# Patient Record
Sex: MALE | Race: WHITE | NOT HISPANIC OR LATINO | Employment: OTHER | URBAN - METROPOLITAN AREA
[De-identification: names, ages, dates, MRNs, and addresses within clinical notes are randomized per-mention and may not be internally consistent; named-entity substitution may affect disease eponyms.]

---

## 2022-08-24 ENCOUNTER — HOSPITAL ENCOUNTER (EMERGENCY)
Facility: HOSPITAL | Age: 67
Discharge: HOME | End: 2022-08-24
Attending: EMERGENCY MEDICINE
Payer: COMMERCIAL

## 2022-08-24 VITALS
TEMPERATURE: 98.3 F | RESPIRATION RATE: 20 BRPM | SYSTOLIC BLOOD PRESSURE: 163 MMHG | DIASTOLIC BLOOD PRESSURE: 89 MMHG | HEART RATE: 125 BPM | OXYGEN SATURATION: 98 % | WEIGHT: 273.59 LBS

## 2022-08-24 DIAGNOSIS — R74.01 TRANSAMINITIS: ICD-10-CM

## 2022-08-24 DIAGNOSIS — R11.2 NAUSEA & VOMITING: Primary | ICD-10-CM

## 2022-08-24 DIAGNOSIS — D69.6 THROMBOCYTOPENIA (HCC): ICD-10-CM

## 2022-08-24 LAB
ALBUMIN SERPL BCP-MCNC: 4.2 G/DL (ref 3.5–5)
ALP SERPL-CCNC: 83 U/L (ref 46–116)
ALT SERPL W P-5'-P-CCNC: 111 U/L (ref 12–78)
ANION GAP SERPL CALCULATED.3IONS-SCNC: 10 MMOL/L (ref 4–13)
AST SERPL W P-5'-P-CCNC: 137 U/L (ref 5–45)
BASOPHILS # BLD AUTO: 0.04 THOUSANDS/ΜL (ref 0–0.1)
BASOPHILS NFR BLD AUTO: 1 % (ref 0–1)
BILIRUB SERPL-MCNC: 0.81 MG/DL (ref 0.2–1)
BUN SERPL-MCNC: 12 MG/DL (ref 5–25)
CALCIUM SERPL-MCNC: 9.5 MG/DL (ref 8.3–10.1)
CHLORIDE SERPL-SCNC: 99 MMOL/L (ref 96–108)
CO2 SERPL-SCNC: 26 MMOL/L (ref 21–32)
CREAT SERPL-MCNC: 1.06 MG/DL (ref 0.6–1.3)
EOSINOPHIL # BLD AUTO: 0.05 THOUSAND/ΜL (ref 0–0.61)
EOSINOPHIL NFR BLD AUTO: 1 % (ref 0–6)
ERYTHROCYTE [DISTWIDTH] IN BLOOD BY AUTOMATED COUNT: 12.4 % (ref 11.6–15.1)
GFR SERPL CREATININE-BSD FRML MDRD: 73 ML/MIN/1.73SQ M
GLUCOSE SERPL-MCNC: 170 MG/DL (ref 65–140)
HCT VFR BLD AUTO: 38.2 % (ref 36.5–49.3)
HGB BLD-MCNC: 13.4 G/DL (ref 12–17)
IMM GRANULOCYTES # BLD AUTO: 0.05 THOUSAND/UL (ref 0–0.2)
IMM GRANULOCYTES NFR BLD AUTO: 1 % (ref 0–2)
LIPASE SERPL-CCNC: 136 U/L (ref 73–393)
LYMPHOCYTES # BLD AUTO: 0.36 THOUSANDS/ΜL (ref 0.6–4.47)
LYMPHOCYTES NFR BLD AUTO: 7 % (ref 14–44)
MCH RBC QN AUTO: 32.8 PG (ref 26.8–34.3)
MCHC RBC AUTO-ENTMCNC: 35.1 G/DL (ref 31.4–37.4)
MCV RBC AUTO: 93 FL (ref 82–98)
MONOCYTES # BLD AUTO: 0.41 THOUSAND/ΜL (ref 0.17–1.22)
MONOCYTES NFR BLD AUTO: 9 % (ref 4–12)
NEUTROPHILS # BLD AUTO: 3.94 THOUSANDS/ΜL (ref 1.85–7.62)
NEUTS SEG NFR BLD AUTO: 81 % (ref 43–75)
NRBC BLD AUTO-RTO: 0 /100 WBCS
PLATELET # BLD AUTO: 90 THOUSANDS/UL (ref 149–390)
PMV BLD AUTO: 10 FL (ref 8.9–12.7)
POTASSIUM SERPL-SCNC: 4.9 MMOL/L (ref 3.5–5.3)
PROT SERPL-MCNC: 8.5 G/DL (ref 6.4–8.4)
RBC # BLD AUTO: 4.09 MILLION/UL (ref 3.88–5.62)
SODIUM SERPL-SCNC: 135 MMOL/L (ref 135–147)
WBC # BLD AUTO: 4.85 THOUSAND/UL (ref 4.31–10.16)

## 2022-08-24 PROCEDURE — 80053 COMPREHEN METABOLIC PANEL: CPT | Performed by: EMERGENCY MEDICINE

## 2022-08-24 PROCEDURE — 85025 COMPLETE CBC W/AUTO DIFF WBC: CPT | Performed by: EMERGENCY MEDICINE

## 2022-08-24 PROCEDURE — 83690 ASSAY OF LIPASE: CPT | Performed by: EMERGENCY MEDICINE

## 2022-08-24 PROCEDURE — 96361 HYDRATE IV INFUSION ADD-ON: CPT

## 2022-08-24 PROCEDURE — 99285 EMERGENCY DEPT VISIT HI MDM: CPT

## 2022-08-24 PROCEDURE — 96374 THER/PROPH/DIAG INJ IV PUSH: CPT

## 2022-08-24 PROCEDURE — 99284 EMERGENCY DEPT VISIT MOD MDM: CPT | Performed by: EMERGENCY MEDICINE

## 2022-08-24 PROCEDURE — 36415 COLL VENOUS BLD VENIPUNCTURE: CPT | Performed by: EMERGENCY MEDICINE

## 2022-08-24 RX ORDER — AMLODIPINE BESYLATE 5 MG/1
5 TABLET ORAL DAILY
COMMUNITY

## 2022-08-24 RX ORDER — ONDANSETRON 4 MG/1
4 TABLET, FILM COATED ORAL EVERY 8 HOURS PRN
COMMUNITY

## 2022-08-24 RX ORDER — FAMOTIDINE 10 MG/ML
20 INJECTION, SOLUTION INTRAVENOUS ONCE
Status: COMPLETED | OUTPATIENT
Start: 2022-08-24 | End: 2022-08-24

## 2022-08-24 RX ADMIN — FAMOTIDINE 20 MG: 10 INJECTION, SOLUTION INTRAVENOUS at 16:53

## 2022-08-24 RX ADMIN — SODIUM CHLORIDE 1000 ML: 0.9 INJECTION, SOLUTION INTRAVENOUS at 16:53

## 2022-08-24 NOTE — ED PROVIDER NOTES
History  Chief Complaint   Patient presents with    Tremors     Patient reports tremors for the past week as well as N/V for the past four weeks  Due to patient's inability to keep anything down by mouth, he has not taken any of his prescription medication (water pill, losartan, amlodipine)  73 yo M with HTN, fatty liver, referred to ED by PCP, c/o not feeling since 7/27/22, when he was evaluated at [facility], specfically for rib pain, subsequently diagnosed with colitis, treated with flagyl and cipro  He says leaving the ED that day, he began having periodic vomiting, and it has been an issue for him since then  He was evaluated again at [facility] 7/29/22, c/o feeling shaky, persistent vomiting, with no additional diagnosis  He followed up PCP, subsequently finished the abx, and was referred to GI, with EGD and colonoscopy that did not have any findings, and specifically no colitis  He has been prescribed ondansetron  He has not been eating or drinking much at home, due to the vomiting, which has been occurring daily, some days more than others, averaging maybe 4 times daily  He also c/o "tremor" which he describes a fine shaking of his hands, which he showed me, and admits it is less prominent today than others  His PCP was concerned about him, so referred him to the ED  He denies smoking, drugs  He has occasional alcohol, last use 2 days ago  Prior to Admission Medications   Prescriptions Last Dose Informant Patient Reported? Taking?    FOLIC ACID PO   Yes Yes   Sig: Take by mouth in the morning   LOSARTAN POTASSIUM PO   Yes Yes   Sig: Take by mouth in the morning   TRIAMTERENE PO   Yes Yes   Sig: Take by mouth in the morning   VITAMIN D PO   Yes Yes   Sig: Take by mouth in the morning   amLODIPine (NORVASC) 5 mg tablet   Yes Yes   Sig: Take 5 mg by mouth daily   ondansetron (ZOFRAN) 4 mg tablet   Yes Yes   Sig: Take 4 mg by mouth every 8 (eight) hours as needed for nausea or vomiting Facility-Administered Medications: None       Past Medical History:   Diagnosis Date    Hypertension        Past Surgical History:   Procedure Laterality Date    REPLACEMENT TOTAL KNEE Bilateral        History reviewed  No pertinent family history  I have reviewed and agree with the history as documented  E-Cigarette/Vaping    E-Cigarette Use Never User      E-Cigarette/Vaping Substances    Nicotine No     THC No     CBD No     Flavoring No     Other No     Unknown No      Social History     Tobacco Use    Smoking status: Former Smoker    Smokeless tobacco: Never Used   Vaping Use    Vaping Use: Never used   Substance Use Topics    Alcohol use: Yes     Comment: occassionally    Drug use: Not Currently       Review of Systems   Constitutional: Negative for appetite change, chills and fever  HENT: Negative for sore throat  Respiratory: Negative for cough, shortness of breath and wheezing  Cardiovascular: Negative for chest pain and palpitations  Gastrointestinal: Positive for nausea  Negative for abdominal pain, diarrhea and vomiting  Genitourinary: Negative for dysuria and hematuria  Musculoskeletal: Negative for neck pain  Skin: Negative for rash  Neurological: Negative for dizziness, weakness and headaches  Psychiatric/Behavioral: Negative for suicidal ideas  All other systems reviewed and are negative  Physical Exam  Physical Exam  Vitals and nursing note reviewed  Constitutional:       Appearance: Normal appearance  He is well-developed  He is obese  He is not toxic-appearing or diaphoretic  HENT:      Head: Normocephalic  Right Ear: Tympanic membrane and external ear normal       Left Ear: External ear normal       Nose: Nose normal    Eyes:      General: Lids are normal       Conjunctiva/sclera: Conjunctivae normal       Pupils: Pupils are equal, round, and reactive to light  Neck:      Vascular: No JVD        Meningeal: Brudzinski's sign and Kernig's sign absent  Cardiovascular:      Rate and Rhythm: Normal rate and regular rhythm  Heart sounds: Normal heart sounds  No murmur heard  Pulmonary:      Effort: Pulmonary effort is normal  No tachypnea, accessory muscle usage or respiratory distress  Breath sounds: Normal breath sounds  No wheezing  Abdominal:      General: Bowel sounds are normal  There is no distension  Palpations: Abdomen is soft  Abdomen is not rigid  There is no mass  Tenderness: There is no abdominal tenderness  There is no guarding or rebound  Musculoskeletal:         General: Normal range of motion  Cervical back: Normal range of motion and neck supple  Lymphadenopathy:      Head:      Right side of head: No submental, submandibular, preauricular or posterior auricular adenopathy  Left side of head: No submental, submandibular, preauricular or posterior auricular adenopathy  Cervical: No cervical adenopathy  Skin:     General: Skin is warm and dry  Capillary Refill: Capillary refill takes less than 2 seconds  Findings: No rash  Rash is not purpuric  Neurological:      Mental Status: He is alert and oriented to person, place, and time  GCS: GCS eye subscore is 4  GCS verbal subscore is 5  GCS motor subscore is 6  Cranial Nerves: No cranial nerve deficit  Sensory: No sensory deficit  Motor: Tremor (fine tremor of hands when held out, but not at rest) present  Coordination: Coordination normal       Deep Tendon Reflexes: Reflexes are normal and symmetric  Psychiatric:         Speech: Speech normal          Behavior: Behavior normal          Thought Content:  Thought content normal          Vital Signs  ED Triage Vitals [08/24/22 1529]   Temperature Pulse Respirations Blood Pressure SpO2   98 3 °F (36 8 °C) (!) 125 20 163/89 98 %      Temp Source Heart Rate Source Patient Position - Orthostatic VS BP Location FiO2 (%)   Oral Monitor Sitting Right arm --      Pain Score       No Pain           Vitals:    08/24/22 1529   BP: 163/89   Pulse: (!) 125   Patient Position - Orthostatic VS: Sitting         Visual Acuity      ED Medications  Medications   sodium chloride 0 9 % bolus 1,000 mL (0 mL Intravenous Stopped 8/24/22 1751)   Famotidine (PF) (PEPCID) injection 20 mg (20 mg Intravenous Given 8/24/22 1653)       Diagnostic Studies  Results Reviewed     Procedure Component Value Units Date/Time    Comprehensive metabolic panel [[id number]]  (Abnormal) Collected: 08/24/22 1558    Lab Status: Final result Specimen: Blood from Arm, Left Updated: 08/24/22 1618     Sodium 135 mmol/L      Potassium 4 9 mmol/L      Chloride 99 mmol/L      CO2 26 mmol/L      ANION GAP 10 mmol/L      BUN 12 mg/dL      Creatinine 1 06 mg/dL      Glucose 170 mg/dL      Calcium 9 5 mg/dL       U/L       U/L      Alkaline Phosphatase 83 U/L      Total Protein 8 5 g/dL      Albumin 4 2 g/dL      Total Bilirubin 0 81 mg/dL      eGFR 73 ml/min/1 73sq m     Narrative:      [place] guidelines for Chronic Kidney Disease (CKD):     Stage 1 with normal or high GFR (GFR > 90 mL/min/1 73 square meters)    Stage 2 Mild CKD (GFR = 60-89 mL/min/1 73 square meters)    Stage 3A Moderate CKD (GFR = 45-59 mL/min/1 73 square meters)    Stage 3B Moderate CKD (GFR = 30-44 mL/min/1 73 square meters)    Stage 4 Severe CKD (GFR = 15-29 mL/min/1 73 square meters)    Stage 5 End Stage CKD (GFR <15 mL/min/1 73 square meters)  Note: GFR calculation is accurate only with a steady state creatinine    Lipase [[id number]]  (Normal) Collected: 08/24/22 1558    Lab Status: Final result Specimen: Blood from Arm, Left Updated: 08/24/22 1618     Lipase 136 u/L     CBC and differential [[id number]]  (Abnormal) Collected: 08/24/22 1558    Lab Status: Final result Specimen: Blood from Arm, Left Updated: 08/24/22 1607     WBC 4 85 Thousand/uL      RBC 4 09 Million/uL      Hemoglobin 13 4 g/dL Hematocrit 38 2 %      MCV 93 fL      MCH 32 8 pg      MCHC 35 1 g/dL      RDW 12 4 %      MPV 10 0 fL      Platelets 90 Thousands/uL      nRBC 0 /100 WBCs      Neutrophils Relative 81 %      Immat GRANS % 1 %      Lymphocytes Relative 7 %      Monocytes Relative 9 %      Eosinophils Relative 1 %      Basophils Relative 1 %      Neutrophils Absolute 3 94 Thousands/µL      Immature Grans Absolute 0 05 Thousand/uL      Lymphocytes Absolute 0 36 Thousands/µL      Monocytes Absolute 0 41 Thousand/µL      Eosinophils Absolute 0 05 Thousand/µL      Basophils Absolute 0 04 Thousands/µL                  No orders to display              Procedures  Procedures         ED Course  ED Course as of 08/24/22 1934   Wed Aug 24, 2022   1720 Mild elevation of LFTs over his chronic baseline   1720 Platelet Count(!): 90  Mild increase of thrombocytopenia from his previous, trend has been decreasing platement  He has no purpura, no petechiae  1726 Reviewed results with patient at bedside and updated on the plan  Went over labs that are abnormal, nothing severe requiring emergency intervention, but for which I am advising outpatient followup with GI and Hem/Onc                                             MDM    Disposition  Final diagnoses:   Nausea & vomiting   Thrombocytopenia ([address] )   Transaminitis     Time reflects when diagnosis was documented in both MDM as applicable and the Disposition within this note     Time User Action Codes Description Comment    8/24/2022  5:27 PM Ehrenbergoscar Llanes Add [R11 2] Nausea & vomiting     8/24/2022  5:28 PM Mara Gibes L Add [D69 6] Thrombocytopenia ([address] )     8/24/2022  5:28 PM Ehrenberg Greenjann Add [R74 01] Transaminitis       ED Disposition     ED Disposition   Discharge    Condition   Good    Date/Time   Wed Aug 24, 2022  5:27 PM    Comment   Marita Pulse discharge to home/self care                 Follow-up Information     Follow up With Specialties Details Why Contact Info Additional [facility] Schedule an appointment as soon as possible for a visit  For followup [address]  [address] [facility] [address]  Antoinette Mulugeta Reyes [address] [facility] [place], [address], [address], [facility] [place], [address] [phone]    Frances [facility] On[facility] Call  For followup [place] [phone]  [address] [facility] [place], [address] [facility], [facility], [place], [address] Internal Medicine    [address] [address] [phone]           Discharge Medication List as of 8/24/2022  5:45 PM      CONTINUE these medications which have NOT CHANGED    Details   amLODIPine (NORVASC) 5 mg tablet Take 5 mg by mouth daily, Historical Med      FOLIC ACID PO Take by mouth in the morning, Historical Med      LOSARTAN POTASSIUM PO Take by mouth in the morning, Historical Med      ondansetron (ZOFRAN) 4 mg tablet Take 4 mg by mouth every 8 (eight) hours as needed for nausea or vomiting, Historical Med      TRIAMTERENE PO Take by mouth in the morning, Historical Med      VITAMIN D PO Take by mouth in the morning, Historical Med                 PDMP Review     None          ED Provider  Electronically Signed by           Theresia Dancer, MD  08/24/22 7394

## 2022-08-24 NOTE — DISCHARGE INSTRUCTIONS
Thrombocytopenia   WHAT YOU NEED TO KNOW:   Thrombocytopenia occurs when your body does not have enough platelets  Platelets are cells that help your blood clot  Your body may not be making enough platelets, or it may be destroying too many platelets  When platelets become low, your risk for bleeding increases  Severe bleeding may become life-threatening  Return to the emergency department if:   You have bleeding that does not stop after you elevate and place pressure on the area  You vomit blood or material that looks like coffee grounds  Your arm or leg feels warm, tender, and painful  It may look swollen and red  You suddenly feel lightheaded, dizzy, or weak  Contact your healthcare provider if:   You have bleeding from your gums, mouth, or nose  You have irregular or heavy menstrual bleeding  You have blood in your urine or bowel movement  You have more bruises or small red or purple spots on your skin  You have questions or concerns about your condition or care  Self-care to help prevent bleeding:  Examine your skin for minor bumps, scrapes, and cuts  These injuries can increase your risk for bleeding that can become life-threatening  Use caution with skin and mouth care  Use a soft washcloth and a soft toothbrush  This can keep your skin and gums from bleeding  Keep your nails trimmed  If you shave, use an electric shaver  Do not strain when you have a bowel movement  This can increase pressure in your brain and could cause bleeding  Ask your healthcare provider about a stool softener or laxative if you are constipated  Do not use enemas or suppositories  Use a cool mist humidifier  to increase moisture in your home  This may help prevent coughing or nosebleeds  Coughing can increase pressure in your brain and cause bleeding  Avoid activities that may cause scratches or bruises  Wear shoes or slippers to protect your feet from injury   Ask which activities are safe for you  Do not take aspirin or NSAIDs  These medicines can cause you to bleed and bruise more easily

## 2022-08-26 ENCOUNTER — CONSULT (OUTPATIENT)
Dept: GASTROENTEROLOGY | Facility: CLINIC | Age: 67
End: 2022-08-26
Payer: COMMERCIAL

## 2022-08-26 VITALS
SYSTOLIC BLOOD PRESSURE: 139 MMHG | TEMPERATURE: 98.2 F | HEART RATE: 71 BPM | WEIGHT: 266.6 LBS | DIASTOLIC BLOOD PRESSURE: 78 MMHG

## 2022-08-26 DIAGNOSIS — R11.2 NAUSEA & VOMITING: Primary | ICD-10-CM

## 2022-08-26 DIAGNOSIS — R63.4 WEIGHT LOSS: ICD-10-CM

## 2022-08-26 DIAGNOSIS — R74.01 TRANSAMINITIS: ICD-10-CM

## 2022-08-26 PROCEDURE — 99244 OFF/OP CNSLTJ NEW/EST MOD 40: CPT | Performed by: INTERNAL MEDICINE

## 2022-08-26 RX ORDER — PANTOPRAZOLE SODIUM 40 MG/1
40 TABLET, DELAYED RELEASE ORAL DAILY
Qty: 30 TABLET | Refills: 3 | Status: SHIPPED | OUTPATIENT
Start: 2022-08-26

## 2022-08-26 RX ORDER — METOCLOPRAMIDE 5 MG/1
5 TABLET ORAL 3 TIMES DAILY
Qty: 90 TABLET | Refills: 2 | Status: SHIPPED | OUTPATIENT
Start: 2022-08-26

## 2022-08-26 NOTE — PATIENT INSTRUCTIONS
Short trial of reglan 3 times a day  Zofran as needed  Clear liquid diet for 24-48 hours  Then we will advance to soft foods, low fiber and low fat (and small portions at a time)  Try Ensure clears  Abdominal ultrasound  Send blood tests and workup for elevated liver enzymes  Avoid alcohol  Please try to send us the recent endoscopy and colonoscopy reports  Pending above consider elastography, MR enterography   Can also send CRP, fecal calprotectin in the future  Consider gastric emptying study    Metoclopramide (By mouth)   Metoclopramide (met-oh-KLOE-pra-mide)  Relieves symptoms of gastroesophageal reflux disease (GERD)  Also relieves symptoms of gastroparesis (slow stomach emptying) in patients with diabetes  Brand Name(s): Reglan   There may be other brand names for this medicine  When This Medicine Should Not Be Used: This medicine is not right for everyone  Do not use this medicine if you had an allergic reaction to metoclopramide, or if you have epilepsy (seizures), stomach or bowel bleeding or blockage, pheochromocytoma (adrenal gland tumor), or a history of tardive dyskinesia (movement disorder)  How to Use This Medicine:   Solution, Tablet, Dissolving Tablet  Take your medicine as directed  Your dose may need to be changed several times to find what works best for you  Take this medicine on an empty stomach, 30 minutes before each meal and at bedtime  Make sure your hands are dry before you handle the disintegrating tablet  Peel back the foil from the blister pack, then remove the tablet  Do not push the tablet through the foil  Place the tablet in your mouth  After it has melted, swallow or take a drink of water  Measure the oral liquid medicine with a marked measuring spoon, oral syringe, or medicine cup  This medicine is not for long-term use  Do not use this medicine for longer than 12 weeks  This medicine should come with a Medication Guide   Ask your pharmacist for a copy if you do not have one   Missed dose: Take a dose as soon as you remember  If it is almost time for your next dose, wait until then and take a regular dose  Do not take extra medicine to make up for a missed dose  Store the medicine in a closed container at room temperature, away from heat, moisture, and direct light  Do not freeze the oral liquid  Drugs and Foods to Avoid:   Ask your doctor or pharmacist before using any other medicine, including over-the-counter medicines, vitamins, and herbal products  Some medicines can affect how metoclopramide works  Tell your doctor if you are using any of the following:  Acetaminophen, apomorphine, atovaquone, bromocriptine, cabergoline, cyclosporine, digoxin, fosfomycin, levodopa, posaconazole oral liquid, pramipexole, ropinirole, rotigotine, sirolimus, tacrolimus, tetracycline  Insulin or diabetes medicine  Medicine for depression (including bupropion, fluoxetine, paroxetine, an MAO inhibitor)  Medicine to treat mental illness  Narcotic pain medicine  Tell your doctor if you use anything else that makes you sleepy  Some examples are allergy medicine, narcotic pain medicine, and alcohol  Do not drink alcohol while you are using this medicine  Warnings While Using This Medicine:   Tell your doctor if you are pregnant or breastfeeding, or if you have kidney disease, liver disease, heart disease, congestive heart failure, heart rhythm problems, diabetes, Parkinson's disease, high blood pressure, or a history of depression  Tell your doctor if you had recent surgery in your stomach  This medicine may cause the following problems:  Tardive dyskinesia  Neuroleptic malignant syndrome (a nerve disorder that could be life-threatening)  Changes in mood or behavior  High blood pressure  Increased levels of prolactin hormone  This medicine may make you dizzy, drowsy, or have trouble with thinking or controlling body movements   Do not drive or do anything else that could be dangerous until you know how this medicine affects you  Do not stop using this medicine suddenly  Your doctor will need to slowly decrease your dose before you stop it completely  Your doctor will check your progress and the effects of this medicine at regular visits  Keep all appointments  Keep all medicine out of the reach of children  Never share your medicine with anyone  Possible Side Effects While Using This Medicine:   Call your doctor right away if you notice any of these side effects: Allergic reaction: Itching or hives, swelling in your face or hands, swelling or tingling in your mouth or throat, chest tightness, trouble breathing  Fast, slow, or uneven heartbeat  Jerky muscle movements you cannot control (often in your face, tongue, or jaw)  Lightheadedness, dizziness, or fainting  Problems with balance or walking  Rapid weight gain, swelling in your hands, arms, legs, or feet  Seizures  Severe muscle stiffness, tremors, twitching  Swelling of the breasts, breast soreness, nipple discharge (in both women and men)  Trouble breathing  Unusual changes in mood or behavior, thoughts of hurting yourself or others  Yellowing of your skin or the whites of your eyes  If you notice these less serious side effects, talk with your doctor:   Constipation, diarrhea, nausea, stomach cramps  Headache  Irregular menstrual periods  Problems having sex  Restlessness, confusion, trouble sleeping  Skin rash, itching  If you notice other side effects that you think are caused by this medicine, tell your doctor  Call your doctor for medical advice about side effects  You may report side effects to FDA at 6-005-FDA-1914    © Copyright [employer] 2022 Information is for End User's use only and may not be sold, redistributed or otherwise used for commercial purposes  The above information is an  only  It is not intended as medical advice for individual conditions or treatments   Talk to your doctor, nurse or pharmacist before following any medical regimen to see if it is safe and effective for you  Gastroparesis diet:  Changes to your diet  Maintaining adequate nutrition is the most important goal in the treatment of gastroparesis  Many people can manage gastroparesis with diet changes and dietary changes are the first step in managing this condition  Your doctor may refer you to a dietitian who can work with you to find foods that are easier for you to digest so that you're more likely to get enough calories and nutrients from the food you eat   A dietitian might suggest that you try to:  Eat smaller meals more frequently   Chew food thoroughly   Eat well-cooked fruits and vegetables rather than raw fruits and vegetables   Avoid fibrous fruits and vegetables, such as oranges and broccoli, which may cause bezoars   Choose mostly low-fat foods, but if you can tolerate them, add small servings of fatty foods to your diet   Try soups and pureed foods if liquids are easier for you to swallow   Drink about 34 to 51 ounces (1 to 1 5 liters) of water a day   Exercise gently after you eat, such as going for a walk   Avoid carbonated drinks, alcohol and smoking   Try to avoid lying down for 2 hours after a meal   Take a multivitamin daily    Here's a brief list of foods recommended for people with gastroparesis (your dietitian can give you a more comprehensive list):  Starches  White bread and rolls and "light" whole-wheat bread without nuts or seeds   Plain or egg bagels   English muffins   Flour or corn tortillas   Pancakes   Puffed wheat and rice cereals   Cream of wheat or rice   White crackers   Potatoes, white or sweet (no skin)   Baked french fries   Rice   Pasta  Protein  Lean beef, veal and pork (not fried)   Chicken or turkey (no skin and not fried)   Crab, lobster, shrimp, clams, scallops, oysters   Tuna (packed in water)   [employer]  [employer]   Eggs   Tofu   Strained meat baby food  Fruits and vegetables  Baby food vegetables and fruits   Tomato sauce, paste, puree, juice   Carrots (cooked)   Beets (cooked)   Mushrooms (cooked)   Vegetable juice   Vegetable broth   Fruit juices and drinks   Applesauce   Bananas   Peaches and pears (canned)  Dairy  Milk, if tolerated   Yogurt (without fruit pieces)   Custard and pudding   Frozen yogurt

## 2022-08-26 NOTE — PROGRESS NOTES
[facility] - Outpatient Consultation  Cuong Orellana 74 y o  male MRN: [id number]  Encounter: [id number]          ASSESSMENT AND PLAN:    Cuong Orellana is a 74 y o  male with alcoholic fatty liver, FELIBERTO, hypertension who presents with complaint of nausea and vomiting as well as elevated liver enzymes  He has been having 3 weeks of nausea, vomiting and poor PO intake with weight loss  Also elevated LFTs  Recent imaging with colitis  Suspect infectious gastroenteritis and colitis now with post-infectious symptoms of gastroparesis and ileus  LFTs could also be related to infection but ddx is broad (autoimmune, viral, metabolic causes)  CT scan from July 27th showed homogeneous low attenuation in the liver without focal mass representing fatty infiltration  Also with small bowel loops normal in caliber but there was mucosal wall thickening of the descending colon which may be from underdistention or colitis  Kelsi mesentery with multiple mesenteric lymph nodes with broad differential that includes mesenteric adenitis  In May of 2020 AST and ALT were 65 and 69 respectively  In July the AST and ALT were 94 and 81 respectively  Recent CMP with AST of 137 and ALT of 111, alkaline phosphatase of 83, normal creatinine normal albumin  Total bilirubin normal   CBC with normal white blood cell count and hemoglobin platelets of 90  Hemoglobin A1c 5 7  HFE testing in 2020 reportedly negative for hereditary hemochromatosis  Recent endoscopy and colonoscopy reports not available but reportedly with some mild gastritis as well as a polyp in the colon that was removed  1  Nausea & vomiting    2  Transaminitis    3   Weight loss        Orders Placed This Encounter   Procedures    US abdomen complete    Ferritin    Iron    Transferrin    Comprehensive metabolic panel    Protime-INR    Celiac Disease Antibody Profile    TSH, 3rd generation with Free T4 reflex    KELSEY Screen w/ Reflex to Titer/Pattern    Anti-smooth muscle antibody, IgG    Antimitochondrial antibody    Chronic Hepatitis Panel    Hepatitis panel, acute    EBV acute panel    CMV DNA, quantitative, PCR    CMV IgG/IgM Antibodies    Ceruloplasmin     Short trial of reglan 3 times a day  Recent EKG reviewed  Zofran as needed  Gastroparesis diet  Clear liquid diet for 24-48 hours  Try Ensure clears  Abdominal ultrasound  Send blood tests and workup for elevated liver enzymes  Avoid alcohol  Please try to send us the recent endoscopy and colonoscopy reports  Pending above consider elastography, MR enterography   Can also send CRP, fecal calprotectin in the future  Consider gastric emptying study    ______________________________________________________________________    Referred by Dr Mary Harmon for nausea and vomiting    HPI:    Lola Whitaker is a 74 y o  male who presents with complaint of nausea and vomiting  3 weeks ago he went to the hospital  Thought he broke a rib  Went for imaging and told it was abnormal  He was started on cipro and flagyl  He then started vomiting  This continued and after a few days he saw his PCP who recommended he stop the antibiotics  1 week after he went for an EGD and colonoscopy (they said some mild inflammation and 2 polyps)  Everytime he tries to eat he will vomit  It occurs maybe 10 minutes later  Even liquids are not staying down  HE lost 20 lbs  It never happened before  No blood or black vomit  No abdominal pain, heartburn, dysphagia, odynophagia  No diarrhea but he is not having many BMs  No brbpr or black stools     Father and sister with colon cancer        REVIEW OF SYSTEMS:  10 point ROS reviewed and negative, except as above      Historical Information   Past Medical History:   Diagnosis Date    Hypertension      Past Surgical History:   Procedure Laterality Date    REPLACEMENT TOTAL KNEE Bilateral      Social History   Social History     Substance and Sexual Activity   Alcohol Use Yes    Comment: occassionally     Social History     Substance and Sexual Activity   Drug Use Not Currently     Social History     Tobacco Use   Smoking Status Former Smoker   Smokeless Tobacco Never Used     History reviewed  No pertinent family history  Meds/Allergies       Current Outpatient Medications:     amLODIPine (NORVASC) 5 mg tablet    FOLIC ACID PO    LOSARTAN POTASSIUM PO    metoclopramide (REGLAN) 5 mg tablet    ondansetron (ZOFRAN) 4 mg tablet    pantoprazole (PROTONIX) 40 mg tablet    TRIAMTERENE PO    VITAMIN D PO    No Known Allergies        Objective     Blood pressure 139/78, pulse 71, temperature 98 2 °F (36 8 °C), weight 121 kg (266 lb 9 6 oz)  There is no height or weight on file to calculate BMI  PHYSICAL EXAMINATION:    General Appearance:   Alert, cooperative, no distress   HEENT:  Normocephalic, atraumatic, anicteric  Neck supple, symmetrical, trachea midline  Lungs:   Equal chest rise and unlabored breathing, normal effort, no coughing  Cardiovascular:   No visualized JVD  Abdomen:   No abdominal distension  Skin:   No jaundice, rashes, or lesions  Musculoskeletal:   Normal range of motion visualized  Psych:  Normal affect and normal insight  Neuro:  Alert and appropriate  Lab Results:   No visits with results within 1 Day(s) from this visit     Latest known visit with results is:   Admission on 08/24/2022, Discharged on 08/24/2022   Component Date Value    WBC 08/24/2022 4 85     RBC 08/24/2022 4 09     Hemoglobin 08/24/2022 13 4     Hematocrit 08/24/2022 38 2     MCV 08/24/2022 93     MCH 08/24/2022 32 8     MCHC 08/24/2022 35 1     RDW 08/24/2022 12 4     MPV 08/24/2022 10 0     Platelets 42/77/3123 90 (A)    nRBC 08/24/2022 0     Neutrophils Relative 08/24/2022 81 (A)    Immat GRANS % 08/24/2022 1     Lymphocytes Relative 08/24/2022 7 (A)    Monocytes Relative 08/24/2022 9     Eosinophils Relative 08/24/2022 1     Basophils Relative 08/24/2022 1     Neutrophils Absolute 08/24/2022 3 94     Immature Grans Absolute 08/24/2022 0 05     Lymphocytes Absolute 08/24/2022 0 36 (A)    Monocytes Absolute 08/24/2022 0 41     Eosinophils Absolute 08/24/2022 0 05     Basophils Absolute 08/24/2022 0 04     Sodium 08/24/2022 135     Potassium 08/24/2022 4 9     Chloride 08/24/2022 99     CO2 08/24/2022 26     ANION GAP 08/24/2022 10     BUN 08/24/2022 12     Creatinine 08/24/2022 1 06     Glucose 08/24/2022 170 (A)    Calcium 08/24/2022 9 5     AST 08/24/2022 137 (A)    ALT 08/24/2022 111 (A)    Alkaline Phosphatase 08/24/2022 83     Total Protein 08/24/2022 8 5 (A)    Albumin 08/24/2022 4 2     Total Bilirubin 08/24/2022 0 81     eGFR 08/24/2022 73     Lipase 08/24/2022 136        Lab Results   Component Value Date    WBC 4 85 08/24/2022    HGB 13 4 08/24/2022    HCT 38 2 08/24/2022    MCV 93 08/24/2022    PLT 90 (L) 08/24/2022       Lab Results   Component Value Date    SODIUM 135 08/24/2022    K 4 9 08/24/2022    CL 99 08/24/2022    CO2 26 08/24/2022    AGAP 10 08/24/2022    BUN 12 08/24/2022    CREATININE 1 06 08/24/2022    GLUC 170 (H) 08/24/2022    CALCIUM 9 5 08/24/2022     (H) 08/24/2022     (H) 08/24/2022    ALKPHOS 83 08/24/2022    TP 8 5 (H) 08/24/2022    TBILI 0 81 08/24/2022    EGFR 73 08/24/2022       No results found for: CRP    No results found for: [id number], TSH    No results found for: IRON, TIBC, FERRITIN    Radiology Results:   No results found

## 2022-08-29 ENCOUNTER — APPOINTMENT (OUTPATIENT)
Dept: LAB | Facility: HOSPITAL | Age: 67
End: 2022-08-29
Attending: INTERNAL MEDICINE
Payer: COMMERCIAL

## 2022-08-29 ENCOUNTER — TELEPHONE (OUTPATIENT)
Dept: GASTROENTEROLOGY | Facility: CLINIC | Age: 67
End: 2022-08-29

## 2022-08-29 ENCOUNTER — HOSPITAL ENCOUNTER (OUTPATIENT)
Dept: ULTRASOUND IMAGING | Facility: HOSPITAL | Age: 67
Discharge: HOME | End: 2022-08-29
Attending: INTERNAL MEDICINE
Payer: COMMERCIAL

## 2022-08-29 DIAGNOSIS — R74.01 TRANSAMINITIS: ICD-10-CM

## 2022-08-29 LAB
ALBUMIN SERPL BCP-MCNC: 4.7 G/DL (ref 3.5–5)
ALP SERPL-CCNC: 65 U/L (ref 43–122)
ALT SERPL W P-5'-P-CCNC: 158 U/L
ANION GAP SERPL CALCULATED.3IONS-SCNC: 8 MMOL/L (ref 5–14)
AST SERPL W P-5'-P-CCNC: 186 U/L (ref 17–59)
BILIRUB SERPL-MCNC: 0.94 MG/DL (ref 0.2–1)
BUN SERPL-MCNC: 22 MG/DL (ref 5–25)
CALCIUM SERPL-MCNC: 9.9 MG/DL (ref 8.4–10.2)
CHLORIDE SERPL-SCNC: 99 MMOL/L (ref 96–108)
CO2 SERPL-SCNC: 30 MMOL/L (ref 21–32)
CREAT SERPL-MCNC: 1.36 MG/DL (ref 0.7–1.5)
FERRITIN SERPL-MCNC: 2585 NG/ML (ref 8–388)
GFR SERPL CREATININE-BSD FRML MDRD: 54 ML/MIN/1.73SQ M
GLUCOSE P FAST SERPL-MCNC: 118 MG/DL (ref 70–99)
INR PPP: 1.03 (ref 0.84–1.19)
IRON SERPL-MCNC: 63 UG/DL (ref 65–175)
POTASSIUM SERPL-SCNC: 4.4 MMOL/L (ref 3.5–5.3)
PROT SERPL-MCNC: 7.7 G/DL (ref 6.4–8.4)
PROTHROMBIN TIME: 13.8 SECONDS (ref 11.6–14.5)
SODIUM SERPL-SCNC: 137 MMOL/L (ref 135–147)
TRANSFERRIN SERPL-MCNC: 208 MG/DL (ref 200–400)
TSH SERPL DL<=0.05 MIU/L-ACNC: 2.83 UIU/ML (ref 0.45–4.5)

## 2022-08-29 PROCEDURE — 86644 CMV ANTIBODY: CPT

## 2022-08-29 PROCEDURE — 86231 EMA EACH IG CLASS: CPT

## 2022-08-29 PROCEDURE — 80053 COMPREHEN METABOLIC PANEL: CPT

## 2022-08-29 PROCEDURE — 85610 PROTHROMBIN TIME: CPT

## 2022-08-29 PROCEDURE — 86704 HEP B CORE ANTIBODY TOTAL: CPT

## 2022-08-29 PROCEDURE — 86364 TISS TRNSGLTMNASE EA IG CLAS: CPT

## 2022-08-29 PROCEDURE — 86645 CMV ANTIBODY IGM: CPT

## 2022-08-29 PROCEDURE — 82784 ASSAY IGA/IGD/IGG/IGM EACH: CPT

## 2022-08-29 PROCEDURE — 82728 ASSAY OF FERRITIN: CPT

## 2022-08-29 PROCEDURE — 86038 ANTINUCLEAR ANTIBODIES: CPT

## 2022-08-29 PROCEDURE — 86381 MITOCHONDRIAL ANTIBODY EACH: CPT

## 2022-08-29 PROCEDURE — 83540 ASSAY OF IRON: CPT

## 2022-08-29 PROCEDURE — 86664 EPSTEIN-BARR NUCLEAR ANTIGEN: CPT

## 2022-08-29 PROCEDURE — 84443 ASSAY THYROID STIM HORMONE: CPT

## 2022-08-29 PROCEDURE — 82390 ASSAY OF CERULOPLASMIN: CPT

## 2022-08-29 PROCEDURE — 86665 EPSTEIN-BARR CAPSID VCA: CPT

## 2022-08-29 PROCEDURE — 76700 US EXAM ABDOM COMPLETE: CPT

## 2022-08-29 PROCEDURE — 84466 ASSAY OF TRANSFERRIN: CPT

## 2022-08-29 PROCEDURE — 86663 EPSTEIN-BARR ANTIBODY: CPT

## 2022-08-29 PROCEDURE — 86258 DGP ANTIBODY EACH IG CLASS: CPT

## 2022-08-29 PROCEDURE — 80074 ACUTE HEPATITIS PANEL: CPT

## 2022-08-29 PROCEDURE — 36415 COLL VENOUS BLD VENIPUNCTURE: CPT

## 2022-08-29 PROCEDURE — 86015 ACTIN ANTIBODY EACH: CPT

## 2022-08-29 NOTE — TELEPHONE ENCOUNTER
Patients GI provider:  Dr Shana Olivo    Number to return call: [phone]    Reason for call: Pt's wife, Rubina Webb calling stating on first day of liquid diet, pt vomited 3 times in the beggining and then his stomach started to settle  Today he will start his soft diet  Pt will also be going for his lab and US today  Scheduled procedure/appointment date if applicable: Appt   10/6/22

## 2022-08-30 DIAGNOSIS — R79.89 ELEVATED FERRITIN: Primary | ICD-10-CM

## 2022-08-30 LAB
ACTIN IGG SERPL-ACNC: 17 UNITS (ref 0–19)
CERULOPLASMIN SERPL-MCNC: 19.7 MG/DL (ref 16–31)
CMV IGG SERPL IA-ACNC: >10 U/ML (ref 0–0.59)
CMV IGM SERPL IA-ACNC: <30 AU/ML (ref 0–29.9)
EBV NA IGG SER IA-ACNC: 108 U/ML (ref 0–17.9)
EBV VCA IGG SER IA-ACNC: >600 U/ML (ref 0–17.9)
EBV VCA IGM SER IA-ACNC: <36 U/ML (ref 0–35.9)
ENDOMYSIUM IGA SER QL: NEGATIVE
GLIADIN PEPTIDE IGA SER-ACNC: 7 UNITS (ref 0–19)
GLIADIN PEPTIDE IGG SER-ACNC: 2 UNITS (ref 0–19)
HAV IGM SER QL: NORMAL
HBV CORE AB SER QL: NORMAL
HBV CORE IGM SER QL: NORMAL
HBV CORE IGM SER QL: NORMAL
HBV SURFACE AG SER QL: NORMAL
HBV SURFACE AG SER QL: NORMAL
HCV AB SER QL: NORMAL
HCV AB SER QL: NORMAL
IGA SERPL-MCNC: 400 MG/DL (ref 61–437)
INTERPRETATION: ABNORMAL
MITOCHONDRIA M2 IGG SER-ACNC: <20 UNITS (ref 0–20)
TTG IGA SER-ACNC: <2 U/ML (ref 0–3)
TTG IGG SER-ACNC: <2 U/ML (ref 0–5)

## 2022-08-31 ENCOUNTER — TELEPHONE (OUTPATIENT)
Dept: GASTROENTEROLOGY | Facility: CLINIC | Age: 67
End: 2022-08-31

## 2022-08-31 DIAGNOSIS — R79.89 ELEVATED FERRITIN: Primary | ICD-10-CM

## 2022-08-31 LAB
CMV DNA SERPL NAA+PROBE-ACNC: NEGATIVE IU/ML
CMV DNA SERPL NAA+PROBE-LOG IU: NORMAL LOG10 IU/ML
RYE IGE QN: NEGATIVE

## 2022-08-31 NOTE — TELEPHONE ENCOUNTER
Hi,    Can you schedule him to see Dr Andra Chery or Dr Guille Johnsonor, ideally sometime soon (perhaps within the next 2 weeks)?     Thank you

## 2022-08-31 NOTE — TELEPHONE ENCOUNTER
Patients GI provider:  Dr Lupe Lindsay    Number to return call: [phone], wife Romario Fears)    Reason for call: Wife is asking for a call because  is concerned about a ferritin level Dr Bradley Johnson him about  Please call wife, thank you!     Scheduled procedure/appointment date if applicable: Apt/procedure 10/6/22

## 2022-08-31 NOTE — TELEPHONE ENCOUNTER
Patient is scheduled for an ov with Dr Tevin Cazares on 9/2 at 9:00 AM at [facility]

## 2022-08-31 NOTE — TELEPHONE ENCOUNTER
Called and spoke with the patient  (his wife is not available)    He is feeling better  Eating and no significant nausea and vomiting  We discussed the ferritin level  Prior HFE gene testing negative  Hgb not significantly elevated       Will have him see hepatology and he has an appointment with hematology

## 2022-09-01 NOTE — TELEPHONE ENCOUNTER
Spoke with pt  Cmp is fasting for 8 hrs or longer & confirmed appt with Dr Yuly Sampson for 9/2 @0900  Pt agreeable and verbalized understanding of the same

## 2022-09-01 NOTE — TELEPHONE ENCOUNTER
Patients GI provider:  Dr Mile Arteaga     Number to return call: [phone]    Reason for call: Pt calling requesting to speak with someone regarding lab work   Would like to know if he should fast or not     Scheduled procedure/appointment date if applicable: Apt/procedure 10-6-22

## 2022-09-02 ENCOUNTER — PREP FOR PROCEDURE (OUTPATIENT)
Dept: INTERVENTIONAL RADIOLOGY/VASCULAR | Facility: CLINIC | Age: 67
End: 2022-09-02

## 2022-09-02 ENCOUNTER — TELEPHONE (OUTPATIENT)
Dept: GASTROENTEROLOGY | Facility: CLINIC | Age: 67
End: 2022-09-02

## 2022-09-02 ENCOUNTER — OFFICE VISIT (OUTPATIENT)
Dept: GASTROENTEROLOGY | Facility: CLINIC | Age: 67
End: 2022-09-02
Payer: COMMERCIAL

## 2022-09-02 VITALS
SYSTOLIC BLOOD PRESSURE: 100 MMHG | HEIGHT: 68 IN | WEIGHT: 262.6 LBS | BODY MASS INDEX: 39.8 KG/M2 | TEMPERATURE: 97.5 F | HEART RATE: 65 BPM | DIASTOLIC BLOOD PRESSURE: 66 MMHG | OXYGEN SATURATION: 99 %

## 2022-09-02 DIAGNOSIS — K76.0 FATTY LIVER: ICD-10-CM

## 2022-09-02 DIAGNOSIS — R79.89 ELEVATED FERRITIN: ICD-10-CM

## 2022-09-02 DIAGNOSIS — R79.89 ELEVATED LFTS: Primary | ICD-10-CM

## 2022-09-02 PROCEDURE — 99214 OFFICE O/P EST MOD 30 MIN: CPT | Performed by: INTERNAL MEDICINE

## 2022-09-02 RX ORDER — FOLIC ACID 1 MG/1
1000 TABLET ORAL DAILY
COMMUNITY
Start: 2022-08-13

## 2022-09-02 RX ORDER — ACETAMINOPHEN 160 MG
2000 TABLET,DISINTEGRATING ORAL DAILY
COMMUNITY
Start: 2022-08-16

## 2022-09-02 RX ORDER — LOSARTAN POTASSIUM 50 MG/1
TABLET ORAL
COMMUNITY
Start: 2022-08-02

## 2022-09-02 RX ORDER — LORATADINE 10 MG/1
10 TABLET ORAL
COMMUNITY

## 2022-09-02 RX ORDER — TRIAMTERENE AND HYDROCHLOROTHIAZIDE 37.5; 25 MG/1; MG/1
CAPSULE ORAL
COMMUNITY
Start: 2022-06-20

## 2022-09-02 NOTE — TELEPHONE ENCOUNTER
Patients GI provider:  Dr Angeles Maguire / Nancy Sosa    Number to return call: [phone]    Reason for call: Pt's wife calling, wanted to let Dr Angeles Maguire know that Dr Nancy Sosa stated Ashok Jacobson did not need any more blood work      Scheduled procedure/appointment date if applicable: Apt/procedure 9/2/22

## 2022-09-02 NOTE — PROGRESS NOTES
[facility] - Outpatient Follow-up Note  Herbert Singh 74 y o  male MRN: [id number]  Encounter: [id number]          ASSESSMENT AND PLAN:       1  Fatty liver with slowly but persistently rising transaminases  He has a fatty appearing liver on imaging, has few components of the metabolic syndrome, and states he is drinking 2 alcoholic beverages per day  Per records in epic it is possible he is understanding the amount that he is drinking  At this point in time, with thorough evaluation be negative thus far, I have requested a liver biopsy to elucidate the etiology of his elevated LFTs  His transaminases appear higher than I would expect with normal routine nonalcoholic steatohepatitis  He does not have any evidence of alcoholic hepatitis at this point in time  To be thorough, I have also requested alpha-1 antitrypsin levels as well as a serum ceruloplasmin  I discussed with Mr Pawan Srivastava, that his fatty appearing liver could be related to both nonalcoholic steatohepatitis as well as alcohol-related steatohepatitis  I counseled him on the importance of cutting back alcohol use  I discussed with Mr Stewartnas Atif the natural history of fatty liver disease, including risks for development, and risk for progression to cirrhosis and its complications  We reviewed that there are no current medications that are FDA approved for the specific management of fatty liver disease  I explained that there are many, many compounds (both new and medications currently used for other indications) that are currently being studied to treat several aspects of fatty liver disease, including decreasing hepatic fat, hepatic inflammation, hepatic fibrosis, as well reducing risk factors for the development of fatty liver (primarily through weight loss)      I counseled Mr Pawan Srivastava on the importance of weight loss through lifestyle modification, primary diet and exercise, and the benefits of seeking input of a dietician/nutritionist as well as consultation with a medical weight loss specialist or bariatric surgery team     Mr Estela Gonzalez will have the testing done as outlined above and follow-up in 1 5 months  FOLLOW-UP:  Return in about 6 weeks (around 10/14/2022)  VISIT DIAGNOSES AND ORDERS:      1  Elevated LFTs    2  Elevated ferritin    3  Fatty liver      Orders Placed This Encounter   Procedures    Alpha 1 Antitrypsin Phenotype    Ceruloplasmin    Ambulatory Referral to Interventional Radiology     ______________________________________________________________________    SUBJECTIVE:           Mr Cuong Orellana is a 74 y o  male with medical history as outlined below, including obesity, HTN, daily alcohol use (2 beers/day), and recent evaluation for persistent nasuea/vomiting/poor PO intake and 25 lbs weight loss, who comes in today for initial hepatology consultation after last seeing my colleague Dr Merrell Kayser last week for persistent nausea and vomiting  Symptoms of his nausea and vomiting have resolved since starting Reglan and daily Protonix  He is eating better and feels great  He states he has a follow-up with Dr Merrell Kayser coming up in a few weeks  He has had elevated LFTs over the last 2 years and has had fairly thorough workup ruling out viral autoimmune hepatitis and hemochromatosis  Abdominal imaging showed a fatty appearing liver with hepatosplenomegaly  He denies any known history of chronic liver disease, risk factors for viral hepatitis or heavy alcohol use  He states he drinks 2 drinks per day with dinner after working 12 hours  Mr Estela Gonzalez denies recent or history of yellow eyes/skin, dark urine, GI bleeding, abdominal distention with fluid, lower extremity swelling, easy bruising, excessive bleeding, pruritus or confusion    He denies abdominal pain, nausea, vomiting, heartburn, reflux, difficulty swallowing, early satiety, bloating, diarrhea, constipation or straining with passing stools  He states he had a colonoscopy 3 weeks ago, however I do not see the report in our system  REVIEW OF SYSTEMS     Review of Systems   All other systems reviewed and are negative  Historical Information   There is no problem list on file for this patient  Social History     Substance and Sexual Activity   Alcohol Use Yes    Comment: occassionally     Social History     Substance and Sexual Activity   Drug Use Not Currently     Social History     Tobacco Use   Smoking Status Former Smoker   Smokeless Tobacco Never Used       Meds/Allergies       Current Outpatient Medications:     amLODIPine (NORVASC) 5 mg tablet    Cholecalciferol (Vitamin D3) 50 MCG (2000 UT) capsule    folic acid (FOLVITE) 1 mg tablet    loratadine (CLARITIN) 10 mg tablet    losartan (COZAAR) 50 mg tablet    metoclopramide (REGLAN) 5 mg tablet    ondansetron (ZOFRAN) 4 mg tablet    pantoprazole (PROTONIX) 40 mg tablet    triamterene-hydrochlorothiazide (DYAZIDE) 37 5-25 mg per capsule    VITAMIN D PO    FOLIC ACID PO    LOSARTAN POTASSIUM PO    TRIAMTERENE PO    Allergies   Allergen Reactions    Other Other (See Comments)     Running nose           Objective     Blood pressure 100/66, pulse 65, temperature 97 5 °F (36 4 °C), temperature source Tympanic, height 5' 8" (1 727 m), weight 119 kg (262 lb 9 6 oz), SpO2 99 %  Body mass index is 39 93 kg/m²  PHYSICAL EXAM:      Physical Exam  Vitals reviewed  Constitutional:       General: He is not in acute distress  Appearance: Normal appearance  He is normal weight  He is not ill-appearing  HENT:      Head: Normocephalic  Mouth/Throat:      Mouth: Mucous membranes are moist       Pharynx: Oropharynx is clear  No oropharyngeal exudate  Eyes:      General: No scleral icterus  Extraocular Movements: Extraocular movements intact  Cardiovascular:      Rate and Rhythm: Normal rate and regular rhythm        Heart sounds: No murmur heard   Pulmonary:      Effort: Pulmonary effort is normal  No respiratory distress  Breath sounds: Normal breath sounds  No rales  Abdominal:      General: Abdomen is flat  Bowel sounds are normal  There is distension (obesity)  Palpations: Abdomen is soft  There is hepatomegaly  There is no splenomegaly  Tenderness: There is no abdominal tenderness  Musculoskeletal:         General: No swelling  Cervical back: Normal range of motion and neck supple  Comments: Surgical scar on knee   Skin:     General: Skin is warm  Coloration: Skin is not jaundiced  Findings: No bruising or rash  Neurological:      General: No focal deficit present  Mental Status: He is oriented to person, place, and time  Psychiatric:         Mood and Affect: Mood normal          Lab Results:   Lab Results   Component Value Date    K 4 4 08/29/2022    CO2 30 08/29/2022    CL 99 08/29/2022    BUN 22 08/29/2022    CREATININE 1 36 08/29/2022     Lab Results   Component Value Date    WBC 4 85 08/24/2022    HGB 13 4 08/24/2022    HCT 38 2 08/24/2022    MCV 93 08/24/2022    PLT 90 (L) 08/24/2022     Lab Results   Component Value Date    TP 7 7 08/29/2022     (H) 08/29/2022     (H) 08/29/2022    INR 1 03 08/29/2022      Lab Results   Component Value Date    IRON 63 (L) 08/29/2022    FERRITIN 2,585 (H) 08/29/2022     No results found for: CHOL, HDL, TRIG, LDL      Radiology Results:   US abdomen complete    Result Date: 9/1/2022  Narrative: ABDOMEN ULTRASOUND, COMPLETE INDICATION:   R74 01: Elevation of levels of liver transaminase levels  COMPARISON:  None TECHNIQUE:   Real-time ultrasound of the abdomen was performed with a curvilinear transducer with both volumetric sweeps and still imaging techniques  FINDINGS: PANCREAS:  Visualized portions of the pancreas are within normal limits  AORTA AND IVC:  Visualized portions are normal for patient age  LIVER: Size:  Enlarged    The liver measures 20 5 cm in the midclavicular line  Contour:  Surface contour is smooth  Parenchyma: There is marked diffuse increased echogenicity with smooth echotexture and significant beam attenuation with loss of periportal echogenicity  Most consistent with severe hepatic steatosis  No liver mass identified  Limited imaging of the main portal vein shows it to be patent and hepatopetal  BILIARY: No gallbladder findings  No intrahepatic biliary dilatation  CBD measures 4 0 mm  No choledocholithiasis  KIDNEY: Right kidney measures 10 6 x 4 9 x 6 7  cm  Volume 182 7 mL Kidney within normal limits  Left kidney measures 11 0 x 5 2 x 6 8 cm  Volume 201 8 mL Kidney within normal limits  SPLEEN: Measures 14 4 x 14 3 x 5 6 cm  Volume 608 2 mL Within normal limits  ASCITES:  None  Impression: Hepatosplenomegaly and steatosis   Workstation performed: [id number]         Tisha Vang MD

## 2022-09-13 RX ORDER — SODIUM CHLORIDE 9 MG/ML
75 INJECTION, SOLUTION INTRAVENOUS CONTINUOUS
Status: CANCELLED | OUTPATIENT
Start: 2022-09-13

## 2022-09-15 ENCOUNTER — APPOINTMENT (OUTPATIENT)
Dept: LAB | Facility: CLINIC | Age: 67
End: 2022-09-15
Payer: COMMERCIAL

## 2022-09-15 ENCOUNTER — CONSULT (OUTPATIENT)
Dept: HEMATOLOGY ONCOLOGY | Facility: CLINIC | Age: 67
End: 2022-09-15
Payer: COMMERCIAL

## 2022-09-15 VITALS
TEMPERATURE: 96.6 F | OXYGEN SATURATION: 96 % | SYSTOLIC BLOOD PRESSURE: 120 MMHG | BODY MASS INDEX: 40.77 KG/M2 | HEIGHT: 68 IN | DIASTOLIC BLOOD PRESSURE: 70 MMHG | HEART RATE: 86 BPM | WEIGHT: 269 LBS | RESPIRATION RATE: 16 BRPM

## 2022-09-15 DIAGNOSIS — R79.89 ELEVATED LFTS: ICD-10-CM

## 2022-09-15 DIAGNOSIS — D69.6 THROMBOCYTOPENIA (HCC): ICD-10-CM

## 2022-09-15 DIAGNOSIS — D69.6 THROMBOCYTOPENIA (HCC): Primary | ICD-10-CM

## 2022-09-15 DIAGNOSIS — R79.89 ELEVATED FERRITIN: ICD-10-CM

## 2022-09-15 DIAGNOSIS — K76.0 FATTY LIVER: ICD-10-CM

## 2022-09-15 PROBLEM — Z99.89 OSA ON CPAP: Status: ACTIVE | Noted: 2022-09-15

## 2022-09-15 PROBLEM — Z96.653 STATUS POST TOTAL BILATERAL KNEE REPLACEMENT: Status: ACTIVE | Noted: 2018-12-14

## 2022-09-15 PROBLEM — Z86.010 HISTORY OF COLON POLYPS: Status: ACTIVE | Noted: 2019-04-02

## 2022-09-15 PROBLEM — E78.00 HYPERCHOLESTEROLEMIA: Status: ACTIVE | Noted: 2018-11-29

## 2022-09-15 PROBLEM — M17.0 PRIMARY OSTEOARTHRITIS OF BOTH KNEES: Status: ACTIVE | Noted: 2018-08-02

## 2022-09-15 PROBLEM — G47.33 OSA ON CPAP: Status: ACTIVE | Noted: 2022-09-15

## 2022-09-15 PROBLEM — K70.0 FATTY LIVER, ALCOHOLIC: Status: ACTIVE | Noted: 2020-03-02

## 2022-09-15 PROBLEM — M19.90 OSTEOARTHRITIS: Status: ACTIVE | Noted: 2022-09-15

## 2022-09-15 PROBLEM — I87.2 VENOUS INSUFFICIENCY OF BOTH LOWER EXTREMITIES: Status: ACTIVE | Noted: 2022-09-15

## 2022-09-15 PROBLEM — Z86.0100 HISTORY OF COLON POLYPS: Status: ACTIVE | Noted: 2019-04-02

## 2022-09-15 LAB
AFP-TM SERPL-MCNC: 2.6 NG/ML (ref 0.5–8)
ALBUMIN SERPL BCP-MCNC: 3.9 G/DL (ref 3.5–5)
ALP SERPL-CCNC: 68 U/L (ref 34–104)
ALT SERPL W P-5'-P-CCNC: 63 U/L (ref 7–52)
ANION GAP SERPL CALCULATED.3IONS-SCNC: 7 MMOL/L (ref 4–13)
AST SERPL W P-5'-P-CCNC: 71 U/L (ref 13–39)
BILIRUB SERPL-MCNC: 0.66 MG/DL (ref 0.2–1)
BUN SERPL-MCNC: 14 MG/DL (ref 5–25)
CALCIUM SERPL-MCNC: 9.3 MG/DL (ref 8.4–10.2)
CHLORIDE SERPL-SCNC: 103 MMOL/L (ref 96–108)
CO2 SERPL-SCNC: 28 MMOL/L (ref 21–32)
CREAT SERPL-MCNC: 0.93 MG/DL (ref 0.6–1.3)
ERYTHROCYTE [DISTWIDTH] IN BLOOD BY AUTOMATED COUNT: 12.6 % (ref 11.6–15.1)
FERRITIN SERPL-MCNC: 1372 NG/ML (ref 8–388)
GFR SERPL CREATININE-BSD FRML MDRD: 85 ML/MIN/1.73SQ M
GLUCOSE P FAST SERPL-MCNC: 118 MG/DL (ref 65–99)
HCT VFR BLD AUTO: 35.1 % (ref 36.5–49.3)
HGB BLD-MCNC: 12.4 G/DL (ref 12–17)
IMM EOSINOPHIL NFR BLD MANUAL: 8 % (ref 0–6)
IRON SATN MFR SERPL: 73 % (ref 20–50)
IRON SERPL-MCNC: 174 UG/DL (ref 65–175)
LYMPHOCYTES NFR BLD: 15 % (ref 14–44)
MCH RBC QN AUTO: 33.2 PG (ref 26.8–34.3)
MCHC RBC AUTO-ENTMCNC: 35.3 G/DL (ref 31.4–37.4)
MCV RBC AUTO: 94 FL (ref 82–98)
MONOCYTES NFR BLD AUTO: 4 % (ref 4–12)
NEUTS BAND NFR BLD MANUAL: 4 THOUSAND/UL
NEUTS SEG NFR BLD AUTO: 69 % (ref 45–77)
NRBC BLD AUTO-RTO: 0 /100 WBCS
PLATELET # BLD AUTO: 153 THOUSANDS/UL (ref 149–390)
PLATELET BLD QL SMEAR: ADEQUATE
PMV BLD AUTO: 10.1 FL (ref 8.9–12.7)
POTASSIUM SERPL-SCNC: 4.5 MMOL/L (ref 3.5–5.3)
PROT SERPL-MCNC: 7.1 G/DL (ref 6.4–8.4)
RBC # BLD AUTO: 3.74 MILLION/UL (ref 3.88–5.62)
RBC MORPH BLD: NORMAL
SODIUM SERPL-SCNC: 138 MMOL/L (ref 135–147)
TIBC SERPL-MCNC: 240 UG/DL (ref 250–450)
TOTAL CELLS COUNTED SPEC: 100
WBC # BLD AUTO: 7.97 THOUSAND/UL (ref 4.31–10.16)

## 2022-09-15 PROCEDURE — 80053 COMPREHEN METABOLIC PANEL: CPT

## 2022-09-15 PROCEDURE — 82105 ALPHA-FETOPROTEIN SERUM: CPT

## 2022-09-15 PROCEDURE — 81256 HFE GENE: CPT

## 2022-09-15 PROCEDURE — 85027 COMPLETE CBC AUTOMATED: CPT

## 2022-09-15 PROCEDURE — 36415 COLL VENOUS BLD VENIPUNCTURE: CPT

## 2022-09-15 PROCEDURE — 83540 ASSAY OF IRON: CPT

## 2022-09-15 PROCEDURE — 82728 ASSAY OF FERRITIN: CPT

## 2022-09-15 PROCEDURE — 85007 BL SMEAR W/DIFF WBC COUNT: CPT

## 2022-09-15 PROCEDURE — 99204 OFFICE O/P NEW MOD 45 MIN: CPT

## 2022-09-15 PROCEDURE — 83550 IRON BINDING TEST: CPT

## 2022-09-15 RX ORDER — METOCLOPRAMIDE 5 MG/1
5 TABLET ORAL 4 TIMES DAILY
COMMUNITY

## 2022-09-15 NOTE — PROGRESS NOTES
[facility] [id number]  Hematology Ambulatory Consult  Rosalind Hernadez, 1955, [id number]  9/15/2022    Assessment/Plan:  1  Thrombocytopenia (HCC)  2  Elevated ferritin  3  Elevated LFTs  Mr Megan Ordonez is a 72-year-old male seen in consultation for thrombocytopenia and elevated ferritin  This has been a longstanding problem dating back to 2018  His decreased platelet count is likely directly related to his elevated LFTs  He is currently undergoing workup for this with a liver specialist and Gastroenterology  He is scheduled for liver biopsy on 09/22  I explained that his platelet count is stable and I am not very concerned with this at this time  We will monitor closely with repeat blood draws  I explained that we do not initiate treatment for this which would include a platelet transfusion unless he is under 15-30  His baseline above 100,000 is stable and he has no evidence of excess bleeding or bruising  He knows to call the office with any nose bleeds, bleeding gums, blood in his urine, blood in his stool, petechial like rash  His liver dysfunction is likely secondary to his elevated ferritin levels  He has had previous hemochromatosis cysts workup which was negative  Repeat testing ordered today  I explained that treatment for this is blood donation/therapeutic phlebotomy  I explained the risks of his elevated ferritin levels on his liver  MRI hemochromatosis quantification is ordered and will be scheduled for him at check out today  I will call him when the results of his blood work from today are available to me to discuss things further  I stressed the importance of hydration  He will follow-up with me in the office 1 week after the MRI  - Ambulatory Referral to Hematology / Oncology  - AFP tumor marker; Future  - Hemochromatosis mutation;  Future  - Iron Panel (Includes Ferritin, Iron Sat%, Iron, and TIBC); Future  - MRI hemochromatosis quantification wo and w contrast; Future  - CBC and differential  - Comprehensive metabolic panel  - Peripheral Smear; Future      The patient is scheduled for follow-up in approximately 1 month  Patient voiced agreement and understanding to the above  Patient knows to call the Hematology/Oncology office with any questions and concerns regarding the above  Barrier(s) to care: None  The patient is able to self care     -------------------------------------------------------------------------------------------------------    Chief Complaint   Patient presents with    Consult       Referring provider:  Trena Carolina MD  [address] [employer] [facility],  [address] [facility]    History of present illness:  Jennifer Proctor is a 44-year-old male with past medical history of hypertension, sleep apnea on CPAP, venous insufficiency of bilateral lower extremities, RT arthritis status post knee replacement, and hypercholesterolemia  He is seen in consultation for decreased platelet count  This does not appear to be a new problem as in 2018 he had a low platelet count of 058,048  This was also in the setting of elevated liver and signs with an AST of 74 and ALT 81  Most recently platelet count was a 102,000 with worsening liver function of ,   He denies any family history of any blood disorders or blood conditions  His father and sister both had colon cancer  He has 2 daughters that have anemia likely due to menstrual cycle and a few brothers that he is not in contact with so in not aware of any of their medical conditions  He is up-to-date on colonoscopy  He previously drink 2 beer per day and has recently stopped this  He had a consultation with Gastroenterology on 08/26/2022 after having 3 weeks of nausea and vomiting and poor oral intake  During that timeframe he lost about 25 lb    There is a suspicion for infectious gastroenteritis and colitis and was started on Reglan and Protonix  Abdominal ultrasound was ordered at that time to workup chronically elevated LFTs  On 08/20 9th 2022 he had an abdominal ultrasound which demonstrated an enlarged liver measuring 20 5 cm with severe hepatic steatosis  He then saw a liver specialist on 09/02/2022 who has ordered a liver biopsy to be performed on 09/22/2022  He was also found to have an elevated ferritin of 1567  Previous hemochromatosis workup in February of 2020 was negative  Since starting on Reglan and Protonix he is feeling much better and his appetite is improving  His weight is now stable  He denies any evidence of excess bleeding or bruising  He denies any nosebleeds, bleeding gums, blood in his stool, blood in his urine  He does not have a petechial rash  11/10/2018:  Hemoglobin 14 7, platelets 450   AST 57, ALT 60   Serum iron 101  01/23/2019:  Hemoglobin 13 1, platelets 512   AST 52, ALT 40  12/28/2019:  Ferritin 837, iron saturation 42%, TIBC 293, serum iron 123  02/29/2020:  Hemoglobin 14 5, platelets 940   AST 60, ALT 68   Ferritin 828, iron saturation 33%, TIBC 342, serum iron 113  05/17/2022:  Hemoglobin 14 1, platelets 453   AST 65, ALT 69   Ferritin 762, iron saturation 51%, TIBC 331, serum iron 169  08/24/2022:  Hemoglobin 13 4, platelets 90   ,   08/29/2022:  ,    Ferritin 2585, serum iron 63  08/30/2022:  Hemoglobin 13 4, platelets 885   ,    Ferritin 1567, iron saturation 29%, TIBC 248, serum iron 72      Review of Systems   Constitutional: Negative for activity change, appetite change (improved), diaphoresis, fatigue, fever and unexpected weight change (stable now )  HENT: Negative for trouble swallowing and voice change  Eyes: Negative for photophobia and visual disturbance  Respiratory: Negative for cough, chest tightness and shortness of breath  Cardiovascular: Negative for chest pain, palpitations and leg swelling     Gastrointestinal: Negative for abdominal distention, abdominal pain, blood in stool, constipation, diarrhea and nausea  Endocrine: Negative for cold intolerance and heat intolerance  Genitourinary: Negative for dysuria, hematuria and urgency  Musculoskeletal: Negative for arthralgias, back pain, gait problem and joint swelling  Skin: Negative for pallor and rash  Neurological: Negative for dizziness, seizures, weakness, light-headedness, numbness and headaches  Hematological: Negative for adenopathy  Does not bruise/bleed easily  Psychiatric/Behavioral: Negative for confusion and sleep disturbance  Patient Active Problem List   Diagnosis    BMI 40 0-44 9, adult ([facility] [address] )    Elevated ferritin    Family history of colon cancer    Fatty liver, alcoholic    History of colon polyps    Hypercholesterolemia    Venous insufficiency of both lower extremities    FELIBERTO on CPAP    Osteoarthritis    Primary osteoarthritis of both knees    Status post total bilateral knee replacement    Thrombocytopenia (HCC)    Vitamin D deficiency       Past Medical History:   Diagnosis Date    Hypertension        Past Surgical History:   Procedure Laterality Date    REPLACEMENT TOTAL KNEE Bilateral        No family history on file      Social History     Socioeconomic History    Marital status: /Civil Union     Spouse name: None    Number of children: None    Years of education: None    Highest education level: None   Occupational History    None   Tobacco Use    Smoking status: Former Smoker    Smokeless tobacco: Never Used   Vaping Use    Vaping Use: Never used   Substance and Sexual Activity    Alcohol use: Yes     Comment: occassionally    Drug use: Not Currently    Sexual activity: None   Other Topics Concern    None   Social History Narrative    None     Social Determinants of Health     Financial Resource Strain: Not on file   Food Insecurity: Not on file   Transportation Needs: Not on file   Physical Activity: Not on file   Stress: Not on file   Social Connections: Not on file   Intimate Partner Violence: Not on file   Housing Stability: Not on file         Current Outpatient Medications:     amLODIPine (NORVASC) 5 mg tablet, Take 5 mg by mouth daily, Disp: , Rfl:     Cholecalciferol (Vitamin D3) 50 MCG (2000 UT) capsule, Take 2,000 Units by mouth daily, Disp: , Rfl:     folic acid (FOLVITE) 1 mg tablet, Take 1,000 mcg by mouth daily, Disp: , Rfl:     loratadine (CLARITIN) 10 mg tablet, Take 10 mg by mouth, Disp: , Rfl:     losartan (COZAAR) 50 mg tablet, TAKE' 1 TABLET (50 MG TOTAL) BY MOUTH DAILY  IN THE MORNING, Disp: , Rfl:     LOSARTAN POTASSIUM PO, Take by mouth in the morning, Disp: , Rfl:     metoclopramide (REGLAN) 5 mg tablet, Take 1 tablet (5 mg total) by mouth 3 (three) times a day, Disp: 90 tablet, Rfl: 2    metoclopramide (REGLAN) 5 mg tablet, Take 5 mg by mouth 4 (four) times a day, Disp: , Rfl:     ondansetron (ZOFRAN) 4 mg tablet, Take 4 mg by mouth every 8 (eight) hours as needed for nausea or vomiting, Disp: , Rfl:     pantoprazole (PROTONIX) 40 mg tablet, Take 1 tablet (40 mg total) by mouth daily, Disp: 30 tablet, Rfl: 3    TRIAMTERENE PO, Take by mouth in the morning, Disp: , Rfl:     triamterene-hydrochlorothiazide (DYAZIDE) 37 5-25 mg per capsule, TAKE 1 CAPSULE BY MOUTH DAILY  NOT TAKING REGULARLY , Disp: , Rfl:     VITAMIN D PO, Take by mouth in the morning, Disp: , Rfl:     Allergies   Allergen Reactions    Other Other (See Comments)     Running nose       Objective:  /70 (BP Location: Right arm, Patient Position: Sitting, Cuff Size: Large)   Pulse 86   Temp (!) 96 6 °F (35 9 °C) (Tympanic)   Resp 16   Ht 5' 8" (1 727 m)   Wt 122 kg (269 lb)   SpO2 96%   BMI 40 90 kg/m²   Physical Exam  Constitutional:       General: He is not in acute distress  Appearance: Normal appearance  He is not ill-appearing  HENT:      Head: Normocephalic and atraumatic  Eyes:      Extraocular Movements: Extraocular movements intact  Conjunctiva/sclera: Conjunctivae normal    Cardiovascular:      Rate and Rhythm: Normal rate and regular rhythm  Pulses: Normal pulses  Heart sounds: Normal heart sounds  Pulmonary:      Effort: Pulmonary effort is normal  No respiratory distress  Breath sounds: Normal breath sounds  Abdominal:      General: Bowel sounds are normal  There is no distension  Palpations: Abdomen is soft  Tenderness: There is no abdominal tenderness  Musculoskeletal:      Cervical back: Normal range of motion  No tenderness  Right lower leg: No edema  Left lower leg: No edema  Lymphadenopathy:      Cervical: No cervical adenopathy  Skin:     General: Skin is warm and dry  Capillary Refill: Capillary refill takes less than 2 seconds  Findings: No bruising or lesion  Comments: No petechial rash or purpura   Neurological:      General: No focal deficit present  Mental Status: He is alert and oriented to person, place, and time  Mental status is at baseline  Motor: No weakness  Gait: Gait normal    Psychiatric:         Mood and Affect: Mood normal          Behavior: Behavior normal          Thought Content: Thought content normal          Judgment: Judgment normal          Result Review  Labs:   Appointment on 08/29/2022   Component Date Value Ref Range Status    Ferritin 08/29/2022 2,585 (A) 8 - 388 ng/mL Final    Iron 08/29/2022 63 (A) 65 - 175 ug/dL Final    Patients treated with metal-binding drugs (ie  Deferoxamine) may have depressed iron values      Transferrin 08/29/2022 208  200 - 400 mg/dL Final    Sodium 08/29/2022 137  135 - 147 mmol/L Final    Potassium 08/29/2022 4 4  3 5 - 5 3 mmol/L Final    Chloride 08/29/2022 99  96 - 108 mmol/L Final    CO2 08/29/2022 30  21 - 32 mmol/L Final    ANION GAP 08/29/2022 8  5 - 14 mmol/L Final    BUN 08/29/2022 22  5 - 25 mg/dL Final    Creatinine 08/29/2022 1 36  0 70 - 1 50 mg/dL Final    Standardized to IDMS reference method    Glucose, Fasting 08/29/2022 118 (A) 70 - 99 mg/dL Final    Specimen collection should occur prior to Sulfasalazine administration due to the potential for falsely depressed results  Specimen collection should occur prior to Sulfapyridine administration due to the potential for falsely elevated results   Calcium 08/29/2022 9 9  8 4 - 10 2 mg/dL Final    AST 08/29/2022 186 (A) 17 - 59 U/L Final    Specimen collection should occur prior to Sulfasalazine administration due to the potential for falsely depressed results   ALT 08/29/2022 158 (A) <50 U/L Final    Specimen collection should occur prior to Sulfasalazine administration due to the potential for falsely depressed results       Alkaline Phosphatase 08/29/2022 65  43 - 122 U/L Final    Total Protein 08/29/2022 7 7  6 4 - 8 4 g/dL Final    Albumin 08/29/2022 4 7  3 5 - 5 0 g/dL Final    Total Bilirubin 08/29/2022 0 94  0 20 - 1 00 mg/dL Final    eGFR 08/29/2022 54  ml/min/1 73sq m Final    Protime 08/29/2022 13 8  11 6 - 14 5 seconds Final    INR 08/29/2022 1 03  0 84 - 1 19 Final    IgA 08/29/2022 400  61 - 437 mg/dL Final    Gliadin IgA 08/29/2022 7  0 - 19 units Final                       Negative                   0 - 19                     Weak Positive             20 - 30                     Moderate to Strong Positive   >30    Gliadin IgG 08/29/2022 2  0 - 19 units Final                       Negative                   0 - 19                     Weak Positive             20 - 30                     Moderate to Strong Positive   >30    Tissue Transglut Ab IGG 08/29/2022 <2  0 - 5 U/mL Final                                  Negative        0 - 5                                Weak Positive   6 - 9                                Positive           >9    TISSUE TRANSGLUTAMINASE IGA 08/29/2022 <2  0 - 3 U/mL Final Negative        0 -  3                                Weak Positive   4 - 10                                Positive           >10   Tissue Transglutaminase (tTG) has been identified   as the endomysial antigen  Studies have demonstr-   ated that endomysial IgA antibodies have over 99%   specificity for gluten sensitive enteropathy   Endomysial IgA 08/29/2022 Negative  Negative Final    TSH 3RD GENERATON 08/29/2022 2 830  0 450 - 4 500 uIU/mL Final    Using supplements with high doses of biotin 20 to more than 300 times greater than the adequate daily intake for adults of 30 mcg/day as established by the [place] [employer], can cause falsely depress results  Adult TSH (3rd generation) reference range follows the recommended guidelines of the American Thyroid Association, January, 2020   KELSEY 08/29/2022 Negative  Negative Final    Smooth Muscle Ab 08/29/2022 17  0 - 19 Units Final                     Negative                     0 - 19                   Weak positive               20 - 30                   Moderate to strong positive     >30   Actin Antibodies are found in 52-85% of patients with   autoimmune hepatitis or chronic active hepatitis and   in 22% of patients with primary biliary cirrhosis   Mitochondrial Ab 08/29/2022 <20 0  0 0 - 20 0 Units Final                                    Negative    0 0 - 20 0                                  Equivocal  20 1 - 24 9                                  Positive         >24 9  Mitochondrial (M2) Antibodies are found in 90-96% of  patients with primary biliary cirrhosis      Hepatitis B Surface Ag 08/29/2022 Non-reactive  Non-reactive, NonReactive - Confirmed Final    Hepatitis C Ab 08/29/2022 Non-reactive  Non-reactive Final    Hep B C IgM 08/29/2022 Non-reactive  Non-reactive Final    Hep B Core Total Ab 08/29/2022 Non-reactive  Non-reactive Final    Hepatitis B Surface Ag 08/29/2022 Non-reactive  Non-reactive, NonReactive - Confirmed Final    Hep A IgM 08/29/2022 Non-reactive  Non-reactive, Equivocal-Suggest Recollect Final    Hepatitis C Ab 08/29/2022 Non-reactive  Non-reactive Final    Hep B C IgM 08/29/2022 Non-reactive  Non-reactive Final    EBV VCA IgG 08/29/2022 >600 0 (A) 0 0 - 17 9 U/mL Final                                     Negative        <18 0                                   Equivocal 18 0 - 21 9                                   Positive        >21 9    EBV VCA IgM 08/29/2022 <36 0  0 0 - 35 9 U/mL Final                                     Negative        <36 0                                   Equivocal 36 0 - 43 9                                   Positive        >43 9    EBV Nuclear Ag Ab 08/29/2022 108 0 (A) 0 0 - 17 9 U/mL Final                                     Negative        <18 0                                   Equivocal 18 0 - 21 9                                   Positive        >21 9    INTERPRETATION 08/29/2022 Comment   Final                   EBV Interpretation Chart  Key: Antibody Present +    Antibody Absent -  Interpretation             VCA-IgM   VCA-IgG  EBNA-IgG  No previous infection/        -         -         -  Susceptible  Primary infection (new        +         +         -  or recent)  Past Infection               +or-       +         +  See comment below*            +         -         -  *Results indicate infection with EBV at some time   however cannot predict the timing of the infection   since antibodies to EBNA usually develop after   primary infection or, alternatively, approximately   5-10% of patients with EBV never develop antibodies   to EBNA   Cytomegalovirus Quant  PCR 08/29/2022 Negative  Negative IU/mL Final    No CMV DNA detected  The quantitative range of this assay is 200 to 1 million IU/mL      log10 CMV Qn DNA Pl 08/29/2022 COMMENT  log10 IU/mL Final    Unable to calculate result since non-numeric result obtained for  component test     CMV IGG 08/29/2022 >10 00 (A) 0 00 - 0 59 U/mL Final                                   Negative          <0 60                                 Equivocal   0 60 - 0 69                                 Positive          >0 69    CMV IgM 08/29/2022 <30 0  0 0 - 29 9 AU/mL Final                                    Negative         <30 0                                  Equivocal  30 0 - 34 9                                  Positive         >34 9  A positive result is generally indicative of acute  infection, reactivation or persistent IgM production      Ceruloplasmin 08/29/2022 19 7  16 0 - 31 0 mg/dL Final   Admission on 08/24/2022, Discharged on 08/24/2022   Component Date Value Ref Range Status    WBC 08/24/2022 4 85  4 31 - 10 16 Thousand/uL Final    RBC 08/24/2022 4 09  3 88 - 5 62 Million/uL Final    Hemoglobin 08/24/2022 13 4  12 0 - 17 0 g/dL Final    Hematocrit 08/24/2022 38 2  36 5 - 49 3 % Final    MCV 08/24/2022 93  82 - 98 fL Final    MCH 08/24/2022 32 8  26 8 - 34 3 pg Final    MCHC 08/24/2022 35 1  31 4 - 37 4 g/dL Final    RDW 08/24/2022 12 4  11 6 - 15 1 % Final    MPV 08/24/2022 10 0  8 9 - 12 7 fL Final    Platelets 31/21/0993 90 (A) 149 - 390 Thousands/uL Final    nRBC 08/24/2022 0  /100 WBCs Final    Neutrophils Relative 08/24/2022 81 (A) 43 - 75 % Final    Immat GRANS % 08/24/2022 1  0 - 2 % Final    Lymphocytes Relative 08/24/2022 7 (A) 14 - 44 % Final    Monocytes Relative 08/24/2022 9  4 - 12 % Final    Eosinophils Relative 08/24/2022 1  0 - 6 % Final    Basophils Relative 08/24/2022 1  0 - 1 % Final    Neutrophils Absolute 08/24/2022 3 94  1 85 - 7 62 Thousands/µL Final    Immature Grans Absolute 08/24/2022 0 05  0 00 - 0 20 Thousand/uL Final    Lymphocytes Absolute 08/24/2022 0 36 (A) 0 60 - 4 47 Thousands/µL Final    Monocytes Absolute 08/24/2022 0 41  0 17 - 1 22 Thousand/µL Final    Eosinophils Absolute 08/24/2022 0 05  0 00 - 0 61 Thousand/µL Final    Basophils Absolute 08/24/2022 0 04  0 00 - 0 10 Thousands/µL Final    Sodium 2022 135  135 - 147 mmol/L Final    Potassium 2022 4 9  3 5 - 5 3 mmol/L Final    Chloride 2022 99  96 - 108 mmol/L Final    CO2 2022 26  21 - 32 mmol/L Final    ANION GAP 2022 10  4 - 13 mmol/L Final    BUN 2022 12  5 - 25 mg/dL Final    Creatinine 2022 1 06  0 60 - 1 30 mg/dL Final    Standardized to IDMS reference method    Glucose 2022 170 (A) 65 - 140 mg/dL Final    If the patient is fasting, the ADA then defines impaired fasting glucose as > 100 mg/dL and diabetes as > or equal to 123 mg/dL  Specimen collection should occur prior to Sulfasalazine administration due to the potential for falsely depressed results  Specimen collection should occur prior to Sulfapyridine administration due to the potential for falsely elevated results   Calcium 2022 9 5  8 3 - 10 1 mg/dL Final    AST 2022 137 (A) 5 - 45 U/L Final    Specimen collection should occur prior to Sulfasalazine administration due to the potential for falsely depressed results   ALT 2022 111 (A) 12 - 78 U/L Final    Specimen collection should occur prior to Sulfasalazine administration due to the potential for falsely depressed results   Alkaline Phosphatase 2022 83  46 - 116 U/L Final    Total Protein 2022 8 5 (A) 6 4 - 8 4 g/dL Final    Albumin 2022 4 2  3 5 - 5 0 g/dL Final    Total Bilirubin 2022 0 81  0 20 - 1 00 mg/dL Final    Use of this assay is not recommended for patients undergoing treatment with eltrombopag due to the potential for falsely elevated results   eGFR 2022 73  ml/min/1 73sq m Final    Lipase 2022 136  73 - 393 u/L Final       Imagin22: abdominal US  LIVER:  Size:  Enlarged  The liver measures 20 5 cm in the midclavicular line  Contour:  Surface contour is smooth  Parenchyma:   There is marked diffuse increased echogenicity with smooth echotexture and significant beam attenuation with loss of periportal echogenicity  Most consistent with severe hepatic steatosis  No liver mass identified  Limited imaging of the main portal vein shows it to be patent and hepatopetal     Please note: This report has been generated by a voice recognition software system  Therefore there may be syntax, spelling, and/or grammatical errors  Please call if you have any questions

## 2022-09-22 ENCOUNTER — HOSPITAL ENCOUNTER (OUTPATIENT)
Dept: RADIOLOGY | Facility: HOSPITAL | Age: 67
Discharge: HOME | End: 2022-09-22
Attending: RADIOLOGY
Payer: COMMERCIAL

## 2022-09-22 VITALS
BODY MASS INDEX: 40.96 KG/M2 | SYSTOLIC BLOOD PRESSURE: 112 MMHG | HEIGHT: 68 IN | RESPIRATION RATE: 18 BRPM | TEMPERATURE: 98.4 F | OXYGEN SATURATION: 95 % | HEART RATE: 80 BPM | WEIGHT: 270.28 LBS | DIASTOLIC BLOOD PRESSURE: 72 MMHG

## 2022-09-22 DIAGNOSIS — R79.89 ELEVATED LFTS: ICD-10-CM

## 2022-09-22 LAB
ERYTHROCYTE [DISTWIDTH] IN BLOOD BY AUTOMATED COUNT: 13.2 % (ref 11.6–15.1)
HCT VFR BLD AUTO: 32.2 % (ref 36.5–49.3)
HGB BLD-MCNC: 11.2 G/DL (ref 12–17)
INR PPP: 1.07 (ref 0.84–1.19)
MCH RBC QN AUTO: 33.2 PG (ref 26.8–34.3)
MCHC RBC AUTO-ENTMCNC: 34.8 G/DL (ref 31.4–37.4)
MCV RBC AUTO: 96 FL (ref 82–98)
PLATELET # BLD AUTO: 93 THOUSANDS/UL (ref 149–390)
PMV BLD AUTO: 9.5 FL (ref 8.9–12.7)
PROTHROMBIN TIME: 13.9 SECONDS (ref 11.6–14.5)
RBC # BLD AUTO: 3.37 MILLION/UL (ref 3.88–5.62)
WBC # BLD AUTO: 6.08 THOUSAND/UL (ref 4.31–10.16)

## 2022-09-22 PROCEDURE — 99152 MOD SED SAME PHYS/QHP 5/>YRS: CPT | Performed by: INTERNAL MEDICINE

## 2022-09-22 PROCEDURE — 99152 MOD SED SAME PHYS/QHP 5/>YRS: CPT

## 2022-09-22 PROCEDURE — 88313 SPECIAL STAINS GROUP 2: CPT | Performed by: PATHOLOGY

## 2022-09-22 PROCEDURE — 99153 MOD SED SAME PHYS/QHP EA: CPT

## 2022-09-22 PROCEDURE — 47000 NEEDLE BIOPSY OF LIVER PERQ: CPT | Performed by: INTERNAL MEDICINE

## 2022-09-22 PROCEDURE — 85610 PROTHROMBIN TIME: CPT | Performed by: RADIOLOGY

## 2022-09-22 PROCEDURE — 76942 ECHO GUIDE FOR BIOPSY: CPT | Performed by: INTERNAL MEDICINE

## 2022-09-22 PROCEDURE — 85027 COMPLETE CBC AUTOMATED: CPT | Performed by: RADIOLOGY

## 2022-09-22 PROCEDURE — 47000 NEEDLE BIOPSY OF LIVER PERQ: CPT

## 2022-09-22 PROCEDURE — 88307 TISSUE EXAM BY PATHOLOGIST: CPT | Performed by: PATHOLOGY

## 2022-09-22 RX ORDER — OXYCODONE HYDROCHLORIDE 5 MG/1
5 TABLET ORAL EVERY 4 HOURS PRN
Status: DISCONTINUED | OUTPATIENT
Start: 2022-09-22 | End: 2022-09-23 | Stop reason: HOSPADM

## 2022-09-22 RX ORDER — SODIUM CHLORIDE 9 MG/ML
75 INJECTION, SOLUTION INTRAVENOUS CONTINUOUS
Status: DISCONTINUED | OUTPATIENT
Start: 2022-09-22 | End: 2022-09-23 | Stop reason: HOSPADM

## 2022-09-22 RX ORDER — MIDAZOLAM HYDROCHLORIDE 2 MG/2ML
INJECTION, SOLUTION INTRAMUSCULAR; INTRAVENOUS CODE/TRAUMA/SEDATION MEDICATION
Status: COMPLETED | OUTPATIENT
Start: 2022-09-22 | End: 2022-09-22

## 2022-09-22 RX ORDER — FENTANYL CITRATE 50 UG/ML
INJECTION, SOLUTION INTRAMUSCULAR; INTRAVENOUS CODE/TRAUMA/SEDATION MEDICATION
Status: COMPLETED | OUTPATIENT
Start: 2022-09-22 | End: 2022-09-22

## 2022-09-22 RX ADMIN — FENTANYL CITRATE 50 MCG: 50 INJECTION INTRAMUSCULAR; INTRAVENOUS at 08:45

## 2022-09-22 RX ADMIN — MIDAZOLAM 1 MG: 1 INJECTION INTRAMUSCULAR; INTRAVENOUS at 08:46

## 2022-09-22 RX ADMIN — MIDAZOLAM 0.5 MG: 1 INJECTION INTRAMUSCULAR; INTRAVENOUS at 08:55

## 2022-09-22 RX ADMIN — MIDAZOLAM 0.5 MG: 1 INJECTION INTRAMUSCULAR; INTRAVENOUS at 08:50

## 2022-09-22 RX ADMIN — FENTANYL CITRATE 50 MCG: 50 INJECTION INTRAMUSCULAR; INTRAVENOUS at 08:46

## 2022-09-22 RX ADMIN — SODIUM CHLORIDE 75 ML/HR: 0.9 INJECTION, SOLUTION INTRAVENOUS at 07:34

## 2022-09-22 RX ADMIN — FENTANYL CITRATE 25 MCG: 50 INJECTION INTRAMUSCULAR; INTRAVENOUS at 08:53

## 2022-09-22 RX ADMIN — MIDAZOLAM 1 MG: 1 INJECTION INTRAMUSCULAR; INTRAVENOUS at 08:45

## 2022-09-22 NOTE — BRIEF OP NOTE (RAD/CATH)
INTERVENTIONAL RADIOLOGY PROCEDURE NOTE    Date: 9/22/2022    Procedure: IR BIOPSY LIVER RANDOM    Preoperative diagnosis:   1  Elevated LFTs         Postoperative diagnosis: Same  Surgeon: Livia Meza MD     Assistant: None  No qualified resident was available  Blood loss: Minimal    Specimens: Three 1 3cm 18g intact cores      Findings:     US guided left liver lobe non targeted biopsy  Complications: None immediate      Anesthesia: conscious sedation

## 2022-09-22 NOTE — DISCHARGE INSTR - LAB
Percutaneous Liver Biopsy   WHAT YOU NEED TO KNOW:   A PLB is a procedure to remove a sample of tissue from your liver  The sample can be sent to a lab and tested for liver disease, cancer, or infection  After the procedure you may have pain and bruising at the biopsy site  You may also have pain in your right shoulder  These symptoms should get better in 48 to 72 hours  DISCHARGE INSTRUCTIONS:     [facility] patients,  Contact Interventional Radiology at [phone] PATIENTS: Contact Interventional Radiology at [phone]   [facility] PATIENTS: Contact Interventional Radiology at [phone] if:    Fever greater than 101 or chills  You have severe pain in your abdomen  Your abdomen is larger than usual and feels hard  Your neck is more swollen and you have trouble swallowing  You feel weak or dizzy  Your heart is beating faster than usual    Your pain does not get better after you take pain medicine  Your wound is red, swollen, or draining pus  You have nausea or are vomiting  Your skin is itchy, swollen, or you have a rash  You have questions or concerns about your condition or care  Medicines:   Acetaminophen decreases pain and fever  It is available without a doctor's order  Acetaminophen can cause liver damage if not taken correctly  Take your home medicine as directed  Resume your normal diet  Small sips of flat soda will help with mild nausea  Self-care:   Rest as directed  Do not play sports, exercise, or lift anything heavier than 5 pounds for up to 1 week  Apply firm, steady pressure if bleeding occurs  A small amount of bleeding from your wound is possible  Apply pressure with a clean gauze or towel for 5 to 10 minutes  Call 911 if bleeding becomes heavy or does not stop  Ask your healthcare provider when to take your blood thinner or antiplatelet medicine  You may need to wait 24 to 72 hours to take your medicine   This will prevent bleeding  Follow up with your healthcare provider as directed: Write down your questions so you remember to ask them during your visits  Procedural Sedation   WHAT YOU NEED TO KNOW:   Procedural sedation is medicine used during procedures to help you feel relaxed and calm  You will remember little to none of the procedure  After sedation you may feel tired, weak, or unsteady on your feet  You may also have trouble concentrating or short-term memory loss  These symptoms should go away in 24 hours or less  DISCHARGE INSTRUCTIONS:     Call 911 or have someone else call for any of the following: You have sudden trouble breathing  You cannot be woken  Contact Interventional Radiology at [phone]   [place] PATIENTS: Contact Interventional Radiology at [phone]) Loly Ferrari PATIENTS: Contact Interventional Radiology at [phone]) if any of the following occur: You have a severe headache or dizziness  Your heart is beating faster than usual     You have a fever or chills  Your skin is itchy, swollen, or you have a rash  You have nausea or are vomiting for more than 8 hours after the procedure  You have questions or concerns about your condition or care  Self-care:   Have someone stay with you for 24 hours  This person can drive you to errands and help you do things around the house  This person can also watch for problems  Rest and do quiet activities for 24 hours  Do not exercise, ride a bike, or play sports  Stand up slowly to prevent dizziness and falls  Take short walks around the house with another person  Slowly return to your usual activities the next day  Do not drive or use dangerous machines or tools for 24 hours  You may injure yourself or others  Examples include a lawnmower, saw, or drill  Do not return to work for 24 hours if you use dangerous machines or tools for work  Do not make important decisions for 24 hours   For example, do not sign important papers or invest money  Drink liquids as directed  Liquids help flush the sedation medicine out of your body  Ask how much liquid to drink each day and which liquids are best for you  Eat small, frequent meals to prevent nausea and vomiting  Start with clear liquids such as juice or broth  If you do not vomit after clear liquids, you can eat your usual foods  Do not drink alcohol or take medicines that make you drowsy  This includes medicines that help you sleep and anxiety medicines  Ask your healthcare provider if it is safe for you to take pain medicine  Follow up with your healthcare provider as directed: Write down your questions so you remember to ask them during your visits  Procedural Sedation   WHAT YOU NEED TO KNOW:   Procedural sedation is medicine used during procedures to help you feel relaxed and calm  You will remember little to none of the procedure  After sedation you may feel tired, weak, or unsteady on your feet  You may also have trouble concentrating or short-term memory loss  These symptoms should go away in 24 hours or less  DISCHARGE INSTRUCTIONS:     Call 911 or have someone else call for any of the following: You have sudden trouble breathing  You cannot be woken  Contact Interventional Radiology at [phone]   [place] PATIENTS: Contact Interventional Radiology at [phone]) Kathrin Sutton PATIENTS: Contact Interventional Radiology at [phone]) if any of the following occur: You have a severe headache or dizziness  Your heart is beating faster than usual     You have a fever or chills  Your skin is itchy, swollen, or you have a rash  You have nausea or are vomiting for more than 8 hours after the procedure  You have questions or concerns about your condition or care  Self-care:   Have someone stay with you for 24 hours  This person can drive you to errands and help you do things around the house   This person can also watch for problems  Rest and do quiet activities for 24 hours  Do not exercise, ride a bike, or play sports  Stand up slowly to prevent dizziness and falls  Take short walks around the house with another person  Slowly return to your usual activities the next day  Do not drive or use dangerous machines or tools for 24 hours  You may injure yourself or others  Examples include a lawnmower, saw, or drill  Do not return to work for 24 hours if you use dangerous machines or tools for work  Do not make important decisions for 24 hours  For example, do not sign important papers or invest money  Drink liquids as directed  Liquids help flush the sedation medicine out of your body  Ask how much liquid to drink each day and which liquids are best for you  Eat small, frequent meals to prevent nausea and vomiting  Start with clear liquids such as juice or broth  If you do not vomit after clear liquids, you can eat your usual foods  Do not drink alcohol or take medicines that make you drowsy  This includes medicines that help you sleep and anxiety medicines  Ask your healthcare provider if it is safe for you to take pain medicine  Follow up with your healthcare provider as directed: Write down your questions so you remember to ask them during your visits

## 2022-09-22 NOTE — H&P
Interventional Radiology  History and Physical 9/22/2022     Marita Pulse   1955   [id number]    Assessment/Plan:    74year old male with fatty liver and elevated LFTs of uncertain etiology  Plan for US guided non targeted liver biopsy  Procedure, risks, benefits and alternatives were discussed with the patient  Consent obtained at bedside  /77   Pulse 86   Temp 98 °F (36 7 °C) (Temporal)   Resp 16   Ht 5' 8" (1 727 m)   Wt 123 kg (270 lb 4 5 oz)   SpO2 97%   BMI 41 10 kg/m²       Problem List Items Addressed This Visit    None     Visit Diagnoses     Elevated LFTs        Relevant Orders    IR biopsy liver random             Subjective: Normal state of health, no acute complaints  Patient ID: Marita Saldaña is a 74 y o  male  History of Present Illness  See A/P above  Review of Systems   Respiratory: Negative  Cardiovascular: Negative  Past Medical History:   Diagnosis Date    Hypertension         Past Surgical History:   Procedure Laterality Date    REPLACEMENT TOTAL KNEE Bilateral         Social History     Tobacco Use   Smoking Status Former Smoker   Smokeless Tobacco Never Used        Social History     Substance and Sexual Activity   Alcohol Use Not Currently    Comment: occassionally        Social History     Substance and Sexual Activity   Drug Use Not Currently        Allergies   Allergen Reactions    Other Other (See Comments)     Running nose       Current Outpatient Medications   Medication Sig Dispense Refill    amLODIPine (NORVASC) 5 mg tablet Take 5 mg by mouth daily      loratadine (CLARITIN) 10 mg tablet Take 10 mg by mouth      losartan (COZAAR) 50 mg tablet TAKE' 1 TABLET (50 MG TOTAL) BY MOUTH DAILY   IN THE MORNING      LOSARTAN POTASSIUM PO Take by mouth in the morning      metoclopramide (REGLAN) 5 mg tablet Take 1 tablet (5 mg total) by mouth 3 (three) times a day 90 tablet 2    ondansetron (ZOFRAN) 4 mg tablet Take 4 mg by mouth every 8 (eight) hours as needed for nausea or vomiting      pantoprazole (PROTONIX) 40 mg tablet Take 1 tablet (40 mg total) by mouth daily 30 tablet 3    triamterene-hydrochlorothiazide (DYAZIDE) 37 5-25 mg per capsule TAKE 1 CAPSULE BY MOUTH DAILY  NOT TAKING REGULARLY   Cholecalciferol (Vitamin D3) 50 MCG (2000 UT) capsule Take 2,000 Units by mouth daily      folic acid (FOLVITE) 1 mg tablet Take 1,000 mcg by mouth daily      metoclopramide (REGLAN) 5 mg tablet Take 5 mg by mouth 4 (four) times a day      TRIAMTERENE PO Take by mouth in the morning      VITAMIN D PO Take by mouth in the morning       Current Facility-Administered Medications   Medication Dose Route Frequency Provider Last Rate Last Admin    sodium chloride 0 9 % infusion  75 mL/hr Intravenous Continuous Mike Saunders MD 75 mL/hr at 09/22/22 0734 75 mL/hr at 09/22/22 0734          Objective:    Vitals:    09/22/22 0657   BP: 144/77   Pulse: 86   Resp: 16   Temp: 98 °F (36 7 °C)   TempSrc: Temporal   SpO2: 97%   Weight: 123 kg (270 lb 4 5 oz)   Height: 5' 8" (1 727 m)        Physical Exam  Cardiovascular:      Rate and Rhythm: Normal rate and regular rhythm  Pulmonary:      Effort: Pulmonary effort is normal            No results found for: BNP   Lab Results   Component Value Date    WBC 7 97 09/15/2022    HGB 12 4 09/15/2022    HCT 35 1 (L) 09/15/2022    MCV 94 09/15/2022     09/15/2022     Lab Results   Component Value Date    INR 1 07 09/22/2022    INR 1 03 08/29/2022    PROTIME 13 9 09/22/2022    PROTIME 13 8 08/29/2022     No results found for: PTT      I have personally reviewed pertinent imaging and laboratory results  Code Status: No Order  Advance Directive and Living Will:      Power of :    POLST:      This text is generated with voice recognition software  There may be translation, syntax,  or grammatical errors  If you have any questions, please contact the dictating provider

## 2022-09-23 LAB — HFE GENE MUT ANL BLD/T: NORMAL

## 2022-09-23 PROCEDURE — 88313 SPECIAL STAINS GROUP 2: CPT | Performed by: PATHOLOGY

## 2022-09-23 PROCEDURE — 88307 TISSUE EXAM BY PATHOLOGIST: CPT | Performed by: PATHOLOGY

## 2022-10-06 ENCOUNTER — OFFICE VISIT (OUTPATIENT)
Dept: GASTROENTEROLOGY | Facility: CLINIC | Age: 67
End: 2022-10-06
Payer: COMMERCIAL

## 2022-10-06 VITALS
HEIGHT: 68 IN | SYSTOLIC BLOOD PRESSURE: 142 MMHG | TEMPERATURE: 97.5 F | WEIGHT: 273.6 LBS | HEART RATE: 99 BPM | OXYGEN SATURATION: 98 % | DIASTOLIC BLOOD PRESSURE: 88 MMHG | BODY MASS INDEX: 41.46 KG/M2

## 2022-10-06 DIAGNOSIS — R79.89 ELEVATED FERRITIN: ICD-10-CM

## 2022-10-06 DIAGNOSIS — R79.89 ELEVATED LFTS: ICD-10-CM

## 2022-10-06 DIAGNOSIS — R11.2 NAUSEA AND VOMITING, UNSPECIFIED VOMITING TYPE: Primary | ICD-10-CM

## 2022-10-06 PROCEDURE — 99213 OFFICE O/P EST LOW 20 MIN: CPT | Performed by: INTERNAL MEDICINE

## 2022-10-06 NOTE — PROGRESS NOTES
[facility] - Outpatient Follow-up Note  Jw Maxwell 74 y o  male MRN: [id number]  Encounter: [id number]          ASSESSMENT AND PLAN:    Jw Maxwell is a 74 y o  male with fatty liver thought to be related to alcohol use versus nonalcoholic fatty liver, FELIBERTO, hypertension who presents for follow-up  He was previously seen for elevated LFTs as well as several weeks of nausea, vomiting, poor p o  Intake, weight loss  Recent imaging had suggested colitis and it was thought that he may have had a suspected infectious gastroenteritis/colitis subsequently with a post infectious functional disorder including possible gastroparesis versus leus  He is now much better in terms of GI symptoms and no more nausea, vomiting or weight loss  CT from July 27th revealed homogeneous low attenuation in the liver suspicious for fatty infiltration, as well as small bowel loops normal in caliber but with mucosal wall thickening of the descending colon  He also had Kelsi mesentery with multiple mesenteric lymph nodes suspicious for mesenteric adenitis  Liver biopsy results revealed steatohepatitis with moderate centrilobular pericellular and periportal fibrosis  Iron studies show mild accumulation of finely granular iron with in Kupffer cells and hepatocytes  90% revealed steatosis with a steatosis score of 3  Fibrosis score of 2, perisinusoidal and periportal/portal     Ceruloplasmin normal at 25 8  Most recent CMP with AST of 71 and ALT of 63 which is an improvement compared to August   Normal albumin and total bilirubin  Celiac serologies negative  Ferritin elevated at 1372 with normal iron, and iron saturation of 73  AFP 2 6  Most recent CBC with normal white blood cell count but hemoglobin of 11 2 and platelets of 93  INR 1 07  Prior EBV and CMV serologies shown prior infection but not current  Standard viral hepatitis panel normal   ASMA in normal   AMA normal   KELSEY negative  Hemochromatosis results did not show any genetic findings of hereditary hemochromatosis  Ultrasound revealed hepatomegaly/fatty liver  1  Nausea and vomiting, unspecified vomiting type    2  Elevated LFTs    3  Elevated ferritin        No orders of the defined types were placed in this encounter  Continue Protonix daily  Stop Reglan use  Can use Zofran as needed  Monitor diet and try for healthy weight loss  We discussed calorie counting with a goal to start of 1800 to 2000 calories per day  Avoid alcohol  Continue to follow-up with Dr Ritchie Swanson as needed for liver abnormalities  Follow-up MRI results    ______________________________________________________________________    SUBJECTIVE:    Mehran Kidd is a 74 y o  male who presents with complaint of nausea and vomiting  Since last visit he was seen by Dr Ritchie Swanson who noted fatty liver was slowly but persistently rising transaminases  No nausea and vomiting now  He is still on the reglan  He is not eating well  He is not hungry but he does have an Ensure in the morning  He feels well  He gained weight  No heartburn, dysphagia, odynophagia, diarrhea, constipation, BRBPR, melena, abdominal pain  He drinks some alcohol  He is drinking less alcohol, maybe 8 drinks per week (of vodka)  REVIEW OF SYSTEMS IS OTHERWISE NEGATIVE  10 point ROS reviewed and negative, except as above      Historical Information   Past Medical History:   Diagnosis Date    Hypertension      Past Surgical History:   Procedure Laterality Date    IR BIOPSY LIVER RANDOM  9/22/2022    REPLACEMENT TOTAL KNEE Bilateral      Social History   Social History     Substance and Sexual Activity   Alcohol Use Not Currently    Comment: occassionally     Social History     Substance and Sexual Activity   Drug Use Not Currently     Social History     Tobacco Use   Smoking Status Former Smoker   Smokeless Tobacco Never Used     History reviewed  No pertinent family history      Meds/Allergies Current Outpatient Medications:     amLODIPine (NORVASC) 5 mg tablet    Cholecalciferol (Vitamin D3) 50 MCG (2000 UT) capsule    folic acid (FOLVITE) 1 mg tablet    loratadine (CLARITIN) 10 mg tablet    losartan (COZAAR) 50 mg tablet    LOSARTAN POTASSIUM PO    metoclopramide (REGLAN) 5 mg tablet    metoclopramide (REGLAN) 5 mg tablet    ondansetron (ZOFRAN) 4 mg tablet    pantoprazole (PROTONIX) 40 mg tablet    TRIAMTERENE PO    triamterene-hydrochlorothiazide (DYAZIDE) 37 5-25 mg per capsule    VITAMIN D PO    Allergies   Allergen Reactions    Other Other (See Comments)     Running nose           Objective     Blood pressure 142/88, pulse 99, temperature 97 5 °F (36 4 °C), temperature source Tympanic, height 5' 8" (1 727 m), weight 124 kg (273 lb 9 6 oz), SpO2 98 %  Body mass index is 41 6 kg/m²  PHYSICAL EXAMINATION:    General Appearance:   Alert, cooperative, no distress   HEENT:  Normocephalic, atraumatic, anicteric  Neck supple, symmetrical, trachea midline  Lungs:   Equal chest rise and unlabored breathing, normal effort, no coughing  Cardiovascular:   No visualized JVD  Abdomen:   No abdominal distension  Skin:   No jaundice, rashes, or lesions  Musculoskeletal:   Normal range of motion visualized  Psych:  Normal affect and normal insight  Neuro:  Alert and appropriate  Lab Results:   No visits with results within 1 Day(s) from this visit     Latest known visit with results is:   Hospital Outpatient Visit on 09/22/2022   Component Date Value    Protime 09/22/2022 13 9     INR 09/22/2022 1 07     WBC 09/22/2022 6 08     RBC 09/22/2022 3 37 (A)    Hemoglobin 09/22/2022 11 2 (A)    Hematocrit 09/22/2022 32 2 (A)    MCV 09/22/2022 96     MCH 09/22/2022 33 2     MCHC 09/22/2022 34 8     RDW 09/22/2022 13 2     Platelets 74/15/1815 93 (A)    MPV 09/22/2022 9 5     Case Report 09/22/2022                      Value:Surgical Pathology Report Case: [id number]                                   Authorizing Provider:  Marcel Pardo MD              Collected:           09/22/2022 2650              Ordering Location:     [facility]        Received:            09/22/2022 [address] [facility] Interventional                                                                            Radiology                                                                    Pathologist:           Bryson Harrell MD                                                                    Specimen:    Liver                                                                                      Final Diagnosis 09/22/2022                      Value: This result contains rich text formatting which cannot be displayed here   Additional Information 09/22/2022                      Value: This result contains rich text formatting which cannot be displayed here  Art Bhavik Description 09/22/2022                      Value: This result contains rich text formatting which cannot be displayed here   Clinical Information 09/22/2022                      Value:Random liver biopsy  CC:  Lakia Tariq  CC:  Zara Fritz    Per EMR,   Fatty liver with slowly but persistently rising transaminases      He has a fatty appearing liver on imaging, has few components of the metabolic syndrome, and states he is drinking 2 alcoholic beverages per day      08/29/22 10:37  AST: 186 (H)  ALT: 158 (H)  Alkaline Phosphatase: 65    09/15/22 09:45  AST: 71 (H)  ALT: 63 (H)  Alkaline Phosphatase: 68    08/29/22 10:37  HEPATITIS A IGM ANTIBODY: Non-reactive  HEPATITIS B SURFACE ANTIGEN: Non-reactive    Non-reactive  HEPATITIS B CORE TOTAL ANTIBODY: Non-reactive  HEPATITIS B CORE IGM ANTIBODY: Non-reactive    Non-reactive  HEPATITIS C ANTIBODY: Non-reactive    Non-reactive    08/29/22 10:37  ANTI-NUCLEAR ANTIBODY (KELSEY): Negative  ENDOMYSIAL IGA ANTIBODY: Negative  MITOCHONDRIAL ANTIBODY: <20 0  SMOOTH MUSCLE ANTIBODY: 17  Tissue Transglut Ab IGG: <2  TTG IGA: <2       Lab Results   Component Value Date    WBC 6 08 09/22/2022    HGB 11 2 (L) 09/22/2022    HCT 32 2 (L) 09/22/2022    MCV 96 09/22/2022    PLT 93 (L) 09/22/2022       Lab Results   Component Value Date    SODIUM 138 09/15/2022    K 4 5 09/15/2022     09/15/2022    CO2 28 09/15/2022    AGAP 7 09/15/2022    BUN 14 09/15/2022    CREATININE 0 93 09/15/2022    GLUC 170 (H) 08/24/2022    GLUF 118 (H) 09/15/2022    CALCIUM 9 3 09/15/2022    AST 71 (H) 09/15/2022    ALT 63 (H) 09/15/2022    ALKPHOS 68 09/15/2022    TP 7 1 09/15/2022    TBILI 0 66 09/15/2022    EGFR 85 09/15/2022       No results found for: CRP    Lab Results   Component Value Date    [id number] 2 830 08/29/2022       Lab Results   Component Value Date    IRON 174 09/15/2022    TIBC 240 (L) 09/15/2022    FERRITIN 1,372 (H) 09/15/2022       Radiology Results:   IR biopsy liver random    Result Date: 9/22/2022  Narrative: PROCEDURE: Ultrasound guided nontargeted left liver lobe biopsy  STAFF: KALYANI Jeffries  NUMBER OF IMAGES: Multiple  COMPLICATIONS: None  MEDICATIONS: Local lidocaine  Moderate sedation with fentanyl and Versed was monitored by radiology nursing staff  Monitoring of conscious sedation totaled 30 minutes  INDICATION: Fatty liver  Elevated LFTs of uncertain etiology  PROCEDURE: Under real-time ultrasound guidance, a 17-gauge coaxial needle was advanced into the peripheral left liver lobe via a substernal approach  Under ultrasound guidance, a total of three 1 3 cm 18g core biopsies were obtained  The tract with embolized with Gelfoam, and the coaxial needle was removed  FINDINGS: 1  Pre-procedural ultrasound showed the left liver lobe, unremarkable in appearance    2   Intra-procedural ultrasound confirmed good positioning of the biopsy needle within the peripheral left liver lobe    3   Post-procedural ultrasound showed no immediate complication  Impression: Ultrasound-guided nontargeted left liver lobe biopsy   Workstation performed: [id number]

## 2022-10-06 NOTE — PATIENT INSTRUCTIONS
Continue Protonix daily  Stop Reglan use  Can use Zofran as needed  Monitor diet and try for healthy weight loss   We discussed calorie counting with a goal to start of 1800 to 2000 calories per day  Try to avoid/limit alcohol  Continue to follow-up with Dr Frances Pratt as needed for liver abnormalities  Awaiting MRI

## 2022-10-12 ENCOUNTER — OFFICE VISIT (OUTPATIENT)
Dept: GASTROENTEROLOGY | Facility: CLINIC | Age: 67
End: 2022-10-12
Payer: COMMERCIAL

## 2022-10-12 VITALS
WEIGHT: 269.8 LBS | SYSTOLIC BLOOD PRESSURE: 106 MMHG | DIASTOLIC BLOOD PRESSURE: 86 MMHG | TEMPERATURE: 97.9 F | BODY MASS INDEX: 40.89 KG/M2 | OXYGEN SATURATION: 96 % | HEIGHT: 68 IN | HEART RATE: 104 BPM

## 2022-10-12 DIAGNOSIS — K75.81 NASH (NONALCOHOLIC STEATOHEPATITIS): ICD-10-CM

## 2022-10-12 DIAGNOSIS — E66.01 CLASS 3 SEVERE OBESITY DUE TO EXCESS CALORIES WITH SERIOUS COMORBIDITY AND BODY MASS INDEX (BMI) OF 40.0 TO 44.9 IN ADULT (HCC): Primary | ICD-10-CM

## 2022-10-12 PROCEDURE — 99214 OFFICE O/P EST MOD 30 MIN: CPT | Performed by: INTERNAL MEDICINE

## 2022-10-12 RX ORDER — HYDROCORTISONE 10 MG/ML
1 LOTION TOPICAL 2 TIMES DAILY
COMMUNITY
Start: 2022-09-13

## 2022-10-12 NOTE — PROGRESS NOTES
[facility] - Outpatient Follow-up Note  Dami Cantu 74 y o  male MRN: [id number]  Encounter: [id number]          ASSESSMENT AND PLAN:       1  Elevated transaminases:  Likely etiology is MAURICE/REEVES +/- hemochromatosis  MRI requested by by JODIE Huerta for iron quant  I requested HFE testing  Liver biopsy showed mild iron accumulation in liver  Serum iron at top end of normal   Transaminases improving  No physical, radiology, laboratory or histologic evidence of cirrhosis/portal HTN  Continue weight loss efforts:  Referred to weight management team   Offered goal of 15 lbs weight loss by follow-up visit in mid February  No FDA approved medications at this time specifically for REEVES, however if necessary, weight loss medication can indirectly help if resulting in sustained weight loss  FOLLOW-UP:  Return in about 4 months (around 2/12/2023)  VISIT DIAGNOSES AND ORDERS:      1  Class 3 severe obesity due to excess calories with serious comorbidity and body mass index (BMI) of 40 0 to 44 9 in adult ([facility] [address] )    2  REEVES (nonalcoholic steatohepatitis)      Orders Placed This Encounter   Procedures   • Hemochromatosis mutation   • Hepatic function panel   • Ambulatory Referral to Weight Management     ______________________________________________________________________    SUBJECTIVE:           Mr Dami Cantu is a 74 y o  male with medical history as outlined below, including obesity, HTN, daily alcohol use (2 beers/day), and recent evaluation for persistent nasuea/vomiting/poor PO intake and 25 lbs weight loss, who comes in today for follow-up after having blood work, imaging and liver biopsy done to evaluate for etiology of elevated LFTs beyond alcoholic and non-alcoholic (MAURICE and REEVES respectively) liver disease  Biopsy showed mild iron accumulation in addition to the expected steatohepatitis  In the office today, he states he actually feels very well     Symptoms of his nausea and vomiting have resolved and he is no longer on Reglan  He is trying to eat healthier meals with a focus on the Mediterranean diet    Mr Huan Gonzalez denies yellow eyes/skin, dark urine, GI bleeding, abdominal distention with fluid, lower extremity swelling, easy bruising, excessive bleeding, pruritus or confusion  He denies abdominal pain, nausea, vomiting, heartburn, reflux, difficulty swallowing, early satiety, bloating, diarrhea, constipation or straining with passing stools  He denies a return to drinking alcohol  REVIEW OF SYSTEMS     Review of Systems   All other systems reviewed and are negative        Historical Information   Patient Active Problem List   Diagnosis   • BMI 40 0-44 9, adult (HCC)   • Elevated ferritin   • Family history of colon cancer   • Fatty liver, alcoholic   • History of colon polyps   • Hypercholesterolemia   • Venous insufficiency of both lower extremities   • FELIBERTO on CPAP   • Osteoarthritis   • Primary osteoarthritis of both knees   • Status post total bilateral knee replacement   • Thrombocytopenia (HCC)   • Vitamin D deficiency     Social History     Substance and Sexual Activity   Alcohol Use Not Currently    Comment: occassionally     Social History     Substance and Sexual Activity   Drug Use Not Currently     Social History     Tobacco Use   Smoking Status Former Smoker   Smokeless Tobacco Never Used       Meds/Allergies       Current Outpatient Medications:   •  amLODIPine (NORVASC) 5 mg tablet  •  Cholecalciferol (Vitamin D3) 50 MCG (2000 UT) capsule  •  Dermarest Eczema 1 % lotion  •  folic acid (FOLVITE) 1 mg tablet  •  loratadine (CLARITIN) 10 mg tablet  •  losartan (COZAAR) 50 mg tablet  •  LOSARTAN POTASSIUM PO  •  metoclopramide (REGLAN) 5 mg tablet  •  ondansetron (ZOFRAN) 4 mg tablet  •  pantoprazole (PROTONIX) 40 mg tablet  •  TRIAMTERENE PO  •  triamterene-hydrochlorothiazide (DYAZIDE) 37 5-25 mg per capsule  •  VITAMIN D PO  •  metoclopramide (REGLAN) 5 mg tablet    Allergies   Allergen Reactions   • Other Other (See Comments)     Running nose           Objective     Blood pressure 106/86, pulse 104, temperature 97 9 °F (36 6 °C), temperature source Tympanic, height 5' 8" (1 727 m), weight 122 kg (269 lb 12 8 oz), SpO2 96 %  Body mass index is 41 02 kg/m²  PHYSICAL EXAM:      Physical Exam  Vitals reviewed  Constitutional:       General: He is not in acute distress  Appearance: Normal appearance  He is normal weight  He is not ill-appearing  HENT:      Head: Normocephalic  Mouth/Throat:      Mouth: Mucous membranes are moist       Pharynx: Oropharynx is clear  No oropharyngeal exudate  Eyes:      General: No scleral icterus  Extraocular Movements: Extraocular movements intact  Cardiovascular:      Rate and Rhythm: Normal rate and regular rhythm  Heart sounds: No murmur heard  Pulmonary:      Effort: Pulmonary effort is normal  No respiratory distress  Breath sounds: Normal breath sounds  No rales  Abdominal:      General: Abdomen is flat  Bowel sounds are normal  There is distension (obesity)  Palpations: Abdomen is soft  There is hepatomegaly  There is no splenomegaly  Tenderness: There is no abdominal tenderness  Musculoskeletal:         General: No swelling  Cervical back: Normal range of motion and neck supple  Comments: Surgical scar on knee   Skin:     General: Skin is warm  Coloration: Skin is not jaundiced  Findings: No bruising or rash  Neurological:      General: No focal deficit present  Mental Status: He is oriented to person, place, and time     Psychiatric:         Mood and Affect: Mood normal          Lab Results:   Lab Results   Component Value Date    K 4 5 09/15/2022    CO2 28 09/15/2022     09/15/2022    BUN 14 09/15/2022    CREATININE 0 93 09/15/2022     Lab Results   Component Value Date    WBC 6 08 09/22/2022    HGB 11 2 (L) 09/22/2022    HCT 32 2 (L) 09/22/2022 MCV 96 09/22/2022    PLT 93 (L) 09/22/2022     Lab Results   Component Value Date    TP 7 1 09/15/2022    AST 71 (H) 09/15/2022    ALT 63 (H) 09/15/2022    INR 1 07 09/22/2022      Lab Results   Component Value Date    IRON 174 09/15/2022    FERRITIN 1,372 (H) 09/15/2022     No results found for: CHOL, HDL, TRIG, LDL      Radiology Results:   US abdomen complete    Result Date: 9/1/2022  Narrative: ABDOMEN ULTRASOUND, COMPLETE INDICATION:   R74 01: Elevation of levels of liver transaminase levels  COMPARISON:  None TECHNIQUE:   Real-time ultrasound of the abdomen was performed with a curvilinear transducer with both volumetric sweeps and still imaging techniques  FINDINGS: PANCREAS:  Visualized portions of the pancreas are within normal limits  AORTA AND IVC:  Visualized portions are normal for patient age  LIVER: Size:  Enlarged  The liver measures 20 5 cm in the midclavicular line  Contour:  Surface contour is smooth  Parenchyma: There is marked diffuse increased echogenicity with smooth echotexture and significant beam attenuation with loss of periportal echogenicity  Most consistent with severe hepatic steatosis  No liver mass identified  Limited imaging of the main portal vein shows it to be patent and hepatopetal  BILIARY: No gallbladder findings  No intrahepatic biliary dilatation  CBD measures 4 0 mm  No choledocholithiasis  KIDNEY: Right kidney measures 10 6 x 4 9 x 6 7  cm  Volume 182 7 mL Kidney within normal limits  Left kidney measures 11 0 x 5 2 x 6 8 cm  Volume 201 8 mL Kidney within normal limits  SPLEEN: Measures 14 4 x 14 3 x 5 6 cm  Volume 608 2 mL Within normal limits  ASCITES:  None  Impression: Hepatosplenomegaly and steatosis   Workstation performed: [id number]         Ingrid Soria MD

## 2022-10-12 NOTE — PATIENT INSTRUCTIONS
Blood work in December  See our weight management team  Continue with your healthier eating and walking  Goal of 15 lbs weight before next visit

## 2022-10-19 ENCOUNTER — HOSPITAL ENCOUNTER (OUTPATIENT)
Dept: RADIOLOGY | Facility: HOSPITAL | Age: 67
Discharge: HOME | End: 2022-10-19
Payer: COMMERCIAL

## 2022-10-19 DIAGNOSIS — R79.89 ELEVATED LFTS: ICD-10-CM

## 2022-10-19 DIAGNOSIS — R79.89 ELEVATED FERRITIN: ICD-10-CM

## 2022-10-19 DIAGNOSIS — D69.6 THROMBOCYTOPENIA (HCC): ICD-10-CM

## 2022-10-19 PROCEDURE — A9585 GADOBUTROL INJECTION: HCPCS

## 2022-10-19 PROCEDURE — 74183 MRI ABD W/O CNTR FLWD CNTR: CPT

## 2022-10-19 RX ADMIN — GADOBUTROL 12 ML: 604.72 INJECTION INTRAVENOUS at 20:03

## 2022-10-25 ENCOUNTER — TELEPHONE (OUTPATIENT)
Dept: HEMATOLOGY ONCOLOGY | Facility: CLINIC | Age: 67
End: 2022-10-25

## 2022-10-25 NOTE — TELEPHONE ENCOUNTER
CALL RETURN FORM   Reason for patient call? Patient returning call, needs regular phone call for appmt with Ms espino  [facility]  Patient's primary oncologist?  [address]   Name of person the patient was calling for? Omar   Any additional information to add, if applicable? [phone]   Informed patient that the message will be forwarded to the team and someone will get back to them as soon as possible    Did you relay this information to the patient?  yes

## 2022-10-25 NOTE — TELEPHONE ENCOUNTER
Keena Shields will be out of the office on 10/26 and working remotely on 10/27  Patient has been rescheduled for a Virtual Visit on Thursday 10/27/2022 @ 10:30 AM     Hopeline number provided for confirmation

## 2022-10-27 ENCOUNTER — APPOINTMENT (OUTPATIENT)
Dept: LAB | Facility: HOSPITAL | Age: 67
End: 2022-10-27
Payer: COMMERCIAL

## 2022-10-27 ENCOUNTER — TELEMEDICINE (OUTPATIENT)
Dept: HEMATOLOGY ONCOLOGY | Facility: CLINIC | Age: 67
End: 2022-10-27

## 2022-10-27 ENCOUNTER — TELEPHONE (OUTPATIENT)
Dept: HEMATOLOGY ONCOLOGY | Facility: CLINIC | Age: 67
End: 2022-10-27

## 2022-10-27 DIAGNOSIS — E83.19 IRON OVERLOAD: ICD-10-CM

## 2022-10-27 DIAGNOSIS — R79.89 ELEVATED FERRITIN: Primary | ICD-10-CM

## 2022-10-27 DIAGNOSIS — D69.6 THROMBOCYTOPENIA (HCC): Primary | ICD-10-CM

## 2022-10-27 DIAGNOSIS — R79.89 ELEVATED FERRITIN: ICD-10-CM

## 2022-10-27 LAB
BASOPHILS # BLD AUTO: 0.06 THOUSANDS/ÂΜL (ref 0–0.1)
BASOPHILS NFR BLD AUTO: 1 % (ref 0–1)
EOSINOPHIL # BLD AUTO: 0.75 THOUSAND/ÂΜL (ref 0–0.61)
EOSINOPHIL NFR BLD AUTO: 10 % (ref 0–6)
ERYTHROCYTE [DISTWIDTH] IN BLOOD BY AUTOMATED COUNT: 13.8 % (ref 11.6–15.1)
FERRITIN SERPL-MCNC: 1670 NG/ML (ref 8–388)
HCT VFR BLD AUTO: 37.8 % (ref 36.5–49.3)
HGB BLD-MCNC: 12.7 G/DL (ref 12–17)
IMM GRANULOCYTES # BLD AUTO: 0.05 THOUSAND/UL (ref 0–0.2)
IMM GRANULOCYTES NFR BLD AUTO: 1 % (ref 0–2)
IRON SATN MFR SERPL: 42 % (ref 20–50)
IRON SERPL-MCNC: 109 UG/DL (ref 65–175)
LYMPHOCYTES # BLD AUTO: 1.48 THOUSANDS/ÂΜL (ref 0.6–4.47)
LYMPHOCYTES NFR BLD AUTO: 20 % (ref 14–44)
MCH RBC QN AUTO: 33.6 PG (ref 26.8–34.3)
MCHC RBC AUTO-ENTMCNC: 33.6 G/DL (ref 31.4–37.4)
MCV RBC AUTO: 100 FL (ref 82–98)
MONOCYTES # BLD AUTO: 0.67 THOUSAND/ÂΜL (ref 0.17–1.22)
MONOCYTES NFR BLD AUTO: 9 % (ref 4–12)
NEUTROPHILS # BLD AUTO: 4.35 THOUSANDS/ÂΜL (ref 1.85–7.62)
NEUTS SEG NFR BLD AUTO: 59 % (ref 43–75)
NRBC BLD AUTO-RTO: 0 /100 WBCS
PLATELET # BLD AUTO: 142 THOUSANDS/UL (ref 149–390)
PMV BLD AUTO: 9.6 FL (ref 8.9–12.7)
RBC # BLD AUTO: 3.78 MILLION/UL (ref 3.88–5.62)
TIBC SERPL-MCNC: 258 UG/DL (ref 250–450)
WBC # BLD AUTO: 7.36 THOUSAND/UL (ref 4.31–10.16)

## 2022-10-27 PROCEDURE — 83540 ASSAY OF IRON: CPT

## 2022-10-27 PROCEDURE — 36415 COLL VENOUS BLD VENIPUNCTURE: CPT

## 2022-10-27 PROCEDURE — 83550 IRON BINDING TEST: CPT

## 2022-10-27 PROCEDURE — 82728 ASSAY OF FERRITIN: CPT

## 2022-10-27 PROCEDURE — 85025 COMPLETE CBC W/AUTO DIFF WBC: CPT

## 2022-10-27 NOTE — TELEPHONE ENCOUNTER
Called and spoke with patient  He is aware he is scheduled for therapeutic phlebotomy tomorrow at 1:00 pm at the [facility]  Patient verbalized understanding

## 2022-10-27 NOTE — PROGRESS NOTES
Virtual Regular Visit    Verification of patient location:    Patient is located in the following state in which I hold an active license [place]    Assessment/Plan:  1  Thrombocytopenia ([address] )  2  Iron overload  3  Elevated ferritin  Mr Jose Uribe is a 29-year-old male seen in follow-up for thrombocytopenia and iron overload  His most recent ferritin was noted to be 1372  He was evaluated by Gastroenterology, Dr Zoraida Echeverria, for his liver and underwent liver biopsy  He has no evidence of hemochromatosis HFE mutation  His liver did demonstrate some mild iron overload  Iron quantification MRI demonstrated slight iron overload with strong steatosis  We discussed the risk of his elevated ferritin levels and he would like to proceed with therapeutic phlebotomy  We will arrange for this to happen asap as he is planning to return to work next week  We discussed that chronic iron overload can damage the liver further than currently  He is to continue with Gastroenterology, liver specialist in February  We has brief discussion regarding alternatives the therapeutic phlebotomy such as chelation therapy and he is okay proceeding with therapeutic phlebotomy 1st   He will go for repeat/updated labs today and continue with this monthly  Plan is to initiate therapeutic phlebotomy monthly for a ferritin >800 or TSAT >45% and hemoglobin > 10  He knows to call the office with any new symptoms  Otherwise he will follow-up with me in February     - CBC and differential; Standing  - Iron Panel (Includes Ferritin, Iron Sat%, Iron, and TIBC);  Standing  - CBC and differential  - Iron Panel (Includes Ferritin, Iron Sat%, Iron, and TIBC)       Reason for visit is   Chief Complaint   Patient presents with   • Virtual Regular Visit        Encounter provider PRAFUL Bynum    Provider located at [address] [id number]      Recent Visits  Date Type Provider Dept   10/25/22 Telephone [facility] [place] [place]   Showing recent visits within past 7 days and meeting all other requirements  Future Appointments  No visits were found meeting these conditions  Showing future appointments within next 150 days and meeting all other requirements       The patient was identified by name and date of birth  Nelson Tucker was informed that this is a telemedicine visit and that the visit is being conducted through Telephone  He acknowledged consent and understanding of privacy and security of the video platform  The patient has agreed to participate and understands they can discontinue the visit at any time  Patient is aware this is a billable service  Subjective  Nelson Tucker is a 74 y o  male  seen in follow-up for thrombocytopenia and elevated ferritin  He has undergone workup including complete gastrointestinal workup with liver biopsy  Hemochromatosis workup has been negative  MRI of the abdomen demonstrated mild iron overload  Most recent ferritin was reviewed at her last visit and was down trending  Platelet count has been stable in the  range  He was evaluated by Dr Amrik Dwyer with Gastroenterology for his liver and was found to have likely [address]  He is a previous 2 beer per day drinker  He previously had significant symptoms with decreased appetite and weight loss  His weight has been stable and his appetite has returned to baseline  He was on Reglan and Protonix  He denies any excess bleeding or bruising  He denies any nose bleeds, bleeding gums, blood in his stool, blood in his urine  He does not have a petechial rash  He is planning on returning to work next week      11/10/2018:  Hemoglobin 14 7, platelets 557              AST 57, ALT 60              Serum iron 101  01/23/2019:  Hemoglobin 13 1, platelets 543              AST 52, ALT 40  12/28/2019:  Ferritin 837, iron saturation 42%, TIBC 293, serum iron 123  02/29/2020:  Hemoglobin 14 5, platelets 701              AST 60, ALT 68              Ferritin 828, iron saturation 33%, TIBC 342, serum iron 113  05/17/2022:  Hemoglobin 14 1, platelets 590              AST 65, ALT 69              Ferritin 762, iron saturation 51%, TIBC 331, serum iron 169  08/24/2022:  Hemoglobin 13 4, platelets 90              ,   08/29/2022:  ,               Ferritin 2585, serum iron 63  08/30/2022:  Hemoglobin 13 4, platelets 425              ,               Ferritin 1567, iron saturation 29%, TIBC 248, serum iron 72  09/15/2022:  Hemoglobin 12 4, MCV 94, platelets 081, WBC 0 65   Ferritin 1372, iron saturation 73%, TIBC 240, serum iron 174  09/22/2022:  Hemoglobin 11 2, MCV 96, platelets 93, WBC 9 29    Past Medical History:   Diagnosis Date   • Hypertension        Past Surgical History:   Procedure Laterality Date   • IR BIOPSY LIVER RANDOM  9/22/2022   • REPLACEMENT TOTAL KNEE Bilateral        Current Outpatient Medications   Medication Sig Dispense Refill   • amLODIPine (NORVASC) 5 mg tablet Take 5 mg by mouth daily     • Cholecalciferol (Vitamin D3) 50 MCG (2000 UT) capsule Take 2,000 Units by mouth daily     • Dermarest Eczema 1 % lotion Apply 1 application topically 2 (two) times a day To affected area     • folic acid (FOLVITE) 1 mg tablet Take 1,000 mcg by mouth daily     • loratadine (CLARITIN) 10 mg tablet Take 10 mg by mouth     • losartan (COZAAR) 50 mg tablet TAKE' 1 TABLET (50 MG TOTAL) BY MOUTH DAILY   IN THE MORNING     • LOSARTAN POTASSIUM PO Take by mouth in the morning     • metoclopramide (REGLAN) 5 mg tablet Take 1 tablet (5 mg total) by mouth 3 (three) times a day (Patient not taking: Reported on 10/12/2022) 90 tablet 2   • metoclopramide (REGLAN) 5 mg tablet Take 5 mg by mouth 4 (four) times a day     • ondansetron (ZOFRAN) 4 mg tablet Take 4 mg by mouth every 8 (eight) hours as needed for nausea or vomiting     • pantoprazole (PROTONIX) 40 mg tablet Take 1 tablet (40 mg total) by mouth daily 30 tablet 3   • TRIAMTERENE PO Take by mouth in the morning     • triamterene-hydrochlorothiazide (DYAZIDE) 37 5-25 mg per capsule TAKE 1 CAPSULE BY MOUTH DAILY  NOT TAKING REGULARLY  • VITAMIN D PO Take by mouth in the morning       No current facility-administered medications for this visit  Allergies   Allergen Reactions   • Other Other (See Comments)     Running nose       Review of Systems   Constitutional: Negative for activity change, appetite change (improved), diaphoresis, fatigue, fever and unexpected weight change (stable now )  HENT: Negative for trouble swallowing and voice change  Eyes: Negative for photophobia and visual disturbance  Respiratory: Negative for cough, chest tightness and shortness of breath  Cardiovascular: Negative for chest pain, palpitations and leg swelling  Gastrointestinal: Negative for abdominal distention, abdominal pain, blood in stool, constipation, diarrhea and nausea  Endocrine: Negative for cold intolerance and heat intolerance  Genitourinary: Negative for dysuria, hematuria and urgency  Musculoskeletal: Negative for arthralgias, back pain, gait problem and joint swelling  Skin: Negative for pallor and rash  Neurological: Negative for dizziness, seizures, weakness, light-headedness, numbness and headaches  Hematological: Negative for adenopathy  Does not bruise/bleed easily  Psychiatric/Behavioral: Negative for confusion and sleep disturbance  Video Exam    There were no vitals filed for this visit  Physical Exam  Pulmonary:      Effort: No respiratory distress  Neurological:      Mental Status: He is oriented to person, place, and time  Mental status is at baseline  Psychiatric:         Mood and Affect: Mood normal          Behavior: Behavior normal          Thought Content:  Thought content normal          Judgment: Judgment normal           I spent 25 minutes with patient today in which greater than 50% of the time was spent in counseling/coordination of care regarding Review of office notes, labs, pathology, imaging

## 2022-10-28 ENCOUNTER — HOSPITAL ENCOUNTER (OUTPATIENT)
Dept: INFUSION CENTER | Facility: CLINIC | Age: 67
Discharge: HOME | End: 2022-10-28

## 2022-10-28 VITALS
TEMPERATURE: 97.7 F | SYSTOLIC BLOOD PRESSURE: 130 MMHG | DIASTOLIC BLOOD PRESSURE: 91 MMHG | RESPIRATION RATE: 18 BRPM | HEART RATE: 111 BPM

## 2022-10-28 DIAGNOSIS — R79.89 ELEVATED FERRITIN: Primary | ICD-10-CM

## 2022-10-28 NOTE — PROGRESS NOTES
Patient arrived to unit for first time therapeutic phlebotomy  HGB 12 7, verified with Alejandro Erickson RN  Tolerated treatment without any adverse reactions  10-15 min wait period completed  AVS printed and given to patient, aware of upcoming appointments   Pt left unit in stable condition

## 2022-11-01 ENCOUNTER — APPOINTMENT (OUTPATIENT)
Dept: URGENT CARE | Age: 67
End: 2022-11-01

## 2022-11-21 ENCOUNTER — APPOINTMENT (OUTPATIENT)
Dept: LAB | Facility: CLINIC | Age: 67
End: 2022-11-21

## 2022-11-25 ENCOUNTER — HOSPITAL ENCOUNTER (OUTPATIENT)
Dept: INFUSION CENTER | Facility: CLINIC | Age: 67
Discharge: HOME | End: 2022-11-25

## 2022-11-25 VITALS
HEART RATE: 71 BPM | SYSTOLIC BLOOD PRESSURE: 127 MMHG | RESPIRATION RATE: 18 BRPM | TEMPERATURE: 96 F | DIASTOLIC BLOOD PRESSURE: 80 MMHG

## 2022-11-25 DIAGNOSIS — R79.89 ELEVATED FERRITIN: Primary | ICD-10-CM

## 2022-11-25 NOTE — PLAN OF CARE
Problem: Knowledge Deficit  Goal: Patient/family/caregiver demonstrates understanding of disease process, treatment plan, medications, and discharge instructions  Description: Complete learning assessment and assess knowledge base    Interventions:  - Provide teaching at level of understanding  - Provide teaching via preferred learning methods  11/25/2022 1205 by Guera Larson RN  Outcome: Progressing

## 2022-11-25 NOTE — PROGRESS NOTES
Patient arrived to unit without complaint  Patient's lab parameters reviewed and ok to perform therapeutic phlebotomy today  Lab parameters and total blood removal volume of 450 mL verified independently by Kerry Cerda RN  450 mL of blood removed from right AC vein without incident  After 15 minute observation, patient's vital signs stable  AVS provided and patient aware of next appointment  Patient left in stable condition

## 2022-12-10 DIAGNOSIS — R63.4 WEIGHT LOSS: ICD-10-CM

## 2022-12-10 DIAGNOSIS — R11.2 NAUSEA & VOMITING: ICD-10-CM

## 2022-12-12 RX ORDER — OMEPRAZOLE 40 MG/1
40 CAPSULE, DELAYED RELEASE ORAL DAILY
Qty: 30 CAPSULE | Refills: 2 | Status: SHIPPED | OUTPATIENT
Start: 2022-12-12

## 2022-12-13 ENCOUNTER — TELEPHONE (OUTPATIENT)
Dept: GASTROENTEROLOGY | Facility: CLINIC | Age: 67
End: 2022-12-13

## 2022-12-13 NOTE — TELEPHONE ENCOUNTER
PA is needed for omeprazole 40 mg daily           ----- Message -----  From: Roger Laguna RN  Sent: 12/12/2022   2:37 PM EST  To: , *  Subject: FW: Rx Auth Request                              Needs a PA  Please advise   Thanks

## 2022-12-15 NOTE — TELEPHONE ENCOUNTER
Initiated PA for Omeprazole 40mg capsule through covermymeds   (Zhao: [facility])

## 2022-12-22 ENCOUNTER — APPOINTMENT (OUTPATIENT)
Dept: LAB | Facility: CLINIC | Age: 67
End: 2022-12-22

## 2022-12-22 DIAGNOSIS — E66.01 CLASS 3 SEVERE OBESITY DUE TO EXCESS CALORIES WITH SERIOUS COMORBIDITY AND BODY MASS INDEX (BMI) OF 40.0 TO 44.9 IN ADULT (HCC): ICD-10-CM

## 2022-12-22 DIAGNOSIS — K75.81 NASH (NONALCOHOLIC STEATOHEPATITIS): ICD-10-CM

## 2022-12-22 LAB
ALBUMIN SERPL BCP-MCNC: 4.1 G/DL (ref 3.5–5)
ALP SERPL-CCNC: 64 U/L (ref 34–104)
ALT SERPL W P-5'-P-CCNC: 59 U/L (ref 7–52)
AST SERPL W P-5'-P-CCNC: 89 U/L (ref 13–39)
BILIRUB DIRECT SERPL-MCNC: 0.24 MG/DL (ref 0–0.2)
BILIRUB SERPL-MCNC: 0.82 MG/DL (ref 0.2–1)
PROT SERPL-MCNC: 7.2 G/DL (ref 6.4–8.4)

## 2022-12-23 ENCOUNTER — HOSPITAL ENCOUNTER (OUTPATIENT)
Dept: INFUSION CENTER | Facility: CLINIC | Age: 67
Discharge: HOME | End: 2022-12-23

## 2022-12-23 VITALS
RESPIRATION RATE: 18 BRPM | TEMPERATURE: 98.1 F | DIASTOLIC BLOOD PRESSURE: 84 MMHG | HEART RATE: 90 BPM | SYSTOLIC BLOOD PRESSURE: 122 MMHG

## 2022-12-23 DIAGNOSIS — R79.89 ELEVATED FERRITIN: Primary | ICD-10-CM

## 2022-12-23 LAB — CERULOPLASMIN SERPL-MCNC: 21.9 MG/DL (ref 16–31)

## 2022-12-23 NOTE — PROGRESS NOTES
Pt  Denied new symptoms or concerns today  Phlebotomy ordered today  Parameters met  Carmen Vazquez RN verified parameters   450 ml removed using LAC  Pt  Tolerated well having fluids as encouraged

## 2022-12-28 LAB
A1AT PHENOTYP SERPL IFE: NORMAL
A1AT SERPL-MCNC: 186 MG/DL (ref 101–187)

## 2022-12-29 LAB — HFE GENE MUT ANL BLD/T: NORMAL

## 2023-01-16 ENCOUNTER — TELEPHONE (OUTPATIENT)
Dept: GASTROENTEROLOGY | Facility: CLINIC | Age: 68
End: 2023-01-16

## 2023-01-18 ENCOUNTER — APPOINTMENT (OUTPATIENT)
Dept: LAB | Facility: CLINIC | Age: 68
End: 2023-01-18

## 2023-01-18 DIAGNOSIS — R79.89 ELEVATED FERRITIN: ICD-10-CM

## 2023-01-18 LAB
BASOPHILS # BLD AUTO: 0.06 THOUSANDS/ÂΜL (ref 0–0.1)
BASOPHILS NFR BLD AUTO: 1 % (ref 0–1)
EOSINOPHIL # BLD AUTO: 0.37 THOUSAND/ÂΜL (ref 0–0.61)
EOSINOPHIL NFR BLD AUTO: 6 % (ref 0–6)
ERYTHROCYTE [DISTWIDTH] IN BLOOD BY AUTOMATED COUNT: 12.4 % (ref 11.6–15.1)
HCT VFR BLD AUTO: 35.3 % (ref 36.5–49.3)
HGB BLD-MCNC: 11.9 G/DL (ref 12–17)
IMM GRANULOCYTES # BLD AUTO: 0.03 THOUSAND/UL (ref 0–0.2)
IMM GRANULOCYTES NFR BLD AUTO: 1 % (ref 0–2)
LYMPHOCYTES # BLD AUTO: 1.45 THOUSANDS/ÂΜL (ref 0.6–4.47)
LYMPHOCYTES NFR BLD AUTO: 23 % (ref 14–44)
MCH RBC QN AUTO: 32.7 PG (ref 26.8–34.3)
MCHC RBC AUTO-ENTMCNC: 33.7 G/DL (ref 31.4–37.4)
MCV RBC AUTO: 97 FL (ref 82–98)
MONOCYTES # BLD AUTO: 0.75 THOUSAND/ÂΜL (ref 0.17–1.22)
MONOCYTES NFR BLD AUTO: 12 % (ref 4–12)
NEUTROPHILS # BLD AUTO: 3.7 THOUSANDS/ÂΜL (ref 1.85–7.62)
NEUTS SEG NFR BLD AUTO: 57 % (ref 43–75)
NRBC BLD AUTO-RTO: 0 /100 WBCS
PLATELET # BLD AUTO: 146 THOUSANDS/UL (ref 149–390)
PMV BLD AUTO: 9.7 FL (ref 8.9–12.7)
RBC # BLD AUTO: 3.64 MILLION/UL (ref 3.88–5.62)
WBC # BLD AUTO: 6.36 THOUSAND/UL (ref 4.31–10.16)

## 2023-01-20 ENCOUNTER — TELEPHONE (OUTPATIENT)
Dept: HEMATOLOGY ONCOLOGY | Facility: CLINIC | Age: 68
End: 2023-01-20

## 2023-01-20 ENCOUNTER — HOSPITAL ENCOUNTER (OUTPATIENT)
Dept: INFUSION CENTER | Facility: CLINIC | Age: 68
Discharge: HOME | End: 2023-01-20

## 2023-01-20 VITALS
TEMPERATURE: 98 F | RESPIRATION RATE: 18 BRPM | SYSTOLIC BLOOD PRESSURE: 111 MMHG | DIASTOLIC BLOOD PRESSURE: 76 MMHG | HEART RATE: 84 BPM

## 2023-01-20 DIAGNOSIS — R79.89 ELEVATED FERRITIN: Primary | ICD-10-CM

## 2023-01-20 DIAGNOSIS — R79.89 ELEVATED LFTS: ICD-10-CM

## 2023-01-20 DIAGNOSIS — R63.4 WEIGHT LOSS: ICD-10-CM

## 2023-01-20 DIAGNOSIS — R11.2 NAUSEA & VOMITING: ICD-10-CM

## 2023-01-20 DIAGNOSIS — D69.6 THROMBOCYTOPENIA (HCC): ICD-10-CM

## 2023-01-20 DIAGNOSIS — E83.19 IRON OVERLOAD: ICD-10-CM

## 2023-01-20 RX ORDER — OMEPRAZOLE 40 MG/1
40 CAPSULE, DELAYED RELEASE ORAL DAILY
Qty: 90 CAPSULE | Refills: 1 | Status: SHIPPED | OUTPATIENT
Start: 2023-01-20

## 2023-01-20 NOTE — TELEPHONE ENCOUNTER
[facility]  Follow up appointment must be moved once again  Patient has been rescheduled to Monday 2/20/2023 @3:00 PM instead with labs 1 week prior  LVM for patient explaining this change in detail  Advised patient to call [phone] with any questions or concerns

## 2023-01-20 NOTE — PROGRESS NOTES
Pt presents for therapeutic phlebotomy  No new meds or concerns  Pt tolerated phlebotomy without adverse reaction  Future visits discussed  AVS declined

## 2023-01-20 NOTE — TELEPHONE ENCOUNTER
Xiomara Green will be on vacation on Friday 2/17/2023  Patient has been rescheduled to 2/16/2023 @ 4:00 PM     Patient is scheduled for infusion on the same day @ 3:00 PM and his follow up will be completed there, [place] per Maia DAVIS with new appointment details and instructions to complete labs 1 week prior  Hopeline number provided for possible call back

## 2023-01-30 NOTE — PROGRESS NOTES
Addendum: On 1/20/23 450mL removed vis therapeutic phlebotomy per orders and per protocol  Tolerated well

## 2023-01-30 NOTE — ADDENDUM NOTE
Encounter addended by: Oriana Rodríguez RN on: 1/30/2023 3:40 PM   Actions taken: Clinical Note Signed

## 2023-02-15 ENCOUNTER — APPOINTMENT (OUTPATIENT)
Dept: LAB | Facility: CLINIC | Age: 68
End: 2023-02-15

## 2023-02-15 DIAGNOSIS — D69.6 THROMBOCYTOPENIA (HCC): ICD-10-CM

## 2023-02-15 DIAGNOSIS — R79.89 ELEVATED LFTS: ICD-10-CM

## 2023-02-15 DIAGNOSIS — R79.89 ELEVATED FERRITIN: ICD-10-CM

## 2023-02-15 DIAGNOSIS — E83.19 IRON OVERLOAD: ICD-10-CM

## 2023-02-15 LAB
ALBUMIN SERPL BCP-MCNC: 3.9 G/DL (ref 3.5–5)
ALP SERPL-CCNC: 66 U/L (ref 34–104)
ALT SERPL W P-5'-P-CCNC: 43 U/L (ref 7–52)
ANION GAP SERPL CALCULATED.3IONS-SCNC: 6 MMOL/L (ref 4–13)
AST SERPL W P-5'-P-CCNC: 67 U/L (ref 13–39)
BASOPHILS NFR BLD MANUAL: 1 % (ref 0–1)
BILIRUB SERPL-MCNC: 0.54 MG/DL (ref 0.2–1)
BUN SERPL-MCNC: 19 MG/DL (ref 5–25)
CALCIUM SERPL-MCNC: 9.4 MG/DL (ref 8.4–10.2)
CHLORIDE SERPL-SCNC: 104 MMOL/L (ref 96–108)
CO2 SERPL-SCNC: 28 MMOL/L (ref 21–32)
CREAT SERPL-MCNC: 0.95 MG/DL (ref 0.6–1.3)
ERYTHROCYTE [DISTWIDTH] IN BLOOD BY AUTOMATED COUNT: 12.7 % (ref 11.6–15.1)
FERRITIN SERPL-MCNC: 793 NG/ML (ref 8–388)
GFR SERPL CREATININE-BSD FRML MDRD: 83 ML/MIN/1.73SQ M
GLUCOSE SERPL-MCNC: 113 MG/DL (ref 65–140)
HCT VFR BLD AUTO: 33.7 % (ref 36.5–49.3)
HGB BLD-MCNC: 11.5 G/DL (ref 12–17)
IMM EOSINOPHIL NFR BLD MANUAL: 4 % (ref 0–6)
IRON SATN MFR SERPL: 26 % (ref 20–50)
IRON SERPL-MCNC: 79 UG/DL (ref 65–175)
LYMPHOCYTES NFR BLD: 20 % (ref 14–44)
MCH RBC QN AUTO: 32.9 PG (ref 26.8–34.3)
MCHC RBC AUTO-ENTMCNC: 34.1 G/DL (ref 31.4–37.4)
MCV RBC AUTO: 96 FL (ref 82–98)
MONOCYTES NFR BLD AUTO: 11 % (ref 4–12)
NEUTS SEG NFR BLD AUTO: 64 % (ref 45–77)
NRBC BLD AUTO-RTO: 0 /100 WBCS
PLATELET # BLD AUTO: 141 THOUSANDS/UL (ref 149–390)
PLATELET BLD QL SMEAR: ABNORMAL
PMV BLD AUTO: 9.6 FL (ref 8.9–12.7)
POTASSIUM SERPL-SCNC: 3.8 MMOL/L (ref 3.5–5.3)
PROT SERPL-MCNC: 7.1 G/DL (ref 6.4–8.4)
RBC # BLD AUTO: 3.5 MILLION/UL (ref 3.88–5.62)
RBC MORPH BLD: NORMAL
SODIUM SERPL-SCNC: 138 MMOL/L (ref 135–147)
TIBC SERPL-MCNC: 301 UG/DL (ref 250–450)
TOTAL CELLS COUNTED SPEC: 100
WBC # BLD AUTO: 5.94 THOUSAND/UL (ref 4.31–10.16)

## 2023-02-16 ENCOUNTER — HOSPITAL ENCOUNTER (OUTPATIENT)
Dept: INFUSION CENTER | Facility: CLINIC | Age: 68
Discharge: HOME | End: 2023-02-16

## 2023-02-16 VITALS
SYSTOLIC BLOOD PRESSURE: 145 MMHG | HEART RATE: 86 BPM | DIASTOLIC BLOOD PRESSURE: 90 MMHG | RESPIRATION RATE: 18 BRPM | TEMPERATURE: 98.1 F

## 2023-02-16 DIAGNOSIS — R79.89 ELEVATED FERRITIN: Primary | ICD-10-CM

## 2023-02-16 NOTE — PROGRESS NOTES
Patient arrived to unit without complaint  Patient's lab parameters reviewed and ok to perform therapeutic phlebotomy today  Lab parameters and total blood removal volume of 450 mL verified independently by Elizabeth Henao RN  450 mL of blood removed from left AC vein without incident  After 15 minute observation, patient's vital signs stable  AVS provided and patient aware of next appointment  Patient left in stable condition

## 2023-02-20 ENCOUNTER — OFFICE VISIT (OUTPATIENT)
Dept: HEMATOLOGY ONCOLOGY | Facility: CLINIC | Age: 68
End: 2023-02-20

## 2023-02-20 VITALS
TEMPERATURE: 96.3 F | OXYGEN SATURATION: 98 % | RESPIRATION RATE: 16 BRPM | WEIGHT: 256 LBS | HEIGHT: 68 IN | DIASTOLIC BLOOD PRESSURE: 80 MMHG | SYSTOLIC BLOOD PRESSURE: 150 MMHG | BODY MASS INDEX: 38.8 KG/M2 | HEART RATE: 113 BPM

## 2023-02-20 DIAGNOSIS — E83.19 IRON OVERLOAD: ICD-10-CM

## 2023-02-20 DIAGNOSIS — R79.89 ELEVATED FERRITIN: Primary | ICD-10-CM

## 2023-02-20 DIAGNOSIS — D69.6 THROMBOCYTOPENIA (HCC): ICD-10-CM

## 2023-02-20 DIAGNOSIS — R79.89 ELEVATED LFTS: ICD-10-CM

## 2023-02-20 NOTE — PROGRESS NOTES
[facility] [id number]  Hematology Ambulatory Follow-Up  Odell Dial, 1955, [id number]  2/20/2023    Assessment/Plan:  1  Elevated ferritin  2  Thrombocytopenia ([address] )  3  Iron overload  4  Elevated LFTs  Mr Jaime Pulido is a 47-year-old male seen in follow-up for thrombocytopenia and iron overload  His most recent ferritin has improved to 793, hemoglobin remained stable in the 11-12 range  He continues to follow with gastroenterology for his elevated LFTs which are improving and ALT is within normal limits and AST is slightly elevated at 67  He will continue with therapeutic phlebotomies as tolerated with labs prior  He knows to call the office with any new or worsening symptoms  He continues working 13-hour shifts 5 days a week and is fatigued from that schedule but otherwise he states that he is basically back to his baseline from prior symptoms  Stressed the importance of good nutrition and he will add an Ensure/boost supplements in the morning since he only drinks coffee, for lunch she has tuna, yogurt, and Jell-O, and dinner he eats a regular meal   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  - Comprehensive metabolic panel; Future  - Iron Panel (Includes Ferritin, Iron Sat%, Iron, and TIBC); Future  - CBC and differential; Future      The patient is scheduled for follow-up in approximately 6 months  Patient voiced agreement and understanding to the above  Patient knows to call the Hematology/Oncology office with any questions and concerns regarding the above        Barrier(s) to care: None  The patient is able to self care   ------------------------------------------------------------------------------------------------------    Chief Complaint   Patient presents with   • Follow-up       History of present illness:   Odell Dial is a 74 y o  male  seen in follow-up for thrombocytopenia and elevated ferritin  He has undergone workup including complete gastrointestinal workup with liver biopsy  Hemochromatosis workup has been negative  MRI of the abdomen demonstrated mild iron overload  Most recent ferritin was reviewed at her last visit and was down trending  Platelet count has been stable in the  range  He was evaluated by Dr Yeyo Young with Gastroenterology for his liver and was found to have likely [address]  He is a previous 2 beer per day drinker  He previously had significant symptoms with decreased appetite and weight loss  His weight has been stable and his appetite has returned to baseline  He was on Reglan and Protonix  He denies any excess bleeding or bruising  He denies any nose bleeds, bleeding gums, blood in his stool, blood in his urine  He does not have a petechial rash  He is doing well and feels good  He returned back to work and is working 13-hour shifts  His appetite is not much better but he is eating as much as he can  He has had some weight loss though his albumin and other electrolytes are within normal limits and CMP looks great  His liver enzymes are returning to normal   Ferritin is slowly decreasing most recently in the 700s  He tolerates phlebotomies well and returns to work the next day      11/10/2018:  Hemoglobin 14 7, platelets 430              AST 57, ALT 60              Serum iron 101  01/23/2019:  Hemoglobin 13 1, platelets 697              AST 52, ALT 40  12/28/2019:  Ferritin 837, iron saturation 42%, TIBC 293, serum iron 123  02/29/2020:  Hemoglobin 14 5, platelets 493              AST 60, ALT 68              Ferritin 828, iron saturation 33%, TIBC 342, serum iron 113  05/17/2022:  Hemoglobin 14 1, platelets 253              AST 65, ALT 69              Ferritin 762, iron saturation 51%, TIBC 331, serum iron 169  08/24/2022:  Hemoglobin 13 4, platelets 90              ,   08/29/2022:  , ALT 158              [facility]  08/30/2022:  Hemoglobin 13 4, platelets 453              ,               Ferritin 1567, iron saturation 29%, TIBC 248, serum iron 72  09/15/2022:  Hemoglobin 12 4, MCV 94, platelets 726, WBC 1 82              Ferritin 1372, iron saturation 73%, TIBC 240, serum iron 174  2/15/2023: Hemoglobin 11 5, MCV 96, platelets 679, WBC 4 19   Ferritin 793, iron saturation 26%, TIBC 301, serum iron 79   AST 67, ALT 43    Review of Systems   Constitutional: Negative for activity change, appetite change (improved), diaphoresis, fatigue, fever and unexpected weight change (stable now )  HENT: Negative for trouble swallowing and voice change  Eyes: Negative for photophobia and visual disturbance  Respiratory: Negative for cough, chest tightness and shortness of breath  Cardiovascular: Negative for chest pain, palpitations and leg swelling  Gastrointestinal: Negative for abdominal distention, abdominal pain, blood in stool, constipation, diarrhea and nausea  Endocrine: Negative for cold intolerance and heat intolerance  Genitourinary: Negative for dysuria, hematuria and urgency  Musculoskeletal: Negative for arthralgias, back pain, gait problem and joint swelling  Skin: Negative for pallor and rash  Neurological: Negative for dizziness, seizures, weakness, light-headedness, numbness and headaches  Hematological: Negative for adenopathy  Does not bruise/bleed easily  Psychiatric/Behavioral: Negative for confusion and sleep disturbance         Patient Active Problem List   Diagnosis   • BMI 40 0-44 9, adult (HCC)   • Elevated ferritin   • Family history of colon cancer   • Fatty liver, alcoholic   • History of colon polyps   • Hypercholesterolemia   • Venous insufficiency of both lower extremities   • FELIBERTO on CPAP   • Osteoarthritis   • Primary osteoarthritis of both knees   • Status post total bilateral knee replacement   • Thrombocytopenia ([facility] [address] )   • Vitamin D deficiency       Past Medical History:   Diagnosis Date   • Hypertension        Past Surgical History:   Procedure Laterality Date   • IR BIOPSY LIVER RANDOM  9/22/2022   • REPLACEMENT TOTAL KNEE Bilateral        No family history on file  Social History     Socioeconomic History   • Marital status: /Civil Union     Spouse name: None   • Number of children: None   • Years of education: None   • Highest education level: None   Occupational History   • None   Tobacco Use   • Smoking status: Former   • Smokeless tobacco: Never   Vaping Use   • Vaping Use: Never used   Substance and Sexual Activity   • Alcohol use: Not Currently     Comment: occassionally   • Drug use: Not Currently   • Sexual activity: None   Other Topics Concern   • None   Social History Narrative   • None     Social Determinants of Health     Financial Resource Strain: Not on file   Food Insecurity: Not on file   Transportation Needs: Not on file   Physical Activity: Not on file   Stress: Not on file   Social Connections: Not on file   Intimate Partner Violence: Not on file   Housing Stability: Not on file         Current Outpatient Medications:   •  amLODIPine (NORVASC) 5 mg tablet, Take 5 mg by mouth daily, Disp: , Rfl:   •  Cholecalciferol (Vitamin D3) 50 MCG (2000 UT) capsule, Take 2,000 Units by mouth daily, Disp: , Rfl:   •  Dermarest Eczema 1 % lotion, Apply 1 application topically 2 (two) times a day To affected area, Disp: , Rfl:   •  folic acid (FOLVITE) 1 mg tablet, Take 1,000 mcg by mouth daily, Disp: , Rfl:   •  loratadine (CLARITIN) 10 mg tablet, Take 10 mg by mouth, Disp: , Rfl:   •  losartan (COZAAR) 50 mg tablet, TAKE' 1 TABLET (50 MG TOTAL) BY MOUTH DAILY  IN THE MORNING, Disp: , Rfl:   •  LOSARTAN POTASSIUM PO, Take by mouth in the morning, Disp: , Rfl:   •  omeprazole (PriLOSEC) 40 MG capsule, TAKE 1 CAPSULE (40 MG TOTAL) BY MOUTH DAILY  , Disp: 90 capsule, Rfl: 1  •  ondansetron (ZOFRAN) 4 mg tablet, Take 4 mg by mouth every 8 (eight) hours as needed for nausea or vomiting, Disp: , Rfl:   •  TRIAMTERENE PO, Take by mouth in the morning, Disp: , Rfl:   •  triamterene-hydrochlorothiazide (DYAZIDE) 37 5-25 mg per capsule, TAKE 1 CAPSULE BY MOUTH DAILY  NOT TAKING REGULARLY , Disp: , Rfl:   •  VITAMIN D PO, Take by mouth in the morning, Disp: , Rfl:   •  metoclopramide (REGLAN) 5 mg tablet, Take 1 tablet (5 mg total) by mouth 3 (three) times a day (Patient not taking: Reported on 10/12/2022), Disp: 90 tablet, Rfl: 2  •  metoclopramide (REGLAN) 5 mg tablet, Take 5 mg by mouth 4 (four) times a day, Disp: , Rfl:     Allergies   Allergen Reactions   • Other Other (See Comments)     Running nose       Objective:  /80 (BP Location: Left arm, Patient Position: Sitting, Cuff Size: Adult)   Pulse (!) 113   Temp (!) 96 3 °F (35 7 °C) (Tympanic)   Resp 16   Ht 5' 8" (1 727 m)   Wt 116 kg (256 lb)   SpO2 98%   BMI 38 92 kg/m²    Physical Exam  Constitutional:       General: He is not in acute distress  Appearance: Normal appearance  He is not ill-appearing  HENT:      Head: Normocephalic and atraumatic  Eyes:      Extraocular Movements: Extraocular movements intact  Conjunctiva/sclera: Conjunctivae normal    Cardiovascular:      Rate and Rhythm: Normal rate and regular rhythm  Pulses: Normal pulses  Heart sounds: Normal heart sounds  Pulmonary:      Effort: Pulmonary effort is normal  No respiratory distress  Abdominal:      General: Bowel sounds are normal  There is no distension  Tenderness: There is no abdominal tenderness  Musculoskeletal:         General: Normal range of motion  Cervical back: Normal range of motion  Right lower leg: No edema  Left lower leg: No edema  Skin:     General: Skin is warm and dry  Capillary Refill: Capillary refill takes less than 2 seconds  Coloration: Skin is not jaundiced or pale     Neurological:      Mental Status: He is alert and oriented to person, place, and time  Mental status is at baseline  Psychiatric:         Mood and Affect: Mood normal          Behavior: Behavior normal          Thought Content: Thought content normal          Judgment: Judgment normal          Result Review  Labs:  Appointment on 02/15/2023   Component Date Value Ref Range Status   • WBC 02/15/2023 5 94  4 31 - 10 16 Thousand/uL Final   • RBC 02/15/2023 3 50 (L)  3 88 - 5 62 Million/uL Final   • Hemoglobin 02/15/2023 11 5 (L)  12 0 - 17 0 g/dL Final   • Hematocrit 02/15/2023 33 7 (L)  36 5 - 49 3 % Final   • MCV 02/15/2023 96  82 - 98 fL Final   • MCH 02/15/2023 32 9  26 8 - 34 3 pg Final   • MCHC 02/15/2023 34 1  31 4 - 37 4 g/dL Final   • RDW 02/15/2023 12 7  11 6 - 15 1 % Final   • MPV 02/15/2023 9 6  8 9 - 12 7 fL Final   • Platelets 94/49/8266 141 (L)  149 - 390 Thousands/uL Final   • nRBC 02/15/2023 0  /100 WBCs Final   • Sodium 02/15/2023 138  135 - 147 mmol/L Final   • Potassium 02/15/2023 3 8  3 5 - 5 3 mmol/L Final   • Chloride 02/15/2023 104  96 - 108 mmol/L Final   • CO2 02/15/2023 28  21 - 32 mmol/L Final   • ANION GAP 02/15/2023 6  4 - 13 mmol/L Final   • BUN 02/15/2023 19  5 - 25 mg/dL Final   • Creatinine 02/15/2023 0 95  0 60 - 1 30 mg/dL Final    Standardized to IDMS reference method   • Glucose 02/15/2023 113  65 - 140 mg/dL Final    If the patient is fasting, the ADA then defines impaired fasting glucose as > 100 mg/dL and diabetes as > or equal to 123 mg/dL  Specimen collection should occur prior to Sulfasalazine administration due to the potential for falsely depressed results  Specimen collection should occur prior to Sulfapyridine administration due to the potential for falsely elevated results  • Calcium 02/15/2023 9 4  8 4 - 10 2 mg/dL Final   • AST 02/15/2023 67 (H)  13 - 39 U/L Final    Specimen collection should occur prior to Sulfasalazine administration due to the potential for falsely depressed results      • ALT 02/15/2023 43  7 - 52 U/L Final    Specimen collection should occur prior to Sulfasalazine administration due to the potential for falsely depressed results  • Alkaline Phosphatase 02/15/2023 66  34 - 104 U/L Final   • Total Protein 02/15/2023 7 1  6 4 - 8 4 g/dL Final   • Albumin 02/15/2023 3 9  3 5 - 5 0 g/dL Final   • Total Bilirubin 02/15/2023 0 54  0 20 - 1 00 mg/dL Final   • eGFR 02/15/2023 83  ml/min/1 73sq m Final   • Segmented Neutrophils Manual 02/15/2023 64  45 - 77 % Final   • Lymphocytes Manual 02/15/2023 20  14 - 44 % Final   • Monocytes Manual 02/15/2023 11  4 - 12 % Final   • Eosinophils Manual 02/15/2023 4  0 - 6 % Final   • Basos 02/15/2023 1  0 - 1 % Final   • Total Counted 02/15/2023 100   Final   • RBC Morphology 02/15/2023 Normal   Final   • Platelet Estimate 11/93/2622 Borderline (A)  Adequate Final   • Iron Saturation 02/15/2023 26  20 - 50 % Final   • TIBC 02/15/2023 301  250 - 450 ug/dL Final   • Iron 02/15/2023 79  65 - 175 ug/dL Final    Patients treated with metal-binding drugs (ie  Deferoxamine) may have depressed iron values  • Ferritin 02/15/2023 793 (H)  8 - 388 ng/mL Final       Imaging:   No relevant imaging to review     Please note: This report has been generated by a voice recognition software system  Therefore there may be syntax, spelling, and/or grammatical errors  Please call if you have any questions

## 2023-03-16 ENCOUNTER — APPOINTMENT (OUTPATIENT)
Dept: LAB | Facility: CLINIC | Age: 68
End: 2023-03-16

## 2023-03-16 DIAGNOSIS — R79.89 ELEVATED LFTS: ICD-10-CM

## 2023-03-16 DIAGNOSIS — D69.6 THROMBOCYTOPENIA (HCC): ICD-10-CM

## 2023-03-16 DIAGNOSIS — R79.89 ELEVATED FERRITIN: ICD-10-CM

## 2023-03-16 DIAGNOSIS — E83.19 IRON OVERLOAD: ICD-10-CM

## 2023-03-16 LAB
BASOPHILS # BLD AUTO: 0.04 THOUSANDS/ÂΜL (ref 0–0.1)
BASOPHILS NFR BLD AUTO: 1 % (ref 0–1)
EOSINOPHIL # BLD AUTO: 0.38 THOUSAND/ÂΜL (ref 0–0.61)
EOSINOPHIL NFR BLD AUTO: 6 % (ref 0–6)
ERYTHROCYTE [DISTWIDTH] IN BLOOD BY AUTOMATED COUNT: 12.4 % (ref 11.6–15.1)
HCT VFR BLD AUTO: 35.7 % (ref 36.5–49.3)
HGB BLD-MCNC: 12.1 G/DL (ref 12–17)
IMM GRANULOCYTES # BLD AUTO: 0.02 THOUSAND/UL (ref 0–0.2)
IMM GRANULOCYTES NFR BLD AUTO: 0 % (ref 0–2)
LYMPHOCYTES # BLD AUTO: 1.09 THOUSANDS/ÂΜL (ref 0.6–4.47)
LYMPHOCYTES NFR BLD AUTO: 17 % (ref 14–44)
MCH RBC QN AUTO: 33 PG (ref 26.8–34.3)
MCHC RBC AUTO-ENTMCNC: 33.9 G/DL (ref 31.4–37.4)
MCV RBC AUTO: 97 FL (ref 82–98)
MONOCYTES # BLD AUTO: 0.66 THOUSAND/ÂΜL (ref 0.17–1.22)
MONOCYTES NFR BLD AUTO: 11 % (ref 4–12)
NEUTROPHILS # BLD AUTO: 4.09 THOUSANDS/ÂΜL (ref 1.85–7.62)
NEUTS SEG NFR BLD AUTO: 65 % (ref 43–75)
NRBC BLD AUTO-RTO: 0 /100 WBCS
PLATELET # BLD AUTO: 144 THOUSANDS/UL (ref 149–390)
PMV BLD AUTO: 9.9 FL (ref 8.9–12.7)
RBC # BLD AUTO: 3.67 MILLION/UL (ref 3.88–5.62)
WBC # BLD AUTO: 6.28 THOUSAND/UL (ref 4.31–10.16)

## 2023-03-17 ENCOUNTER — HOSPITAL ENCOUNTER (OUTPATIENT)
Dept: INFUSION CENTER | Facility: CLINIC | Age: 68
Discharge: HOME | End: 2023-03-17

## 2023-03-17 VITALS
TEMPERATURE: 97.9 F | HEART RATE: 84 BPM | SYSTOLIC BLOOD PRESSURE: 131 MMHG | RESPIRATION RATE: 16 BRPM | DIASTOLIC BLOOD PRESSURE: 83 MMHG

## 2023-03-17 DIAGNOSIS — R79.89 ELEVATED FERRITIN: Primary | ICD-10-CM

## 2023-03-17 NOTE — PROGRESS NOTES
patient arrived for therapeutic phlebotomy without concerns  HGB 12 1  Parameters checked with Laina Campbell  450 mL removed from L AC without incidents  Patient stable after 15 min observation time  Denies need for AVS, aware of upcoming appointments

## 2023-04-14 DIAGNOSIS — R79.89 ELEVATED FERRITIN: Primary | ICD-10-CM

## 2023-05-11 ENCOUNTER — APPOINTMENT (OUTPATIENT)
Dept: LAB | Facility: CLINIC | Age: 68
End: 2023-05-11

## 2023-05-11 DIAGNOSIS — R79.89 ELEVATED FERRITIN: ICD-10-CM

## 2023-05-11 LAB
BASOPHILS # BLD AUTO: 0.06 THOUSANDS/ÂΜL (ref 0–0.1)
BASOPHILS NFR BLD AUTO: 1 % (ref 0–1)
EOSINOPHIL # BLD AUTO: 0.47 THOUSAND/ÂΜL (ref 0–0.61)
EOSINOPHIL NFR BLD AUTO: 7 % (ref 0–6)
ERYTHROCYTE [DISTWIDTH] IN BLOOD BY AUTOMATED COUNT: 12.4 % (ref 11.6–15.1)
FERRITIN SERPL-MCNC: 425 NG/ML (ref 8–388)
HCT VFR BLD AUTO: 36.5 % (ref 36.5–49.3)
HGB BLD-MCNC: 12.5 G/DL (ref 12–17)
IMM GRANULOCYTES # BLD AUTO: 0.03 THOUSAND/UL (ref 0–0.2)
IMM GRANULOCYTES NFR BLD AUTO: 0 % (ref 0–2)
IRON SATN MFR SERPL: 28 % (ref 20–50)
IRON SERPL-MCNC: 108 UG/DL (ref 65–175)
LYMPHOCYTES # BLD AUTO: 1.35 THOUSANDS/ÂΜL (ref 0.6–4.47)
LYMPHOCYTES NFR BLD AUTO: 19 % (ref 14–44)
MCH RBC QN AUTO: 32.6 PG (ref 26.8–34.3)
MCHC RBC AUTO-ENTMCNC: 34.2 G/DL (ref 31.4–37.4)
MCV RBC AUTO: 95 FL (ref 82–98)
MONOCYTES # BLD AUTO: 0.66 THOUSAND/ÂΜL (ref 0.17–1.22)
MONOCYTES NFR BLD AUTO: 9 % (ref 4–12)
NEUTROPHILS # BLD AUTO: 4.56 THOUSANDS/ÂΜL (ref 1.85–7.62)
NEUTS SEG NFR BLD AUTO: 64 % (ref 43–75)
NRBC BLD AUTO-RTO: 0 /100 WBCS
PLATELET # BLD AUTO: 144 THOUSANDS/UL (ref 149–390)
PMV BLD AUTO: 10 FL (ref 8.9–12.7)
RBC # BLD AUTO: 3.84 MILLION/UL (ref 3.88–5.62)
TIBC SERPL-MCNC: 382 UG/DL (ref 250–450)
WBC # BLD AUTO: 7.13 THOUSAND/UL (ref 4.31–10.16)

## 2023-05-12 ENCOUNTER — HOSPITAL ENCOUNTER (OUTPATIENT)
Dept: INFUSION CENTER | Facility: CLINIC | Age: 68
Discharge: HOME | End: 2023-05-12

## 2023-05-12 VITALS
SYSTOLIC BLOOD PRESSURE: 124 MMHG | DIASTOLIC BLOOD PRESSURE: 80 MMHG | HEART RATE: 103 BPM | RESPIRATION RATE: 18 BRPM | TEMPERATURE: 97.3 F

## 2023-05-12 DIAGNOSIS — R79.89 ELEVATED FERRITIN: Primary | ICD-10-CM

## 2023-05-12 NOTE — PROGRESS NOTES
Patient presents for therapeutic phlebotomy  Vital signs stable  Hemoglobin is 12 5, Hematocrit 63 5 and Ferritin 425 from labs drawn on 5/11/23  Verified by second RN  OK to treat per Ankur De La Cruz  Started at [phone] and ended at 1528   450 ml blood removed  Gauze dressing applied and pressure held  Patient tolerated well  Vital signs repeated and are stable and patient observed for any reactions  No new meds or concerns  Pt tolerated infusion without adverse reaction  Future appointments discussed  AVS given

## 2023-05-14 ENCOUNTER — HOSPITAL ENCOUNTER (EMERGENCY)
Facility: HOSPITAL | Age: 68
Discharge: HOME | End: 2023-05-14
Attending: EMERGENCY MEDICINE | Admitting: EMERGENCY MEDICINE

## 2023-05-14 ENCOUNTER — APPOINTMENT (EMERGENCY)
Dept: CT IMAGING | Facility: HOSPITAL | Age: 68
End: 2023-05-14

## 2023-05-14 VITALS
HEART RATE: 94 BPM | SYSTOLIC BLOOD PRESSURE: 162 MMHG | HEIGHT: 68 IN | DIASTOLIC BLOOD PRESSURE: 96 MMHG | RESPIRATION RATE: 18 BRPM | TEMPERATURE: 97.8 F | BODY MASS INDEX: 37.25 KG/M2 | WEIGHT: 245.81 LBS | OXYGEN SATURATION: 97 %

## 2023-05-14 DIAGNOSIS — S22.41XA CLOSED FRACTURE OF MULTIPLE RIBS OF RIGHT SIDE, INITIAL ENCOUNTER: ICD-10-CM
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
## 2023-05-14 LAB
ALBUMIN SERPL BCP-MCNC: 4.1 G/DL (ref 3.5–5)
ALP SERPL-CCNC: 66 U/L (ref 34–104)
ALT SERPL W P-5'-P-CCNC: 56 U/L (ref 7–52)
ANION GAP SERPL CALCULATED.3IONS-SCNC: 8 MMOL/L (ref 4–13)
AST SERPL W P-5'-P-CCNC: 93 U/L (ref 13–39)
BASOPHILS # BLD AUTO: 0.05 THOUSANDS/ÂΜL (ref 0–0.1)
BASOPHILS NFR BLD AUTO: 1 % (ref 0–1)
BILIRUB DIRECT SERPL-MCNC: 0.14 MG/DL (ref 0–0.2)
BILIRUB SERPL-MCNC: 0.48 MG/DL (ref 0.2–1)
BUN SERPL-MCNC: 21 MG/DL (ref 5–25)
CALCIUM SERPL-MCNC: 9.5 MG/DL (ref 8.4–10.2)
CHLORIDE SERPL-SCNC: 100 MMOL/L (ref 96–108)
CO2 SERPL-SCNC: 23 MMOL/L (ref 21–32)
CREAT SERPL-MCNC: 1.15 MG/DL (ref 0.6–1.3)
EOSINOPHIL # BLD AUTO: 0.22 THOUSAND/ÂΜL (ref 0–0.61)
EOSINOPHIL NFR BLD AUTO: 3 % (ref 0–6)
ERYTHROCYTE [DISTWIDTH] IN BLOOD BY AUTOMATED COUNT: 12.3 % (ref 11.6–15.1)
GFR SERPL CREATININE-BSD FRML MDRD: 65 ML/MIN/1.73SQ M
GLUCOSE SERPL-MCNC: 97 MG/DL (ref 65–140)
HCT VFR BLD AUTO: 33.9 % (ref 36.5–49.3)
HGB BLD-MCNC: 11.7 G/DL (ref 12–17)
IMM GRANULOCYTES # BLD AUTO: 0.04 THOUSAND/UL (ref 0–0.2)
IMM GRANULOCYTES NFR BLD AUTO: 1 % (ref 0–2)
LYMPHOCYTES # BLD AUTO: 0.99 THOUSANDS/ÂΜL (ref 0.6–4.47)
LYMPHOCYTES NFR BLD AUTO: 15 % (ref 14–44)
MCH RBC QN AUTO: 32.2 PG (ref 26.8–34.3)
MCHC RBC AUTO-ENTMCNC: 34.5 G/DL (ref 31.4–37.4)
MCV RBC AUTO: 93 FL (ref 82–98)
MONOCYTES # BLD AUTO: 0.75 THOUSAND/ÂΜL (ref 0.17–1.22)
MONOCYTES NFR BLD AUTO: 12 % (ref 4–12)
NEUTROPHILS # BLD AUTO: 4.38 THOUSANDS/ÂΜL (ref 1.85–7.62)
NEUTS SEG NFR BLD AUTO: 68 % (ref 43–75)
NRBC BLD AUTO-RTO: 0 /100 WBCS
PLATELET # BLD AUTO: 135 THOUSANDS/UL (ref 149–390)
PMV BLD AUTO: 10.1 FL (ref 8.9–12.7)
POTASSIUM SERPL-SCNC: 4.2 MMOL/L (ref 3.5–5.3)
PROT SERPL-MCNC: 7.1 G/DL (ref 6.4–8.4)
RBC # BLD AUTO: 3.63 MILLION/UL (ref 3.88–5.62)
SODIUM SERPL-SCNC: 131 MMOL/L (ref 135–147)
TSH SERPL DL<=0.05 MIU/L-ACNC: 2.48 UIU/ML (ref 0.45–4.5)
WBC # BLD AUTO: 6.43 THOUSAND/UL (ref 4.31–10.16)

## 2023-05-14 RX ORDER — LIDOCAINE 40 MG/G
CREAM TOPICAL AS NEEDED
Qty: 30 G | Refills: 0 | Status: SHIPPED | OUTPATIENT
Start: 2023-05-14

## 2023-05-14 RX ORDER — NAPROXEN 500 MG/1
500 TABLET ORAL 2 TIMES DAILY WITH MEALS
Qty: 20 TABLET | Refills: 0 | Status: SHIPPED | OUTPATIENT
Start: 2023-05-14 | End: 2023-05-24

## 2023-05-14 RX ORDER — KETOROLAC TROMETHAMINE 30 MG/ML
15 INJECTION, SOLUTION INTRAMUSCULAR; INTRAVENOUS ONCE
Status: COMPLETED | OUTPATIENT
Start: 2023-05-14 | End: 2023-05-14

## 2023-05-14 RX ORDER — OXYCODONE HYDROCHLORIDE 5 MG/1
5 TABLET ORAL EVERY 4 HOURS PRN
Qty: 10 TABLET | Refills: 0 | Status: SHIPPED | OUTPATIENT
Start: 2023-05-14

## 2023-05-14 RX ORDER — LIDOCAINE 50 MG/G
1 PATCH TOPICAL ONCE
Status: DISCONTINUED | OUTPATIENT
Start: 2023-05-14 | End: 2023-05-14 | Stop reason: HOSPADM

## 2023-05-14 RX ADMIN — KETOROLAC TROMETHAMINE 15 MG: 30 INJECTION, SOLUTION INTRAMUSCULAR; INTRAVENOUS at 11:35

## 2023-05-14 RX ADMIN — LIDOCAINE 1 PATCH: 50 PATCH CUTANEOUS at 11:35

## 2023-05-14 RX ADMIN — IOHEXOL 100 ML: 350 INJECTION, SOLUTION INTRAVENOUS at 12:52

## 2023-05-14 RX ADMIN — SODIUM CHLORIDE 1000 ML: 0.9 INJECTION, SOLUTION INTRAVENOUS at 11:38

## 2023-05-14 NOTE — ED PROVIDER NOTES
History  Chief Complaint   Patient presents with   • Fall     Patient reports fall last night after standing too quickly, struck body on carpeted floor  Denies head strike, LOC, thinners  R sided rib and abdominal pain since fall  78 YO male presents after a fall  Patient states fall occurred last night  States he was sitting and stood up quickly, became lightheaded and fell to the floor, landing on the carpet on the Right side  Patient denies striking his head  States he has been able to get up and ambulate since the event  He states he typically takes time prior to standing up due to lightheadedness but did not last night  Patient has pain in the Right lateral lower chest was and Right upper abdomen  This pain has been constant, worse with movement and with deep breathing  Pt denies SOB/F/C/N/V/D/C, no dysuria, burning on urination or blood in urine  History provided by:  Patient and spouse   used: No        Prior to Admission Medications   Prescriptions Last Dose Informant Patient Reported? Taking? Cholecalciferol (Vitamin D3) 50 MCG (2000 UT) capsule   Yes No   Sig: Take 2,000 Units by mouth daily   Dermarest Eczema 1 % lotion   Yes No   Sig: Apply 1 application topically 2 (two) times a day To affected area   LOSARTAN POTASSIUM PO   Yes No   Sig: Take by mouth in the morning   TRIAMTERENE PO   Yes No   Sig: Take by mouth in the morning   VITAMIN D PO   Yes No   Sig: Take by mouth in the morning   amLODIPine (NORVASC) 5 mg tablet   Yes No   Sig: Take 5 mg by mouth daily   folic acid (FOLVITE) 1 mg tablet   Yes No   Sig: Take 1,000 mcg by mouth daily   loratadine (CLARITIN) 10 mg tablet   Yes No   Sig: Take 10 mg by mouth   losartan (COZAAR) 50 mg tablet   Yes No   Sig: TAKE' 1 TABLET (50 MG TOTAL) BY MOUTH DAILY   IN THE MORNING   metoclopramide (REGLAN) 5 mg tablet   No No   Sig: Take 1 tablet (5 mg total) by mouth 3 (three) times a day   Patient not taking: Reported on 10/12/2022   metoclopramide (REGLAN) 5 mg tablet   Yes No   Sig: Take 5 mg by mouth 4 (four) times a day   omeprazole (PriLOSEC) 40 MG capsule   No No   Sig: TAKE 1 CAPSULE (40 MG TOTAL) BY MOUTH DAILY  ondansetron (ZOFRAN) 4 mg tablet   Yes No   Sig: Take 4 mg by mouth every 8 (eight) hours as needed for nausea or vomiting   triamterene-hydrochlorothiazide (DYAZIDE) 37 5-25 mg per capsule   Yes No   Sig: TAKE 1 CAPSULE BY MOUTH DAILY  NOT TAKING REGULARLY  Facility-Administered Medications: None       Past Medical History:   Diagnosis Date   • Hypertension        Past Surgical History:   Procedure Laterality Date   • IR BIOPSY LIVER RANDOM  9/22/2022   • REPLACEMENT TOTAL KNEE Bilateral        History reviewed  No pertinent family history  I have reviewed and agree with the history as documented  E-Cigarette/Vaping   • E-Cigarette Use Never User      E-Cigarette/Vaping Substances   • Nicotine No    • THC No    • CBD No    • Flavoring No    • Other No    • Unknown No      Social History     Tobacco Use   • Smoking status: Former   • Smokeless tobacco: Never   Vaping Use   • Vaping Use: Never used   Substance Use Topics   • Alcohol use: Yes     Comment: occassionally   • Drug use: Not Currently       Review of Systems   Constitutional: Negative for fever  HENT: Negative for dental problem  Eyes: Negative for visual disturbance  Respiratory: Negative for shortness of breath  Cardiovascular: Positive for chest pain  Gastrointestinal: Negative for abdominal pain, nausea and vomiting  Genitourinary: Negative for dysuria and frequency  Musculoskeletal: Negative for neck pain and neck stiffness  Skin: Negative for rash  Neurological: Negative for dizziness, weakness and light-headedness  Psychiatric/Behavioral: Negative for agitation, behavioral problems and confusion  All other systems reviewed and are negative  Physical Exam  Physical Exam  Vitals and nursing note reviewed  Constitutional:       Appearance: He is well-developed  Comments: Uncomfortable  HENT:      Head: Normocephalic and atraumatic  Eyes:      Extraocular Movements: Extraocular movements intact  Cardiovascular:      Rate and Rhythm: Normal rate  Pulmonary:      Effort: Pulmonary effort is normal       Breath sounds: Normal breath sounds  Comments: Palpation over the Right lower lateral chest wall reproduces discomfort  Abdominal:      General: There is no distension  Musculoskeletal:         General: Normal range of motion  Cervical back: Normal range of motion  Skin:     Findings: No rash  Neurological:      Mental Status: He is alert and oriented to person, place, and time     Psychiatric:         Behavior: Behavior normal          Vital Signs  ED Triage Vitals [05/14/23 1102]   Temperature Pulse Respirations Blood Pressure SpO2   97 8 °F (36 6 °C) 95 16 (!) 171/80 98 %      Temp Source Heart Rate Source Patient Position - Orthostatic VS BP Location FiO2 (%)   Oral Monitor Sitting Right arm --      Pain Score       8           Vitals:    05/14/23 1102 05/14/23 1230 05/14/23 1345   BP: (!) 171/80 133/75 162/96   Pulse: 95 92 94   Patient Position - Orthostatic VS: Sitting Lying Sitting         Visual Acuity  Visual Acuity    Flowsheet Row Most Recent Value   L Pupil Size (mm) 2   R Pupil Size (mm) 2          ED Medications  Medications   sodium chloride 0 9 % bolus 1,000 mL (0 mL Intravenous Stopped 5/14/23 1238)   ketorolac (TORADOL) injection 15 mg (15 mg Intravenous Given 5/14/23 1135)   iohexol (OMNIPAQUE) 350 MG/ML injection (SINGLE-DOSE) 100 mL (100 mL Intravenous Given 5/14/23 1252)       Diagnostic Studies  Results Reviewed     Procedure Component Value Units Date/Time    Basic metabolic panel [[id number]]  (Abnormal) Collected: 05/14/23 1135    Lab Status: Final result Specimen: Blood from Arm, Left Updated: 05/14/23 1225     Sodium 131 mmol/L      Potassium 4 2 mmol/L Chloride 100 mmol/L      CO2 23 mmol/L      ANION GAP 8 mmol/L      BUN 21 mg/dL      Creatinine 1 15 mg/dL      Glucose 97 mg/dL      Calcium 9 5 mg/dL      eGFR 65 ml/min/1 73sq m     Narrative:      [place] guidelines for Chronic Kidney Disease (CKD):   •  Stage 1 with normal or high GFR (GFR > 90 mL/min/1 73 square meters)  •  Stage 2 Mild CKD (GFR = 60-89 mL/min/1 73 square meters)  •  Stage 3A Moderate CKD (GFR = 45-59 mL/min/1 73 square meters)  •  Stage 3B Moderate CKD (GFR = 30-44 mL/min/1 73 square meters)  •  Stage 4 Severe CKD (GFR = 15-29 mL/min/1 73 square meters)  •  Stage 5 End Stage CKD (GFR <15 mL/min/1 73 square meters)  Note: GFR calculation is accurate only with a steady state creatinine    Hepatic function panel [[id number]]  (Abnormal) Collected: 05/14/23 1135    Lab Status: Final result Specimen: Blood from Arm, Left Updated: 05/14/23 1225     Total Bilirubin 0 48 mg/dL      Bilirubin, Direct 0 14 mg/dL      Alkaline Phosphatase 66 U/L      AST 93 U/L      ALT 56 U/L      Total Protein 7 1 g/dL      Albumin 4 1 g/dL     TSH [[id number]]  (Normal) Collected: 05/14/23 1135    Lab Status: Final result Specimen: Blood from Arm, Left Updated: 05/14/23 1216     TSH 3RD GENERATON 2 483 uIU/mL     CBC and differential [[id number]]  (Abnormal) Collected: 05/14/23 1135    Lab Status: Final result Specimen: Blood from Arm, Left Updated: 05/14/23 1207     WBC 6 43 Thousand/uL      RBC 3 63 Million/uL      Hemoglobin 11 7 g/dL      Hematocrit 33 9 %      MCV 93 fL      MCH 32 2 pg      MCHC 34 5 g/dL      RDW 12 3 %      MPV 10 1 fL      Platelets 477 Thousands/uL      nRBC 0 /100 WBCs      Neutrophils Relative 68 %      Immat GRANS % 1 %      Lymphocytes Relative 15 %      Monocytes Relative 12 %      Eosinophils Relative 3 %      Basophils Relative 1 %      Neutrophils Absolute 4 38 Thousands/µL      Immature Grans Absolute 0 04 Thousand/uL      Lymphocytes Absolute 0 99 Thousands/µL      Monocytes Absolute 0 75 Thousand/µL      Eosinophils Absolute 0 22 Thousand/µL      Basophils Absolute 0 05 Thousands/µL                  CT chest abdomen pelvis w contrast   Final Result by Genna Adair MD (05/14 1322)      1  Nondisplaced fractures of the right anterior seventh and eighth ribs  Otherwise no traumatic injury to the underlying chest, abdomen, or pelvis      2  Diffuse hepatic steatosis with slight splenomegaly though decreased from the prior MRI  This may be related to underlying portal hypertension given fibrosis on liver biopsy  The study was marked in [facility] for immediate notification  Workstation performed: [id number]                    Procedures  ECG 12 Lead Documentation Only    Date/Time: 5/14/2023 12:45 PM  Performed by: Benigno Hernandez MD  Authorized by: Benigno Hernandez MD     ECG reviewed by me, the ED Provider: yes    Patient location:  ED  Previous ECG:     Previous ECG:  Unavailable  Interpretation:     Interpretation: normal    Rate:     ECG rate:  84    ECG rate assessment: normal    Rhythm:     Rhythm: sinus rhythm    QRS:     QRS axis:  Normal    QRS intervals:  Normal  Conduction:     Conduction: normal    ST segments:     ST segments:  Normal  T waves:     T waves: normal    Comments:      Low Voltage QRS             ED Course  ED Course as of 05/14/23 1722   Sun May 14, 2023   1231 Creatinine: 1 15  0 95 two months prior  1309 I reviewed CT imaging prior to radiology reading: Appears to have non-displaced fractures of ribs 7 and 8  Will wait for radiology to read  Medical Decision Making  1  Fall - Patient states fall last night, pain in the lower chest wall and RUQ since  Will CT chest abdomen and pelvis to assess potential rib fractures, PTX, hemothorax, liver injury  Will give NSAIDs for discomfort and had lidocaine patch placed       2  Lightheadedness - Patient had no preceding chest pain or shortness of breath, states similar in the past will check ECG, CBC for anemia, metabolic panel for electrolyte abnormalities and dehydration  Will give fluids  Closed fracture of multiple ribs of right side, initial encounter: acute illness or injury  Fall, initial encounter: acute illness or injury  Amount and/or Complexity of Data Reviewed  Independent Historian: spouse  Labs: ordered  Decision-making details documented in ED Course  Radiology: ordered and independent interpretation performed  Decision-making details documented in ED Course  ECG/medicine tests: ordered and independent interpretation performed  Decision-making details documented in ED Course  Risk  OTC drugs  Prescription drug management  Disposition  Final diagnoses:   Fall, initial encounter   Closed fracture of multiple ribs of right side, initial encounter     Time reflects when diagnosis was documented in both MDM as applicable and the Disposition within this note     Time User Action Codes Description Comment    5/14/2023  1:38 PM Bharti POP Add [K36  XXXA] Fall, initial encounter     5/14/2023  1:39 PM Matthew Vitale Add [S22 [id number]] Closed fracture of multiple ribs of right side, initial encounter       ED Disposition     ED Disposition   Discharge    Condition   Stable    Date/Time   Sun May 14, 2023  1:38 PM    Comment   Ajit Stevens discharge to home/self care                 Follow-up Information     Follow up With Specialties Details Why Contact Info Additional Information    Melva Alfredo MD Internal Medicine   [address] [address]  [phone]       [facility]   [address] [id number]  [address] [place] [address], [address] [place] [address], [address] [phone]          Discharge Medication List as of 5/14/2023  1:43 PM      START taking these medications    Details lidocaine (LMX) 4 % cream Apply topically as needed for mild pain, Starting Sun 5/14/2023, Normal      naproxen (NAPROSYN) 500 mg tablet Take 1 tablet (500 mg total) by mouth 2 (two) times a day with meals for 10 days, Starting Sun 5/14/2023, Until Wed 5/24/2023, Normal      oxyCODONE (ROXICODONE) 5 immediate release tablet Take 1 tablet (5 mg total) by mouth every 4 (four) hours as needed for moderate pain for up to 10 doses Max Daily Amount: 30 mg, Starting Sun 5/14/2023, Normal         CONTINUE these medications which have NOT CHANGED    Details   amLODIPine (NORVASC) 5 mg tablet Take 5 mg by mouth daily, Historical Med      !! Cholecalciferol (Vitamin D3) 50 MCG (2000 UT) capsule Take 2,000 Units by mouth daily, Starting Tue 8/16/2022, Historical Med      Dermarest Eczema 1 % lotion Apply 1 application topically 2 (two) times a day To affected area, Starting Tue 9/13/2022, Historical Med      folic acid (FOLVITE) 1 mg tablet Take 1,000 mcg by mouth daily, Starting Sat 8/13/2022, Historical Med      loratadine (CLARITIN) 10 mg tablet Take 10 mg by mouth, Historical Med      !! losartan (COZAAR) 50 mg tablet TAKE' 1 TABLET (50 MG TOTAL) BY MOUTH DAILY  IN THE MORNING, Historical Med      !! LOSARTAN POTASSIUM PO Take by mouth in the morning, Historical Med      !! metoclopramide (REGLAN) 5 mg tablet Take 1 tablet (5 mg total) by mouth 3 (three) times a day, Starting Fri 8/26/2022, Normal      !! metoclopramide (REGLAN) 5 mg tablet Take 5 mg by mouth 4 (four) times a day, Historical Med      omeprazole (PriLOSEC) 40 MG capsule TAKE 1 CAPSULE (40 MG TOTAL) BY MOUTH DAILY  , Starting Fri 1/20/2023, Normal      ondansetron (ZOFRAN) 4 mg tablet Take 4 mg by mouth every 8 (eight) hours as needed for nausea or vomiting, Historical Med      TRIAMTERENE PO Take by mouth in the morning, Historical Med      triamterene-hydrochlorothiazide (DYAZIDE) 37 5-25 mg per capsule TAKE 1 CAPSULE BY MOUTH DAILY  NOT TAKING REGULARLY  , Historical Med      !! VITAMIN D PO Take by mouth in the morning, Historical Med       !! - Potential duplicate medications found  Please discuss with provider  No discharge procedures on file      PDMP Review     None          ED Provider  Electronically Signed by           Benigno Hernandez MD  05/14/23 4127

## 2023-05-14 NOTE — DISCHARGE INSTRUCTIONS
Use the incentive spirometer a few times an hour while awake to prevent small areas of lung collapse (atelectasis)  Take the Naprosyn twice daily for the next 5-10 days  You can take the oxycodone if the naprosyn is not helping  Do not drive or operate machinery while taking this and do not mix it with alcohol  You can use the lidocaine over the area and ice  Return to the ER if you develop shortness of breath or if the pain is not being well controlled with the prescribed medication  The number for trauma is included in these discharge instructions, call to be seen in the office for further evaluation and management

## 2023-05-15 LAB
ATRIAL RATE: 84 BPM
P AXIS: 65 DEGREES
PR INTERVAL: 174 MS
QRS AXIS: 18 DEGREES
QRSD INTERVAL: 92 MS
QT INTERVAL: 350 MS
QTC INTERVAL: 413 MS
T WAVE AXIS: 47 DEGREES
VENTRICULAR RATE: 84 BPM

## 2023-06-01 ENCOUNTER — OFFICE VISIT (OUTPATIENT)
Dept: SURGERY | Facility: CLINIC | Age: 68
End: 2023-06-01

## 2023-06-01 VITALS — WEIGHT: 244.9 LBS | TEMPERATURE: 97.8 F | BODY MASS INDEX: 37.11 KG/M2 | HEIGHT: 68 IN

## 2023-06-01 DIAGNOSIS — S22.49XA RIB FRACTURES: Primary | ICD-10-CM
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
## 2023-06-01 RX ORDER — METHOCARBAMOL 500 MG/1
500 TABLET, FILM COATED ORAL 4 TIMES DAILY
Qty: 56 TABLET | Refills: 0 | Status: SHIPPED | OUTPATIENT
Start: 2023-06-01 | End: 2023-06-15

## 2023-06-01 NOTE — PROGRESS NOTES
Office Visit - General Surgery  Joseph Mckeon MRN: [id number]  Encounter: [id number]    Assessment and Plan  Problem List Items Addressed This Visit        Musculoskeletal and Integument    Rib fractures - Primary     Patient sustained the following injuries following a mechanical fall: Right anterior rib fractures 7/8  - Seen in ED at [facility] on 5/14 sent home with Oxycodone/Tylenol  - Here for follow up  - Not taking oxycodone did not like how it made him feel  - Is complaining of constipation  - 99%oxygen saturation on RA, denies SOB difficulty breathing except when coughing due to rib pain  - Lungs sounds clear, found to have some tachycardia 110-120  - Wrote for RX Robaxin for pain and senna/colace for constipation suggested MOM as well  -Discussed with patient typical time for rib healing could take up to 8 to 12 weeks  -Discussed with patient to limit heavy lifting nothing over 10 pounds, gradually increase activity as tolerated  -Follow-up as needed with trauma  -Recommend to follow-up with his primary care physician due to some tachycardia in the office of rates in the low 100-120 patient asymptomatic denies any previous tachycardia         Relevant Medications    methocarbamol (ROBAXIN) 500 mg tablet       Other    Fall     Patient is status post mechanical fall on May 14  Sustained rib fractures  Presented to the hospital found to have right anterior seventh and eighth rib fractures sent home on naproxen and oxycodone  -Patient stopped taking oxycodone and naproxen due to stomach pains  -Still having pain although has said that it is improving  -Sleeping in bed, but having difficulty getting a full night sleep              Chief Complaint:  Joseph Mckeon is a 79 y o  male who presents for Follow-up (F/u fall  Rib fx  Getting better but still hurting )    Subjective  Patient states that his pain is improving slowly however he stopped taking the oxycodone naproxen because it upset his stomach    He states that his pain instead of 10 is now more 6-8  Certain types of twisting motions coughing sneezing aggravate his pain  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  Past Medical History:   Diagnosis Date   • Hypertension        Past Surgical History:   Procedure Laterality Date   • IR BIOPSY LIVER RANDOM  9/22/2022   • REPLACEMENT TOTAL KNEE Bilateral        History reviewed  No pertinent family history  Social History     Tobacco Use   • Smoking status: Former   • Smokeless tobacco: Never   Vaping Use   • Vaping Use: Never used   Substance Use Topics   • Alcohol use: Yes     Comment: occassionally   • Drug use: Not Currently        Medications  Current Outpatient Medications on File Prior to Visit   Medication Sig Dispense Refill   • amLODIPine (NORVASC) 5 mg tablet Take 5 mg by mouth daily     • Cholecalciferol (Vitamin D3) 50 MCG (2000 UT) capsule Take 2,000 Units by mouth daily     • Dermarest Eczema 1 % lotion Apply 1 application topically 2 (two) times a day To affected area     • folic acid (FOLVITE) 1 mg tablet Take 1,000 mcg by mouth daily     • lidocaine (LMX) 4 % cream Apply topically as needed for mild pain 30 g 0   • loratadine (CLARITIN) 10 mg tablet Take 10 mg by mouth     • losartan (COZAAR) 50 mg tablet TAKE' 1 TABLET (50 MG TOTAL) BY MOUTH DAILY  IN THE MORNING     • LOSARTAN POTASSIUM PO Take by mouth in the morning     • omeprazole (PriLOSEC) 40 MG capsule TAKE 1 CAPSULE (40 MG TOTAL) BY MOUTH DAILY   90 capsule 1   • ondansetron (ZOFRAN) 4 mg tablet Take 4 mg by mouth every 8 (eight) hours as needed for nausea or vomiting     • oxyCODONE (ROXICODONE) 5 immediate release tablet Take 1 tablet (5 mg total) by mouth every 4 (four) hours as needed for moderate pain for up to 10 doses Max Daily Amount: 30 mg 10 tablet 0   • TRIAMTERENE PO Take by mouth in the morning     • triamterene-hydrochlorothiazide (DYAZIDE) 37 5-25 mg per capsule TAKE 1 CAPSULE BY MOUTH DAILY  NOT TAKING REGULARLY  • VITAMIN D PO Take by mouth in the morning     • metoclopramide (REGLAN) 5 mg tablet Take 1 tablet (5 mg total) by mouth 3 (three) times a day (Patient not taking: Reported on 10/12/2022) 90 tablet 2   • metoclopramide (REGLAN) 5 mg tablet Take 5 mg by mouth 4 (four) times a day     • naproxen (NAPROSYN) 500 mg tablet Take 1 tablet (500 mg total) by mouth 2 (two) times a day with meals for 10 days 20 tablet 0     No current facility-administered medications on file prior to visit  Allergies  Allergies   Allergen Reactions   • Other Other (See Comments)     Running nose       Review of Systems   Constitutional: Negative  Respiratory: Negative  Cardiovascular: Negative  Gastrointestinal: Positive for constipation  Genitourinary: Negative  Musculoskeletal:        + For rib pain   Neurological: Negative  Objective  Vitals:    06/01/23 1247   Temp: 97 8 °F (36 6 °C)       Physical Exam  Constitutional:       General: He is not in acute distress  Appearance: Normal appearance  He is not toxic-appearing  HENT:      Head: Atraumatic  Cardiovascular:      Rate and Rhythm: Regular rhythm  Tachycardia present  Pulses: Normal pulses  Heart sounds: Normal heart sounds  No murmur heard  No gallop  Pulmonary:      Effort: Pulmonary effort is normal  No respiratory distress  Breath sounds: Normal breath sounds  No wheezing or rales  Abdominal:      General: Bowel sounds are normal       Palpations: Abdomen is soft  Skin:     General: Skin is warm  Capillary Refill: Capillary refill takes less than 2 seconds  Neurological:      General: No focal deficit present  Mental Status: He is alert and oriented to person, place, and time     Psychiatric:         Behavior: Behavior normal

## 2023-06-01 NOTE — ASSESSMENT & PLAN NOTE
Patient is status post mechanical fall on May 14  Sustained rib fractures  Presented to the hospital found to have right anterior seventh and eighth rib fractures sent home on naproxen and oxycodone  -Patient stopped taking oxycodone and naproxen due to stomach pains  -Still having pain although has said that it is improving  -Sleeping in bed, but having difficulty getting a full night sleep

## 2023-06-01 NOTE — ASSESSMENT & PLAN NOTE
Patient sustained the following injuries following a mechanical fall: Right anterior rib fractures 7/8    - Seen in ED at [facility] on 5/14 sent home with Oxycodone/Tylenol  - Here for follow up  - Not taking oxycodone did not like how it made him feel  - Is complaining of constipation  - 99%oxygen saturation on RA, denies SOB difficulty breathing except when coughing due to rib pain  - Lungs sounds clear, found to have some tachycardia 110-120  - Wrote for RX Robaxin for pain and senna/colace for constipation suggested MOM as well  -Discussed with patient typical time for rib healing could take up to 8 to 12 weeks  -Discussed with patient to limit heavy lifting nothing over 10 pounds, gradually increase activity as tolerated  -Follow-up as needed with trauma  -Recommend to follow-up with his primary care physician due to some tachycardia in the office of rates in the low 100-120 patient asymptomatic denies any previous tachycardia

## 2023-06-02 ENCOUNTER — OFFICE VISIT (OUTPATIENT)
Dept: FAMILY MEDICINE CLINIC | Facility: CLINIC | Age: 68
End: 2023-06-02
Payer: COMMERCIAL

## 2023-06-02 VITALS
BODY MASS INDEX: 37.62 KG/M2 | HEIGHT: 68 IN | HEART RATE: 106 BPM | WEIGHT: 248.2 LBS | SYSTOLIC BLOOD PRESSURE: 128 MMHG | DIASTOLIC BLOOD PRESSURE: 80 MMHG | TEMPERATURE: 99 F | OXYGEN SATURATION: 97 %

## 2023-06-02 DIAGNOSIS — D69.6 THROMBOCYTOPENIA (HCC): ICD-10-CM

## 2023-06-02 DIAGNOSIS — Z12.5 PROSTATE CANCER SCREENING: Primary | ICD-10-CM

## 2023-06-02 DIAGNOSIS — S22.49XD CLOSED FRACTURE OF MULTIPLE RIBS WITH ROUTINE HEALING, UNSPECIFIED LATERALITY, SUBSEQUENT ENCOUNTER: ICD-10-CM

## 2023-06-02 DIAGNOSIS — E66.01 SEVERE OBESITY (BMI 35.0-39.9) WITH COMORBIDITY (HCC): ICD-10-CM

## 2023-06-02 PROCEDURE — 99204 OFFICE O/P NEW MOD 45 MIN: CPT | Performed by: FAMILY MEDICINE

## 2023-06-05 PROBLEM — E66.01 SEVERE OBESITY (BMI 35.0-39.9) WITH COMORBIDITY (HCC): Status: ACTIVE | Noted: 2023-06-05

## 2023-06-05 NOTE — PROGRESS NOTES
"Assessment/Plan:    78 y/o male with: rib fx, thrombocytopenia and severe obesity with comorbidity  Continue meds Discussed supportive care and return parameters  No problem-specific Assessment & Plan notes found for this encounter  Diagnoses and all orders for this visit:    Prostate cancer screening  -     Lipid Panel with Direct LDL reflex; Future  -     PSA, Total Screen; Future    Severe obesity (BMI 35 0-39  9) with comorbidity (HCC)    Thrombocytopenia ([address] )    Closed fracture of multiple ribs with routine healing, unspecified laterality, subsequent encounter          Subjective:     Chief Complaint   Patient presents with   • New pt     Hx of fractured ribs  Allergies  Pt does not really have any problems at this time  [facility]        Patient ID: Jenny Townsend is a 79 y o  male  Patient is a 78 y/o male who presents to establish care he has rib fx, thrombocytopenia and severe obesity with comorbidity no fevers chills nausea or vomiting      The following portions of the patient's history were reviewed and updated as appropriate: allergies, current medications, past family history, past medical history, past social history, past surgical history and problem list     Review of Systems   Constitutional: Negative  HENT: Negative  Eyes: Negative  Respiratory: Negative  Cardiovascular: Negative  Gastrointestinal: Negative  Endocrine: Negative  Genitourinary: Negative  Musculoskeletal: Negative  Allergic/Immunologic: Negative  Neurological: Negative  Hematological: Negative  Psychiatric/Behavioral: Negative  All other systems reviewed and are negative  Objective:      /80 (BP Location: Right arm, Patient Position: Sitting, Cuff Size: Large)   Pulse (!) 106   Temp 99 °F (37 2 °C) (Temporal)   Ht 5' 8\" (1 727 m)   Wt 113 kg (248 lb 3 2 oz)   SpO2 97%   BMI 37 74 kg/m²          Physical Exam  Constitutional:       Appearance: He is well-developed     HENT: " Head: Atraumatic  Right Ear: External ear normal       Left Ear: External ear normal    Eyes:      Conjunctiva/sclera: Conjunctivae normal       Pupils: Pupils are equal, round, and reactive to light  Cardiovascular:      Rate and Rhythm: Normal rate and regular rhythm  Heart sounds: Normal heart sounds  Pulmonary:      Effort: Pulmonary effort is normal  No respiratory distress  Breath sounds: Normal breath sounds  Abdominal:      General: Bowel sounds are normal  There is no distension  Palpations: Abdomen is soft  Tenderness: There is no abdominal tenderness  There is no guarding or rebound  Musculoskeletal:         General: Normal range of motion  Cervical back: Normal range of motion  Skin:     General: Skin is warm and dry  Neurological:      Mental Status: He is alert and oriented to person, place, and time  Cranial Nerves: No cranial nerve deficit  Psychiatric:         Behavior: Behavior normal          Thought Content: Thought content normal          Judgment: Judgment normal        BMI Counseling: Body mass index is 37 74 kg/m²  The BMI is above normal  Nutrition recommendations include encouraging healthy choices of fruits and vegetables  Exercise recommendations include moderate physical activity 150 minutes/week  Rationale for BMI follow-up plan is due to patient being overweight or obese  Depression Screening and Follow-up Plan: Patient was screened for depression during today's encounter  They screened negative with a PHQ-2 score of 0

## 2023-06-15 ENCOUNTER — APPOINTMENT (OUTPATIENT)
Dept: LAB | Facility: MEDICAL CENTER | Age: 68
End: 2023-06-15
Payer: COMMERCIAL

## 2023-06-15 DIAGNOSIS — Z12.5 PROSTATE CANCER SCREENING: ICD-10-CM

## 2023-06-15 DIAGNOSIS — R79.89 ELEVATED FERRITIN: ICD-10-CM

## 2023-06-15 LAB
BASOPHILS # BLD AUTO: 0.05 THOUSANDS/ÂΜL (ref 0–0.1)
BASOPHILS NFR BLD AUTO: 1 % (ref 0–1)
CHOLEST SERPL-MCNC: 197 MG/DL
EOSINOPHIL # BLD AUTO: 0.13 THOUSAND/ÂΜL (ref 0–0.61)
EOSINOPHIL NFR BLD AUTO: 3 % (ref 0–6)
ERYTHROCYTE [DISTWIDTH] IN BLOOD BY AUTOMATED COUNT: 12.4 % (ref 11.6–15.1)
FERRITIN SERPL-MCNC: 403 NG/ML (ref 24–336)
HCT VFR BLD AUTO: 36.6 % (ref 36.5–49.3)
HDLC SERPL-MCNC: 105 MG/DL
HGB BLD-MCNC: 12.7 G/DL (ref 12–17)
IMM GRANULOCYTES # BLD AUTO: 0.03 THOUSAND/UL (ref 0–0.2)
IMM GRANULOCYTES NFR BLD AUTO: 1 % (ref 0–2)
IRON SATN MFR SERPL: 56 % (ref 20–50)
IRON SERPL-MCNC: 180 UG/DL (ref 65–175)
LDLC SERPL CALC-MCNC: 81 MG/DL (ref 0–100)
LYMPHOCYTES # BLD AUTO: 0.84 THOUSANDS/ÂΜL (ref 0.6–4.47)
LYMPHOCYTES NFR BLD AUTO: 21 % (ref 14–44)
MCH RBC QN AUTO: 32 PG (ref 26.8–34.3)
MCHC RBC AUTO-ENTMCNC: 34.7 G/DL (ref 31.4–37.4)
MCV RBC AUTO: 92 FL (ref 82–98)
MONOCYTES # BLD AUTO: 0.71 THOUSAND/ÂΜL (ref 0.17–1.22)
MONOCYTES NFR BLD AUTO: 18 % (ref 4–12)
NEUTROPHILS # BLD AUTO: 2.21 THOUSANDS/ÂΜL (ref 1.85–7.62)
NEUTS SEG NFR BLD AUTO: 56 % (ref 43–75)
NRBC BLD AUTO-RTO: 0 /100 WBCS
PLATELET # BLD AUTO: 111 THOUSANDS/UL (ref 149–390)
PMV BLD AUTO: 9.7 FL (ref 8.9–12.7)
PSA SERPL-MCNC: 0.9 NG/ML (ref 0–4)
RBC # BLD AUTO: 3.97 MILLION/UL (ref 3.88–5.62)
TIBC SERPL-MCNC: 319 UG/DL (ref 250–450)
TRIGL SERPL-MCNC: 57 MG/DL
WBC # BLD AUTO: 3.97 THOUSAND/UL (ref 4.31–10.16)

## 2023-06-15 PROCEDURE — 82728 ASSAY OF FERRITIN: CPT

## 2023-06-15 PROCEDURE — 83550 IRON BINDING TEST: CPT

## 2023-06-15 PROCEDURE — 83540 ASSAY OF IRON: CPT

## 2023-06-15 PROCEDURE — 85025 COMPLETE CBC W/AUTO DIFF WBC: CPT

## 2023-06-15 PROCEDURE — 80061 LIPID PANEL: CPT

## 2023-06-15 PROCEDURE — 36415 COLL VENOUS BLD VENIPUNCTURE: CPT

## 2023-06-15 PROCEDURE — G0103 PSA SCREENING: HCPCS

## 2023-06-16 ENCOUNTER — HOSPITAL ENCOUNTER (OUTPATIENT)
Dept: INFUSION CENTER | Facility: CLINIC | Age: 68
Discharge: HOME | End: 2023-06-16
Payer: COMMERCIAL

## 2023-06-16 VITALS
RESPIRATION RATE: 18 BRPM | DIASTOLIC BLOOD PRESSURE: 88 MMHG | SYSTOLIC BLOOD PRESSURE: 138 MMHG | TEMPERATURE: 97.5 F | HEART RATE: 88 BPM

## 2023-06-16 DIAGNOSIS — R79.89 ELEVATED FERRITIN: Primary | ICD-10-CM

## 2023-06-16 PROCEDURE — 99195 PHLEBOTOMY: CPT

## 2023-06-16 NOTE — PROGRESS NOTES
Pt presents for therapeutic phlebotomy  Pt has parameters for ferritin, TSAT, and hgb  Pt meets some parameters but not all  Notified Jeff Ta RN who clarified with Jaswinder Dorado that pt is OK to receive phlebotomy today  Parameters and volume verified with Bebeto Mary RN  450mL removed from [address] [place] [address] without incident  Pt observed for 15 minutes post phlebotomy, VSS  Pt provided with AVS and left unit in stable condition  Encouraged pt to hydrate well today and tomorrow and not to get up too quickly  Per Ev Paz will review plan to see if parameters can be made more clear

## 2023-06-22 ENCOUNTER — APPOINTMENT (EMERGENCY)
Dept: RADIOLOGY | Facility: HOSPITAL | Age: 68
DRG: 176 | End: 2023-06-22
Payer: COMMERCIAL

## 2023-06-22 ENCOUNTER — HOSPITAL ENCOUNTER (INPATIENT)
Facility: HOSPITAL | Age: 68
LOS: 8 days | Discharge: HOME | DRG: 176 | End: 2023-06-30
Attending: EMERGENCY MEDICINE | Admitting: INTERNAL MEDICINE
Payer: COMMERCIAL

## 2023-06-22 ENCOUNTER — OFFICE VISIT (OUTPATIENT)
Dept: SURGERY | Facility: CLINIC | Age: 68
End: 2023-06-22
Payer: COMMERCIAL

## 2023-06-22 ENCOUNTER — APPOINTMENT (INPATIENT)
Dept: NON INVASIVE DIAGNOSTICS | Facility: HOSPITAL | Age: 68
DRG: 176 | End: 2023-06-22
Payer: COMMERCIAL

## 2023-06-22 VITALS
BODY MASS INDEX: 37.48 KG/M2 | SYSTOLIC BLOOD PRESSURE: 149 MMHG | OXYGEN SATURATION: 98 % | HEIGHT: 68 IN | TEMPERATURE: 97.8 F | RESPIRATION RATE: 14 BRPM | HEART RATE: 116 BPM | DIASTOLIC BLOOD PRESSURE: 98 MMHG | WEIGHT: 247.3 LBS

## 2023-06-22 DIAGNOSIS — M54.9 BACK PAIN: ICD-10-CM

## 2023-06-22 DIAGNOSIS — T14.8XXA HEMATOMA: ICD-10-CM

## 2023-06-22 DIAGNOSIS — R79.89 ELEVATED FERRITIN: Primary | ICD-10-CM
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
## 2023-06-22 DIAGNOSIS — R00.0 TACHYCARDIA: ICD-10-CM

## 2023-06-22 DIAGNOSIS — I26.99 PULMONARY EMBOLISM (HCC): ICD-10-CM

## 2023-06-22 DIAGNOSIS — R74.8 ELEVATED LIVER ENZYMES: ICD-10-CM

## 2023-06-22 DIAGNOSIS — R55 SYNCOPE: ICD-10-CM

## 2023-06-22 DIAGNOSIS — R07.81 RIB PAIN: Primary | ICD-10-CM

## 2023-06-22 LAB
2HR DELTA HS TROPONIN: 1 NG/L
4HR DELTA HS TROPONIN: 2 NG/L
ALBUMIN SERPL BCP-MCNC: 4 G/DL (ref 3.5–5)
ALP SERPL-CCNC: 92 U/L (ref 46–116)
ALT SERPL W P-5'-P-CCNC: 84 U/L (ref 12–78)
ANION GAP SERPL CALCULATED.3IONS-SCNC: 5 MMOL/L
APTT PPP: 28 SECONDS (ref 23–37)
AST SERPL W P-5'-P-CCNC: 143 U/L (ref 5–45)
BASOPHILS # BLD AUTO: 0.05 THOUSANDS/ÂΜL (ref 0–0.1)
BASOPHILS NFR BLD AUTO: 1 % (ref 0–1)
BILIRUB SERPL-MCNC: 0.44 MG/DL (ref 0.2–1)
BUN SERPL-MCNC: 10 MG/DL (ref 5–25)
CALCIUM SERPL-MCNC: 9.7 MG/DL (ref 8.3–10.1)
CARDIAC TROPONIN I PNL SERPL HS: 7 NG/L
CARDIAC TROPONIN I PNL SERPL HS: 8 NG/L
CARDIAC TROPONIN I PNL SERPL HS: 9 NG/L
CHLORIDE SERPL-SCNC: 102 MMOL/L (ref 96–108)
CO2 SERPL-SCNC: 26 MMOL/L (ref 21–32)
CREAT SERPL-MCNC: 0.98 MG/DL (ref 0.6–1.3)
D DIMER PPP FEU-MCNC: 1.39 UG/ML FEU
EOSINOPHIL # BLD AUTO: 0.14 THOUSAND/ÂΜL (ref 0–0.61)
EOSINOPHIL NFR BLD AUTO: 3 % (ref 0–6)
ERYTHROCYTE [DISTWIDTH] IN BLOOD BY AUTOMATED COUNT: 12.6 % (ref 11.6–15.1)
ETHANOL SERPL-MCNC: <3 MG/DL (ref 0–3)
GFR SERPL CREATININE-BSD FRML MDRD: 80 ML/MIN/1.73SQ M
GGT SERPL-CCNC: 404 U/L (ref 5–85)
GLUCOSE SERPL-MCNC: 94 MG/DL (ref 65–140)
HCT VFR BLD AUTO: 35.6 % (ref 36.5–49.3)
HGB BLD-MCNC: 12.4 G/DL (ref 12–17)
IMM GRANULOCYTES # BLD AUTO: 0.03 THOUSAND/UL (ref 0–0.2)
IMM GRANULOCYTES NFR BLD AUTO: 1 % (ref 0–2)
INR PPP: 1.02 (ref 0.84–1.19)
LYMPHOCYTES # BLD AUTO: 0.99 THOUSANDS/ÂΜL (ref 0.6–4.47)
LYMPHOCYTES NFR BLD AUTO: 20 % (ref 14–44)
MCH RBC QN AUTO: 31.9 PG (ref 26.8–34.3)
MCHC RBC AUTO-ENTMCNC: 34.8 G/DL (ref 31.4–37.4)
MCV RBC AUTO: 92 FL (ref 82–98)
MONOCYTES # BLD AUTO: 0.52 THOUSAND/ÂΜL (ref 0.17–1.22)
MONOCYTES NFR BLD AUTO: 10 % (ref 4–12)
NEUTROPHILS # BLD AUTO: 3.3 THOUSANDS/ÂΜL (ref 1.85–7.62)
NEUTS SEG NFR BLD AUTO: 65 % (ref 43–75)
NRBC BLD AUTO-RTO: 0 /100 WBCS
PLATELET # BLD AUTO: 133 THOUSANDS/UL (ref 149–390)
PMV BLD AUTO: 9.2 FL (ref 8.9–12.7)
POTASSIUM SERPL-SCNC: 4.5 MMOL/L (ref 3.5–5.3)
PROT SERPL-MCNC: 8.1 G/DL (ref 6.4–8.4)
PROTHROMBIN TIME: 13.6 SECONDS (ref 11.6–14.5)
RBC # BLD AUTO: 3.89 MILLION/UL (ref 3.88–5.62)
SODIUM SERPL-SCNC: 133 MMOL/L (ref 135–147)
TSH SERPL DL<=0.05 MIU/L-ACNC: 3.46 UIU/ML (ref 0.45–4.5)
VIT B12 SERPL-MCNC: 541 PG/ML (ref 180–914)
WBC # BLD AUTO: 5.03 THOUSAND/UL (ref 4.31–10.16)

## 2023-06-22 PROCEDURE — 96365 THER/PROPH/DIAG IV INF INIT: CPT

## 2023-06-22 PROCEDURE — 96366 THER/PROPH/DIAG IV INF ADDON: CPT

## 2023-06-22 PROCEDURE — 99284 EMERGENCY DEPT VISIT MOD MDM: CPT

## 2023-06-22 PROCEDURE — 71045 X-RAY EXAM CHEST 1 VIEW: CPT

## 2023-06-22 PROCEDURE — 85025 COMPLETE CBC W/AUTO DIFF WBC: CPT | Performed by: STUDENT IN AN ORGANIZED HEALTH CARE EDUCATION/TRAINING PROGRAM

## 2023-06-22 PROCEDURE — 82977 ASSAY OF GGT: CPT | Performed by: INTERNAL MEDICINE

## 2023-06-22 PROCEDURE — 70450 CT HEAD/BRAIN W/O DYE: CPT

## 2023-06-22 PROCEDURE — 72125 CT NECK SPINE W/O DYE: CPT

## 2023-06-22 PROCEDURE — 36415 COLL VENOUS BLD VENIPUNCTURE: CPT | Performed by: STUDENT IN AN ORGANIZED HEALTH CARE EDUCATION/TRAINING PROGRAM

## 2023-06-22 PROCEDURE — 71275 CT ANGIOGRAPHY CHEST: CPT

## 2023-06-22 PROCEDURE — 82077 ASSAY SPEC XCP UR&BREATH IA: CPT | Performed by: INTERNAL MEDICINE

## 2023-06-22 PROCEDURE — 99223 1ST HOSP IP/OBS HIGH 75: CPT | Performed by: INTERNAL MEDICINE

## 2023-06-22 PROCEDURE — 93005 ELECTROCARDIOGRAM TRACING: CPT

## 2023-06-22 PROCEDURE — 93970 EXTREMITY STUDY: CPT

## 2023-06-22 PROCEDURE — 85379 FIBRIN DEGRADATION QUANT: CPT | Performed by: STUDENT IN AN ORGANIZED HEALTH CARE EDUCATION/TRAINING PROGRAM

## 2023-06-22 PROCEDURE — 84443 ASSAY THYROID STIM HORMONE: CPT | Performed by: STUDENT IN AN ORGANIZED HEALTH CARE EDUCATION/TRAINING PROGRAM

## 2023-06-22 PROCEDURE — 99213 OFFICE O/P EST LOW 20 MIN: CPT | Performed by: EMERGENCY MEDICINE

## 2023-06-22 PROCEDURE — 85610 PROTHROMBIN TIME: CPT | Performed by: INTERNAL MEDICINE

## 2023-06-22 PROCEDURE — 82607 VITAMIN B-12: CPT | Performed by: INTERNAL MEDICINE

## 2023-06-22 PROCEDURE — 85730 THROMBOPLASTIN TIME PARTIAL: CPT | Performed by: INTERNAL MEDICINE

## 2023-06-22 PROCEDURE — G1004 CDSM NDSC: HCPCS

## 2023-06-22 PROCEDURE — 80053 COMPREHEN METABOLIC PANEL: CPT | Performed by: STUDENT IN AN ORGANIZED HEALTH CARE EDUCATION/TRAINING PROGRAM

## 2023-06-22 PROCEDURE — 84484 ASSAY OF TROPONIN QUANT: CPT | Performed by: STUDENT IN AN ORGANIZED HEALTH CARE EDUCATION/TRAINING PROGRAM

## 2023-06-22 RX ORDER — FOLIC ACID 1 MG/1
1 TABLET ORAL DAILY
Status: DISCONTINUED | OUTPATIENT
Start: 2023-06-22 | End: 2023-06-30 | Stop reason: HOSPADM

## 2023-06-22 RX ORDER — HEPARIN SODIUM 1000 [USP'U]/ML
4400 INJECTION, SOLUTION INTRAVENOUS; SUBCUTANEOUS EVERY 6 HOURS PRN
Status: DISCONTINUED | OUTPATIENT
Start: 2023-06-22 | End: 2023-06-24

## 2023-06-22 RX ORDER — SODIUM CHLORIDE, SODIUM GLUCONATE, SODIUM ACETATE, POTASSIUM CHLORIDE, MAGNESIUM CHLORIDE, SODIUM PHOSPHATE, DIBASIC, AND POTASSIUM PHOSPHATE .53; .5; .37; .037; .03; .012; .00082 G/100ML; G/100ML; G/100ML; G/100ML; G/100ML; G/100ML; G/100ML
1000 INJECTION, SOLUTION INTRAVENOUS ONCE
Status: COMPLETED | OUTPATIENT
Start: 2023-06-22 | End: 2023-06-22

## 2023-06-22 RX ORDER — LOSARTAN POTASSIUM 50 MG/1
50 TABLET ORAL DAILY
Status: DISCONTINUED | OUTPATIENT
Start: 2023-06-23 | End: 2023-06-30 | Stop reason: HOSPADM

## 2023-06-22 RX ORDER — LANOLIN ALCOHOL/MO/W.PET/CERES
100 CREAM (GRAM) TOPICAL DAILY
Status: DISCONTINUED | OUTPATIENT
Start: 2023-06-22 | End: 2023-06-30 | Stop reason: HOSPADM

## 2023-06-22 RX ORDER — HEPARIN SODIUM 1000 [USP'U]/ML
8800 INJECTION, SOLUTION INTRAVENOUS; SUBCUTANEOUS ONCE
Status: COMPLETED | OUTPATIENT
Start: 2023-06-22 | End: 2023-06-22

## 2023-06-22 RX ORDER — HEPARIN SODIUM 10000 [USP'U]/100ML
3-30 INJECTION, SOLUTION INTRAVENOUS
Status: DISCONTINUED | OUTPATIENT
Start: 2023-06-22 | End: 2023-06-24

## 2023-06-22 RX ORDER — HEPARIN SODIUM 1000 [USP'U]/ML
8800 INJECTION, SOLUTION INTRAVENOUS; SUBCUTANEOUS EVERY 6 HOURS PRN
Status: DISCONTINUED | OUTPATIENT
Start: 2023-06-22 | End: 2023-06-24

## 2023-06-22 RX ORDER — PANTOPRAZOLE SODIUM 40 MG/1
40 TABLET, DELAYED RELEASE ORAL
Status: DISCONTINUED | OUTPATIENT
Start: 2023-06-23 | End: 2023-06-23

## 2023-06-22 RX ADMIN — IOHEXOL 155 ML: 350 INJECTION, SOLUTION INTRAVENOUS at 17:51

## 2023-06-22 RX ADMIN — SODIUM CHLORIDE, SODIUM GLUCONATE, SODIUM ACETATE, POTASSIUM CHLORIDE, MAGNESIUM CHLORIDE, SODIUM PHOSPHATE, DIBASIC, AND POTASSIUM PHOSPHATE 1000 ML: .53; .5; .37; .037; .03; .012; .00082 INJECTION, SOLUTION INTRAVENOUS at 14:43

## 2023-06-22 RX ADMIN — HEPARIN SODIUM 18 UNITS/KG/HR: 10000 INJECTION, SOLUTION INTRAVENOUS at 21:23

## 2023-06-22 RX ADMIN — HEPARIN SODIUM 8800 UNITS: 1000 INJECTION INTRAVENOUS; SUBCUTANEOUS at 21:22

## 2023-06-22 NOTE — ED PROVIDER NOTES
History  Chief Complaint   Patient presents with   • Rib Injury     Pt reports falling and breaking 2 ribs on the right side may 13th and has been following up with outpatient trauma and had a fall last night when going to the bathroom  They want to make sure everything is okay  -headstrike, -LOC     Pt is a 78 YO M, PMHx including HTN, s/p fall resulting in R rib fx 1 month ago, who presents to the ED after a fall  Pt got up slowly from a seated position to use the bathroom  While walking to the bathroom he collapsed  He is unsure what caused him to fall but states he did not trip  This was unwitnessed  He is unsure how he fell or if he hit his head  Per his wife, who is at bedside, when she went to him he initially seemed out of it but quickly came to  After the fall he had worsening of his right lateral chest wall pain but no new symptoms  He subsequently had a trauma office appointment today (was already scheduled)  While there he was found to be tachycardic, prompting referral to ED  Currently pt continues to endorse R lateral rib cage pain  No modifying factors  Associated symptoms include feeling shaky  He states this fall is similar to his prior fall which he states was not due to tripping  Denies any chest pain or SOB  No back pain, neck pain, or abdominal pain  No AC/AP  No other complaints or concerns  Chart reviewed  Patient was seen in the emergency department on 5/14/2023 after a fall  Per this ED note patient stated he stood up too quickly, became lightheaded, and then fell  He underwent a CT of his chest abdomen pelvis which showed nondisplaced fractures of the right anterior seventh and eighth ribs  He was discharged with trauma follow-up  Prior to Admission Medications   Prescriptions Last Dose Informant Patient Reported? Taking?    Cholecalciferol (Vitamin D3) 50 MCG (2000 UT) capsule 6/22/2023 Self Yes Yes   Sig: Take 2,000 Units by mouth daily   Dermarest Eczema 1 % lotion More than a month Self Yes No   Sig: Apply 1 application topically 2 (two) times a day To affected area   LOSARTAN POTASSIUM PO 6/22/2023 Self Yes Yes   Sig: Take by mouth in the morning   TRIAMTERENE PO Unknown Self Yes No   Sig: Take by mouth in the morning   VITAMIN D PO 6/22/2023 Self Yes Yes   Sig: Take by mouth in the morning   amLODIPine (NORVASC) 5 mg tablet 6/21/2023 Self Yes Yes   Sig: Take 5 mg by mouth daily   folic acid (FOLVITE) 1 mg tablet 6/22/2023 Self Yes Yes   Sig: Take 1,000 mcg by mouth daily   lidocaine (LMX) 4 % cream More than a month  No No   Sig: Apply topically as needed for mild pain   loratadine (CLARITIN) 10 mg tablet 6/22/2023 Self Yes Yes   Sig: Take 10 mg by mouth   losartan (COZAAR) 50 mg tablet Unknown Self Yes No   Sig: TAKE' 1 TABLET (50 MG TOTAL) BY MOUTH DAILY  IN THE MORNING   methocarbamol (ROBAXIN) 500 mg tablet   No No   Sig: Take 1 tablet (500 mg total) by mouth 4 (four) times a day for 14 days   metoclopramide (REGLAN) 5 mg tablet  Self No No   Sig: Take 1 tablet (5 mg total) by mouth 3 (three) times a day   Patient not taking: Reported on 10/12/2022   metoclopramide (REGLAN) 5 mg tablet  Self Yes No   Sig: Take 5 mg by mouth 4 (four) times a day   naproxen (NAPROSYN) 500 mg tablet   No No   Sig: Take 1 tablet (500 mg total) by mouth 2 (two) times a day with meals for 10 days   omeprazole (PriLOSEC) 40 MG capsule More than a month  No No   Sig: TAKE 1 CAPSULE (40 MG TOTAL) BY MOUTH DAILY     ondansetron (ZOFRAN) 4 mg tablet More than a month Self Yes No   Sig: Take 4 mg by mouth every 8 (eight) hours as needed for nausea or vomiting   oxyCODONE (ROXICODONE) 5 immediate release tablet Not Taking  No No   Sig: Take 1 tablet (5 mg total) by mouth every 4 (four) hours as needed for moderate pain for up to 10 doses Max Daily Amount: 30 mg   Patient not taking: Reported on 6/2/2023   triamterene-hydrochlorothiazide (DYAZIDE) 37 5-25 mg per capsule 6/22/2023 Self Yes Yes   Sig: TAKE 1 CAPSULE BY MOUTH DAILY  NOT TAKING REGULARLY  Facility-Administered Medications: None       Past Medical History:   Diagnosis Date   • Broken ribs    • Hypertension        Past Surgical History:   Procedure Laterality Date   • IR BIOPSY LIVER RANDOM  9/22/2022   • REPLACEMENT TOTAL KNEE Bilateral        Family History   Problem Relation Age of Onset   • Cancer Father      I have reviewed and agree with the history as documented  E-Cigarette/Vaping   • E-Cigarette Use Never User      E-Cigarette/Vaping Substances   • Nicotine No    • THC No    • CBD No    • Flavoring No    • Other No    • Unknown No      Social History     Tobacco Use   • Smoking status: Former   • Smokeless tobacco: Never   Vaping Use   • Vaping Use: Never used   Substance Use Topics   • Alcohol use: Yes     Comment: occassionally   • Drug use: Not Currently        Review of Systems   Constitutional: Negative for chills and fever  Respiratory: Negative for shortness of breath  Gastrointestinal: Negative for nausea and vomiting  Musculoskeletal: Negative for back pain and neck pain  Right lateral chest wall pain     All other systems reviewed and are negative  Physical Exam  ED Triage Vitals [06/22/23 1354]   Temperature Pulse Respirations Blood Pressure SpO2   98 4 °F (36 9 °C) (!) 121 16 163/89 98 %      Temp Source Heart Rate Source Patient Position - Orthostatic VS BP Location FiO2 (%)   Temporal Monitor Sitting Right arm --      Pain Score       7             Orthostatic Vital Signs  Vitals:    06/22/23 1430 06/22/23 1500 06/22/23 1530 06/22/23 1600   BP: 164/93 139/63 130/63 121/65   Pulse: (!) 124 (!) 120 (!) 120 (!) 118   Patient Position - Orthostatic VS:  Sitting Sitting Sitting       Physical Exam  Vitals and nursing note reviewed  Constitutional:       General: He is not in acute distress  Appearance: He is well-developed  He is not ill-appearing, toxic-appearing or diaphoretic     HENT:      Head: Normocephalic and atraumatic  Right Ear: External ear normal       Left Ear: External ear normal       Nose: Nose normal    Eyes:      General: Lids are normal  No scleral icterus  Cardiovascular:      Rate and Rhythm: Regular rhythm  Tachycardia present  Heart sounds: Normal heart sounds  No murmur heard  No friction rub  No gallop  Pulmonary:      Effort: Pulmonary effort is normal  No respiratory distress  Breath sounds: Normal breath sounds  No wheezing or rales  Abdominal:      Palpations: Abdomen is soft  Tenderness: There is no abdominal tenderness  There is no guarding or rebound  Musculoskeletal:         General: No deformity  Normal range of motion  Arms:       Cervical back: Normal range of motion and neck supple  Skin:     General: Skin is warm and dry  Neurological:      General: No focal deficit present  Mental Status: He is alert     Psychiatric:         Mood and Affect: Mood normal          Behavior: Behavior normal          ED Medications  Medications   multi-electrolyte (ISOLYTE-S PH 7 4) bolus 1,000 mL (1,000 mL Intravenous New Bag 6/22/23 1443)       Diagnostic Studies  Results Reviewed     Procedure Component Value Units Date/Time    HS Troponin I 4hr [[id number]]     Lab Status: No result Specimen: Blood     CBC and differential [[id number]]  (Abnormal) Collected: 06/22/23 1438    Lab Status: Final result Specimen: Blood from Arm, Left Updated: 06/22/23 1525     WBC 5 03 Thousand/uL      RBC 3 89 Million/uL      Hemoglobin 12 4 g/dL      Hematocrit 35 6 %      MCV 92 fL      MCH 31 9 pg      MCHC 34 8 g/dL      RDW 12 6 %      MPV 9 2 fL      Platelets 117 Thousands/uL      nRBC 0 /100 WBCs      Neutrophils Relative 65 %      Immat GRANS % 1 %      Lymphocytes Relative 20 %      Monocytes Relative 10 %      Eosinophils Relative 3 %      Basophils Relative 1 %      Neutrophils Absolute 3 30 Thousands/µL      Immature Grans Absolute 0 03 Thousand/uL Lymphocytes Absolute 0 99 Thousands/µL      Monocytes Absolute 0 52 Thousand/µL      Eosinophils Absolute 0 14 Thousand/µL      Basophils Absolute 0 05 Thousands/µL     TSH, 3rd generation with Free T4 reflex [[id number]]  (Normal) Collected: 06/22/23 1438    Lab Status: Final result Specimen: Blood from Arm, Left Updated: 06/22/23 1520     TSH 3RD GENERATON 3 464 uIU/mL     HS Troponin 0hr (reflex protocol) [[id number]]  (Normal) Collected: 06/22/23 1438    Lab Status: Final result Specimen: Blood from Arm, Left Updated: 06/22/23 1512     hs TnI 0hr 7 ng/L     HS Troponin I 2hr [[id number]]     Lab Status: No result Specimen: Blood     D-dimer, quantitative [[id number]]  (Abnormal) Collected: 06/22/23 1438    Lab Status: Final result Specimen: Blood from Arm, Left Updated: 06/22/23 1511     D-Dimer, Quant 1 39 ug/ml FEU     Narrative: In the evaluation for possible pulmonary embolism, in the appropriate (Well's Score of 4 or less) patient, the age adjusted d-dimer cutoff for this patient can be calculated as:    Age x 0 01 (in ug/mL) for Age-adjusted D-dimer exclusion threshold for a patient over 50 years      Comprehensive metabolic panel [[id number]]  (Abnormal) Collected: 06/22/23 1438    Lab Status: Final result Specimen: Blood from Arm, Left Updated: 06/22/23 1508     Sodium 133 mmol/L      Potassium 4 5 mmol/L      Chloride 102 mmol/L      CO2 26 mmol/L      ANION GAP 5 mmol/L      BUN 10 mg/dL      Creatinine 0 98 mg/dL      Glucose 94 mg/dL      Calcium 9 7 mg/dL       U/L      ALT 84 U/L      Alkaline Phosphatase 92 U/L      Total Protein 8 1 g/dL      Albumin 4 0 g/dL      Total Bilirubin 0 44 mg/dL      eGFR 80 ml/min/1 73sq m     Narrative:      [facility] guidelines for Chronic Kidney Disease (CKD):   •  Stage 1 with normal or high GFR (GFR > 90 mL/min/1 73 square meters)  •  Stage 2 Mild CKD (GFR = 60-89 mL/min/1 73 square meters)  •  Stage 3A Moderate CKD (GFR = 45-59 "mL/min/1 73 square meters)  •  Stage 3B Moderate CKD (GFR = 30-44 mL/min/1 73 square meters)  •  Stage 4 Severe CKD (GFR = 15-29 mL/min/1 73 square meters)  •  Stage 5 End Stage CKD (GFR <15 mL/min/1 73 square meters)  Note: GFR calculation is accurate only with a steady state creatinine                 XR chest 1 view portable    (Results Pending)   CT head without contrast    (Results Pending)   CT spine cervical without contrast    (Results Pending)   CTA ED chest PE Study    (Results Pending)         Procedures  Procedures      ED Course  ED Course as of 06/22/23 1627   Thu Jun 22, 2023   1508 AST(!): 143  Elevated from prior   1509 ALT(!): 84  Elevated from prior   1512 D-Dimer, Quant(!): 1 39  Will PE study   1513 hs TnI 0hr: 7   1525 Hemoglobin: 12 4   1526 Platelet Count(!): 166   1611 Pt signed out to Dr Claudell Bills  Pending CT studies                                       Medical Decision Making  Patient is a 79 y o  male who presents to the ED after a likely pre/syncopal episode  Pt is non-toxic, well appearing  He is tachycardic but otherwise vitals are stable  Episodes sound syncopal in nature  Given tachycardia, differential includes PE, dysrhythmia (aflutter?)  Low suspicion for orthostatic syncope given lack of dehydration and no evidence of acute life threatening hemorrhage  Presentation not consistent with seizures given short time course, no postictal state, and no seizure activity  Given new trauma and worsening right rib cage pain, there is concern for new rib fracture/intrathoracic injury  Low suspicion for acute neurologic catastrophes including ICH  Doubt intraabdominal trauma given no abdominal tenderness  Plan: labs, troponin, CTH, CT C spine, d-dimer, IVF reassessment                 Portions of the record may have been created with voice recognition software   Occasional wrong word or \"sound a like\" substitutions may have occurred due to the inherent limitations of voice " recognition software  Read the chart carefully and recognize, using context, where substitutions have occurred  Rib pain: acute illness or injury  Amount and/or Complexity of Data Reviewed  External Data Reviewed: notes  Labs: ordered  Decision-making details documented in ED Course  Radiology: ordered  Risk  Prescription drug management  Disposition  Final diagnoses:   Rib pain   Elevated liver enzymes     Time reflects when diagnosis was documented in both MDM as applicable and the Disposition within this note     Time User Action Codes Description Comment    6/22/2023  4:25 PM Ana Kaitlyn Add [R07 81] Rib pain     6/22/2023  4:27 PM Ana Kaitlyn Add [R74 8] Elevated liver enzymes       ED Disposition     None      Follow-up Information    None         Patient's Medications   Discharge Prescriptions    No medications on file     No discharge procedures on file  PDMP Review     None           ED Provider  Attending physically available and evaluated Zoë Reene I managed the patient along with the ED Attending      Electronically Signed by         Rangel Guardado DO  06/22/23 3327

## 2023-06-22 NOTE — PROGRESS NOTES
"Office Visit - General Surgery  Huan Jiménez MRN: [id number]  Encounter: [id number]    Assessment and Plan  Problem List Items Addressed This Visit        Other    Elevated ferritin - Primary     - patient has been undergoing therapeutic phlebotomies, last on 6/16  - f/u with PCP         Fall     - has been having syncopal falls in the days following his therapeutic phlebotomies  - last fall was yesterday, stating \"I just went down\"  No recollection of details  - worsening pain at site of prior rib fracture and small contusion on right hip  - no significant pain or new areas of pain  He does not believe he struck his head  His wife found him on the ground, reports he was a little \"out of it\"  - recommend evaluation in the ED for work up of syncope and fall with imaging         Tachycardia     - tachycardic to 126 bpm in office  - denies chest pain and is not hypoxic  - underwent phlebotomy on 6/16 and had a fall yesterday with worsening pain in his existing rib fractures as well, though is in no acute distress and appears non-toxic at this time  - recommend patient be evaluated in the ED at this time  He and wife are in agreement  Office staff will transport him to ED via [facility] now  Chief Complaint:  Huan Jiménez is a 79 y o  male who presents for Follow-up (3rd f/u rib fx )    Subjective  78 y/o male s/p fall on 5/14 when he sustained R 7th and 8th rib fractures  He was doing well and then he had a fall yesterday  He reports falling in the days following therapeutic phlebotomies which he as been receiving by  Hematology over the last several months for elevated ferritin levels  He reports that he \"just went down\"  He denies presyncopal symptoms but does not recall the details of the event  He does not believe he struck his head  His wife heard a thud and found him immediately and reports that he was \"sort of out of it\"  He does not take AP/AC medications   He does not feel well in the days following his " "phlebotomies as he feels generally weak and \"out of it\"  Since his fall he notes worse pain in the area of his known rib fractures  He also notes a small bruise on his right hip  Past Medical History:   Diagnosis Date   • Broken ribs    • Hypertension        Past Surgical History:   Procedure Laterality Date   • IR BIOPSY LIVER RANDOM  9/22/2022   • REPLACEMENT TOTAL KNEE Bilateral        Family History   Problem Relation Age of Onset   • Cancer Father        Social History     Tobacco Use   • Smoking status: Former   • Smokeless tobacco: Never   Vaping Use   • Vaping Use: Never used   Substance Use Topics   • Alcohol use: Yes     Comment: occassionally   • Drug use: Not Currently        Medications  No current facility-administered medications on file prior to visit  Current Outpatient Medications on File Prior to Visit   Medication Sig Dispense Refill   • amLODIPine (NORVASC) 5 mg tablet Take 5 mg by mouth daily     • Cholecalciferol (Vitamin D3) 50 MCG (2000 UT) capsule Take 2,000 Units by mouth daily     • Dermarest Eczema 1 % lotion Apply 1 application topically 2 (two) times a day To affected area     • folic acid (FOLVITE) 1 mg tablet Take 1,000 mcg by mouth daily     • lidocaine (LMX) 4 % cream Apply topically as needed for mild pain 30 g 0   • loratadine (CLARITIN) 10 mg tablet Take 10 mg by mouth     • losartan (COZAAR) 50 mg tablet TAKE' 1 TABLET (50 MG TOTAL) BY MOUTH DAILY  IN THE MORNING     • LOSARTAN POTASSIUM PO Take by mouth in the morning     • omeprazole (PriLOSEC) 40 MG capsule TAKE 1 CAPSULE (40 MG TOTAL) BY MOUTH DAILY  90 capsule 1   • ondansetron (ZOFRAN) 4 mg tablet Take 4 mg by mouth every 8 (eight) hours as needed for nausea or vomiting     • TRIAMTERENE PO Take by mouth in the morning     • triamterene-hydrochlorothiazide (DYAZIDE) 37 5-25 mg per capsule TAKE 1 CAPSULE BY MOUTH DAILY  NOT TAKING REGULARLY       • VITAMIN D PO Take by mouth in the morning     • methocarbamol " (ROBAXIN) 500 mg tablet Take 1 tablet (500 mg total) by mouth 4 (four) times a day for 14 days 56 tablet 0   • metoclopramide (REGLAN) 5 mg tablet Take 1 tablet (5 mg total) by mouth 3 (three) times a day (Patient not taking: Reported on 10/12/2022) 90 tablet 2   • metoclopramide (REGLAN) 5 mg tablet Take 5 mg by mouth 4 (four) times a day     • naproxen (NAPROSYN) 500 mg tablet Take 1 tablet (500 mg total) by mouth 2 (two) times a day with meals for 10 days 20 tablet 0   • oxyCODONE (ROXICODONE) 5 immediate release tablet Take 1 tablet (5 mg total) by mouth every 4 (four) hours as needed for moderate pain for up to 10 doses Max Daily Amount: 30 mg (Patient not taking: Reported on 6/2/2023) 10 tablet 0       Allergies  Allergies   Allergen Reactions   • Ciprofloxacin Vomiting   • Flagyl [Metronidazole] Vomiting   • Other Other (See Comments)     Running nose       Review of Systems   Constitutional: Negative  HENT: Negative  Respiratory: Negative  Negative for chest tightness and shortness of breath  Cardiovascular: Negative  Negative for chest pain  Gastrointestinal: Negative  Negative for abdominal pain, nausea and vomiting  Genitourinary: Negative  Musculoskeletal:        + R anterolateral rib pain   Skin: Negative  Neurological: Negative  Negative for dizziness, light-headedness and headaches  Hematological: Negative  Psychiatric/Behavioral: Negative  Objective  Vitals:    06/22/23 1301   BP: 149/98   Pulse: (!) 116   Resp: 14   Temp: 97 8 °F (36 6 °C)   SpO2: 98%       Physical Exam  Constitutional:       Appearance: Normal appearance  HENT:      Head: Normocephalic and atraumatic  Nose: Nose normal       Mouth/Throat:      Mouth: Mucous membranes are dry  Cardiovascular:      Rate and Rhythm: Normal rate and regular rhythm  Pulses: Normal pulses  Heart sounds: Normal heart sounds        Comments: +  bpm  Pulmonary:      Effort: Pulmonary effort is normal       Comments: + Oxygen saturation 95%  Abdominal:      General: Abdomen is flat  Palpations: Abdomen is soft  Tenderness: There is no abdominal tenderness  There is no guarding  Musculoskeletal:         General: Tenderness present  No swelling or deformity  Normal range of motion  Cervical back: Normal range of motion and neck supple  No tenderness  Comments: + R anterolateral chest wall tenderness  + R lateral gluteal contusion, small  No hematoma appreciated    + Cervical through lumbar spine non-tender  Skin:     General: Skin is warm and dry  Capillary Refill: Capillary refill takes less than 2 seconds  Neurological:      General: No focal deficit present  Mental Status: He is alert and oriented to person, place, and time  Sensory: No sensory deficit  Motor: No weakness     Psychiatric:         Behavior: Behavior normal

## 2023-06-22 NOTE — ASSESSMENT & PLAN NOTE
- R 7th/8th rib fractures  - recommend evaluation in ED due to tachycardia and what sound like syncopal falls  Will f/u chest imaging to r/o PTX/SAMMI or new chest wall injury since his fall yesterday     - rib pain is worse since fall, but tolerable

## 2023-06-22 NOTE — ASSESSMENT & PLAN NOTE
- tachycardic to 126 bpm in office  - denies chest pain and is not hypoxic  - underwent phlebotomy on 6/16 and had a fall yesterday with worsening pain in his existing rib fractures as well, though is in no acute distress and appears non-toxic at this time  - recommend patient be evaluated in the ED at this time  He and wife are in agreement  Office staff will transport him to ED via [facility] now

## 2023-06-22 NOTE — ASSESSMENT & PLAN NOTE
"- has been having syncopal falls in the days following his therapeutic phlebotomies  - last fall was yesterday, stating \"I just went down\"  No recollection of details  - worsening pain at site of prior rib fracture and small contusion on right hip  - no significant pain or new areas of pain  He does not believe he struck his head  His wife found him on the ground, reports he was a little \"out of it\"     - recommend evaluation in the ED for work up of syncope and fall with imaging  "

## 2023-06-23 ENCOUNTER — APPOINTMENT (INPATIENT)
Dept: NON INVASIVE DIAGNOSTICS | Facility: HOSPITAL | Age: 68
DRG: 176 | End: 2023-06-23
Payer: COMMERCIAL

## 2023-06-23 PROBLEM — R33.9 RETENTION OF URINE, UNSPECIFIED: Chronic | Status: ACTIVE | Noted: 2023-06-23

## 2023-06-23 PROBLEM — R55 SYNCOPE: Status: ACTIVE | Noted: 2023-06-23

## 2023-06-23 PROBLEM — E83.42 HYPOMAGNESEMIA: Status: ACTIVE | Noted: 2023-06-23

## 2023-06-23 PROBLEM — R29.6 RECURRENT FALLS: Status: ACTIVE | Noted: 2023-06-23

## 2023-06-23 PROBLEM — I10 PRIMARY HYPERTENSION: Chronic | Status: ACTIVE | Noted: 2023-06-23

## 2023-06-23 LAB
ALBUMIN SERPL BCP-MCNC: 3.3 G/DL (ref 3.5–5)
ALP SERPL-CCNC: 76 U/L (ref 46–116)
ALT SERPL W P-5'-P-CCNC: 61 U/L (ref 12–78)
ANION GAP SERPL CALCULATED.3IONS-SCNC: 2 MMOL/L
AORTIC ROOT: 4.3 CM
APICAL FOUR CHAMBER EJECTION FRACTION: 67 %
APTT PPP: 201 SECONDS (ref 23–37)
APTT PPP: 67 SECONDS (ref 23–37)
APTT PPP: 98 SECONDS (ref 23–37)
ASCENDING AORTA: 4.1 CM
AST SERPL W P-5'-P-CCNC: 105 U/L (ref 5–45)
ATRIAL RATE: 118 BPM
BILIRUB SERPL-MCNC: 0.97 MG/DL (ref 0.2–1)
BUN SERPL-MCNC: 11 MG/DL (ref 5–25)
CALCIUM ALBUM COR SERPL-MCNC: 9.6 MG/DL (ref 8.3–10.1)
CALCIUM SERPL-MCNC: 9 MG/DL (ref 8.3–10.1)
CHLORIDE SERPL-SCNC: 103 MMOL/L (ref 96–108)
CO2 SERPL-SCNC: 30 MMOL/L (ref 21–32)
CREAT SERPL-MCNC: 0.98 MG/DL (ref 0.6–1.3)
E WAVE DECELERATION TIME: 196 MS
FRACTIONAL SHORTENING: 37 % (ref 28–44)
GFR SERPL CREATININE-BSD FRML MDRD: 80 ML/MIN/1.73SQ M
GLUCOSE SERPL-MCNC: 106 MG/DL (ref 65–140)
INTERVENTRICULAR SEPTUM IN DIASTOLE (PARASTERNAL SHORT AXIS VIEW): 0.9 CM
INTERVENTRICULAR SEPTUM: 0.9 CM (ref 0.6–1.1)
IVC: 14 MM
LAAS-AP2: 21.2 CM2
LAAS-AP4: 19.9 CM2
LEFT ATRIUM SIZE: 3.2 CM
LEFT ATRIUM VOLUME (MOD BIPLANE): 64 ML
LEFT INTERNAL DIMENSION IN SYSTOLE: 3.2 CM (ref 2.1–4)
LEFT VENTRICULAR INTERNAL DIMENSION IN DIASTOLE: 5.1 CM (ref 3.5–6)
LEFT VENTRICULAR POSTERIOR WALL IN END DIASTOLE: 1 CM
LEFT VENTRICULAR STROKE VOLUME: 84 ML
LVSV (TEICH): 84 ML
MAGNESIUM SERPL-MCNC: 1.3 MG/DL (ref 1.6–2.6)
MV E'TISSUE VEL-SEP: 10 CM/S
MV PEAK A VEL: 0.96 M/S
MV PEAK E VEL: 87 CM/S
MV STENOSIS PRESSURE HALF TIME: 57 MS
MV VALVE AREA P 1/2 METHOD: 3.86 CM2
P AXIS: 24 DEGREES
PHOSPHATE SERPL-MCNC: 3.2 MG/DL (ref 2.3–4.1)
POTASSIUM SERPL-SCNC: 4.3 MMOL/L (ref 3.5–5.3)
PR INTERVAL: 168 MS
PROT SERPL-MCNC: 6.8 G/DL (ref 6.4–8.4)
QRS AXIS: 0 DEGREES
QRSD INTERVAL: 84 MS
QT INTERVAL: 308 MS
QTC INTERVAL: 431 MS
RA PRESSURE ESTIMATED: 3 MMHG
RIGHT ATRIUM AREA SYSTOLE A4C: 20.6 CM2
RIGHT VENTRICLE ID DIMENSION: 3.4 CM
SL CV LEFT ATRIUM LENGTH A2C: 5.4 CM
SL CV LV EF: 60
SL CV PED ECHO LEFT VENTRICLE DIASTOLIC VOLUME (MOD BIPLANE) 2D: 125 ML
SL CV PED ECHO LEFT VENTRICLE SYSTOLIC VOLUME (MOD BIPLANE) 2D: 41 ML
SODIUM SERPL-SCNC: 135 MMOL/L (ref 135–147)
T WAVE AXIS: 6 DEGREES
TRICUSPID ANNULAR PLANE SYSTOLIC EXCURSION: 2.4 CM
VENTRICULAR RATE: 118 BPM

## 2023-06-23 PROCEDURE — 99223 1ST HOSP IP/OBS HIGH 75: CPT | Performed by: INTERNAL MEDICINE

## 2023-06-23 PROCEDURE — 80053 COMPREHEN METABOLIC PANEL: CPT | Performed by: INTERNAL MEDICINE

## 2023-06-23 PROCEDURE — 99232 SBSQ HOSP IP/OBS MODERATE 35: CPT | Performed by: INTERNAL MEDICINE

## 2023-06-23 PROCEDURE — 85730 THROMBOPLASTIN TIME PARTIAL: CPT | Performed by: INTERNAL MEDICINE

## 2023-06-23 PROCEDURE — 97163 PT EVAL HIGH COMPLEX 45 MIN: CPT

## 2023-06-23 PROCEDURE — 83735 ASSAY OF MAGNESIUM: CPT | Performed by: INTERNAL MEDICINE

## 2023-06-23 PROCEDURE — 84100 ASSAY OF PHOSPHORUS: CPT | Performed by: INTERNAL MEDICINE

## 2023-06-23 PROCEDURE — 93306 TTE W/DOPPLER COMPLETE: CPT

## 2023-06-23 PROCEDURE — 97166 OT EVAL MOD COMPLEX 45 MIN: CPT

## 2023-06-23 PROCEDURE — 93970 EXTREMITY STUDY: CPT | Performed by: SURGERY

## 2023-06-23 PROCEDURE — 93306 TTE W/DOPPLER COMPLETE: CPT | Performed by: INTERNAL MEDICINE

## 2023-06-23 PROCEDURE — 93010 ELECTROCARDIOGRAM REPORT: CPT | Performed by: INTERNAL MEDICINE

## 2023-06-23 RX ORDER — METHOCARBAMOL 500 MG/1
500 TABLET, FILM COATED ORAL EVERY 6 HOURS PRN
Status: DISCONTINUED | OUTPATIENT
Start: 2023-06-23 | End: 2023-06-23

## 2023-06-23 RX ORDER — MAGNESIUM SULFATE HEPTAHYDRATE 40 MG/ML
2 INJECTION, SOLUTION INTRAVENOUS ONCE
Status: COMPLETED | OUTPATIENT
Start: 2023-06-23 | End: 2023-06-23

## 2023-06-23 RX ORDER — LANOLIN ALCOHOL/MO/W.PET/CERES
3 CREAM (GRAM) TOPICAL
Status: DISCONTINUED | OUTPATIENT
Start: 2023-06-23 | End: 2023-06-30 | Stop reason: HOSPADM

## 2023-06-23 RX ORDER — SODIUM CHLORIDE, SODIUM GLUCONATE, SODIUM ACETATE, POTASSIUM CHLORIDE, MAGNESIUM CHLORIDE, SODIUM PHOSPHATE, DIBASIC, AND POTASSIUM PHOSPHATE .53; .5; .37; .037; .03; .012; .00082 G/100ML; G/100ML; G/100ML; G/100ML; G/100ML; G/100ML; G/100ML
75 INJECTION, SOLUTION INTRAVENOUS CONTINUOUS
Status: DISCONTINUED | OUTPATIENT
Start: 2023-06-23 | End: 2023-06-25

## 2023-06-23 RX ORDER — METHOCARBAMOL 750 MG/1
750 TABLET, FILM COATED ORAL EVERY 6 HOURS PRN
Status: DISCONTINUED | OUTPATIENT
Start: 2023-06-23 | End: 2023-06-30 | Stop reason: HOSPADM

## 2023-06-23 RX ORDER — ACETAMINOPHEN 325 MG/1
650 TABLET ORAL EVERY 6 HOURS PRN
Status: DISCONTINUED | OUTPATIENT
Start: 2023-06-23 | End: 2023-06-30 | Stop reason: HOSPADM

## 2023-06-23 RX ADMIN — MAGNESIUM SULFATE HEPTAHYDRATE 2 G: 40 INJECTION, SOLUTION INTRAVENOUS at 09:55

## 2023-06-23 RX ADMIN — METHOCARBAMOL 750 MG: 750 TABLET ORAL at 16:31

## 2023-06-23 RX ADMIN — LOSARTAN POTASSIUM 50 MG: 50 TABLET, FILM COATED ORAL at 08:53

## 2023-06-23 RX ADMIN — DICLOFENAC SODIUM 2 G: 10 GEL TOPICAL at 20:04

## 2023-06-23 RX ADMIN — ACETAMINOPHEN 650 MG: 325 TABLET ORAL at 23:34

## 2023-06-23 RX ADMIN — FOLIC ACID 1 MG: 1 TABLET ORAL at 08:53

## 2023-06-23 RX ADMIN — HEPARIN SODIUM 15 UNITS/KG/HR: 10000 INJECTION, SOLUTION INTRAVENOUS at 11:36

## 2023-06-23 RX ADMIN — SODIUM CHLORIDE, SODIUM GLUCONATE, SODIUM ACETATE, POTASSIUM CHLORIDE, MAGNESIUM CHLORIDE, SODIUM PHOSPHATE, DIBASIC, AND POTASSIUM PHOSPHATE 75 ML/HR: .53; .5; .37; .037; .03; .012; .00082 INJECTION, SOLUTION INTRAVENOUS at 13:48

## 2023-06-23 RX ADMIN — PANTOPRAZOLE SODIUM 40 MG: 40 TABLET, DELAYED RELEASE ORAL at 05:13

## 2023-06-23 RX ADMIN — Medication 1 TABLET: at 08:53

## 2023-06-23 RX ADMIN — THIAMINE HCL TAB 100 MG 100 MG: 100 TAB at 08:53

## 2023-06-23 RX ADMIN — MELATONIN TAB 3 MG 3 MG: 3 TAB at 23:34

## 2023-06-23 NOTE — PLAN OF CARE
Problem: PAIN - ADULT  Goal: Verbalizes/displays adequate comfort level or baseline comfort level  Description: Interventions:  - Encourage patient to monitor pain and request assistance  - Assess pain using appropriate pain scale  - Administer analgesics based on type and severity of pain and evaluate response  - Implement non-pharmacological measures as appropriate and evaluate response  - Consider cultural and social influences on pain and pain management  - Notify physician/advanced practitioner if interventions unsuccessful or patient reports new pain  Outcome: Progressing     Problem: INFECTION - ADULT  Goal: Absence or prevention of progression during hospitalization  Description: INTERVENTIONS:  - Assess and monitor for signs and symptoms of infection  - Monitor lab/diagnostic results  - Monitor all insertion sites, i e  indwelling lines, tubes, and drains  - Monitor endotracheal if appropriate and nasal secretions for changes in amount and color  - [place] appropriate cooling/warming therapies per order  - Administer medications as ordered  - Instruct and encourage patient and family to use good hand hygiene technique  - Identify and instruct in appropriate isolation precautions for identified infection/condition  Outcome: Progressing  Goal: Absence of fever/infection during neutropenic period  Description: INTERVENTIONS:  - Monitor WBC    Outcome: Progressing     Problem: DISCHARGE PLANNING  Goal: Discharge to home or other facility with appropriate resources  Description: INTERVENTIONS:  - Identify barriers to discharge w/patient and caregiver  - Arrange for needed discharge resources and transportation as appropriate  - Identify discharge learning needs (meds, wound care, etc )  - Arrange for interpretive services to assist at discharge as needed  - Refer to Case Management Department for coordinating discharge planning if the patient needs post-hospital services based on physician/advanced practitioner order or complex needs related to functional status, cognitive ability, or social support system  Outcome: Progressing     Problem: Knowledge Deficit  Goal: Patient/family/caregiver demonstrates understanding of disease process, treatment plan, medications, and discharge instructions  Description: Complete learning assessment and assess knowledge base    Interventions:  - Provide teaching at level of understanding  - Provide teaching via preferred learning methods  Outcome: Progressing

## 2023-06-23 NOTE — PLAN OF CARE
Problem: PHYSICAL THERAPY ADULT  Goal: Performs mobility at highest level of function for planned discharge setting  See evaluation for individualized goals  Description: Treatment/Interventions: Functional transfer training, Elevations, Therapeutic exercise, Endurance training, Patient/family training, Equipment eval/education, Bed mobility, Gait training, Spoke to case management, Spoke to nursing, OT  Equipment Recommended: Nik Thomas       See flowsheet documentation for full assessment, interventions and recommendations  Note: Prognosis: Fair  Problem List: Decreased endurance, Impaired balance, Decreased mobility, Pain, Obesity  Assessment: Pt is a 79 y o  male who presents after a fall 6/21/23  CT imaging demonstrated a small PE 6/22/23  Additionally, patient had a fall in May and suffered R 7th and 8th rib fractures  Pt's current hospital problem list includes PE, rib fractures, thrombocytopenia, fattyliver, alcoholic, and elevated ferritin  Pt  has a past medical history of Broken ribs and Hypertension  Pt has a high complexity clinical presentation due to Ongoing medical management for primary dx, Increased reliance on more restrictive AD compared to baseline, Decreased activity tolerance compared to baseline, Fall risk, Increased assistance needed from caregiver at current time, Ongoing telemetry monitoring, Continuous pulse oximetry monitoring    Pt demonstrates deficits in functional mobility and ambulation, balance, and endurance  Skilled PT is required to address these deficits  Pt required S for bed mobility, S for transfers, CGA for ambulation, and CGA for stair negotiation  The patient's AM-PAC Basic Mobility Inpatient Short Form Raw Score is 18  A Raw score of greater than 16 suggests the patient may benefit from discharge to home  Please also refer to the recommendation of the Physical Therapist for safe discharge planning  Pt's current d/c recommendation is for home with OPPT    Barriers to Discharge: Decreased caregiver support     PT Discharge Recommendation: Home with outpatient rehabilitation    See flowsheet documentation for full assessment

## 2023-06-23 NOTE — CONSULTS
Consultation - Cardiology Team 1  Melo Devine 79 y o  male MRN: [id number]  Unit/Bed#: [facility] 821-01 Encounter: [id number]    Assessment/Plan     Active Problems:    Elevated ferritin    Fatty liver, alcoholic    Thrombocytopenia (HCC)    Rib fractures    Tachycardia    Pulmonary embolism ([address] )      Assessment    1  Syncope- recurrent  At night after getting OOB to use bathroom  Does not recall preceding symptoms  Each event several days after phlebotomy  Telemetry- sinus rhythm  ECG-sinus tachycardia heart rate 118 bpm   Cannot rule out inferior infarct  2  Elevated ferritin/iron overload  Recent phlebotomies, last 6/16    3  Rib fractures- as result of syncopal event    4  Tachycardia-sinus and has improved with IV hydration    5  Possible PE-as noted on CTA chest   LV /RV ratio normal  TTE pending  D dimer 1 3  IV heparin started  ? Contributing to syncoe  No hypoxia    6  Essential HTN  PTA-amlodipine 5 mg, losartan 50 mg, Dyazide 37 5/25 mg daily  Losartan 50mg continued  7   Hypomagnesemia  Mag 1 3  Being repleted  Plan  1  F/u with echocardiogram to assure LV /RV function normal  2  Reasonable to apply 2 week zio patch  3  Check orthostatic VS  4  Hydrate well especially pre and post phlebotomy  5  Slow position changes particularly at night getting OOB  6  AC per medicine      Physician Requesting Consult: Dustin Camargo MD  Reason for Consult / Principal Problem: syncope      HPI: Melo Devine is a 79y o  year old male with HTN, FELIBERTO-uses CPAP, syncope, history of chronic thrombocytopenia, elevated ferritin and iron deposition liver - receiving phlebotomies , history of heavy alcohol use who was sent in from trauma office with tachycardia and recent syncopal event  He presented mildly hypertensive and tachycardic  He was given 1 liter isolyte  ECG sinus tachycardia  HR improved significantly with IV hydration  Patient without any palpitations chest pain or shortness of breath    Patient reports 3 episodes of syncope  Each occurred getting out of bed in the middle of the night going to the bathroom  First was the ED visit 5/14  He does not recall getting lightheaded or dizzy  At that time he suffered right-sided rib fractures  He was seen in the emergency room  He reportedly had 2 more episodes  All were in the middle of the night getting up out of bed to use the bathroom  He has been out of work for the past month due to his right-sided rib fractures  He is a   He reports feeling exhausted  Rib pain limiting his activity      CTA chest revealed no central PE, peripheral branch arteries are not well opacified  Small filling defects in the far peripheral fourth and fifth order branch arteries supplying the right lower lobe  LV/RV ratio normal  Healing fractures right 6-9 ribs  No new fractures       Started on IV heparin    D dimer 1 3  Mag 1 3  0 hr trop 7  2 hr trop 8  4 hr trop 9  BUN/creat- 10/0 9  H/H 12 4/35  6     CT head- no acute pathology  C spine no fracture     ED visit 5/14- fall on floor in the middle of night after getting OOB  No head strike per ED records  + rib fractures       Trauma f/u 6/22 reported syncope  He was tachycardic-   TTE 12/2018- normal LV/RV function, mild TR  Normal PA pressure  Remote tobacco abuse  Father with CAD in his 46s  Rare alcohol use         Inpatient consult to Cardiology  Consult performed by: PRAFUL Kaur  Consult ordered by: Holley Siegel MD          Review of Systems   Constitutional: Positive for activity change and fatigue  HENT: Negative  Eyes: Negative  Respiratory: Negative for cough and shortness of breath  Cardiovascular: Negative for chest pain, palpitations and leg swelling  Gastrointestinal: Negative  Endocrine: Negative  Genitourinary: Negative  Musculoskeletal: Negative  Skin: Negative  Allergic/Immunologic: Negative  Neurological: Positive for syncope  Hematological: Bruises/bleeds easily  Psychiatric/Behavioral: Negative  All other systems reviewed and are negative        Historical Information   Past Medical History:   Diagnosis Date   • Broken ribs    • Hypertension      Past Surgical History:   Procedure Laterality Date   • IR BIOPSY LIVER RANDOM  9/22/2022   • REPLACEMENT TOTAL KNEE Bilateral      Social History     Substance and Sexual Activity   Alcohol Use Yes    Comment: occassionally     Social History     Substance and Sexual Activity   Drug Use Not Currently     Social History     Tobacco Use   Smoking Status Former   Smokeless Tobacco Never     Family History:   Family History   Problem Relation Age of Onset   • Cancer Father        Meds/Allergies   current meds:   Current Facility-Administered Medications   Medication Dose Route Frequency   • folic acid (FOLVITE) tablet 1 mg  1 mg Oral Daily   • heparin (porcine) 25,000 units in 0 45% NaCl 250 mL infusion (premix)  3-30 Units/kg/hr (Order-Specific) Intravenous Titrated   • heparin (porcine) injection 4,400 Units  4,400 Units Intravenous Q6H PRN   • heparin (porcine) injection 8,800 Units  8,800 Units Intravenous Q6H PRN   • losartan (COZAAR) tablet 50 mg  50 mg Oral Daily   • magnesium sulfate 2 g/50 mL IVPB (premix) 2 g  2 g Intravenous Once   • multivitamin-minerals (CENTRUM) tablet 1 tablet  1 tablet Oral Daily   • pantoprazole (PROTONIX) EC tablet 40 mg  40 mg Oral Early Morning   • thiamine tablet 100 mg  100 mg Oral Daily    and PTA meds:    Medications Prior to Admission   Medication   • amLODIPine (NORVASC) 5 mg tablet   • Cholecalciferol (Vitamin D3) 50 MCG (2000 UT) capsule   • folic acid (FOLVITE) 1 mg tablet   • loratadine (CLARITIN) 10 mg tablet   • LOSARTAN POTASSIUM PO   • triamterene-hydrochlorothiazide (DYAZIDE) 37 5-25 mg per capsule   • VITAMIN D PO   • Dermarest Eczema 1 % lotion   • lidocaine (LMX) 4 % cream   • losartan (COZAAR) 50 mg tablet   • methocarbamol (ROBAXIN) 500 "mg tablet   • metoclopramide (REGLAN) 5 mg tablet   • metoclopramide (REGLAN) 5 mg tablet   • naproxen (NAPROSYN) 500 mg tablet   • omeprazole (PriLOSEC) 40 MG capsule   • ondansetron (ZOFRAN) 4 mg tablet   • oxyCODONE (ROXICODONE) 5 immediate release tablet   • TRIAMTERENE PO     Allergies   Allergen Reactions   • Ciprofloxacin Vomiting   • Flagyl [Metronidazole] Vomiting   • Other Other (See Comments)     Running nose       Objective   Vitals: Blood pressure 106/75, pulse (!) 108, temperature 98 7 °F (37 1 °C), resp  rate 14, height 5' 8\" (1 727 m), weight 111 kg (244 lb), SpO2 95 %    Orthostatic Blood Pressures    Flowsheet Row Most Recent Value   Blood Pressure 106/75 filed at 06/23/2023 1121   Patient Position - Orthostatic VS Sitting filed at 06/22/2023 1830            Intake/Output Summary (Last 24 hours) at 6/23/2023 1127  Last data filed at 6/23/2023 0801  Gross per 24 hour   Intake 120 ml   Output 1000 ml   Net -880 ml       Invasive Devices     Peripheral Intravenous Line  Duration           Peripheral IV 06/22/23 Left Antecubital 1 day    Peripheral IV 06/22/23 Right;Ventral (anterior) Forearm 1 day                Physical Exam: /75   Pulse (!) 108   Temp 98 7 °F (37 1 °C)   Resp 14   Ht 5' 8\" (1 727 m)   Wt 111 kg (244 lb)   SpO2 95%   BMI 37 10 kg/m²   General Appearance:    Alert, cooperative, no distress, appears stated age   Head:    Normocephalic, no scleral icterus   Eyes:    PERRL   Nose:   Nares normal, septum midline, mucosa normal, no drainage    Throat:   Lips, mucosa, and tongue normal   Neck:   Supple, symmetrical, trachea midline     no carotid bruit or JVD   Back:     Symmetric   Lungs:     Clear to auscultation bilaterally, respirations unlabored   Chest Wall:    No tenderness or deformity    Heart:    Regular rate and rhythm, S1 and S2 normal, no murmur, rub   or gallop   Abdomen:     Soft, non-tender, bowel sounds active all four quadrants,     no masses, no organomegaly " Extremities:   Extremities normal, atraumatic, no cyanosis or edema   Pulses:   2+ and symmetric all extremities   Skin:   Skin color, texture, turgor normal, no rashes or lesions   Neurologic:   Alert and oriented to person place and time   No focal deficits       Lab Results:   Recent Results (from the past 72 hour(s))   CBC and differential    Collection Time: 06/22/23  2:38 PM   Result Value Ref Range    WBC 5 03 4 31 - 10 16 Thousand/uL    RBC 3 89 3 88 - 5 62 Million/uL    Hemoglobin 12 4 12 0 - 17 0 g/dL    Hematocrit 35 6 (L) 36 5 - 49 3 %    MCV 92 82 - 98 fL    MCH 31 9 26 8 - 34 3 pg    MCHC 34 8 31 4 - 37 4 g/dL    RDW 12 6 11 6 - 15 1 %    MPV 9 2 8 9 - 12 7 fL    Platelets 623 (L) 765 - 390 Thousands/uL    nRBC 0 /100 WBCs    Neutrophils Relative 65 43 - 75 %    Immat GRANS % 1 0 - 2 %    Lymphocytes Relative 20 14 - 44 %    Monocytes Relative 10 4 - 12 %    Eosinophils Relative 3 0 - 6 %    Basophils Relative 1 0 - 1 %    Neutrophils Absolute 3 30 1 85 - 7 62 Thousands/µL    Immature Grans Absolute 0 03 0 00 - 0 20 Thousand/uL    Lymphocytes Absolute 0 99 0 60 - 4 47 Thousands/µL    Monocytes Absolute 0 52 0 17 - 1 22 Thousand/µL    Eosinophils Absolute 0 14 0 00 - 0 61 Thousand/µL    Basophils Absolute 0 05 0 00 - 0 10 Thousands/µL   Comprehensive metabolic panel    Collection Time: 06/22/23  2:38 PM   Result Value Ref Range    Sodium 133 (L) 135 - 147 mmol/L    Potassium 4 5 3 5 - 5 3 mmol/L    Chloride 102 96 - 108 mmol/L    CO2 26 21 - 32 mmol/L    ANION GAP 5 mmol/L    BUN 10 5 - 25 mg/dL    Creatinine 0 98 0 60 - 1 30 mg/dL    Glucose 94 65 - 140 mg/dL    Calcium 9 7 8 3 - 10 1 mg/dL     (H) 5 - 45 U/L    ALT 84 (H) 12 - 78 U/L    Alkaline Phosphatase 92 46 - 116 U/L    Total Protein 8 1 6 4 - 8 4 g/dL    Albumin 4 0 3 5 - 5 0 g/dL    Total Bilirubin 0 44 0 20 - 1 00 mg/dL    eGFR 80 ml/min/1 73sq m   HS Troponin 0hr (reflex protocol)    Collection Time: 06/22/23  2:38 PM   Result Value "Ref Range    hs TnI 0hr 7 \"Refer to ACS Flowchart\"- see link ng/L   D-dimer, quantitative    Collection Time: 06/22/23  2:38 PM   Result Value Ref Range    D-Dimer, Quant 1 39 (H) <0 50 ug/ml FEU   TSH, 3rd generation with Free T4 reflex    Collection Time: 06/22/23  2:38 PM   Result Value Ref Range    TSH 3RD GENERATON 3 464 0 450 - 4 500 uIU/mL   Gamma GT    Collection Time: 06/22/23  2:38 PM   Result Value Ref Range     (H) 5 - 85 U/L   Vitamin B12    Collection Time: 06/22/23  2:38 PM   Result Value Ref Range    Vitamin B-12 541 180 - 914 pg/mL   HS Troponin I 2hr    Collection Time: 06/22/23  5:05 PM   Result Value Ref Range    hs TnI 2hr 8 \"Refer to ACS Flowchart\"- see link ng/L    Delta 2hr hsTnI 1 <20 ng/L   HS Troponin I 4hr    Collection Time: 06/22/23  7:11 PM   Result Value Ref Range    hs TnI 4hr 9 \"Refer to ACS Flowchart\"- see link ng/L    Delta 4hr hsTnI 2 <20 ng/L   Ethanol    Collection Time: 06/22/23  9:21 PM   Result Value Ref Range    Ethanol Lvl <3 0 - 3 mg/dL   APTT    Collection Time: 06/22/23  9:21 PM   Result Value Ref Range    PTT 28 23 - 37 seconds   Protime-INR    Collection Time: 06/22/23  9:21 PM   Result Value Ref Range    Protime 13 6 11 6 - 14 5 seconds    INR 1 02 0 84 - 1 19   APTT    Collection Time: 06/23/23  4:00 AM   Result Value Ref Range     (HH) 23 - 37 seconds   Magnesium    Collection Time: 06/23/23  4:36 AM   Result Value Ref Range    Magnesium 1 3 (L) 1 6 - 2 6 mg/dL   Phosphorus    Collection Time: 06/23/23  4:36 AM   Result Value Ref Range    Phosphorus 3 2 2 3 - 4 1 mg/dL   Comprehensive metabolic panel    Collection Time: 06/23/23  4:38 AM   Result Value Ref Range    Sodium 135 135 - 147 mmol/L    Potassium 4 3 3 5 - 5 3 mmol/L    Chloride 103 96 - 108 mmol/L    CO2 30 21 - 32 mmol/L    ANION GAP 2 mmol/L    BUN 11 5 - 25 mg/dL    Creatinine 0 98 0 60 - 1 30 mg/dL    Glucose 106 65 - 140 mg/dL    Calcium 9 0 8 3 - 10 1 mg/dL    Corrected Calcium 9 6 8 3 " - 10 1 mg/dL     (H) 5 - 45 U/L    ALT 61 12 - 78 U/L    Alkaline Phosphatase 76 46 - 116 U/L    Total Protein 6 8 6 4 - 8 4 g/dL    Albumin 3 3 (L) 3 5 - 5 0 g/dL    Total Bilirubin 0 97 0 20 - 1 00 mg/dL    eGFR 80 ml/min/1 73sq m   Echo complete w/ contrast if indicated    Collection Time: 06/23/23 10:12 AM   Result Value Ref Range    LA size 3 2 cm    LVPWd 1 00 cm    Left Atrium Area-systolic Apical Two Chamber 21 2 cm2    Left Atrium Area-systolic Four Chamber 27 4 cm2    MV E' Tissue Velocity Septal 10 cm/s    Tricuspid annular plane systolic excursion 9 13 cm    IVSd 2 08 cm    LV DIASTOLIC VOLUME (MOD BIPLANE) 2D 125 mL    LEFT VENTRICLE SYSTOLIC VOLUME (MOD BIPLANE) 2D 41 mL    Left ventricular stroke volume (2D) 84 00 mL    A4C EF 67 %    LA length (A2C) 5 40 cm    LVIDd 5 10 cm    IVS 0 9 cm    LVIDS 3 20 cm    FS 37 28 - 44 %    LA volume (BP) 64 mL    Asc Ao 4 1 cm    Ao root 4 30 cm    RVID d 3 4 cm    MV valve area p 1/2 method 3 86 cm2    E wave deceleration time 196 ms    MV Peak E Vin 87 cm/s    MV Peak A Vin 0 96 m/s    RAA A4C 20 6 cm2    MV stenosis pressure 1/2 time 57 ms    LVSV, 2D 84 mL     Imaging: I have personally reviewed pertinent reports  EKG: sinus tachycardia unable to r/o inferior infarct  VTE Prophylaxis: Heparin    Code Status: Level 1 - Full Code  Advance Directive and Living Will:      Power of :    POLST:      Counseling / Coordination of Care  Total floor / unit time spent today 50 minutes  Greater than 50% of total time was spent with the patient and / or family counseling and / or coordination of care

## 2023-06-23 NOTE — RESTORATIVE TECHNICIAN NOTE
Restorative Technician Note      Patient Name: Zahra Fair     Restorative Tech Visit Date: 06/23/23  Note Type: Mobility  Patient Position Upon Consult: Other (Comment) (responded to JOMAR singh)  Activity Performed: Ambulated (in hallways)  Assistive Device: Other (Comment) (used IV pole for stability)  Education Provided: Yes  Patient Position at End of Consult: Bedside chair;  All needs within reach    Shani Al  DPT, Restorative Technician

## 2023-06-23 NOTE — ED ATTENDING ATTESTATION
6/22/2023  Mer BURCH Comment, DO, saw and evaluated the patient  I have discussed the patient with the resident/non-physician practitioner and agree with the resident's/non-physician practitioner's findings, Plan of Care, and MDM as documented in the resident's/non-physician practitioner's note, except where noted  All available labs and Radiology studies were reviewed  I was present for key portions of any procedure(s) performed by the resident/non-physician practitioner and I was immediately available to provide assistance  At this point I agree with the current assessment done in the Emergency Department  I have conducted an independent evaluation of this patient a history and physical is as follows:    74-year-old male presents status post fall  Patient got up from a seated position to go to the bathroom and while walking to the bathroom he collapsed  Patient unsure what caused him to fall but states he did not trip  It was an unwitnessed fall  Patient not sure if he hit his head  After the fall he had worsening of his right lateral chest wall pain  Patient broke 2 ribs on the right side approximately 1 month ago after a fall  Patient states he feels shaky, no chest pain or shortness of breath  On exam-no acute distress, heart tachycardic, no respiratory distress, moving all extremities  Plan- concern given falls that they may be syncopal   Patient also tachycardic to emergency department so differential includes PE, dysrhythmia  We will do CT head, C-spine, check labs including a D-dimer and cardiac labs    Patient will require admission    ED Course         Critical Care Time  Procedures

## 2023-06-23 NOTE — PHYSICAL THERAPY NOTE
Physical Therapy Evaluation    Patient Name: Sri Win    QBIDS'Q Date: 6/23/2023     Problem List  Principal Problem:    Syncope  Active Problems:    Elevated ferritin    Fatty liver, alcoholic    Thrombocytopenia (HCC)    Rib fractures    Tachycardia    Pulmonary embolism [facility])       Past Medical History  Past Medical History:   Diagnosis Date    Broken ribs     Hypertension         Past Surgical History  Past Surgical History:   Procedure Laterality Date    IR BIOPSY LIVER RANDOM  9/22/2022    REPLACEMENT TOTAL KNEE Bilateral            06/23/23 0939   PT Last Visit   PT Visit Date 06/23/23   Note Type   Note type Evaluation   Pain Assessment   Pain Assessment Tool 0-10   Pain Score 7   Pain Location/Orientation Orientation: Right;Location: Rib Cage   Hospital Pain Intervention(s) Repositioned; Ambulation/increased activity; Rest   Restrictions/Precautions   Weight Bearing Precautions Per Order No   Other Precautions Multiple lines;Telemetry; Fall Risk;Pain; Chair Alarm; Bed Alarm   Home Living   Type of [address] [place] [address] Two level;1/2 bath on main level;Bed/bath upstairs;Stairs to enter with rails  (3 LEONOR with railing; pt can live on 1st floor)   Bathroom Shower/Tub Walk-in shower   Bathroom Toilet Raised   Bathroom Equipment Commode; Shower chair;Grab bars in [facility] Other (Comment)  (none)   Prior Function   Level of [place] Independent with ADLs; Independent with functional mobility; Independent with IADLS   Lives With Spouse   Receives Help From Family   IADLs Independent with driving; Independent with meal prep; Independent with medication management   Falls in the last 6 months 1 to 4   Vocational Full time employment  (, planning to retire on August)   Cognition   Overall Cognitive Status WFL   Arousal/Participation Cooperative   Attention Within functional limits   Orientation Level Oriented X4   Memory Within functional limits   Following Commands Follows one step commands without difficulty   Comments cooperative and followed 1 step commands   RLE Assessment   RLE Assessment WFL   LLE Assessment   LLE Assessment WFL   Bed Mobility   Supine to Sit 5  Supervision   Additional items HOB elevated   Transfers   Sit to Stand 5  Supervision   Stand to Sit 5  Supervision   Additional Comments RW   Ambulation/Elevation   Gait pattern Excessively slow; Short stride   Gait Assistance 5  Supervision  (CGA)   Assistive Device Rolling walker   Distance 120'   Stair Management Assistance 5  Supervision  (CGA)   Stair Management Technique One rail R;Step to pattern   Number of Stairs 2  (due to IV)   Balance   Static Sitting Fair +   Dynamic Sitting Fair   Static Standing Fair -   Dynamic Standing Fair -   Ambulatory Poor +   Endurance Deficit   Endurance Deficit Yes   Endurance Deficit Description pain; fatigue   Activity Tolerance   Activity Tolerance Patient limited by fatigue;Patient limited by pain   Medical Staff Made Aware ROSE Sanchez   Nurse Made Aware RN provided clearance   Assessment   Prognosis Fair   Problem List Decreased endurance; Impaired balance;Decreased mobility;Pain;Obesity   Assessment Pt is a 79 y o  male who presents after a fall 6/21/23  CT imaging demonstrated a small PE 6/22/23  Additionally, patient had a fall in May and suffered R 7th and 8th rib fractures  Pt's current hospital problem list includes PE, rib fractures, thrombocytopenia, fattyliver, alcoholic, and elevated ferritin  Pt  has a past medical history of Broken ribs and Hypertension   Pt has a high complexity clinical presentation due to Ongoing medical management for primary dx, Increased reliance on more restrictive AD compared to baseline, Decreased activity tolerance compared to baseline, Fall risk, Increased assistance needed from caregiver at current time, Ongoing telemetry monitoring, Continuous pulse oximetry monitoring    Pt demonstrates deficits in functional mobility and ambulation, balance, and endurance  Skilled PT is required to address these deficits  Pt required S for bed mobility, S for transfers, CGA for ambulation, and CGA for stair negotiation  The patient's AM-PAC Basic Mobility Inpatient Short Form Raw Score is 18  A Raw score of greater than 16 suggests the patient may benefit from discharge to home  Please also refer to the recommendation of the Physical Therapist for safe discharge planning  Pt's current d/c recommendation is for home with OPPT  (Simultaneous filing  User may not have seen previous data )   Barriers to Discharge Decreased caregiver support   Goals   Patient Goals return home   STG Expiration Date 07/07/23   Short Term Goal #1 In 14 days 1) pt will perform bed mobility at [facility] demonstrating improved functional mobility 2) pt will perform transfers at [facility] demonstrating improved functional mobility and ability to carry out ADLs 3) pt will perform ambulation at [facility] with LRAD for a distance of 150' to improve safe functional mobility within their home environment 4) pt will negotiate 3 stairs at [facility] demonstrating the ability to safely navigate their home environment   Plan   Treatment/Interventions Functional transfer training;Elevations; Therapeutic exercise; Endurance training;Patient/family training;Equipment eval/education; Bed mobility;Gait training;Spoke to case management;Spoke to nursing;OT   PT Frequency 1-2x/wk   Recommendation   PT Discharge Recommendation Home with outpatient rehabilitation   Equipment Recommended [address] Recommended Wheeled walker   Additional Comments Anticipate pt will progress to home with no needs   AM-PAC Basic Mobility Inpatient   Turning in Flat Bed Without Bedrails 3   Lying on Back to Sitting on Edge of Flat Bed Without Bedrails 3   Moving Bed to Chair 3   Standing Up From Chair Using Arms 3   Walk in Room 3   Climb 3-5 Stairs With Railing 3   Basic Mobility Inpatient Raw Score 18   Basic Mobility Standardized Score 41 05   Highest Level Of Mobility   -[facility] Goal 6: Walk 10 steps or more   -HL Achieved 7: Walk 25 feet or more   Modified [place] Scale   Modified [place] Scale 3   Barthel Index   Feeding 10   Bathing 5   Grooming Score 5   Dressing Score 5   Bladder Score 10   Bowels Score 10   Toilet Use Score 10   Transfers (Bed/Chair) Score 10   Mobility (Level Surface) Score 0   Stairs Score 5   Barthel Index Score 70       Beto Sullivan, SPT

## 2023-06-23 NOTE — UTILIZATION REVIEW
NOTIFICATION OF INPATIENT ADMISSION   AUTHORIZATION REQUEST   SERVICING FACILITY:   [facility]  Address: [address] [place] [address], [address] [place] [address]  Tax ID: [id number]  NPI: [id number] ATTENDING PROVIDER:  Attending Name and NPI#: Mable Tellez Md [[id number]]  Address: [address] [place] [address]  Phone: [phone]   ADMISSION INFORMATION:  [facility]  Place of Service Code: 21  Inpatient Admission Date/Time: 6/22/23  7:23 PM  Discharge Date/Time: No discharge date for patient encounter  Admitting Diagnosis Code/Description:  Syncope [R55]  Pulmonary embolism ([address] ) [I26 99]  Tachycardia [R00 0]  Elevated liver enzymes [R74 8]  Rib pain [R07 81]     UTILIZATION REVIEW CONTACT:  [facility] Utilization   Network Utilization Review Department  Phone: [phone]  Fax: [phone]  Email: [email or web address]  Contact for approvals/pending authorizations, clinical reviews, and discharge  PHYSICIAN ADVISORY SERVICES:  Medical Necessity Denial & Efxp-ky-Rwpi Review  Phone: [phone]  Fax: [phone]  Email: [email or web address]

## 2023-06-23 NOTE — H&P
[facility]  H&P  Name: Ayaz Appiah 79 y o  male I MRN: [id number]  Unit/Bed#: QCB I Date of Admission: 6/22/2023   Date of Service: 6/22/2023 I Hospital Day: 0      Assessment/Plan   Pulmonary embolism [facility])  Assessment & Plan  Presented to the ER in May after experiencing a fall found to have rib fractures, he has been following with trauma as an outpatient  Patient reports experiencing a fall yesterday denies syncope/loss of consciousness, cannot recall much preceding his fall or afterwards limited historian in regards to his fall  Patient found to have small pulmonary embolism on CT imaging, unsure if this is the etiology behind his fall however will continue work-up and treatment  Check echocardiogram, monitor on telemetry-patient's repeated falls would recommend cardiac monitoring on discharge  Start heparin drip and transition to Eliquis or Xarelto once price checked  Lower extremity Dopplers ordered  In regards to work-up of falls we will check orthostatics hold antihypertensives for now    Rib fractures  Assessment & Plan  Rib fracture status post fall    Thrombocytopenia (HCC)  Assessment & Plan  Chronic thrombocytopenia secondary to alcoholic fatty liver disease    Fatty liver, alcoholic  Assessment & Plan  Follows with GI as an outpatient    Elevated ferritin  Assessment & Plan  She follows with hematology as an outpatient for phlebotomies             VTE Pharmacologic Prophylaxis:   High Risk (Score >/= 5) - Pharmacological DVT Prophylaxis Ordered: heparin drip  Sequential Compression Devices Ordered  Code Status: Level 1 - Full Code   Discussion with family: Patient declined call to   Anticipated Length of Stay: Patient will be admitted on an inpatient basis with an anticipated length of stay of greater than 2 midnights secondary to Fall pulm embolism      Total Time Spent on Date of Encounter in care of patient: 40 minutes This time was spent on one or more of the following: performing physical exam; counseling and coordination of care; obtaining or reviewing history; documenting in the medical record; reviewing/ordering tests, medications or procedures; communicating with other healthcare professionals and discussing with patient's family/caregivers  Chief Complaint: Fall    History of Present Illness:  Rama Velazquez is a 79 y o  male with a PMH of hypertension who presents evaluation of a fall  Of note patient was recently evaluated in the ER in May after experiencing a fall and found to have right seventh and eighth rib fractures  Patient reports a fall yesterday  He denies loss of consciousness or head strike but cannot tell me much about the events preceding his fall or afterwards  During my evaluation he currently denies any acute complaints including headache nausea vomiting chest pain shortness of breath numbness or tingling in his extremities  He was found to have a small blood clot on imaging, lab work-up otherwise largely unremarkable apart from elevation in AST and ALT patient reports occasional alcohol use denies any drug use  Review of Systems:  Review of Systems   Constitutional: Positive for fatigue  Negative for chills and fever  HENT: Negative for ear pain and sore throat  Eyes: Negative for pain and visual disturbance  Respiratory: Negative for cough and shortness of breath  Cardiovascular: Negative for chest pain, palpitations and leg swelling  Gastrointestinal: Negative for abdominal distention, abdominal pain, diarrhea, nausea and vomiting  Genitourinary: Negative for dysuria and hematuria  Musculoskeletal: Negative for arthralgias and back pain  Skin: Negative for color change, pallor, rash and wound  Neurological: Negative for dizziness, seizures, syncope, weakness and headaches  All other systems reviewed and are negative        Past Medical and Surgical History:   Past Medical History:   Diagnosis Date   • Broken ribs    • Hypertension        Past Surgical History:   Procedure Laterality Date   • IR BIOPSY LIVER RANDOM  9/22/2022   • REPLACEMENT TOTAL KNEE Bilateral        Meds/Allergies:  Prior to Admission medications    Medication Sig Start Date End Date Taking? Authorizing Provider   amLODIPine (NORVASC) 5 mg tablet Take 5 mg by mouth daily   Yes Historical Provider, MD   Cholecalciferol (Vitamin D3) 50 MCG (2000 UT) capsule Take 2,000 Units by mouth daily 8/16/22  Yes Historical Provider, MD   folic acid (FOLVITE) 1 mg tablet Take 1,000 mcg by mouth daily 8/13/22  Yes Historical Provider, MD   loratadine (CLARITIN) 10 mg tablet Take 10 mg by mouth   Yes Historical Provider, MD   LOSARTAN POTASSIUM PO Take by mouth in the morning   Yes Historical Provider, MD   triamterene-hydrochlorothiazide (DYAZIDE) 37 5-25 mg per capsule TAKE 1 CAPSULE BY MOUTH DAILY  NOT TAKING REGULARLY  6/20/22  Yes Historical Provider, MD   VITAMIN D PO Take by mouth in the morning   Yes Historical Provider, MD   Dermarest Eczema 1 % lotion Apply 1 application topically 2 (two) times a day To affected area 9/13/22   Historical Provider, MD   lidocaine (LMX) 4 % cream Apply topically as needed for mild pain 5/14/23   Liban Remedies, MD   losartan (COZAAR) 50 mg tablet TAKE' 1 TABLET (50 MG TOTAL) BY MOUTH DAILY   IN THE MORNING 8/2/22   Historical Provider, MD   methocarbamol (ROBAXIN) 500 mg tablet Take 1 tablet (500 mg total) by mouth 4 (four) times a day for 14 days 6/1/23 6/15/23  PRAFUL Murdock   metoclopramide (REGLAN) 5 mg tablet Take 1 tablet (5 mg total) by mouth 3 (three) times a day  Patient not taking: Reported on 10/12/2022 8/26/22   Ale Gonsalez MD   metoclopramide (REGLAN) 5 mg tablet Take 5 mg by mouth 4 (four) times a day    Historical Provider, MD   naproxen (NAPROSYN) 500 mg tablet Take 1 tablet (500 mg total) by mouth 2 (two) times a day with meals for 10 days 5/14/23 5/24/23  Brice Gomez Fam Diaz MD   omeprazole (PriLOSEC) 40 MG capsule TAKE 1 CAPSULE (40 MG TOTAL) BY MOUTH DAILY  1/20/23   Beatrice Spann MD   ondansetron (ZOFRAN) 4 mg tablet Take 4 mg by mouth every 8 (eight) hours as needed for nausea or vomiting    Historical Provider, MD   oxyCODONE (ROXICODONE) 5 immediate release tablet Take 1 tablet (5 mg total) by mouth every 4 (four) hours as needed for moderate pain for up to 10 doses Max Daily Amount: 30 mg  Patient not taking: Reported on 6/2/2023 5/14/23   Mayur Coleman MD   TRIAMTERENE PO Take by mouth in the morning    Historical Provider, MD     I have reviewed home medications with patient personally  Allergies: Allergies   Allergen Reactions   • Ciprofloxacin Vomiting   • Flagyl [Metronidazole] Vomiting   • Other Other (See Comments)     Running nose       Social History:  Marital Status: /Civil Union   Occupation:   Patient Pre-hospital Living Situation: Home  Patient Pre-hospital Level of Mobility: walks  Patient Pre-hospital Diet Restrictions:   Substance Use History:   Social History     Substance and Sexual Activity   Alcohol Use Yes    Comment: occassionally     Social History     Tobacco Use   Smoking Status Former   Smokeless Tobacco Never     Social History     Substance and Sexual Activity   Drug Use Not Currently       Family History:  Family History   Problem Relation Age of Onset   • Cancer Father        Physical Exam:     Vitals:   Blood Pressure: 164/87 (06/22/23 1900)  Pulse: (!) 122 (06/22/23 1900)  Temperature: 98 4 °F (36 9 °C) (06/22/23 1354)  Temp Source: Oral (06/22/23 1500)  Respirations: 20 (06/22/23 1900)  SpO2: 96 % (06/22/23 1900)    Physical Exam  Vitals and nursing note reviewed  Constitutional:       General: He is not in acute distress  Appearance: He is well-developed  He is not toxic-appearing or diaphoretic  HENT:      Head: Normocephalic and atraumatic  Eyes:      General: No scleral icterus       Conjunctiva/sclera: Conjunctivae normal    Cardiovascular:      Rate and Rhythm: Regular rhythm  Tachycardia present  Heart sounds: No murmur heard  No friction rub  No gallop  Pulmonary:      Effort: Pulmonary effort is normal  No respiratory distress  Breath sounds: Normal breath sounds  No stridor  No wheezing, rhonchi or rales  Chest:      Chest wall: No tenderness  Abdominal:      General: There is no distension  Palpations: Abdomen is soft  There is no mass  Tenderness: There is no abdominal tenderness  There is no guarding or rebound  Hernia: No hernia is present  Musculoskeletal:         General: No swelling or tenderness  Cervical back: Neck supple  Skin:     General: Skin is warm and dry  Capillary Refill: Capillary refill takes less than 2 seconds  Neurological:      Mental Status: He is alert and oriented to person, place, and time     Psychiatric:         Mood and Affect: Mood normal           Additional Data:     Lab Results:  Results from last 7 days   Lab Units 06/22/23  1438   WBC Thousand/uL 5 03   HEMOGLOBIN g/dL 12 4   HEMATOCRIT % 35 6*   PLATELETS Thousands/uL 133*   NEUTROS PCT % 65   LYMPHS PCT % 20   MONOS PCT % 10   EOS PCT % 3     Results from last 7 days   Lab Units 06/22/23  1438   SODIUM mmol/L 133*   POTASSIUM mmol/L 4 5   CHLORIDE mmol/L 102   CO2 mmol/L 26   BUN mg/dL 10   CREATININE mg/dL 0 98   ANION GAP mmol/L 5   CALCIUM mg/dL 9 7   ALBUMIN g/dL 4 0   TOTAL BILIRUBIN mg/dL 0 44   ALK PHOS U/L 92   ALT U/L 84*   AST U/L 143*   GLUCOSE RANDOM mg/dL 94                       Lines/Drains:  Invasive Devices     Peripheral Intravenous Line  Duration           Peripheral IV 06/22/23 Left Antecubital <1 day    Peripheral IV 06/22/23 Right;Ventral (anterior) Forearm <1 day                    Imaging: Reviewed radiology reports from this admission including: chest CT scan  CT head without contrast   Final Result by Nubia Silver DO (06/22 0019)   No acute intracranial abnormality  Workstation performed: [id number]         CT spine cervical without contrast   Final Result by Nubia Silver DO (06/22 1809)   No cervical spine fracture or traumatic malalignment  Workstation performed: [id number]         CTA ED chest PE Study   Final Result by Nubia Silver DO (06/22 1846)   No central pulmonary emboli  The peripheral branch arteries are not well opacified  There are small filling defects in the far peripheral fourth and fifth order branch arteries supplying the right lower lobe for example image 14/148 and image 14/162  Measured RV/LV ratio is within normal limits at less than 0 9  Healing fractures of right sixth, seventh, eighth and ninth ribs  All showing some minimal callus formation -and therefore related to the initial injury in may  No new fractures were found  I personally discussed this study with Dr Salvador Lau on 6/22/2023 6:45 PM             Workstation performed: [id number]         XR chest 1 view portable   Final Result by Salma Salgado MD (06/22 1757)      No acute cardiopulmonary disease  Workstation performed: [id number]         VAS lower limb venous duplex study, complete bilateral    (Results Pending)       EKG and Other Studies Reviewed on Admission:   · EKG: sinus tach  ** Please Note: This note has been constructed using a voice recognition system   **

## 2023-06-23 NOTE — ASSESSMENT & PLAN NOTE
Presented to the ER in May after experiencing a fall found to have rib fractures, he has been following with trauma as an outpatient  Patient reports experiencing a fall yesterday denies syncope/loss of consciousness, cannot recall much preceding his fall or afterwards limited historian in regards to his fall     Patient found to have small pulmonary embolism on CT imaging, unsure if this is the etiology behind his fall however will continue work-up and treatment  Check echocardiogram, monitor on telemetry-patient's repeated falls would recommend cardiac monitoring on discharge  Start heparin drip and transition to Eliquis or Xarelto once price checked  Lower extremity Dopplers ordered  In regards to work-up of falls we will check orthostatics hold antihypertensives for now

## 2023-06-23 NOTE — PLAN OF CARE
Problem: MOBILITY - ADULT  Goal: Maintain or return to baseline ADL function  Description: INTERVENTIONS:  -  Assess patient's ability to carry out ADLs; assess patient's baseline for ADL function and identify physical deficits which impact ability to perform ADLs (bathing, care of mouth/teeth, toileting, grooming, dressing, etc )  - Assess/evaluate cause of self-care deficits   - Assess range of motion  - Assess patient's mobility; develop plan if impaired  - Assess patient's need for assistive devices and provide as appropriate  - Encourage maximum independence but intervene and supervise when necessary  - Involve family in performance of ADLs  - Assess for home care needs following discharge   - Consider OT consult to assist with ADL evaluation and planning for discharge  - Provide patient education as appropriate  6/23/2023 0734 by Jesus Meyers RN  Outcome: Progressing  6/23/2023 0734 by Jesus Meyers RN  Outcome: Progressing  Goal: Maintains/Returns to pre admission functional level  Description: INTERVENTIONS:  - Perform BMAT or MOVE assessment daily    - Set and communicate daily mobility goal to care team and patient/family/caregiver  - Collaborate with rehabilitation services on mobility goals if consulted  - Perform Range of Motion 3 times a day  - Reposition patient every 2 hours    - Dangle patient 3 times a day  - Stand patient 3 times a day  - Ambulate patient 3 times a day  - Out of bed to chair 3 times a day   - Out of bed for meals 3 times a day  - Out of bed for toileting  - Record patient progress and toleration of activity level   6/23/2023 0734 by Jesus Meyers RN  Outcome: Progressing  6/23/2023 0734 by Jesus Meyers RN  Outcome: Progressing     Problem: PAIN - ADULT  Goal: Verbalizes/displays adequate comfort level or baseline comfort level  Description: Interventions:  - Encourage patient to monitor pain and request assistance  - Assess pain using appropriate pain scale  - Administer analgesics based on type and severity of pain and evaluate response  - Implement non-pharmacological measures as appropriate and evaluate response  - Consider cultural and social influences on pain and pain management  - Notify physician/advanced practitioner if interventions unsuccessful or patient reports new pain  6/23/2023 0734 by Elsi Hernandez RN  Outcome: Progressing  6/23/2023 0734 by Elsi Hernandez RN  Outcome: Progressing

## 2023-06-23 NOTE — CASE MANAGEMENT
Case Management Discharge Planning Note    Patient name Chester Harrison  Location [address] 788[address]65 MRN [id number]  : 1955 Date 2023       Current Admission Date: 2023  Current Admission Diagnosis:Rib fractures   Patient Active Problem List    Diagnosis Date Noted   • Tachycardia 2023   • Pulmonary embolism ([facility] ) 2023   • Severe obesity (BMI 35 0-39  9) with comorbidity ([facility] ) 2023   • Prostate cancer screening 2023   • Fall 2023   • Rib fractures 2023   • Venous insufficiency of both lower extremities 09/15/2022   • FELIBERTO on CPAP 09/15/2022   • Osteoarthritis 09/15/2022   • Fatty liver, alcoholic    • Elevated ferritin 2020   • History of colon polyps 2019   • Status post total bilateral knee replacement 2018   • BMI 40 0-44 9, adult ([facility] ) 2018   • Hypercholesterolemia 2018   • Primary osteoarthritis of both knees 2018   • Thrombocytopenia ([facility] ) 2018   • Vitamin D deficiency 2015   • Family history of colon cancer 2015      LOS (days): 1  Geometric Mean LOS (GMLOS) (days):   Days to GMLOS:     OBJECTIVE:  Risk of Unplanned Readmission Score: 8 59         Current admission status: Inpatient   Preferred Pharmacy:   [id number], [address]  [address]  Phone: [phone] Fax: [phone]    Primary Care Provider: Sita Jarquin MD    Primary Insurance: BLUE CROSS  Secondary Insurance:     DISCHARGE DETAILS:      Met at bedside with patient  Lives at home with wife, independent with all ADL's and drives a truck for a living  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

## 2023-06-23 NOTE — UTILIZATION REVIEW
Initial Clinical Review    Admission: Date/Time/Statement:   Admission Orders (From admission, onward)     Ordered        06/22/23 1923  INPATIENT ADMISSION  Once                      Orders Placed This Encounter   Procedures   • INPATIENT ADMISSION     Standing Status:   Standing     Number of Occurrences:   1     Order Specific Question:   Level of Care     Answer:   Med Surg [16]     Order Specific Question:   Estimated length of stay     Answer:   More than 2 Midnights     Order Specific Question:   Certification     Answer:   I certify that inpatient services are medically necessary for this patient for a duration of greater than two midnights  See H&P and MD Progress Notes for additional information about the patient's course of treatment  ED Arrival Information     Expected   -    Arrival   6/22/2023 13:48    Acuity   Urgent            Means of arrival   Walk-In    Escorted by   Family Member    Service   Hospitalist    Admission type   Emergency            Arrival complaint   rib pain           Chief Complaint   Patient presents with   • Rib Injury     Pt reports falling and breaking 2 ribs on the right side may 13th and has been following up with outpatient trauma and had a fall last night when going to the bathroom  They want to make sure everything is okay  -headstrike, -LOC       Initial Presentation: 79 y o  male who presented to [facility] ED via EMS presents evaluation of a fall one day prior  Patient was recently evaluated in the ER in May after experiencing a fall and found to have right seventh and eighth rib fractures  He denies loss of consciousness or head strike but cannot recall the events preceding his fall or afterwards  Pt offers no complaints in the ED  In the ED, tachycardic, on imaging, pt found to have a small blood clot, labs unremarkable apart from elevation in AST and ALT  Given IVF and started on heparin drip  PMHx:  HTN  Admitted Inpatient status dt PE    Plan:  med surg, telemetry, continue heparin drip, check echo and orthostatics, hold antihypertensives, CIWA, PT OT eval, CM consult for dispo planning  Date: 6/23   Day 2:   Pt c/o feeling exhausted, rib pain limiting activity  Continue above tx plan including heparin drip and fu on orthostatics  PT recommending home with outpt rehab      6/23 Per Cardiology: F/u with echocardiogram to assure LV /RV function normal  Reasonable to apply 2 week zio patch  Check orthostatic VS  Hydrate well especially pre and post phlebotomy  Slow position changes particularly at night getting OOB  AC per medicine      ED Triage Vitals [06/22/23 1354]   Temperature Pulse Respirations Blood Pressure SpO2   98 4 °F (36 9 °C) (!) 121 16 163/89 98 %      Temp Source Heart Rate Source Patient Position - Orthostatic VS BP Location FiO2 (%)   Temporal Monitor Sitting Right arm --      Pain Score       7          Wt Readings from Last 1 Encounters:   06/23/23 111 kg (244 lb 0 8 oz)     Additional Vital Signs:   Date/Time Temp Pulse Resp BP MAP (mmHg) SpO2 O2 Device Patient Position - Orthostatic VS   06/23/23 15:53:11 97 9 °F (36 6 °C) -- 16 137/86 103 -- -- --   06/23/23 11:21:19 -- 108 Abnormal  -- 106/75 85 95 % -- Standing - Orthostatic VS   06/23/23 11:19:53 -- 94 -- 113/80 91 95 % -- Sitting   06/23/23 11:16:45 -- -- -- 126/81 96 -- -- Lying   06/23/23 1012 -- 85 -- 146/87 -- -- -- --   06/23/23 07:29:07 98 7 °F (37 1 °C) -- 14 146/87 107 -- -- --   06/23/23 05:16:14 -- 85 18 144/95 111 95 % -- --   06/23/23 0403 -- -- -- -- -- 96 % -- --   06/23/23 0402 -- -- -- -- -- 96 % -- --   06/22/23 23:46:43 98 5 °F (36 9 °C) 94 22 142/90 107 95 % -- --   06/22/23 2245 -- 95 22 -- -- 96 % None (Room air) --   06/22/23 2145 -- 105 21 -- -- 94 % -- --   06/22/23 2100 -- 104 -- 160/84 -- -- -- --   06/22/23 1900 -- 122 Abnormal  20 164/87 113 96 % None (Room air) --   06/22/23 1830 -- 114 Abnormal  20 129/78 96 95 % None (Room air) Sitting   06/22/23 1800 -- 116 Abnormal  21 142/82 105 94 % None (Room air) Sitting   06/22/23 1600 -- 118 Abnormal  18 121/65 87 94 % None (Room air) Sitting   06/22/23 1530 -- 120 Abnormal  16 130/63 90 97 % None (Room air) Sitting   06/22/23 1500 -- 120 Abnormal  16 139/63 91 96 % None (Room air) Sitting   06/22/23 1430 -- 124 Abnormal  24 Abnormal  164/93 122 97 % -- --   06/22/23 1415 -- 122 Abnormal  20 147/87 112 99 % None (Room air) Sitting   06/22/23 1354 98 4 °F (36 9 °C) 121 Abnormal  16 163/89 113 98 % None (Room air) Sitting     Pertinent Labs/Diagnostic Test Results:   6/23 ECHO:  •  Left Ventricle: Left ventricular cavity size is normal  Wall thickness is normal  The left ventricular ejection fraction is 60%  Systolic function is normal  Wall motion is normal  Diastolic function is normal for age  •  Right Ventricle: Right ventricular cavity size is normal  Systolic function is normal   •  Right Atrium: The atrium is mildly dilated  •  Atrial Septum: No patent foramen ovale detected using color flow Doppler at rest   •  Mitral Valve: There is mild regurgitation  •  Aorta: The aortic root is mildly dilated  The ascending aorta is mildly dilated  The aortic root is 4 30 cm  The ascending aorta is 4 1 cm  CT head without contrast   Final Result by Abby Cueto DO (06/22 1757)   No acute intracranial abnormality  Workstation performed: [id number]         CT spine cervical without contrast   Final Result by Abby Cueto DO (06/22 1809)   No cervical spine fracture or traumatic malalignment  Workstation performed: [id number]         CTA ED chest PE Study   Final Result by Abby Cueto DO (06/22 1846)   No central pulmonary emboli  The peripheral branch arteries are not well opacified  There are small filling defects in the far peripheral fourth and fifth order branch arteries supplying the right lower lobe for example image 14/148 and image 14/162        Measured RV/LV ratio is within normal limits at less than 0 9  Healing fractures of right sixth, seventh, eighth and ninth ribs  All showing some minimal callus formation -and therefore related to the initial injury in may  No new fractures were found  I personally discussed this study with Dr Juwan Vitale on 6/22/2023 6:45 PM             Workstation performed: [id number]         XR chest 1 view portable   Final Result by Angelita Barnett MD (06/22 1757)      No acute cardiopulmonary disease                    Workstation performed: [id number]         VAS lower limb venous duplex study, complete bilateral    (Results Pending)         Results from last 7 days   Lab Units 06/22/23  1438   WBC Thousand/uL 5 03   HEMOGLOBIN g/dL 12 4   HEMATOCRIT % 35 6*   PLATELETS Thousands/uL 133*   NEUTROS ABS Thousands/µL 3 30         Results from last 7 days   Lab Units 06/23/23  0438 06/23/23  0436 06/22/23  1438   SODIUM mmol/L 135  --  133*   POTASSIUM mmol/L 4 3  --  4 5   CHLORIDE mmol/L 103  --  102   CO2 mmol/L 30  --  26   ANION GAP mmol/L 2  --  5   BUN mg/dL 11  --  10   CREATININE mg/dL 0 98  --  0 98   EGFR ml/min/1 73sq m 80  --  80   CALCIUM mg/dL 9 0  --  9 7   MAGNESIUM mg/dL  --  1 3*  --    PHOSPHORUS mg/dL  --  3 2  --      Results from last 7 days   Lab Units 06/23/23  0438 06/22/23  1438   AST U/L 105* 143*   ALT U/L 61 84*   ALK PHOS U/L 76 92   TOTAL PROTEIN g/dL 6 8 8 1   ALBUMIN g/dL 3 3* 4 0   TOTAL BILIRUBIN mg/dL 0 97 0 44         Results from last 7 days   Lab Units 06/23/23  0438 06/22/23  1438   GLUCOSE RANDOM mg/dL 106 94     Results from last 7 days   Lab Units 06/22/23  1911 06/22/23  1705 06/22/23  1438   HS TNI 0HR ng/L  --   --  7   HS TNI 2HR ng/L  --  8  --    HSTNI D2 ng/L  --  1  --    HS TNI 4HR ng/L 9  --   --    HSTNI D4 ng/L 2  --   --      Results from last 7 days   Lab Units 06/22/23  1438   D-DIMER QUANTITATIVE ug/ml FEU 1 39*     Results from last 7 days   Lab Units 06/23/23  0408 06/22/23 2121   PROTIME seconds  --  13 6   INR   --  1 02   PTT seconds 201* 28     Results from last 7 days   Lab Units 06/22/23  1438   TSH 3RD GENERATON uIU/mL 3 464       Results from last 7 days   Lab Units 06/22/23  2121   ETHANOL LVL mg/dL <3     ED Treatment:   Medication Administration from 06/22/2023 1348 to 06/22/2023 2321       Date/Time Order Dose Route Action     06/22/2023 1443 EDT multi-electrolyte (ISOLYTE-S PH 7 4) bolus 1,000 mL 1,000 mL Intravenous New Bag     06/22/2023 1751 EDT iohexol (OMNIPAQUE) 350 MG/ML injection (SINGLE-DOSE) 150 mL 155 mL Intravenous Given     06/22/2023 2122 EDT heparin (porcine) injection 8,800 Units 8,800 Units Intravenous Given     06/22/2023 2123 EDT heparin (porcine) 25,000 units in 0 45% NaCl 250 mL infusion (premix) 18 Units/kg/hr Intravenous New Bag        Past Medical History:   Diagnosis Date   • Broken ribs    • Hypertension      Present on Admission:  • Rib fractures  • Tachycardia  • Thrombocytopenia (HCC)  • Fatty liver, alcoholic  • Elevated ferritin      Admitting Diagnosis: Syncope [R55]  Pulmonary embolism (HCC) [I26 99]  Tachycardia [R00 0]  Elevated liver enzymes [R74 8]  Rib pain [R07 81]  Age/Sex: 79 y o  male  Admission Orders:  Scheduled Medications:  folic acid, 1 mg, Oral, Daily  losartan, 50 mg, Oral, Daily  multivitamin-minerals, 1 tablet, Oral, Daily  pantoprazole, 40 mg, Oral, Early Morning  thiamine, 100 mg, Oral, Daily      Continuous IV Infusions:  heparin (porcine), 3-30 Units/kg/hr (Order-Specific), Intravenous, Titrated      PRN Meds:  heparin (porcine), 4,400 Units, Intravenous, Q6H PRN  heparin (porcine), 8,800 Units, Intravenous, Q6H PRN      scd  IO, daily wts    IP CONSULT TO CASE MANAGEMENT    Network Utilization Review Department  ATTENTION: Please call with any questions or concerns to [phone] and carefully listen to the prompts so that you are directed to the right person   All voicemails are confidential   Galilea Shape all requests for admission clinical reviews, approved or denied determinations and any other requests to dedicated fax number below belonging to the campus where the patient is receiving treatment   List of dedicated fax numbers for the Facilities:  [address] DENIALS (Administrative/Medical Necessity) [phone]   [address] (Maternity/NICU/Pediatrics) [phone]   [address] [phone]   [place] [address] [phone]   [address] [place] [address] [phone]   1558 [facility] 134 815 [facility] [phone]

## 2023-06-23 NOTE — OCCUPATIONAL THERAPY NOTE
Occupational Therapy Evaluation     Patient Name: Iline Severance  HLCZT'Q Date: 6/23/2023  Problem List  Principal Problem:    Syncope  Active Problems:    Elevated ferritin    Fatty liver, alcoholic    Thrombocytopenia (HCC)    Rib fractures    Tachycardia    Pulmonary embolism [facility])    Past Medical History  Past Medical History:   Diagnosis Date    Broken ribs     Hypertension      Past Surgical History  Past Surgical History:   Procedure Laterality Date    IR BIOPSY LIVER RANDOM  9/22/2022    REPLACEMENT TOTAL KNEE Bilateral          06/23/23 0938   OT Last Visit   OT Visit Date 06/23/23   Note Type   Note type Evaluation   Pain Assessment   Pain Assessment Tool 0-10   Pain Score 6   Pain Location/Orientation Orientation: Right;[place] [facility]   Hospital Pain Intervention(s) Repositioned; Ambulation/increased activity   Restrictions/Precautions   Weight Bearing Precautions Per Order No   Other Precautions Multiple lines;Telemetry; Fall Risk;Pain  (IV)   Home Living   Type of [facility] [address] Two level;1/2 bath on main level;Bed/bath upstairs;Stairs to enter with rails  (3 [address]ON[address] w/ railing; pt reports able to have [address]  Pt reports he sleeps on couch and wife sleeps on recliner on main level)   Bathroom Shower/Tub Walk-in shower   Bathroom Toilet Raised   Bathroom Equipment Grab bars in shower; [address]   (pt denies DME)   Additional Comments Pt resides with spouse in a [facility] w/ 3 [address] w/ railing  Pt reports ability to have [address]   Prior Function   Level of [place] Independent with ADLs; Independent with functional mobility; Independent with IADLS   Lives With Spouse   Receives Help From Family   IADLs Independent with driving; Independent with meal prep; Independent with medication management   Falls in the last 6 months 1 to 4   Vocational Full time employment  (Pt reports he is retiring in August  He is a )   Comments PTA, pt reports independence with ADLs,IADLs, and functional mobility w/o device  Pt (+) drives   Lifestyle   Autonomy PTA, pt reports independence with ADLs,IADLs, and functional mobility w/o device  Pt (+) drives   Reciprocal Relationships Supportive spouse   Service to Others Employed   Intrinsic Gratification Pt reports unsure at the time  He is looking forward to finding a hobby when he gets better   General   Family/Caregiver Present No   ADL   Where Assessed Chair   Eating Assistance 7  [facility] [address] 5  [address] [place] [address]  5  [address]  5  [address]  5  [address]  [address]  [address] 5  Supervision/Setup   Additional Comments Pt limited in ADL status due to decreased endurance  Bed Mobility   Supine to Sit 5  Supervision   Additional items HOB elevated; Bedrails; Increased time required   Sit to Supine Unable to assess   Additional Comments Pt greeted supine in bed upon arrival   Transfers   Sit to Stand 5  Supervision   Additional items Increased time required   Stand to Sit 5  Supervision   Additional items Increased time required   Additional Comments Pt completed transfers at S level w/ RW   Functional Mobility   Functional Mobility 5  Supervision   Additional Comments Pt completed functional mobility at S level w/ RW at a household distance   Additional items Rolling walker   Balance   Static Sitting Fair +   Dynamic Sitting Fair   Static Standing Fair -   Dynamic Standing Fair -   Ambulatory Poor +   Activity Tolerance   Activity Tolerance Patient limited by fatigue   Medical Staff Made Aware Co-eval with RAISA Pérez 2* to pt's medical complexities and decreased endurance   Nurse Made Aware RN cleared and updated   RUE Assessment   RUE Assessment WFL   LUE Assessment   LUE Assessment WFL   Hand Function   Gross Motor Coordination Functional   Fine Motor Coordination Functional   Sensation   Light Touch No apparent deficits   Psychosocial   Psychosocial (WDL) WDL   Cognition   Overall Cognitive Status WFL   Arousal/Participation Alert; Responsive; Cooperative   Attention Within functional limits   Orientation Level Oriented X4   Memory Within functional limits   Following Commands Follows one step commands without difficulty   Comments Pt was pleasant and cooperative t/o session  Pt benefits from one step directions  Assessment   Limitation Decreased endurance;Decreased high-level ADLs; Decreased ADL status   Prognosis Fair   Assessment Pt is 80 y/o male who presented to [facility] on 6/22/23 s/p mechanical fall on 6/21/23  (-) HS (-) LOC  CT head (-)  Pt has a diagnosis of pulmonary embolism, syncope, and fall  Pt with history of recent fall and R 7th and 8th rib fx  Pt has PMH of Hypertension  Pt greeted for OT evaluation on 6/23/23  Pt resides in a [facility] with his wife w/ [address] w/ railing  PTA, pt reports independence with ADLs, IADLs, and functional mobility w/o device  Pt plans to retire in August, he is a   Pt (+) drives  Pt is demonstrating the following occupational performance levels: S for bed mobility, S for sit to stand transfers w/ RW, S for functional mobility w/ RW at household distance, S for UB bathing/dressing, S for LB bathing/dressing, S for toileting, I for grooming, and I for eating  Pt with no further acute care OT needs  Pt to return home upon d/c with increased social support to maximize independence with ADLs  D/c skilled OT services  Goals   Patient Goals to go home   Plan   OT Frequency Eval only   Recommendation   OT Discharge Recommendation No rehabilitation needs   Additional Comments  The patient's raw score on the AM-PAC Daily Activity Inpatient Short Form is 20  A raw score of greater than or equal to 19 suggests the patient may benefit from discharge to home   Please refer to the recommendation of the Occupational Therapist for safe discharge planning  AM-PAC Daily Activity Inpatient   Lower Body Dressing 3   Bathing 3   Toileting 3   Upper Body Dressing 3   Grooming 4   Eating 4   Daily Activity Raw Score 20   Daily Activity Standardized Score (Calc for Raw Score >=11) 42 03   AM-PAC Applied Cognition Inpatient   Following a Speech/Presentation 4   Understanding Ordinary Conversation 4   Taking Medications 4   Remembering Where Things Are Placed or Put Away 4   Remembering List of 4-5 Errands 3   Taking Care of Complicated Tasks 3   Applied Cognition Raw Score 22   Applied Cognition Standardized Score 47 83   End of Consult   Education Provided Yes   Patient Position at End of Consult Bedside chair;Bed/Chair alarm activated; All needs within reach   Nurse Communication Nurse aware of consult       DIANA Garcia

## 2023-06-24 ENCOUNTER — APPOINTMENT (INPATIENT)
Dept: RADIOLOGY | Facility: HOSPITAL | Age: 68
DRG: 176 | End: 2023-06-24
Payer: COMMERCIAL

## 2023-06-24 PROBLEM — S27.802A: Status: ACTIVE | Noted: 2023-06-24

## 2023-06-24 LAB
ABO GROUP BLD: NORMAL
ABO GROUP BLD: NORMAL
ALBUMIN SERPL BCP-MCNC: 3.3 G/DL (ref 3.5–5)
ALBUMIN SERPL BCP-MCNC: 3.4 G/DL (ref 3.5–5)
ALP SERPL-CCNC: 77 U/L (ref 46–116)
ALP SERPL-CCNC: 78 U/L (ref 46–116)
ALT SERPL W P-5'-P-CCNC: 54 U/L (ref 12–78)
ALT SERPL W P-5'-P-CCNC: 56 U/L (ref 12–78)
ANION GAP SERPL CALCULATED.3IONS-SCNC: 5 MMOL/L
ANION GAP SERPL CALCULATED.3IONS-SCNC: 5 MMOL/L
APTT PPP: 64 SECONDS (ref 23–37)
AST SERPL W P-5'-P-CCNC: 77 U/L (ref 5–45)
AST SERPL W P-5'-P-CCNC: 88 U/L (ref 5–45)
BASOPHILS # BLD AUTO: 0.02 THOUSANDS/ÂΜL (ref 0–0.1)
BASOPHILS NFR BLD AUTO: 0 % (ref 0–1)
BILIRUB SERPL-MCNC: 0.77 MG/DL (ref 0.2–1)
BILIRUB SERPL-MCNC: 0.8 MG/DL (ref 0.2–1)
BLD GP AB SCN SERPL QL: NEGATIVE
BUN SERPL-MCNC: 10 MG/DL (ref 5–25)
BUN SERPL-MCNC: 10 MG/DL (ref 5–25)
CALCIUM ALBUM COR SERPL-MCNC: 9.3 MG/DL (ref 8.3–10.1)
CALCIUM ALBUM COR SERPL-MCNC: 9.6 MG/DL (ref 8.3–10.1)
CALCIUM SERPL-MCNC: 8.8 MG/DL (ref 8.3–10.1)
CALCIUM SERPL-MCNC: 9 MG/DL (ref 8.3–10.1)
CHLORIDE SERPL-SCNC: 100 MMOL/L (ref 96–108)
CHLORIDE SERPL-SCNC: 101 MMOL/L (ref 96–108)
CO2 SERPL-SCNC: 26 MMOL/L (ref 21–32)
CO2 SERPL-SCNC: 27 MMOL/L (ref 21–32)
CREAT SERPL-MCNC: 0.89 MG/DL (ref 0.6–1.3)
CREAT SERPL-MCNC: 0.92 MG/DL (ref 0.6–1.3)
EOSINOPHIL # BLD AUTO: 0.04 THOUSAND/ÂΜL (ref 0–0.61)
EOSINOPHIL NFR BLD AUTO: 1 % (ref 0–6)
ERYTHROCYTE [DISTWIDTH] IN BLOOD BY AUTOMATED COUNT: 12.4 % (ref 11.6–15.1)
ERYTHROCYTE [DISTWIDTH] IN BLOOD BY AUTOMATED COUNT: 12.4 % (ref 11.6–15.1)
GFR SERPL CREATININE-BSD FRML MDRD: 86 ML/MIN/1.73SQ M
GFR SERPL CREATININE-BSD FRML MDRD: 89 ML/MIN/1.73SQ M
GLUCOSE SERPL-MCNC: 119 MG/DL (ref 65–140)
GLUCOSE SERPL-MCNC: 207 MG/DL (ref 65–140)
HCT VFR BLD AUTO: 28 % (ref 36.5–49.3)
HCT VFR BLD AUTO: 29.3 % (ref 36.5–49.3)
HCT VFR BLD AUTO: 29.9 % (ref 36.5–49.3)
HGB BLD-MCNC: 10.2 G/DL (ref 12–17)
HGB BLD-MCNC: 10.5 G/DL (ref 12–17)
HGB BLD-MCNC: 9.9 G/DL (ref 12–17)
IMM GRANULOCYTES # BLD AUTO: 0.04 THOUSAND/UL (ref 0–0.2)
IMM GRANULOCYTES NFR BLD AUTO: 1 % (ref 0–2)
LACTATE SERPL-SCNC: 2 MMOL/L (ref 0.5–2)
LYMPHOCYTES # BLD AUTO: 0.51 THOUSANDS/ÂΜL (ref 0.6–4.47)
LYMPHOCYTES NFR BLD AUTO: 10 % (ref 14–44)
MAGNESIUM SERPL-MCNC: 1.5 MG/DL (ref 1.6–2.6)
MAGNESIUM SERPL-MCNC: 1.8 MG/DL (ref 1.6–2.6)
MCH RBC QN AUTO: 32.6 PG (ref 26.8–34.3)
MCH RBC QN AUTO: 32.7 PG (ref 26.8–34.3)
MCHC RBC AUTO-ENTMCNC: 34.8 G/DL (ref 31.4–37.4)
MCHC RBC AUTO-ENTMCNC: 35.1 G/DL (ref 31.4–37.4)
MCV RBC AUTO: 93 FL (ref 82–98)
MCV RBC AUTO: 94 FL (ref 82–98)
MONOCYTES # BLD AUTO: 0.34 THOUSAND/ÂΜL (ref 0.17–1.22)
MONOCYTES NFR BLD AUTO: 7 % (ref 4–12)
NEUTROPHILS # BLD AUTO: 4.3 THOUSANDS/ÂΜL (ref 1.85–7.62)
NEUTS SEG NFR BLD AUTO: 81 % (ref 43–75)
NRBC BLD AUTO-RTO: 0 /100 WBCS
PLATELET # BLD AUTO: 82 THOUSANDS/UL (ref 149–390)
PLATELET # BLD AUTO: 92 THOUSANDS/UL (ref 149–390)
PMV BLD AUTO: 10.1 FL (ref 8.9–12.7)
PMV BLD AUTO: 9.3 FL (ref 8.9–12.7)
POTASSIUM SERPL-SCNC: 3.5 MMOL/L (ref 3.5–5.3)
POTASSIUM SERPL-SCNC: 3.7 MMOL/L (ref 3.5–5.3)
PROT SERPL-MCNC: 7.1 G/DL (ref 6.4–8.4)
PROT SERPL-MCNC: 7.1 G/DL (ref 6.4–8.4)
RBC # BLD AUTO: 3.13 MILLION/UL (ref 3.88–5.62)
RBC # BLD AUTO: 3.21 MILLION/UL (ref 3.88–5.62)
RH BLD: POSITIVE
RH BLD: POSITIVE
SODIUM SERPL-SCNC: 131 MMOL/L (ref 135–147)
SODIUM SERPL-SCNC: 133 MMOL/L (ref 135–147)
SPECIMEN EXPIRATION DATE: NORMAL
WBC # BLD AUTO: 4.24 THOUSAND/UL (ref 4.31–10.16)
WBC # BLD AUTO: 5.25 THOUSAND/UL (ref 4.31–10.16)

## 2023-06-24 PROCEDURE — 74177 CT ABD & PELVIS W/CONTRAST: CPT

## 2023-06-24 PROCEDURE — 74176 CT ABD & PELVIS W/O CONTRAST: CPT

## 2023-06-24 PROCEDURE — 85730 THROMBOPLASTIN TIME PARTIAL: CPT | Performed by: INTERNAL MEDICINE

## 2023-06-24 PROCEDURE — 86850 RBC ANTIBODY SCREEN: CPT | Performed by: INTERNAL MEDICINE

## 2023-06-24 PROCEDURE — 85018 HEMOGLOBIN: CPT | Performed by: INTERNAL MEDICINE

## 2023-06-24 PROCEDURE — 83605 ASSAY OF LACTIC ACID: CPT | Performed by: INTERNAL MEDICINE

## 2023-06-24 PROCEDURE — 71260 CT THORAX DX C+: CPT

## 2023-06-24 PROCEDURE — 85025 COMPLETE CBC W/AUTO DIFF WBC: CPT | Performed by: INTERNAL MEDICINE

## 2023-06-24 PROCEDURE — G1004 CDSM NDSC: HCPCS

## 2023-06-24 PROCEDURE — 85027 COMPLETE CBC AUTOMATED: CPT | Performed by: INTERNAL MEDICINE

## 2023-06-24 PROCEDURE — 85014 HEMATOCRIT: CPT | Performed by: INTERNAL MEDICINE

## 2023-06-24 PROCEDURE — 83735 ASSAY OF MAGNESIUM: CPT | Performed by: INTERNAL MEDICINE

## 2023-06-24 PROCEDURE — 80053 COMPREHEN METABOLIC PANEL: CPT | Performed by: INTERNAL MEDICINE

## 2023-06-24 PROCEDURE — 99232 SBSQ HOSP IP/OBS MODERATE 35: CPT | Performed by: INTERNAL MEDICINE

## 2023-06-24 PROCEDURE — 86901 BLOOD TYPING SEROLOGIC RH(D): CPT | Performed by: INTERNAL MEDICINE

## 2023-06-24 PROCEDURE — 86900 BLOOD TYPING SEROLOGIC ABO: CPT | Performed by: INTERNAL MEDICINE

## 2023-06-24 PROCEDURE — 99255 IP/OBS CONSLTJ NEW/EST HI 80: CPT | Performed by: SURGERY

## 2023-06-24 RX ORDER — OXYCODONE HYDROCHLORIDE 5 MG/1
5 TABLET ORAL EVERY 4 HOURS PRN
Status: DISCONTINUED | OUTPATIENT
Start: 2023-06-24 | End: 2023-06-30 | Stop reason: HOSPADM

## 2023-06-24 RX ORDER — MAGNESIUM SULFATE HEPTAHYDRATE 40 MG/ML
2 INJECTION, SOLUTION INTRAVENOUS ONCE
Status: COMPLETED | OUTPATIENT
Start: 2023-06-24 | End: 2023-06-24

## 2023-06-24 RX ORDER — AMLODIPINE BESYLATE 5 MG/1
5 TABLET ORAL DAILY
Status: DISCONTINUED | OUTPATIENT
Start: 2023-06-24 | End: 2023-06-24

## 2023-06-24 RX ORDER — HYDROMORPHONE HCL IN WATER/PF 6 MG/30 ML
0.2 PATIENT CONTROLLED ANALGESIA SYRINGE INTRAVENOUS EVERY 4 HOURS PRN
Status: DISCONTINUED | OUTPATIENT
Start: 2023-06-24 | End: 2023-06-30 | Stop reason: HOSPADM

## 2023-06-24 RX ADMIN — Medication 1 TABLET: at 08:24

## 2023-06-24 RX ADMIN — IOHEXOL 100 ML: 350 INJECTION, SOLUTION INTRAVENOUS at 15:56

## 2023-06-24 RX ADMIN — FOLIC ACID 1 MG: 1 TABLET ORAL at 08:24

## 2023-06-24 RX ADMIN — HYDROMORPHONE HYDROCHLORIDE 0.2 MG: 0.2 INJECTION, SOLUTION INTRAMUSCULAR; INTRAVENOUS; SUBCUTANEOUS at 16:29

## 2023-06-24 RX ADMIN — HEPARIN SODIUM 13 UNITS/KG/HR: 10000 INJECTION, SOLUTION INTRAVENOUS at 07:19

## 2023-06-24 RX ADMIN — SODIUM CHLORIDE, SODIUM GLUCONATE, SODIUM ACETATE, POTASSIUM CHLORIDE, MAGNESIUM CHLORIDE, SODIUM PHOSPHATE, DIBASIC, AND POTASSIUM PHOSPHATE 75 ML/HR: .53; .5; .37; .037; .03; .012; .00082 INJECTION, SOLUTION INTRAVENOUS at 16:29

## 2023-06-24 RX ADMIN — MAGNESIUM SULFATE IN WATER 2 G: 40 INJECTION, SOLUTION INTRAVENOUS at 16:29

## 2023-06-24 RX ADMIN — LOSARTAN POTASSIUM 50 MG: 50 TABLET, FILM COATED ORAL at 08:24

## 2023-06-24 RX ADMIN — OXYCODONE HYDROCHLORIDE 5 MG: 5 TABLET ORAL at 20:26

## 2023-06-24 RX ADMIN — METHOCARBAMOL 750 MG: 750 TABLET ORAL at 12:35

## 2023-06-24 RX ADMIN — MELATONIN TAB 3 MG 3 MG: 3 TAB at 20:26

## 2023-06-24 RX ADMIN — METHOCARBAMOL 750 MG: 750 TABLET ORAL at 20:26

## 2023-06-24 RX ADMIN — METHOCARBAMOL 750 MG: 750 TABLET ORAL at 04:52

## 2023-06-24 RX ADMIN — OXYCODONE HYDROCHLORIDE 5 MG: 5 TABLET ORAL at 12:36

## 2023-06-24 RX ADMIN — THIAMINE HCL TAB 100 MG 100 MG: 100 TAB at 08:24

## 2023-06-24 NOTE — ASSESSMENT & PLAN NOTE
Follows with GI as an outpatient Pt presents ambulatory to ED with complaints of a migraine which started around 1230 this afternoon. Pt reports history of migraines which occur at least once per week and states this one is worse than usual. Pt reports taking OTC medicaions without relief and states she is having trouble keeping PO fluids and solids down. On arrival pt is alert and oriented x4 and resting in bed in no apparent distress with call light in reach, family at bedside.

## 2023-06-24 NOTE — ASSESSMENT & PLAN NOTE
"· Fall on 5/14/23 when he stood up quickly from the seated position, felt lightheaded and fell   · CT chest (5/14/23) - \"Nondisplaced fractures of the right anterior seventh and eighth ribs  \"  · Braden Gillespie again on 6/21/23 and was seen in the trauma office as follow-up from eugene injury on 6/22/23 - was told to go to the ED  · Presyncope noted in the days following his therapeutic phlebotomy   · Noted to be tachycardic in the trauma office which resolved with IVF's  · Likely hypovolemia with orthostatic hypotension from therapeutic phlebotomy and decreased oral hydration in the setting of diuretic use for hypertension  · Home Dyazide held  · IVF's  · Echo - EF 60%  · Telemetry - no arrhythmia  · Outpatient Zio patch will be arranged by cardiology  "

## 2023-06-24 NOTE — PLAN OF CARE
Problem: MOBILITY - ADULT  Goal: Maintain or return to baseline ADL function  Description: INTERVENTIONS:  -  Assess patient's ability to carry out ADLs; assess patient's baseline for ADL function and identify physical deficits which impact ability to perform ADLs (bathing, care of mouth/teeth, toileting, grooming, dressing, etc )  - Assess/evaluate cause of self-care deficits   - Assess range of motion  - Assess patient's mobility; develop plan if impaired  - Assess patient's need for assistive devices and provide as appropriate  - Encourage maximum independence but intervene and supervise when necessary  - Involve family in performance of ADLs  - Assess for home care needs following discharge   - Consider OT consult to assist with ADL evaluation and planning for discharge  - Provide patient education as appropriate  Outcome: Progressing  Goal: Maintains/Returns to pre admission functional level  Description: INTERVENTIONS:  - Perform BMAT or MOVE assessment daily    - Set and communicate daily mobility goal to care team and patient/family/caregiver  - Collaborate with rehabilitation services on mobility goals if consulted  - Perform Range of Motion 3 times a day  - Reposition patient every 2 hours    - Dangle patient 3 times a day  - Stand patient 3 times a day  - Ambulate patient 3 times a day  - Out of bed to chair 3 times a day   - Out of bed for meals 3 times a day  - Out of bed for toileting  - Record patient progress and toleration of activity level   Outcome: Progressing     Problem: PAIN - ADULT  Goal: Verbalizes/displays adequate comfort level or baseline comfort level  Description: Interventions:  - Encourage patient to monitor pain and request assistance  - Assess pain using appropriate pain scale  - Administer analgesics based on type and severity of pain and evaluate response  - Implement non-pharmacological measures as appropriate and evaluate response  - Consider cultural and social influences on pain and pain management  - Notify physician/advanced practitioner if interventions unsuccessful or patient reports new pain  Outcome: Progressing     Problem: INFECTION - ADULT  Goal: Absence or prevention of progression during hospitalization  Description: INTERVENTIONS:  - Assess and monitor for signs and symptoms of infection  - Monitor lab/diagnostic results  - Monitor all insertion sites, i e  indwelling lines, tubes, and drains  - Monitor endotracheal if appropriate and nasal secretions for changes in amount and color  - [place] appropriate cooling/warming therapies per order  - Administer medications as ordered  - Instruct and encourage patient and family to use good hand hygiene technique  - Identify and instruct in appropriate isolation precautions for identified infection/condition  Outcome: Progressing  Goal: Absence of fever/infection during neutropenic period  Description: INTERVENTIONS:  - Monitor WBC    Outcome: Progressing

## 2023-06-24 NOTE — QUICK NOTE
2-week cardiac monitor has been ordered    Patient can arrange pickup at [facility] in [facility]  Will sign off

## 2023-06-24 NOTE — PROGRESS NOTES
"[facility]  Progress Note  Name: Carrie Drew  MRN: [id number]  Unit/Bed#: [facility] 821-01 I Date of Admission: 6/22/2023   Date of Service: 6/23/2023 I Hospital Day: 1    Assessment/Plan   * Recurrent falls - possible presyncope with orthostatic hypotension  Assessment & Plan  · Fall on 5/14/23 when he stood up quickly from the seated position, felt lightheaded and fell   · CT chest (5/14/23) - \"Nondisplaced fractures of the right anterior seventh and eighth ribs  \"  · Gary Aguirre again on 6/21/23 and was seen in the trauma office as follow-up from eugene injury on 6/22/23 - was told to go to the ED  · Presyncope noted in the days following his therapeutic phlebotomy   · Noted to be tachycardic in the trauma office which resolved with IVF's  · Likely hypovolemia with orthostatic hypotension from therapeutic phlebotomy and decreased oral hydration in the setting of diuretic use for hypertension  · Home Dyazide held  · IVF's  · Echo - EF 60%  · Telemetry - no arrhythmia  · Outpatient Zio patch will be arranged by cardiology    Pulmonary embolism [facility])  Assessment & Plan  · Presentation as above  · CTA chest PE study - \"No central pulmonary emboli  The peripheral branch arteries are not well opacified  There are small filling defects in the far peripheral fourth and fifth order branch arteries supplying the right lower lobe  \"  · On heparin drip  · Eliquis being price checked    Rib fractures  Assessment & Plan  · Following fall on 5/14/2023  · Pain control    Primary hypertension  Assessment & Plan  · Home regimen: Amlodipine 5 mg daily, losartan 50 mg daily, triamterene-HCTZ 37 5/25 mg daily  · Amlodipine and triamterene HCTZ held with concern for orthostatic hypotension  · Continue Losartan 50 mg daily  · BP acceptable  · Monitor BP along with orthostatic vitals    Elevated ferritin/iron overload  Assessment & Plan  · Follows with hematology as outpatient for regular " phlebotomies    Thrombocytopenia (HCC)  Assessment & Plan  · Chronic  · Monitor    Fatty liver, alcoholic  Assessment & Plan  Follows with GI as an outpatient    Hypomagnesemia  Assessment & Plan  · Replete    Tachycardia  Assessment & Plan  · Likely due to hypovolemia resolved with IV fluids  · Plan for ZIO monitor as outpatient      VTE Pharmacologic Prophylaxis: VTE Score: 3 Moderate Risk (Score 3-4) - Pharmacological DVT Prophylaxis Ordered: heparin drip  Patient Centered Rounds: I performed bedside rounds with nursing staff today  Education and Discussions with Family / Patient: Patient declined call to   Total Time Spent on Date of Encounter in care of patient: 35 minutes This time was spent on one or more of the following: performing physical exam; counseling and coordination of care; obtaining or reviewing history; documenting in the medical record; reviewing/ordering tests, medications or procedures; communicating with other healthcare professionals and discussing with patient's family/caregivers  Current Length of Stay: 1 day(s)  Current Patient Status: Inpatient   Certification Statement: The patient will continue to require additional inpatient hospital stay due to Hypovolemia    Code Status: Level 1 - Full Code    Subjective:   Mild lightheadedness on standing  Objective:     Vitals:   Temp (24hrs), Av 4 °F (36 9 °C), Min:97 9 °F (36 6 °C), Max:98 7 °F (37 1 °C)    Temp:  [97 9 °F (36 6 °C)-98 7 °F (37 1 °C)] 97 9 °F (36 6 °C)  HR:  [] 108  Resp:  [14-22] 16  BP: (106-160)/(75-95) 137/86  SpO2:  [94 %-96 %] 95 %  Body mass index is 37 1 kg/m²  Physical Exam:   Physical Exam  Vitals reviewed  HENT:      Head: Normocephalic  Nose: Nose normal       Mouth/Throat:      Mouth: Mucous membranes are moist    Eyes:      Extraocular Movements: Extraocular movements intact  Cardiovascular:      Rate and Rhythm: Normal rate and regular rhythm     Pulmonary: Effort: Pulmonary effort is normal  No respiratory distress  Breath sounds: Normal breath sounds  No wheezing  Abdominal:      General: Bowel sounds are normal  There is no distension  Palpations: Abdomen is soft  Tenderness: There is no abdominal tenderness  Musculoskeletal:         General: No swelling  Cervical back: Neck supple  Skin:     General: Skin is warm  Neurological:      General: No focal deficit present  Mental Status: He is alert and oriented to person, place, and time     Psychiatric:         Mood and Affect: Mood normal          Behavior: Behavior normal           Additional Data:     Labs:  Results from last 7 days   Lab Units 06/22/23  1438   WBC Thousand/uL 5 03   HEMOGLOBIN g/dL 12 4   HEMATOCRIT % 35 6*   PLATELETS Thousands/uL 133*   NEUTROS PCT % 65   LYMPHS PCT % 20   MONOS PCT % 10   EOS PCT % 3     Results from last 7 days   Lab Units 06/23/23  0438   SODIUM mmol/L 135   POTASSIUM mmol/L 4 3   CHLORIDE mmol/L 103   CO2 mmol/L 30   BUN mg/dL 11   CREATININE mg/dL 0 98   ANION GAP mmol/L 2   CALCIUM mg/dL 9 0   ALBUMIN g/dL 3 3*   TOTAL BILIRUBIN mg/dL 0 97   ALK PHOS U/L 76   ALT U/L 61   AST U/L 105*   GLUCOSE RANDOM mg/dL 106     Results from last 7 days   Lab Units 06/22/23  2121   INR  1 02                   Lines/Drains:  Invasive Devices     Peripheral Intravenous Line  Duration           Peripheral IV 06/22/23 Left Antecubital 1 day    Peripheral IV 06/22/23 Right;Ventral (anterior) Forearm 1 day                Last 24 Hours Medication List:   Current Facility-Administered Medications   Medication Dose Route Frequency Provider Last Rate   • Diclofenac Sodium  2 g Topical 4x Daily Олег Madden MD     • folic acid  1 mg Oral Daily Ananth Queen DO     • heparin (porcine)  3-30 Units/kg/hr (Order-Specific) Intravenous Titrated Ananth Banai, DO 13 Units/kg/hr (06/23/23 1522)   • heparin (porcine)  4,400 Units Intravenous Q6H PRN Ananth Bret, DO     • heparin (porcine)  8,800 Units Intravenous Q6H PRN Ananth Banai, DO     • losartan  50 mg Oral Daily Ananth Lylaai, DO     • methocarbamol  750 mg Oral Q6H PRN Cornell Paredes MD     • multi-electrolyte  75 mL/hr Intravenous Continuous Cornell Paredes MD 75 mL/hr (06/23/23 1348)   • multivitamin-minerals  1 tablet Oral Daily Ananth Banai, DO     • thiamine  100 mg Oral Daily Ananthtravon Aritaai, DO          Today, Patient Was Seen By: Cornell Paredes MD    **Please Note: This note may have been constructed using a voice recognition system  **

## 2023-06-24 NOTE — ASSESSMENT & PLAN NOTE
- Recent history of multiple falls, admitted for rib fractures in mid May, recent fall on 6/21 and presented to the trauma clinic for evaluation, was tachycardic and sent to the ED for syncope/orthostatic hypotension work-up, initial CT scans negative for acute traumatic injury but positive for possible small Pes  - 6/24 CT A/P: Finding suspicious for pleural hematoma more prominent on the right, concern for underlying diaphragmatic injury, new soft tissue fullness and partially visualized posterior mediastinum extending to the left  - 6/24 CT C/A/P PO/IV: Mildly asymmetric b/l  pleural thickening R 3cm and L 2cm, associated stranding s/f crural hematomas, extension of small volume hematoma superiorly into posterior mediastinum, no leodan diaphragmatic discontinuity or evidence of rupture, hepatomegaly/hepatic steatosis, splenomegaly  - Hb dropped to 10s from 12s'  - continue to trend Hb

## 2023-06-24 NOTE — ASSESSMENT & PLAN NOTE
· Home regimen: Amlodipine 5 mg daily, losartan 50 mg daily, triamterene-HCTZ 37 5/25 mg daily  · Amlodipine and triamterene HCTZ held with concern for orthostatic hypotension  · Continue Losartan 50 mg daily  · BP acceptable  · Monitor BP along with orthostatic vitals

## 2023-06-24 NOTE — ASSESSMENT & PLAN NOTE
"· Presentation as above  · CTA chest PE study - \"No central pulmonary emboli  The peripheral branch arteries are not well opacified  There are small filling defects in the far peripheral fourth and fifth order branch arteries supplying the right lower lobe  \"  · On heparin drip  · Eliquis being price checked  "

## 2023-06-24 NOTE — DISCHARGE INSTR - AVS FIRST PAGE
Mr Melissa Ceja,    We have ordered a 2-week heart monitor for you  You will need to call [facility] to arrange being set up for the monitor  The contact information is in this discharge packet      [facility]    You need to get CBC done within a week-arrange through your primary care physician

## 2023-06-24 NOTE — CONSULTS
Consultation - Ofelia Fowler 79 y o  male MRN: [id number]  [facility] Encounter: [id number]    Trauma Alert: Evaluation; trauma team notified at 12pm via text   Model of Arrival: Ambulance    Trauma Team: Attending [facility] and Residents Douglas [facility]  Consultants:     None     Assessment/Plan   Active Problems / Assessment:   -Recent history of multiple falls, admitted for rib fractures in mid May, recent fall on 6/21 and presented to the trauma clinic for evaluation, was tachycardic and sent to the ED for syncope/orthostatic hypotension work-up, initial CT scans negative for acute traumatic injury but positive for possible small PEs  -6/24 CT A/P Noncon positive for possible crural hematoma more prominent on the right  -6/24 CT C/A/P w p o  and IV contrast also showing mild asymmetric bilateral crural thickening right greater than left with associated stranding suspicious for crural hematomas as well as extension of small volume hematoma superiorly into the posterior mediastinum     Plan:   -Heparin drip currently held in setting of possible hematoma as well as hemoglobin drop 10 from 12  -Can continue to trend hemoglobin, if next couple hemoglobins are stable can restart heparin drip at that time  -Diet as tolerated  -Pain control    History of Present Illness   Physician Requesting Evaluation: Mamadou Silverman MD  Reason for Evaluation / Principal Problem: possible crural hematoma    Chief Complaint: Bilateral lower abdominal and back pain    HPI:    Bari Marley is a 79 y o  male w PMH of HTN, iron overload follows with hematology outpatient for phlebotomies, recent admission to the trauma service admitted May after a fall with rib fractures, again with a fall on 6/21 and presented to the trauma office for follow-up where he was tachycardic and in the setting of additional recent fall was sent to the ED for further evaluation for possible syncopal episodes and orthostatic hypotension    Initial scans on 6/22 negative for any acute traumatic injury, patient was admitted to the [facility] service for syncope and orthostatic hypotension work-up, was also found to have possible small PEs on imaging, initiated on heparin drip  Patient now with bilateral lower abdominal pain radiating to his lower back/flanks bilaterally, CT A/P showing possible crural hematoma on the right side  Patient reports increased bilateral lower abdominal and lower back pain since yesterday when he was trying to strain on his recliner chair in his hospital room, states this had a fall on 6/21 he has not had any recent falls following discharge in mid May, has been ambulating without issues including since admission to the hospital   Patient reports he thinks he might get lightheaded/dizzy intermittently after his phlebotomies in the outpatient setting  Review of Systems   Constitutional: Negative for appetite change, chills and fever  Respiratory: Negative for shortness of breath  Cardiovascular: Negative for chest pain  Gastrointestinal: Positive for abdominal pain  Negative for abdominal distention, constipation, diarrhea, nausea and vomiting  Genitourinary: Negative for difficulty urinating  Musculoskeletal: Positive for back pain  Negative for neck pain  Skin: Positive for color change  Neurological: Positive for dizziness and light-headedness  Negative for numbness and headaches  Psychiatric/Behavioral: Negative for agitation and confusion  The patient is not nervous/anxious  All other systems reviewed and are negative  12-point, complete review of systems was reviewed and negative except as stated above       Historical Information     Past Medical History:   Diagnosis Date   • Broken ribs    • Hypertension      Past Surgical History:   Procedure Laterality Date   • IR BIOPSY LIVER RANDOM  9/22/2022   • REPLACEMENT TOTAL KNEE Bilateral         Social History     Tobacco Use   • Smoking status: Former   • Smokeless tobacco: Never   Vaping Use   • Vaping Use: Never used   Substance Use Topics   • Alcohol use: Yes     Comment: occassionally   • Drug use: Not Currently     Immunization History   Administered Date(s) Administered   • COVID-19 PFIZER VACCINE 0 3 ML IM 04/21/2021, 05/12/2021, 05/19/2022   • COVID-19 Pfizer Vac BIVALENT Sheldon-sucrose 12 Yr+ IM (BOOSTER ONLY) 09/19/2022     Last Tetanus: unknown  Family History: Non-contributory    1  Before the illness or injury that brought you to the Emergency, did you need someone to help you on a regular basis? 0=No   2  Since the illness or injury that brought you to the Emergency, have you needed more help than usual to take care of yourself? 0=No   3  Have you been hospitalized for one or more nights during the past 6 months (excluding a stay in the Emergency Department)? 1=Yes   4  In general, do you see well? 0=Yes   5  In general, do you have serious problems with your memory? 0=No   6  Do you take more than three different medications everyday? 1=Yes   TOTAL   2     Did you order a geriatric consult if the score was 2 or greater?: n/a     Meds/Allergies   all current active meds have been reviewed     Allergies   Allergen Reactions   • Ciprofloxacin Vomiting   • Flagyl [Metronidazole] Vomiting   • Other Other (See Comments)     Running nose       Objective   Initial Vitals:   Temperature: 98 4 °F (36 9 °C) (06/22/23 1354)  Pulse: (!) 121 (06/22/23 1354)  Respirations: 16 (06/22/23 1354)  Blood Pressure: 163/89 (06/22/23 1354)    Physical Exam:  Physical Exam  Vitals reviewed  Constitutional:       General: He is not in acute distress  Appearance: Normal appearance  He is not ill-appearing or toxic-appearing  HENT:      Head: Normocephalic and atraumatic  Nose: Nose normal       Mouth/Throat:      Mouth: Mucous membranes are moist    Eyes:      Extraocular Movements: Extraocular movements intact     Cardiovascular:      Rate and Rhythm: Normal rate and regular "rhythm  Pulses: Normal pulses  Heart sounds: Normal heart sounds  Pulmonary:      Effort: Pulmonary effort is normal  No respiratory distress  Breath sounds: Normal breath sounds  Abdominal:      General: There is no distension  Palpations: Abdomen is soft  Tenderness: There is abdominal tenderness  There is no guarding or rebound  Comments: Abdomen soft, nondistended, mildly tender to palpation in the epigastric region   Musculoskeletal:         General: No swelling, tenderness, deformity or signs of injury  Normal range of motion  Cervical back: Normal range of motion  No tenderness  Legs:    Skin:     General: Skin is warm  Capillary Refill: Capillary refill takes less than 2 seconds  Coloration: Skin is not jaundiced or pale  Findings: Bruising present  Neurological:      Mental Status: He is alert and oriented to person, place, and time     Psychiatric:         Mood and Affect: Mood normal          Invasive Devices     Peripheral Intravenous Line  Duration           Peripheral IV 06/22/23 Right;Ventral (anterior) Forearm 2 days    Peripheral IV 06/24/23 Left;Proximal;Ventral (anterior) Forearm <1 day              Lab Results:   Results: I have personally reviewed all pertinent laboratory/tests results, BMP/CMP:   Lab Results   Component Value Date    SODIUM 131 (L) 06/24/2023    K 3 7 06/24/2023     06/24/2023    CO2 26 06/24/2023    BUN 10 06/24/2023    CREATININE 0 92 06/24/2023    CALCIUM 8 8 06/24/2023    AST 77 (H) 06/24/2023    ALT 54 06/24/2023    ALKPHOS 77 06/24/2023    EGFR 86 06/24/2023   , CBC:   Lab Results   Component Value Date    WBC 5 25 06/24/2023    HGB 10 2 (L) 06/24/2023    HCT 29 3 (L) 06/24/2023    MCV 94 06/24/2023    PLT 92 (L) 06/24/2023    RBC 3 13 (L) 06/24/2023    MCH 32 6 06/24/2023    MCHC 34 8 06/24/2023    RDW 12 4 06/24/2023    MPV 9 3 06/24/2023    NRBC 0 06/24/2023   , Coagulation: No results found for: \"PT\", " "\"INR\", \"APTT\" and Lactate: No results found for: \"LACTATE\"    Imaging Results: I have personally reviewed pertinent reports      Chest Xray(s): negative for acute findings   FAST exam(s): N/A   CT Scan(s): positive for acute findings: crural hematomas   Additional Xray(s): N/A     Other Studies:      Code Status: Level 1 - Full Code  Advance Directive and Living Will:      Power of :    POLST:                 "

## 2023-06-24 NOTE — PLAN OF CARE
Problem: MOBILITY - ADULT  Goal: Maintain or return to baseline ADL function  Description: INTERVENTIONS:  -  Assess patient's ability to carry out ADLs; assess patient's baseline for ADL function and identify physical deficits which impact ability to perform ADLs (bathing, care of mouth/teeth, toileting, grooming, dressing, etc )  - Assess/evaluate cause of self-care deficits   - Assess range of motion  - Assess patient's mobility; develop plan if impaired  - Assess patient's need for assistive devices and provide as appropriate  - Encourage maximum independence but intervene and supervise when necessary  - Involve family in performance of ADLs  - Assess for home care needs following discharge   - Consider OT consult to assist with ADL evaluation and planning for discharge  - Provide patient education as appropriate  Outcome: Progressing  Goal: Maintains/Returns to pre admission functional level  Description: INTERVENTIONS:  - Perform BMAT or MOVE assessment daily    - Set and communicate daily mobility goal to care team and patient/family/caregiver  - Collaborate with rehabilitation services on mobility goals if consulted  - Perform Range of Motion  times a day  - Reposition patient every  hours    - Dangle patient  times a day  - Stand patient  times a day  - Ambulate patient  times a day  - Out of bed to chair  times a day   - Out of bed for meals  times a day  - Out of bed for toileting  - Record patient progress and toleration of activity level   Outcome: Progressing     Problem: PAIN - ADULT  Goal: Verbalizes/displays adequate comfort level or baseline comfort level  Description: Interventions:  - Encourage patient to monitor pain and request assistance  - Assess pain using appropriate pain scale  - Administer analgesics based on type and severity of pain and evaluate response  - Implement non-pharmacological measures as appropriate and evaluate response  - Consider cultural and social influences on pain and pain management  - Notify physician/advanced practitioner if interventions unsuccessful or patient reports new pain  Outcome: Progressing     Problem: INFECTION - ADULT  Goal: Absence or prevention of progression during hospitalization  Description: INTERVENTIONS:  - Assess and monitor for signs and symptoms of infection  - Monitor lab/diagnostic results  - Monitor all insertion sites, i e  indwelling lines, tubes, and drains  - Monitor endotracheal if appropriate and nasal secretions for changes in amount and color  - [place] appropriate cooling/warming therapies per order  - Administer medications as ordered  - Instruct and encourage patient and family to use good hand hygiene technique  - Identify and instruct in appropriate isolation precautions for identified infection/condition  Outcome: Progressing  Goal: Absence of fever/infection during neutropenic period  Description: INTERVENTIONS:  - Monitor WBC    Outcome: Progressing     Problem: SAFETY ADULT  Goal: Maintain or return to baseline ADL function  Description: INTERVENTIONS:  -  Assess patient's ability to carry out ADLs; assess patient's baseline for ADL function and identify physical deficits which impact ability to perform ADLs (bathing, care of mouth/teeth, toileting, grooming, dressing, etc )  - Assess/evaluate cause of self-care deficits   - Assess range of motion  - Assess patient's mobility; develop plan if impaired  - Assess patient's need for assistive devices and provide as appropriate  - Encourage maximum independence but intervene and supervise when necessary  - Involve family in performance of ADLs  - Assess for home care needs following discharge   - Consider OT consult to assist with ADL evaluation and planning for discharge  - Provide patient education as appropriate  Outcome: Progressing  Goal: Maintains/Returns to pre admission functional level  Description: INTERVENTIONS:  - Perform BMAT or MOVE assessment daily    - Set and communicate daily mobility goal to care team and patient/family/caregiver  - Collaborate with rehabilitation services on mobility goals if consulted  - Perform Range of Motion  times a day  - Reposition patient every  hours    - Dangle patient  times a day  - Stand patient  times a day  - Ambulate patient  times a day  - Out of bed to chair  times a day   - Out of bed for meals  times a day  - Out of bed for toileting  - Record patient progress and toleration of activity level   Outcome: Progressing  Goal: Patient will remain free of falls  Description: INTERVENTIONS:  - Educate patient/family on patient safety including physical limitations  - Instruct patient to call for assistance with activity   - Consult OT/PT to assist with strengthening/mobility   - Keep Call bell within reach  - Keep bed low and locked with side rails adjusted as appropriate  - Keep care items and personal belongings within reach  - Initiate and maintain comfort rounds  - Make Fall Risk Sign visible to staff  - Offer Toileting every  Hours, in advance of need  - Initiate/Maintain alarm  - Obtain necessary fall risk management equipmen  - Apply yellow socks and bracelet for high fall risk patients  - Consider moving patient to room near nurses station  Outcome: Progressing     Problem: DISCHARGE PLANNING  Goal: Discharge to home or other facility with appropriate resources  Description: INTERVENTIONS:  - Identify barriers to discharge w/patient and caregiver  - Arrange for needed discharge resources and transportation as appropriate  - Identify discharge learning needs (meds, wound care, etc )  - Arrange for interpretive services to assist at discharge as needed  - Refer to Case Management Department for coordinating discharge planning if the patient needs post-hospital services based on physician/advanced practitioner order or complex needs related to functional status, cognitive ability, or social support system  Outcome: Progressing Problem: Knowledge Deficit  Goal: Patient/family/caregiver demonstrates understanding of disease process, treatment plan, medications, and discharge instructions  Description: Complete learning assessment and assess knowledge base    Interventions:  - Provide teaching at level of understanding  - Provide teaching via preferred learning methods  Outcome: Progressing

## 2023-06-24 NOTE — ASSESSMENT & PLAN NOTE
- 6/22 CTA PE shows No central PE but small filling defects in far peripheral fourth and fifth order branch artery supplying RLL, RV/LV ratio 0 9  - initiated on hep gtt at this time  - currently held 2/2 Hb drop w concern for hematomas

## 2023-06-25 LAB
ANION GAP SERPL CALCULATED.3IONS-SCNC: 1 MMOL/L
APTT PPP: 28 SECONDS (ref 23–37)
APTT PPP: 29 SECONDS (ref 23–37)
APTT PPP: 85 SECONDS (ref 23–37)
BUN SERPL-MCNC: 8 MG/DL (ref 5–25)
CALCIUM SERPL-MCNC: 8.6 MG/DL (ref 8.3–10.1)
CHLORIDE SERPL-SCNC: 101 MMOL/L (ref 96–108)
CO2 SERPL-SCNC: 31 MMOL/L (ref 21–32)
CREAT SERPL-MCNC: 0.92 MG/DL (ref 0.6–1.3)
ERYTHROCYTE [DISTWIDTH] IN BLOOD BY AUTOMATED COUNT: 12.6 % (ref 11.6–15.1)
ERYTHROCYTE [DISTWIDTH] IN BLOOD BY AUTOMATED COUNT: 12.6 % (ref 11.6–15.1)
GFR SERPL CREATININE-BSD FRML MDRD: 86 ML/MIN/1.73SQ M
GLUCOSE SERPL-MCNC: 136 MG/DL (ref 65–140)
HCT VFR BLD AUTO: 24.6 % (ref 36.5–49.3)
HCT VFR BLD AUTO: 26.5 % (ref 36.5–49.3)
HCT VFR BLD AUTO: 26.5 % (ref 36.5–49.3)
HCT VFR BLD AUTO: 27.1 % (ref 36.5–49.3)
HGB BLD-MCNC: 8.4 G/DL (ref 12–17)
HGB BLD-MCNC: 9 G/DL (ref 12–17)
HGB BLD-MCNC: 9 G/DL (ref 12–17)
HGB BLD-MCNC: 9.2 G/DL (ref 12–17)
INR PPP: 1.02 (ref 0.84–1.19)
MAGNESIUM SERPL-MCNC: 2.1 MG/DL (ref 1.6–2.6)
MCH RBC QN AUTO: 32.1 PG (ref 26.8–34.3)
MCH RBC QN AUTO: 32.3 PG (ref 26.8–34.3)
MCHC RBC AUTO-ENTMCNC: 33.2 G/DL (ref 31.4–37.4)
MCHC RBC AUTO-ENTMCNC: 34 G/DL (ref 31.4–37.4)
MCV RBC AUTO: 95 FL (ref 82–98)
MCV RBC AUTO: 97 FL (ref 82–98)
PLATELET # BLD AUTO: 90 THOUSANDS/UL (ref 149–390)
PLATELET # BLD AUTO: 92 THOUSANDS/UL (ref 149–390)
PMV BLD AUTO: 9.3 FL (ref 8.9–12.7)
PMV BLD AUTO: 9.6 FL (ref 8.9–12.7)
POTASSIUM SERPL-SCNC: 4 MMOL/L (ref 3.5–5.3)
PROTHROMBIN TIME: 13.6 SECONDS (ref 11.6–14.5)
RBC # BLD AUTO: 2.79 MILLION/UL (ref 3.88–5.62)
RBC # BLD AUTO: 2.8 MILLION/UL (ref 3.88–5.62)
SODIUM SERPL-SCNC: 133 MMOL/L (ref 135–147)
WBC # BLD AUTO: 4.61 THOUSAND/UL (ref 4.31–10.16)
WBC # BLD AUTO: 4.72 THOUSAND/UL (ref 4.31–10.16)

## 2023-06-25 PROCEDURE — 85027 COMPLETE CBC AUTOMATED: CPT | Performed by: INTERNAL MEDICINE

## 2023-06-25 PROCEDURE — 99223 1ST HOSP IP/OBS HIGH 75: CPT | Performed by: INTERNAL MEDICINE

## 2023-06-25 PROCEDURE — 85610 PROTHROMBIN TIME: CPT | Performed by: INTERNAL MEDICINE

## 2023-06-25 PROCEDURE — 83735 ASSAY OF MAGNESIUM: CPT | Performed by: INTERNAL MEDICINE

## 2023-06-25 PROCEDURE — 80048 BASIC METABOLIC PNL TOTAL CA: CPT | Performed by: INTERNAL MEDICINE

## 2023-06-25 PROCEDURE — 85730 THROMBOPLASTIN TIME PARTIAL: CPT | Performed by: INTERNAL MEDICINE

## 2023-06-25 PROCEDURE — 85014 HEMATOCRIT: CPT | Performed by: INTERNAL MEDICINE

## 2023-06-25 PROCEDURE — 99232 SBSQ HOSP IP/OBS MODERATE 35: CPT | Performed by: INTERNAL MEDICINE

## 2023-06-25 PROCEDURE — 85018 HEMOGLOBIN: CPT | Performed by: INTERNAL MEDICINE

## 2023-06-25 RX ORDER — HEPARIN SODIUM 10000 [USP'U]/100ML
3-30 INJECTION, SOLUTION INTRAVENOUS
Status: DISCONTINUED | OUTPATIENT
Start: 2023-06-25 | End: 2023-06-26

## 2023-06-25 RX ORDER — HEPARIN SODIUM 1000 [USP'U]/ML
4400 INJECTION, SOLUTION INTRAVENOUS; SUBCUTANEOUS EVERY 6 HOURS PRN
Status: DISCONTINUED | OUTPATIENT
Start: 2023-06-25 | End: 2023-06-26

## 2023-06-25 RX ORDER — HEPARIN SODIUM 1000 [USP'U]/ML
8800 INJECTION, SOLUTION INTRAVENOUS; SUBCUTANEOUS EVERY 6 HOURS PRN
Status: DISCONTINUED | OUTPATIENT
Start: 2023-06-25 | End: 2023-06-26

## 2023-06-25 RX ADMIN — DICLOFENAC SODIUM 2 G: 10 GEL TOPICAL at 08:45

## 2023-06-25 RX ADMIN — MELATONIN TAB 3 MG 3 MG: 3 TAB at 21:02

## 2023-06-25 RX ADMIN — SODIUM CHLORIDE, SODIUM GLUCONATE, SODIUM ACETATE, POTASSIUM CHLORIDE, MAGNESIUM CHLORIDE, SODIUM PHOSPHATE, DIBASIC, AND POTASSIUM PHOSPHATE 75 ML/HR: .53; .5; .37; .037; .03; .012; .00082 INJECTION, SOLUTION INTRAVENOUS at 04:51

## 2023-06-25 RX ADMIN — HEPARIN SODIUM 18 UNITS/KG/HR: 10000 INJECTION, SOLUTION INTRAVENOUS at 14:39

## 2023-06-25 RX ADMIN — FOLIC ACID 1 MG: 1 TABLET ORAL at 08:44

## 2023-06-25 RX ADMIN — Medication 1 TABLET: at 08:44

## 2023-06-25 RX ADMIN — THIAMINE HCL TAB 100 MG 100 MG: 100 TAB at 08:44

## 2023-06-25 NOTE — ASSESSMENT & PLAN NOTE
"· Presentation as above  · CTA chest PE study - \"No central pulmonary emboli  The peripheral branch arteries are not well opacified  There are small filling defects in the far peripheral fourth and fifth order branch arteries supplying the right lower lobe  \"  · Lower extremity venous doppler - no DVT  · Heparin drip held due to concern for hematoma  · Eliquis price checked with pharmacy - no cost to patient  · Pulm consult - ?  Need for anticoagulation  "

## 2023-06-25 NOTE — PLAN OF CARE
Problem: MOBILITY - ADULT  Goal: Maintain or return to baseline ADL function  Description: INTERVENTIONS:  -  Assess patient's ability to carry out ADLs; assess patient's baseline for ADL function and identify physical deficits which impact ability to perform ADLs (bathing, care of mouth/teeth, toileting, grooming, dressing, etc )  - Assess/evaluate cause of self-care deficits   - Assess range of motion  - Assess patient's mobility; develop plan if impaired  - Assess patient's need for assistive devices and provide as appropriate  - Encourage maximum independence but intervene and supervise when necessary  - Involve family in performance of ADLs  - Assess for home care needs following discharge   - Consider OT consult to assist with ADL evaluation and planning for discharge  - Provide patient education as appropriate  Outcome: Progressing  Goal: Maintains/Returns to pre admission functional level  Description: INTERVENTIONS:  - Perform BMAT or MOVE assessment daily    - Set and communicate daily mobility goal to care team and patient/family/caregiver  - Collaborate with rehabilitation services on mobility goals if consulted  - Perform Range of Motion  times a day  - Reposition patient every  hours    - Dangle patient  times a day  - Stand patient  times a day  - Ambulate patient  times a day  - Out of bed to chair  times a day   - Out of bed for meals  times a day  - Out of bed for toileting  - Record patient progress and toleration of activity level   Outcome: Progressing     Problem: PAIN - ADULT  Goal: Verbalizes/displays adequate comfort level or baseline comfort level  Description: Interventions:  - Encourage patient to monitor pain and request assistance  - Assess pain using appropriate pain scale  - Administer analgesics based on type and severity of pain and evaluate response  - Implement non-pharmacological measures as appropriate and evaluate response  - Consider cultural and social influences on pain and pain management  - Notify physician/advanced practitioner if interventions unsuccessful or patient reports new pain  Outcome: Progressing     Problem: INFECTION - ADULT  Goal: Absence or prevention of progression during hospitalization  Description: INTERVENTIONS:  - Assess and monitor for signs and symptoms of infection  - Monitor lab/diagnostic results  - Monitor all insertion sites, i e  indwelling lines, tubes, and drains  - Monitor endotracheal if appropriate and nasal secretions for changes in amount and color  - [place] appropriate cooling/warming therapies per order  - Administer medications as ordered  - Instruct and encourage patient and family to use good hand hygiene technique  - Identify and instruct in appropriate isolation precautions for identified infection/condition  Outcome: Progressing  Goal: Absence of fever/infection during neutropenic period  Description: INTERVENTIONS:  - Monitor WBC    Outcome: Progressing     Problem: SAFETY ADULT  Goal: Maintain or return to baseline ADL function  Description: INTERVENTIONS:  -  Assess patient's ability to carry out ADLs; assess patient's baseline for ADL function and identify physical deficits which impact ability to perform ADLs (bathing, care of mouth/teeth, toileting, grooming, dressing, etc )  - Assess/evaluate cause of self-care deficits   - Assess range of motion  - Assess patient's mobility; develop plan if impaired  - Assess patient's need for assistive devices and provide as appropriate  - Encourage maximum independence but intervene and supervise when necessary  - Involve family in performance of ADLs  - Assess for home care needs following discharge   - Consider OT consult to assist with ADL evaluation and planning for discharge  - Provide patient education as appropriate  Outcome: Progressing  Goal: Maintains/Returns to pre admission functional level  Description: INTERVENTIONS:  - Perform BMAT or MOVE assessment daily    - Set and communicate daily mobility goal to care team and patient/family/caregiver  - Collaborate with rehabilitation services on mobility goals if consulted  - Perform Range of Motion  times a day  - Reposition patient every  hours    - Dangle patient  times a day  - Stand patient  times a day  - Ambulate patient  times a day  - Out of bed to chair times a day   - Out of bed for trevon times a day  - Out of bed for toileting  - Record patient progress and toleration of activity level   Outcome: Progressing  Goal: Patient will remain free of falls  Description: INTERVENTIONS:  -  Assess patient's ability to carry out ADLs; assess patient's baseline for ADL function and identify physical deficits which impact ability to perform ADLs (bathing, care of mouth/teeth, toileting, grooming, dressing, etc )  - Assess/evaluate cause of self-care deficits   - Assess range of motion  - Assess patient's mobility; develop plan if impaired  - Assess patient's need for assistive devices and provide as appropriate  - Encourage maximum independence but intervene and supervise when necessary  - Involve family in performance of ADLs  - Assess for home care needs following discharge   - Consider OT consult to assist with ADL evaluation and planning for discharge  - Provide patient education as appropriate  Outcome: Progressing     Problem: DISCHARGE PLANNING  Goal: Discharge to home or other facility with appropriate resources  Description: INTERVENTIONS:  - Identify barriers to discharge w/patient and caregiver  - Arrange for needed discharge resources and transportation as appropriate  - Identify discharge learning needs (meds, wound care, etc )  - Arrange for interpretive services to assist at discharge as needed  - Refer to Case Management Department for coordinating discharge planning if the patient needs post-hospital services based on physician/advanced practitioner order or complex needs related to functional status, cognitive ability, or social support system  Outcome: Progressing     Problem: Knowledge Deficit  Goal: Patient/family/caregiver demonstrates understanding of disease process, treatment plan, medications, and discharge instructions  Description: Complete learning assessment and assess knowledge base    Interventions:  - Provide teaching at level of understanding  - Provide teaching via preferred learning methods  Outcome: Progressing

## 2023-06-25 NOTE — PROGRESS NOTES
[facility]  Progress Note  Name: Marino Gallardo  MRN: [id number]  Unit/Bed#: [facility] 821-01 I Date of Admission: 6/22/2023   Date of Service: 6/25/2023 I Hospital Day: 3    Assessment/Plan   Diaphragmatic hematoma  Assessment & Plan  - Recent history of multiple falls, admitted for rib fractures in mid May, recent fall on 6/21 and presented to the trauma clinic for evaluation, was tachycardic and sent to the ED for syncope/orthostatic hypotension work-up, initial CT scans negative for acute traumatic injury but positive for possible small Pes  - 6/24 CT A/P: Finding suspicious for pleural hematoma more prominent on the right, concern for underlying diaphragmatic injury, new soft tissue fullness and partially visualized posterior mediastinum extending to the left  - 6/24 CT C/A/P PO/IV: Mildly asymmetric b/l  pleural thickening R 3cm and L 2cm, associated stranding s/f crural hematomas, extension of small volume hematoma superiorly into posterior mediastinum, no leodan diaphragmatic discontinuity or evidence of rupture, hepatomegaly/hepatic steatosis, splenomegaly  - Hb dropped to 10s from 12s'  - continue to trend Hb    Pulmonary embolism (HCC)  Assessment & Plan  - 6/22 CTA PE shows No central PE but small filling defects in far peripheral fourth and fifth order branch artery supplying RLL, RV/LV ratio 0 9  - initiated on hep gtt at this time  - currently held 2/2 Hb drop w concern for hematomas  - awaiting Pulm consult to determine if Heparin drip is necessary given subsegmental PE  - Hgb currently stable           TRAUMA TERTIARY SURVEY NOTE    VTE Prophylaxis:Reason for no pharmacologic prophylaxis concern for hematomas - Heparin gtt held at this time     Disposition: pending    Code status:  Level 1 - Full Code    Consultants: IP CONSULT TO CASE MANAGEMENT  IP CONSULT TO CARDIOLOGY  INPATIENT CONSULT TO TRAUMA (INPATIENT ONLY)  IP CONSULT TO PULMONOLOGY    Subjective Mechanism of Injury:Fall     Chief Complaint: fall    HPI/Last 24 hour events: Saniya Sommer is a 79 y o  male w PMH of HTN, iron overload follows with hematology outpatient for phlebotomies, recent admission to the trauma service admitted May after a fall with rib fractures, again with a fall on 6/21 and presented to the trauma office for follow-up where he was tachycardic and in the setting of additional recent fall was sent to the ED for further evaluation for possible syncopal episodes and orthostatic hypotension  Initial scans on 6/22 negative for any acute traumatic injury, patient was admitted to the [facility] [place] service for syncope and orthostatic hypotension work-up, was also found to have possible small PEs on imaging, initiated on heparin drip  Patient now with bilateral lower abdominal pain radiating to his lower back/flanks bilaterally, CT A/P showing possible crural hematoma on the right side  Patient reports increased bilateral lower abdominal and lower back pain since yesterday when he was trying to strain on his recliner chair in his hospital room, states this had a fall on 6/21 he has not had any recent falls following discharge in mid May, has been ambulating without issues including since admission to the hospital   Patient reports he thinks he might get lightheaded/dizzy intermittently after his phlebotomies in the outpatient setting  Overnight, pain improved  Able to sleep on back  No current pain  Objective   Vitals:   Temp:  [98 5 °F (36 9 °C)-98 9 °F (37 2 °C)] 98 9 °F (37 2 °C)  HR:  [79-99] 79  Resp:  [15-20] 15  BP: (104-146)/(63-86) 104/63    I/O       06/23 0701  06/24 0700 06/24 0701  06/25 0700    P  O  120 320    Total Intake(mL/kg) 120 (1 1) 320 (2 9)    Net +120 +320          Unmeasured Urine Occurrence  4 x           Physical Exam:   GENERAL APPEARANCE: NAD  NEURO: Patient is speaking clearly in complete sentences  Patient is answering appropriately and able follow commands  Patient is moving all four extremities spontaneously  No facial droop  Tongue midline  HEENT: PERRL, Moist mucous membranes, external ears normal, nose normal  Neck: No cervical adenopathy  CV: RRR  No murmurs, rubs, gallops  LUNGS: Clear to auscultation: No wheezes, stridor, rhonchi, rales  GI: Abdomen non-distended  Soft  Non-tender and without rebound or guarding  No abdominal bruising  MSK: No deformity  Skin: Warm and dry  Capillary refill: <2 seconds      Invasive Devices     Peripheral Intravenous Line  Duration           Peripheral IV 06/22/23 Right;Ventral (anterior) Forearm 2 days    Peripheral IV 06/24/23 Left;Proximal;Ventral (anterior) Forearm <1 day                   1  Before the illness or injury that brought you to the Emergency, did you need someone to help you on a regular basis? 0=No   2  Since the illness or injury that brought you to the Emergency, have you needed more help than usual to take care of yourself? 0=No   3  Have you been hospitalized for one or more nights during the past 6 months (excluding a stay in the Emergency Department)? 0=No   4  In general, do you see well? 0=Yes   5  In general, do you have serious problems with your memory? 0=No   6  Do you take more than three different medications everyday? 1=Yes   TOTAL   1     Did you order a geriatric consult if the score was 2 or greater?: no         Lab Results: Results: I have personally reviewed all pertinent laboratory/tests results    Imaging Results: I have personally reviewed pertinent reports      Chest Xray(s): see imaging   FAST exam(s): N/A   CT Scan(s): see imaging   Additional Xray(s): see imaging

## 2023-06-25 NOTE — PROGRESS NOTES
"[facility]  Progress Note  Name: Brody Ochoa  MRN: [id number]  Unit/Bed#: [facility] 821-01 I Date of Admission: 6/22/2023   Date of Service: 6/24/2023 I Hospital Day: 2    Assessment/Plan   * Recurrent falls - possible presyncope with orthostatic hypotension  Assessment & Plan  · Fall on 5/14/23 when he stood up quickly from the seated position, felt lightheaded and fell   · CT chest (5/14/23) - \"Nondisplaced fractures of the right anterior seventh and eighth ribs  \"  · Taryn Higgins again on 6/21/23 and was seen in the trauma office as follow-up from eugene injury on 6/22/23 - was told to go to the ED  · Presyncope noted in the days following his therapeutic phlebotomy   · Noted to be tachycardic in the trauma office which resolved with IVF's  · Likely hypovolemia with orthostatic hypotension from therapeutic phlebotomy and decreased oral hydration in the setting of diuretic use for hypertension  · Home Dyazide held  · IVF's  · Echo - EF 60%  · Telemetry - no arrhythmia  · 2 week Zio patch arranged by cardiology  Patient should arrange pickup at [address] [place] [address] in [place]      Pulmonary embolism [facility])  Assessment & Plan  · Presentation as above  · CTA chest PE study - \"No central pulmonary emboli  The peripheral branch arteries are not well opacified  There are small filling defects in the far peripheral fourth and fifth order branch arteries supplying the right lower lobe  \"  · Lower extremity venous doppler - no DVT  · Heparin drip held due to concern for hematoma  · Eliquis price checked with pharmacy - no cost to patient  · Pulm consult - ? Need for anticoagulation    Diaphragmatic hematoma  Assessment & Plan  · Complained of low back pain radiating to the sides of the abdomen after leaning in his recliner chair yesterday - pain progressively worsened overnight  · CT A/P without contrast - \"Findings suspicious for crural hematoma more prominent on the right   This " "would raise concern for underlying diaphragmatic injury  New soft tissue fullness in the partially visualized posterior mediastinum extending to the left  This is incompletely visualized  Question related to extension of hematoma into the posterior mediastinum  \"  · CT C/A/P with contrast - \"Redemonstrated mildly asymmetric bilateral crural thickening measuring up to 3 cm on the right and 2 cm on the left in maximal thickness with associated stranding suspicious for crural hematomas  Extension of small volume hematoma superiorly into the posterior mediastinum at the level of the distal esophagus  No leodan diaphragmatic discontinuity or intrathoracic herniation of abdominal organs to suggest rupture  \"  · Drop in Hb from 12 4 to 9 9  · Discussed with trauma - ct to monitor off heparin drip, monitor H/H, may restart heparin gtt if Hb stable vs holding off AC  · Trauma input appreciated  · Pulm consult - ? Need for [facility] for small PE    Rib fractures  Assessment & Plan  · Following fall on 5/14/2023  · Pain control    Primary hypertension  Assessment & Plan  · Home regimen: Amlodipine 5 mg daily, losartan 50 mg daily, triamterene-HCTZ 37 5/25 mg daily  · Amlodipine and triamterene HCTZ held with concern for orthostatic hypotension  · Continue Losartan 50 mg daily  · BP acceptable  · Monitor BP along with orthostatic vitals    Elevated ferritin/iron overload  Assessment & Plan  · Follows with hematology as outpatient for regular phlebotomies    Thrombocytopenia (HCC)  Assessment & Plan  · Chronic  · Monitor    Fatty liver, alcoholic  Assessment & Plan  · Follows with GI as an outpatient    Hypomagnesemia  Assessment & Plan  · Replete    Tachycardia  Assessment & Plan  · Likely due to hypovolemia   · Resolved with IV fluids  · Plan for Zio monitor as outpatient    VTE Pharmacologic Prophylaxis: VTE Score: 3 Moderate Risk (Score 3-4) - Pharmacological DVT Prophylaxis Contraindicated   Sequential Compression Devices " Ordered  Patient Centered Rounds: I performed bedside rounds with nursing staff today  Discussions with Specialists or Other Care Team Provider: Discussed with trauma    Education and Discussions with Family / Patient: Patient declined call to   Total Time Spent on Date of Encounter in care of patient: 35 minutes This time was spent on one or more of the following: performing physical exam; counseling and coordination of care; obtaining or reviewing history; documenting in the medical record; reviewing/ordering tests, medications or procedures; communicating with other healthcare professionals and discussing with patient's family/caregivers  Current Length of Stay: 2 day(s)  Current Patient Status: Inpatient   Certification Statement: The patient will continue to require additional inpatient hospital stay due to diaphragmatic hematoma    Code Status: Level 1 - Full Code    Subjective:   Low back pain radiating to the sides of the abdomen since last evening after stretching in his recliner chair  No nausea or vomiting  Objective:     Vitals:   Temp (24hrs), Av 2 °F (36 8 °C), Min:97 6 °F (36 4 °C), Max:98 5 °F (36 9 °C)    Temp:  [97 6 °F (36 4 °C)-98 5 °F (36 9 °C)] 98 5 °F (36 9 °C)  HR:  [] 99  Resp:  [15-20] 18  BP: (121-151)/(76-99) 146/86  SpO2:  [93 %-97 %] 95 %  Body mass index is 37 34 kg/m²  Physical Exam:   Physical Exam  Vitals reviewed  HENT:      Head: Normocephalic  Nose: Nose normal       Mouth/Throat:      Mouth: Mucous membranes are moist    Eyes:      Extraocular Movements: Extraocular movements intact  Cardiovascular:      Rate and Rhythm: Normal rate and regular rhythm  Pulmonary:      Effort: Pulmonary effort is normal  No respiratory distress  Breath sounds: Normal breath sounds  No wheezing  Abdominal:      General: Bowel sounds are normal  There is no distension  Palpations: Abdomen is soft  Tenderness:  There is no abdominal tenderness  Musculoskeletal:         General: No swelling  Cervical back: Neck supple  Skin:     General: Skin is warm and dry  Neurological:      General: No focal deficit present  Mental Status: He is alert and oriented to person, place, and time     Psychiatric:         Mood and Affect: Mood normal          Behavior: Behavior normal           Additional Data:     Labs:  Results from last 7 days   Lab Units 06/24/23 1955 06/24/23  1332   WBC Thousand/uL  --  5 25   HEMOGLOBIN g/dL 9 9* 10 2*   HEMATOCRIT % 28 0* 29 3*   PLATELETS Thousands/uL  --  92*   NEUTROS PCT %  --  81*   LYMPHS PCT %  --  10*   MONOS PCT %  --  7   EOS PCT %  --  1     Results from last 7 days   Lab Units 06/24/23  1332   SODIUM mmol/L 131*   POTASSIUM mmol/L 3 7   CHLORIDE mmol/L 100   CO2 mmol/L 26   BUN mg/dL 10   CREATININE mg/dL 0 92   ANION GAP mmol/L 5   CALCIUM mg/dL 8 8   ALBUMIN g/dL 3 4*   TOTAL BILIRUBIN mg/dL 0 80   ALK PHOS U/L 77   ALT U/L 54   AST U/L 77*   GLUCOSE RANDOM mg/dL 207*     Results from last 7 days   Lab Units 06/22/23  2121   INR  1 02             Results from last 7 days   Lab Units 06/24/23  1332   LACTIC ACID mmol/L 2 0       Lines/Drains:  Invasive Devices     Peripheral Intravenous Line  Duration           Peripheral IV 06/22/23 Right;Ventral (anterior) Forearm 2 days    Peripheral IV 06/24/23 Left;Proximal;Ventral (anterior) Forearm <1 day                  Last 24 Hours Medication List:   Current Facility-Administered Medications   Medication Dose Route Frequency Provider Last Rate   • acetaminophen  650 mg Oral Q6H PRN Nilson Navarro PA-C     • Diclofenac Sodium  2 g Topical 4x Daily Delano Chris MD     • folic acid  1 mg Oral Daily Ananth Lylaai, DO     • HYDROmorphone  0 2 mg Intravenous Q4H PRN Delano Chris MD     • losartan  50 mg Oral Daily Ananth Aritaai, DO     • melatonin  3 mg Oral HS Nilson Navarro PA-C     • methocarbamol  750 mg Oral Q6H PRN Silver Stafford MD     • multi-electrolyte  75 mL/hr Intravenous Continuous Silver Stafford MD 75 mL/hr (06/24/23 9226)   • multivitamin-minerals  1 tablet Oral Daily Ananth Queen DO     • oxyCODONE  5 mg Oral Q4H PRN Silver Stafford MD     • oxyCODONE  2 5 mg Oral Q4H PRN Silver Stafford MD     • thiamine  100 mg Oral Daily Ventura Martinez DO          Today, Patient Was Seen By: Silver Stafford MD    **Please Note: This note may have been constructed using a voice recognition system  **

## 2023-06-25 NOTE — PROGRESS NOTES
"[facility]  Progress Note  Name: Joann Mora  MRN: [id number]  Unit/Bed#: [facility]PHP 821-01 I Date of Admission: 6/22/2023   Date of Service: 6/25/2023 I Hospital Day: 3    Assessment/Plan   * Recurrent falls - possible presyncope with orthostatic hypotension  Assessment & Plan  · Fall on 5/14/23 when he stood up quickly from the seated position, felt lightheaded and fell   · CT chest (5/14/23) - \"Nondisplaced fractures of the right anterior seventh and eighth ribs  \"  · Lowanda Pipe again on 6/21/23 and was seen in the trauma office as follow-up from eugene injury on 6/22/23 - was told to go to the ED  · Presyncope noted in the days following his therapeutic phlebotomy   · Noted to be tachycardic in the trauma office which resolved with IVF's  · Likely hypovolemia with orthostatic hypotension from therapeutic phlebotomy and decreased oral hydration in the setting of diuretic use for hypertension  · Home Dyazide held  · IVF's  · Echo - EF 60%  · Telemetry - no arrhythmia  · 2 week Zio patch arranged by cardiology  Patient should arrange pickup at [address] [place] [address] in [place]      Pulmonary embolism [facility])  Assessment & Plan  · Presentation as above  · CTA chest PE study - \"No central pulmonary emboli  The peripheral branch arteries are not well opacified  There are small filling defects in the far peripheral fourth and fifth order branch arteries supplying the right lower lobe  \"  · Lower extremity venous doppler - no DVT  · Heparin drip held on 6/24/23 due to diaphragmatic hematoma  · Eliquis price checked with pharmacy - no cost to patient  · Discussed with pulmonology - anticoagulation recommended for current PE unless contraindicated  · Discussed with trauma - ok to start heparin drip today as Hb stable  · Continue heparin drip till Hb stable with therapeutic INR for atleast 24 hrs and then transition to Eliquis    Diaphragmatic hematoma  Assessment & Plan  · Complained " "of low back pain radiating to the sides of the abdomen after leaning in his recliner chair yesterday - pain progressively worsened overnight  · CT A/P without contrast - \"Findings suspicious for crural hematoma more prominent on the right  This would raise concern for underlying diaphragmatic injury  New soft tissue fullness in the partially visualized posterior mediastinum extending to the left  This is incompletely visualized  Question related to extension of hematoma into the posterior mediastinum  \"  · CT C/A/P with contrast - \"Redemonstrated mildly asymmetric bilateral crural thickening measuring up to 3 cm on the right and 2 cm on the left in maximal thickness with associated stranding suspicious for crural hematomas  Extension of small volume hematoma superiorly into the posterior mediastinum at the level of the distal esophagus  No leodan diaphragmatic discontinuity or intrathoracic herniation of abdominal organs to suggest rupture  \"  · Drop in Hb from 12 4 to 9 0  · Discussed with trauma - heparin drip plan as above      Primary hypertension  Assessment & Plan  · Home regimen: Amlodipine 5 mg daily, losartan 50 mg daily, triamterene-HCTZ 37 5/25 mg daily  · Amlodipine and triamterene HCTZ held in the setting of orthostatic hypotension  · Continue Losartan 50 mg daily  · BP acceptable  Orthostatic hypotension resolved  · Monitor BP     Elevated ferritin/iron overload  Assessment & Plan  · Follows with hematology as outpatient for regular phlebotomies    Fatty liver, alcoholic  Assessment & Plan  · Follows with GI as an outpatient    Thrombocytopenia (HCC)  Assessment & Plan  · Chronic  · Monitor    Tachycardia  Assessment & Plan  · Likely due to hypovolemia   · Resolved with IV fluids  · Plan for Zio monitor as outpatient      VTE Pharmacologic Prophylaxis: VTE Score: 3 Moderate Risk (Score 3-4) - Pharmacological DVT Prophylaxis Ordered: heparin drip      Patient Centered Rounds: Discussed with RN  Discussions " with Specialists or Other Care Team Provider: Discussed with pulmonology and trauma    Education and Discussions with Family / Patient: Patient declined call to   Total Time Spent on Date of Encounter in care of patient: 35 minutes This time was spent on one or more of the following: performing physical exam; counseling and coordination of care; obtaining or reviewing history; documenting in the medical record; reviewing/ordering tests, medications or procedures; communicating with other healthcare professionals and discussing with patient's family/caregivers  Current Length of Stay: 3 day(s)  Current Patient Status: Inpatient   Certification Statement: The patient will continue to require additional inpatient hospital stay due to PE with diaphragmatic hematoma being monitored for increase in bleeding with heparin drip    Code Status: Level 1 - Full Code    Subjective:   Low back pain radiating to abdomen much improved  No chest pain or shortness of breath  Objective:     Vitals:   Temp (24hrs), Av 3 °F (36 8 °C), Min:97 9 °F (36 6 °C), Max:98 9 °F (37 2 °C)    Temp:  [97 9 °F (36 6 °C)-98 9 °F (37 2 °C)] 98 5 °F (36 9 °C)  HR:  [75-96] 88  Resp:  [15-20] 18  BP: (104-124)/(63-77) 124/77  SpO2:  [92 %-98 %] 95 %  Body mass index is 37 34 kg/m²  Physical Exam:   Physical Exam  Vitals reviewed  HENT:      Head: Normocephalic  Nose: Nose normal       Mouth/Throat:      Mouth: Mucous membranes are moist    Eyes:      Extraocular Movements: Extraocular movements intact  Cardiovascular:      Rate and Rhythm: Normal rate and regular rhythm  Pulmonary:      Effort: Pulmonary effort is normal  No respiratory distress  Breath sounds: Normal breath sounds  No wheezing  Abdominal:      General: Bowel sounds are normal  There is no distension  Palpations: Abdomen is soft  Tenderness: There is no abdominal tenderness  Musculoskeletal:         General: No swelling  Cervical back: Neck supple  Skin:     General: Skin is warm and dry  Neurological:      General: No focal deficit present  Mental Status: He is alert and oriented to person, place, and time  Psychiatric:         Mood and Affect: Mood normal          Behavior: Behavior normal           Additional Data:     Labs:  Results from last 7 days   Lab Units 06/25/23  1439 06/24/23  1955 06/24/23  1332   WBC Thousand/uL 4 72   < > 5 25   HEMOGLOBIN g/dL 9 0*   < > 10 2*   HEMATOCRIT % 27 1*   < > 29 3*   PLATELETS Thousands/uL 92*   < > 92*   NEUTROS PCT %  --   --  81*   LYMPHS PCT %  --   --  10*   MONOS PCT %  --   --  7   EOS PCT %  --   --  1    < > = values in this interval not displayed       Results from last 7 days   Lab Units 06/25/23  0847 06/24/23  1332   SODIUM mmol/L 133* 131*   POTASSIUM mmol/L 4 0 3 7   CHLORIDE mmol/L 101 100   CO2 mmol/L 31 26   BUN mg/dL 8 10   CREATININE mg/dL 0 92 0 92   ANION GAP mmol/L 1 5   CALCIUM mg/dL 8 6 8 8   ALBUMIN g/dL  --  3 4*   TOTAL BILIRUBIN mg/dL  --  0 80   ALK PHOS U/L  --  77   ALT U/L  --  54   AST U/L  --  77*   GLUCOSE RANDOM mg/dL 136 207*     Results from last 7 days   Lab Units 06/25/23  1439   INR  1 02             Results from last 7 days   Lab Units 06/24/23  1332   LACTIC ACID mmol/L 2 0       Lines/Drains:  Invasive Devices     Peripheral Intravenous Line  Duration           Peripheral IV 06/22/23 Right;Ventral (anterior) Forearm 3 days    Peripheral IV 06/24/23 Left;Proximal;Ventral (anterior) Forearm 1 day                Last 24 Hours Medication List:   Current Facility-Administered Medications   Medication Dose Route Frequency Provider Last Rate   • acetaminophen  650 mg Oral Q6H PRN Nilson Navarro PA-C     • Diclofenac Sodium  2 g Topical 4x Daily Edwin Godinez MD     • folic acid  1 mg Oral Daily Ananth Queen DO     • heparin (porcine)  3-30 Units/kg/hr (Order-Specific) Intravenous Titrated Edwin Godinez MD 18 Units/kg/hr (06/25/23 8705)   • heparin (porcine)  4,400 Units Intravenous Q6H PRN Shannan Frausto MD     • heparin (porcine)  8,800 Units Intravenous Q6H PRN Shannan Frausto MD     • HYDROmorphone  0 2 mg Intravenous Q4H PRN Shannan Frausto MD     • losartan  50 mg Oral Daily Ananth Aritaai, DO     • melatonin  3 mg Oral HS Nilson Navarro PA-C     • methocarbamol  750 mg Oral Q6H PRN Shannan Frausto MD     • multivitamin-minerals  1 tablet Oral Daily Ananth Queen, DO     • oxyCODONE  5 mg Oral Q4H PRN Shannan Frausto MD     • oxyCODONE  2 5 mg Oral Q4H PRN Shannan Frausto MD     • thiamine  100 mg Oral Daily Clover Hernandez DO          Today, Patient Was Seen By: Shannan Frausto MD    **Please Note: This note may have been constructed using a voice recognition system  **

## 2023-06-25 NOTE — ASSESSMENT & PLAN NOTE
"· Fall on 5/14/23 when he stood up quickly from the seated position, felt lightheaded and fell   · CT chest (5/14/23) - \"Nondisplaced fractures of the right anterior seventh and eighth ribs  \"  · Marcella Purvis again on 6/21/23 and was seen in the trauma office as follow-up from eugene injury on 6/22/23 - was told to go to the ED  · Presyncope noted in the days following his therapeutic phlebotomy   · Noted to be tachycardic in the trauma office which resolved with IVF's  · Likely hypovolemia with orthostatic hypotension from therapeutic phlebotomy and decreased oral hydration in the setting of diuretic use for hypertension  · Home Dyazide held  · IVF's  · Echo - EF 60%  · Telemetry - no arrhythmia  · 2 week Zio patch arranged by cardiology   Patient should arrange pickup at [address] [place] [address] in [place]    "

## 2023-06-25 NOTE — ASSESSMENT & PLAN NOTE
"· Complained of low back pain radiating to the sides of the abdomen after leaning in his recliner chair yesterday - pain progressively worsened overnight  · CT A/P without contrast - \"Findings suspicious for crural hematoma more prominent on the right  This would raise concern for underlying diaphragmatic injury  New soft tissue fullness in the partially visualized posterior mediastinum extending to the left  This is incompletely visualized  Question related to extension of hematoma into the posterior mediastinum  \"  · CT C/A/P with contrast - \"Redemonstrated mildly asymmetric bilateral crural thickening measuring up to 3 cm on the right and 2 cm on the left in maximal thickness with associated stranding suspicious for crural hematomas  Extension of small volume hematoma superiorly into the posterior mediastinum at the level of the distal esophagus  No leodan diaphragmatic discontinuity or intrathoracic herniation of abdominal organs to suggest rupture  \"  · Drop in Hb from 12 4 to 9 0  · Discussed with trauma - heparin drip plan as above    "

## 2023-06-25 NOTE — ASSESSMENT & PLAN NOTE
"· Presentation as above  · CTA chest PE study - \"No central pulmonary emboli  The peripheral branch arteries are not well opacified  There are small filling defects in the far peripheral fourth and fifth order branch arteries supplying the right lower lobe  \"  · Lower extremity venous doppler - no DVT  · Heparin drip held on 6/24/23 due to diaphragmatic hematoma  · Eliquis price checked with pharmacy - no cost to patient  · Discussed with pulmonology - anticoagulation recommended for current PE unless contraindicated  · Discussed with trauma - ok to start heparin drip today as Hb stable  · Continue heparin drip till Hb stable with therapeutic INR for atleast 24 hrs and then transition to Eliquis  "

## 2023-06-25 NOTE — ASSESSMENT & PLAN NOTE
- 6/22 CTA PE shows No central PE but small filling defects in far peripheral fourth and fifth order branch artery supplying RLL, RV/LV ratio 0 9  -May continue heparin drip if pulmonary believes PE should be treated with anticoagulation    -Continue to trend hemoglobin  - Pulmonary consulted and note appreciated  May consider oral anticoagulation once hemoglobin is stable

## 2023-06-25 NOTE — ASSESSMENT & PLAN NOTE
"· Fall on 5/14/23 when he stood up quickly from the seated position, felt lightheaded and fell   · CT chest (5/14/23) - \"Nondisplaced fractures of the right anterior seventh and eighth ribs  \"  · Edgar Line again on 6/21/23 and was seen in the trauma office as follow-up from eugene injury on 6/22/23 - was told to go to the ED  · Presyncope noted in the days following his therapeutic phlebotomy   · Noted to be tachycardic in the trauma office which resolved with IVF's  · Likely hypovolemia with orthostatic hypotension from therapeutic phlebotomy and decreased oral hydration in the setting of diuretic use for hypertension  · Home Dyazide held  · IVF's  · Echo - EF 60%  · Telemetry - no arrhythmia  · 2 week Zio patch arranged by cardiology   Patient should arrange pickup at [address] [place] [address] in [place]    "

## 2023-06-25 NOTE — ASSESSMENT & PLAN NOTE
- Recent history of multiple falls, admitted for rib fractures in mid May, recent fall on 6/21 and presented to the trauma clinic for evaluation, was tachycardic and sent to the ED for syncope/orthostatic hypotension work-up, initial CT scans negative for acute traumatic injury but positive for possible small PEs  - 6/24 CT A/P: Finding suspicious for pleural hematoma more prominent on the right, concern for underlying diaphragmatic injury, new soft tissue fullness and partially visualized posterior mediastinum extending to the left  - 6/24 CT C/A/P PO/IV: Mildly asymmetric b/l  pleural thickening R 3cm and L 2cm, associated stranding s/f crural hematomas, extension of small volume hematoma superiorly into posterior mediastinum, no leodan diaphragmatic discontinuity or evidence of rupture, hepatomegaly/hepatic steatosis, splenomegaly  -Hemoglobin continues to slowly downtrend today to 8 2 from 8 4 yesterday and 9 0 prior   -Continue to monitor hemoglobin

## 2023-06-25 NOTE — ASSESSMENT & PLAN NOTE
· Home regimen: Amlodipine 5 mg daily, losartan 50 mg daily, triamterene-HCTZ 37 5/25 mg daily  · Amlodipine and triamterene HCTZ held in the setting of orthostatic hypotension  · Continue Losartan 50 mg daily  · BP acceptable   Orthostatic hypotension resolved  · Monitor BP

## 2023-06-25 NOTE — CONSULTS
PULMONOLOGY CONSULT NOTE     Name: Zeeshan Daily   Age & Sex: 79 y o  male   MRN: [id number]  Unit/Bed#: [facility] 821-01   Encounter: [id number]        Reason for consultation: Pulmonary embolism    Requesting physician: Roselyn Garsia MD     Assessment:   1  Subsegmental pulmonary embolism - Intermediate risk per PESI score  2  Syncope  3  FELIBERTO on CPAP  4  Bilateral crural hematomas  Plan:  • Patient with history of recent syncopal events and tachycardia  Found to have subsegmental PE in the RLL  Doppler US negative for DVT  Patient has a significant history of elevated ferritin levels as well as colon cancer in father and sister  He is up to date on cancer screening  • He was started on heparin drip, however he developed drop in hemoglobin with evidence of crural hematomas on CT scan  Trauma following  Appreciate recommendations  Heparin is currently off  • Patient would benefit from anticoagulation for treatment of the pulmonary embolism but will defer to primary team and trauma service  If cleared from a trauma perspective, can likely start heparin drip with close monitoring of H/H  If no evidence of active bleeding, can transition to [address]  • He should follow up closely with hematology as outpatient  • Recommend starting CPAP qHS while in the hospital since he has a diagnosis of FELIBERTO and is compliant with CPAP at home  • Remainder of care per primary team          History of Present Illness   HPI:  Zeeshan Daily is a 79 y o  male with a PMHx of HTN, FELIBERTO, elevated ferritin, and rib fractures 1 month ago, who presented to the ED on 06/22 after experiencing an unwitnessed syncopal event at home and being found to be tachycardic at outpatient appointment  On arrival yo the ED, he was found to have an elevated d-dimer at 1 39  CTA PE study with evidence of small filling defects in the peripheral fourth and fifth order branch supplying the RLL with RV/LV ratio < 0 9   Patient was admitted to the medical service and started on heparin drip  Patient developed drops in hemoglobin level after starting heparin drip as well as lower back pain  For this reason CTA C/A/P was done on  with evidence of bilateral clural hematomas  Trauma was consulted at that time with recommendations to hold anticoagulation  Pulmonology consulted for evaluation and management of small PE with questions regarding need for systemic anticoagulation  Patient has a 20 pack/year smoking history and quit in   He denies any known history of COPD, emphysema or asthma  Denies family history of respiratory diseases  He has been working as a  for 29 years  He reports a family history of colon cancer in his father who  at the age of 47 and possibly his sister  He had colonoscopy and EGD on 2022, unfortunately results are not available  He follows closely with hematology for iron overload, elevated ferritin and thrombocytopenia  Per notes, he has been worked up for hemochromatosis with negative results  He undergoes phlebotomy once per month  He reports feeling lightheaded a couple of days after phlebotomy and that has been causing his recent falls  He has been more sedentary since his fall a month ago that caused the broken ribs  Doppler ultrasound negative for DVTs  Review of systems:  12 point review of systems was completed and was otherwise negative except as listed in HPI        Historical Information   Past Medical History:   Diagnosis Date   • Broken ribs    • Hypertension      Past Surgical History:   Procedure Laterality Date   • IR BIOPSY LIVER RANDOM  2022   • REPLACEMENT TOTAL KNEE Bilateral      Family History   Problem Relation Age of Onset   • Cancer Father        Social History    Social History:   Social History     Socioeconomic History   • Marital status: /Civil Union     Spouse name: Not on file   • Number of children: Not on file   • Years of education: Not on file   • Highest education level: Not on file   Occupational History   • Not on file   Tobacco Use   • Smoking status: Former   • Smokeless tobacco: Never   Vaping Use   • Vaping Use: Never used   Substance and Sexual Activity   • Alcohol use: Yes     Comment: occassionally   • Drug use: Not Currently   • Sexual activity: Not on file   Other Topics Concern   • Not on file   Social History Narrative   • Not on file     Social Determinants of Health     Financial Resource Strain: Not on file   Food Insecurity: No Food Insecurity (6/23/2023)    Hunger Vital Sign    • Worried About Running Out of Food in the Last Year: Never true    • Ran Out of Food in the Last Year: Never true   Transportation Needs: Unknown (6/23/2023)    PRAPARE - Transportation    • Lack of Transportation (Medical): Not on file    • Lack of Transportation (Non-Medical): No   Physical Activity: Not on file   Stress: Not on file   Social Connections: Not on file   Intimate Partner Violence: Not on file   Housing Stability: Unknown (6/23/2023)    Housing Stability Vital Sign    • Unable to Pay for Housing in the Last Year: No    • Number of Places Lived in the Last Year: Not on file    • Unstable Housing in the Last Year: No       Occupational History:       Meds/Allergies   Current Facility-Administered Medications   Medication Dose Route Frequency   • acetaminophen (TYLENOL) tablet 650 mg  650 mg Oral Q6H PRN   • Diclofenac Sodium (VOLTAREN) 1 % topical gel 2 g  2 g Topical 4x Daily   • folic acid (FOLVITE) tablet 1 mg  1 mg Oral Daily   • HYDROmorphone HCl (DILAUDID) injection 0 2 mg  0 2 mg Intravenous Q4H PRN   • losartan (COZAAR) tablet 50 mg  50 mg Oral Daily   • melatonin tablet 3 mg  3 mg Oral HS   • methocarbamol (ROBAXIN) tablet 750 mg  750 mg Oral Q6H PRN   • multi-electrolyte (PLASMALYTE-A/ISOLYTE-S PH 7 4) IV solution  75 mL/hr Intravenous Continuous   • multivitamin-minerals (CENTRUM) tablet 1 tablet  1 tablet Oral Daily   • oxyCODONE (ROXICODONE) IR "tablet 5 mg  5 mg Oral Q4H PRN   • oxyCODONE (ROXICODONE) split tablet 2 5 mg  2 5 mg Oral Q4H PRN   • thiamine tablet 100 mg  100 mg Oral Daily     Medications Prior to Admission   Medication   • amLODIPine (NORVASC) 5 mg tablet   • Cholecalciferol (Vitamin D3) 50 MCG (2000 UT) capsule   • folic acid (FOLVITE) 1 mg tablet   • loratadine (CLARITIN) 10 mg tablet   • LOSARTAN POTASSIUM PO   • triamterene-hydrochlorothiazide (DYAZIDE) 37 5-25 mg per capsule   • VITAMIN D PO   • Dermarest Eczema 1 % lotion   • lidocaine (LMX) 4 % cream   • losartan (COZAAR) 50 mg tablet   • methocarbamol (ROBAXIN) 500 mg tablet   • metoclopramide (REGLAN) 5 mg tablet   • metoclopramide (REGLAN) 5 mg tablet   • naproxen (NAPROSYN) 500 mg tablet   • ondansetron (ZOFRAN) 4 mg tablet   • oxyCODONE (ROXICODONE) 5 immediate release tablet   • TRIAMTERENE PO     Allergies   Allergen Reactions   • Ciprofloxacin Vomiting   • Flagyl [Metronidazole] Vomiting   • Other Other (See Comments)     Running nose       Objective    Vitals: Blood pressure 104/63, pulse 79, temperature 98 9 °F (37 2 °C), resp  rate 15, height 5' 8\" (1 727 m), weight 111 kg (245 lb 9 5 oz), SpO2 92 % , RA, Body mass index is 37 34 kg/m²  Intake/Output Summary (Last 24 hours) at 6/25/2023 1102  Last data filed at 6/25/2023 0738  Gross per 24 hour   Intake 180 ml   Output --   Net 180 ml       Physical Exam  Vitals and nursing note reviewed  Constitutional:       General: He is not in acute distress  Appearance: He is not ill-appearing or toxic-appearing  HENT:      Head: Normocephalic  Eyes:      General: No scleral icterus  Pupils: Pupils are equal, round, and reactive to light  Cardiovascular:      Rate and Rhythm: Normal rate and regular rhythm  Heart sounds: No murmur heard  Pulmonary:      Effort: Pulmonary effort is normal  No respiratory distress  Breath sounds: Normal breath sounds  No wheezing, rhonchi or rales     Abdominal:      " General: There is no distension  Palpations: Abdomen is soft  Tenderness: There is no abdominal tenderness  There is no guarding  Musculoskeletal:      Right lower leg: No edema  Left lower leg: No edema  Skin:     General: Skin is warm  Capillary Refill: Capillary refill takes less than 2 seconds  Neurological:      General: No focal deficit present  Mental Status: He is alert and oriented to person, place, and time  Mental status is at baseline  Cranial Nerves: No cranial nerve deficit  Psychiatric:         Mood and Affect: Mood normal              Labs: I have personally reviewed pertinent lab results    Laboratory and Diagnostics  Results from last 7 days   Lab Units 06/25/23  0847 06/24/23 1955 06/24/23  1332 06/24/23  0541 06/22/23  1438   WBC Thousand/uL 4 61  --  5 25 4 24* 5 03   HEMOGLOBIN g/dL 9 0*  9 2* 9 9* 10 2* 10 5* 12 4   HEMATOCRIT % 26 5*  26 5* 28 0* 29 3* 29 9* 35 6*   PLATELETS Thousands/uL 90*  --  92* 82* 133*   NEUTROS PCT %  --   --  81*  --  65   MONOS PCT %  --   --  7  --  10   EOS PCT %  --   --  1  --  3     Results from last 7 days   Lab Units 06/25/23  0847 06/24/23  1332 06/24/23  0541 06/23/23  0438 06/22/23  1438   SODIUM mmol/L 133* 131* 133* 135 133*   POTASSIUM mmol/L 4 0 3 7 3 5 4 3 4 5   CHLORIDE mmol/L 101 100 101 103 102   CO2 mmol/L 31 26 27 30 26   ANION GAP mmol/L 1 5 5 2 5   BUN mg/dL 8 10 10 11 10   CREATININE mg/dL 0 92 0 92 0 89 0 98 0 98   CALCIUM mg/dL 8 6 8 8 9 0 9 0 9 7   GLUCOSE RANDOM mg/dL 136 207* 119 106 94   ALT U/L  --  54 56 61 84*   AST U/L  --  77* 88* 105* 143*   ALK PHOS U/L  --  77 78 76 92   ALBUMIN g/dL  --  3 4* 3 3* 3 3* 4 0   TOTAL BILIRUBIN mg/dL  --  0 80 0 77 0 97 0 44     Results from last 7 days   Lab Units 06/25/23  0847 06/24/23  1332 06/24/23  0541 06/23/23  0436   MAGNESIUM mg/dL 2 1 1 5* 1 8 1 3*   PHOSPHORUS mg/dL  --   --   --  3 2      Results from last 7 days   Lab Units 06/25/23  0815 "06/24/23  0541 06/23/23  2246 06/23/23  1335 06/23/23  0400 06/22/23  2121   INR   --   --   --   --   --  1 02   PTT seconds 28 64* 67* 98* 201* 28          Results from last 7 days   Lab Units 06/24/23  1332   LACTIC ACID mmol/L 2 0                 Results from last 7 days   Lab Units 06/22/23  1438   D-DIMER QUANTITATIVE ug/ml FEU 1 39*           ABG:       Micro:        Imaging and other studies: I have personally reviewed pertinent reports  and I have personally reviewed pertinent films in PACS     CTA PE study 06/22/2023: No central pulmonary emboli  The peripheral branch arteries are not well opacified  There are tiny filling defects in the far peripheral branch arteries supplying the right lower lobe     Pulmonary function testing: None available    EKG, Pathology, and Other Studies: I have personally reviewed pertinent reports  TTE 06/23/2023: LVEF 60%  Mild MR  Trace TR  Code Status: Level 1 - Full Code    VTE Pharmacologic Prophylaxis: Heparin drip on hold  VTE Mechanical Prophylaxis: sequential compression device    Disclaimer: Portions of the record may have been created with voice recognition software  Occasional wrong word or \"sound a like\" substitutions may have occurred due to the inherent limitations of voice recognition software  Careful consideration should be taken to recognize, using context, where substitutions have occurred      Lena Montanez MD   Pulmonary/Critical Care Fellowship PGY-4  [facility]     "

## 2023-06-25 NOTE — ASSESSMENT & PLAN NOTE
"· Complained of low back pain radiating to the sides of the abdomen after leaning in his recliner chair yesterday - pain progressively worsened overnight  · CT A/P without contrast - \"Findings suspicious for crural hematoma more prominent on the right  This would raise concern for underlying diaphragmatic injury  New soft tissue fullness in the partially visualized posterior mediastinum extending to the left  This is incompletely visualized  Question related to extension of hematoma into the posterior mediastinum  \"  · CT C/A/P with contrast - \"Redemonstrated mildly asymmetric bilateral crural thickening measuring up to 3 cm on the right and 2 cm on the left in maximal thickness with associated stranding suspicious for crural hematomas  Extension of small volume hematoma superiorly into the posterior mediastinum at the level of the distal esophagus  No leodan diaphragmatic discontinuity or intrathoracic herniation of abdominal organs to suggest rupture  \"  · Drop in Hb from 12 4 to 9 9  · Discussed with trauma - ct to monitor off heparin drip, monitor H/H, may restart heparin gtt if Hb stable vs holding off AC  · Trauma input appreciated  · Pulm consult - ?  Need for [facility] for small PE  "

## 2023-06-26 LAB
ANION GAP SERPL CALCULATED.3IONS-SCNC: 3 MMOL/L
APTT PPP: 37 SECONDS (ref 23–37)
APTT PPP: 99 SECONDS (ref 23–37)
BASOPHILS # BLD AUTO: 0.02 THOUSANDS/ÂΜL (ref 0–0.1)
BASOPHILS NFR BLD AUTO: 1 % (ref 0–1)
BUN SERPL-MCNC: 10 MG/DL (ref 5–25)
CALCIUM SERPL-MCNC: 8.4 MG/DL (ref 8.3–10.1)
CHLORIDE SERPL-SCNC: 104 MMOL/L (ref 96–108)
CO2 SERPL-SCNC: 27 MMOL/L (ref 21–32)
CREAT SERPL-MCNC: 0.78 MG/DL (ref 0.6–1.3)
EOSINOPHIL # BLD AUTO: 0.18 THOUSAND/ÂΜL (ref 0–0.61)
EOSINOPHIL NFR BLD AUTO: 4 % (ref 0–6)
ERYTHROCYTE [DISTWIDTH] IN BLOOD BY AUTOMATED COUNT: 12.7 % (ref 11.6–15.1)
ERYTHROCYTE [DISTWIDTH] IN BLOOD BY AUTOMATED COUNT: 12.8 % (ref 11.6–15.1)
GFR SERPL CREATININE-BSD FRML MDRD: 94 ML/MIN/1.73SQ M
GLUCOSE SERPL-MCNC: 100 MG/DL (ref 65–140)
HCT VFR BLD AUTO: 24.2 % (ref 36.5–49.3)
HCT VFR BLD AUTO: 24.6 % (ref 36.5–49.3)
HCT VFR BLD AUTO: 25.5 % (ref 36.5–49.3)
HCT VFR BLD AUTO: 27.1 % (ref 36.5–49.3)
HGB BLD-MCNC: 8.2 G/DL (ref 12–17)
HGB BLD-MCNC: 8.6 G/DL (ref 12–17)
HGB BLD-MCNC: 8.7 G/DL (ref 12–17)
HGB BLD-MCNC: 9 G/DL (ref 12–17)
IMM GRANULOCYTES # BLD AUTO: 0.03 THOUSAND/UL (ref 0–0.2)
IMM GRANULOCYTES NFR BLD AUTO: 1 % (ref 0–2)
LYMPHOCYTES # BLD AUTO: 0.81 THOUSANDS/ÂΜL (ref 0.6–4.47)
LYMPHOCYTES NFR BLD AUTO: 19 % (ref 14–44)
MCH RBC QN AUTO: 32.1 PG (ref 26.8–34.3)
MCH RBC QN AUTO: 32.2 PG (ref 26.8–34.3)
MCHC RBC AUTO-ENTMCNC: 33.7 G/DL (ref 31.4–37.4)
MCHC RBC AUTO-ENTMCNC: 33.9 G/DL (ref 31.4–37.4)
MCV RBC AUTO: 95 FL (ref 82–98)
MCV RBC AUTO: 95 FL (ref 82–98)
MONOCYTES # BLD AUTO: 0.47 THOUSAND/ÂΜL (ref 0.17–1.22)
MONOCYTES NFR BLD AUTO: 11 % (ref 4–12)
NEUTROPHILS # BLD AUTO: 2.74 THOUSANDS/ÂΜL (ref 1.85–7.62)
NEUTS SEG NFR BLD AUTO: 64 % (ref 43–75)
NRBC BLD AUTO-RTO: 0 /100 WBCS
PLATELET # BLD AUTO: 75 THOUSANDS/UL (ref 149–390)
PLATELET # BLD AUTO: 90 THOUSANDS/UL (ref 149–390)
PMV BLD AUTO: 9.6 FL (ref 8.9–12.7)
PMV BLD AUTO: 9.7 FL (ref 8.9–12.7)
POTASSIUM SERPL-SCNC: 3.7 MMOL/L (ref 3.5–5.3)
RBC # BLD AUTO: 2.55 MILLION/UL (ref 3.88–5.62)
RBC # BLD AUTO: 2.68 MILLION/UL (ref 3.88–5.62)
SODIUM SERPL-SCNC: 134 MMOL/L (ref 135–147)
WBC # BLD AUTO: 4.25 THOUSAND/UL (ref 4.31–10.16)
WBC # BLD AUTO: 4.26 THOUSAND/UL (ref 4.31–10.16)

## 2023-06-26 PROCEDURE — 85014 HEMATOCRIT: CPT | Performed by: INTERNAL MEDICINE

## 2023-06-26 PROCEDURE — 85730 THROMBOPLASTIN TIME PARTIAL: CPT | Performed by: INTERNAL MEDICINE

## 2023-06-26 PROCEDURE — 80048 BASIC METABOLIC PNL TOTAL CA: CPT | Performed by: INTERNAL MEDICINE

## 2023-06-26 PROCEDURE — 99232 SBSQ HOSP IP/OBS MODERATE 35: CPT | Performed by: INTERNAL MEDICINE

## 2023-06-26 PROCEDURE — 85018 HEMOGLOBIN: CPT | Performed by: INTERNAL MEDICINE

## 2023-06-26 PROCEDURE — 85027 COMPLETE CBC AUTOMATED: CPT | Performed by: INTERNAL MEDICINE

## 2023-06-26 PROCEDURE — 85025 COMPLETE CBC W/AUTO DIFF WBC: CPT | Performed by: INTERNAL MEDICINE

## 2023-06-26 PROCEDURE — 99233 SBSQ HOSP IP/OBS HIGH 50: CPT | Performed by: NURSE PRACTITIONER

## 2023-06-26 RX ADMIN — METHOCARBAMOL 750 MG: 750 TABLET ORAL at 21:38

## 2023-06-26 RX ADMIN — MELATONIN TAB 3 MG 3 MG: 3 TAB at 21:38

## 2023-06-26 RX ADMIN — HEPARIN SODIUM 18 UNITS/KG/HR: 10000 INJECTION, SOLUTION INTRAVENOUS at 01:07

## 2023-06-26 RX ADMIN — LOSARTAN POTASSIUM 50 MG: 50 TABLET, FILM COATED ORAL at 08:46

## 2023-06-26 RX ADMIN — Medication 1 TABLET: at 08:46

## 2023-06-26 RX ADMIN — FOLIC ACID 1 MG: 1 TABLET ORAL at 08:46

## 2023-06-26 RX ADMIN — THIAMINE HCL TAB 100 MG 100 MG: 100 TAB at 08:46

## 2023-06-26 NOTE — PLAN OF CARE
Problem: PAIN - ADULT  Goal: Verbalizes/displays adequate comfort level or baseline comfort level  Description: Interventions:  - Encourage patient to monitor pain and request assistance  - Assess pain using appropriate pain scale  - Administer analgesics based on type and severity of pain and evaluate response  - Implement non-pharmacological measures as appropriate and evaluate response  - Consider cultural and social influences on pain and pain management  - Notify physician/advanced practitioner if interventions unsuccessful or patient reports new pain  Outcome: Progressing     Problem: INFECTION - ADULT  Goal: Absence or prevention of progression during hospitalization  Description: INTERVENTIONS:  - Assess and monitor for signs and symptoms of infection  - Monitor lab/diagnostic results  - Monitor all insertion sites, i e  indwelling lines, tubes, and drains  - Monitor endotracheal if appropriate and nasal secretions for changes in amount and color  - [place] appropriate cooling/warming therapies per order  - Administer medications as ordered  - Instruct and encourage patient and family to use good hand hygiene technique  - Identify and instruct in appropriate isolation precautions for identified infection/condition  Outcome: Progressing  Goal: Absence of fever/infection during neutropenic period  Description: INTERVENTIONS:  - Monitor WBC    Outcome: Progressing     Problem: DISCHARGE PLANNING  Goal: Discharge to home or other facility with appropriate resources  Description: INTERVENTIONS:  - Identify barriers to discharge w/patient and caregiver  - Arrange for needed discharge resources and transportation as appropriate  - Identify discharge learning needs (meds, wound care, etc )  - Arrange for interpretive services to assist at discharge as needed  - Refer to Case Management Department for coordinating discharge planning if the patient needs post-hospital services based on physician/advanced practitioner order or complex needs related to functional status, cognitive ability, or social support system  Outcome: Progressing

## 2023-06-26 NOTE — PROGRESS NOTES
"[address] [facility]  Progress Note  Name: Alex Guerra  MRN: [id number]  Unit/Bed#: [facility] 821-01 I Date of Admission: 6/22/2023   Date of Service: 6/26/2023 I Hospital Day: 4    Assessment/Plan   Diaphragmatic hematoma  Assessment & Plan  - Recent history of multiple falls, admitted for rib fractures in mid May, recent fall on 6/21 and presented to the trauma clinic for evaluation, was tachycardic and sent to the ED for syncope/orthostatic hypotension work-up, initial CT scans negative for acute traumatic injury but positive for possible small PEs  - 6/24 CT A/P: Finding suspicious for pleural hematoma more prominent on the right, concern for underlying diaphragmatic injury, new soft tissue fullness and partially visualized posterior mediastinum extending to the left  - 6/24 CT C/A/P PO/IV: Mildly asymmetric b/l  pleural thickening R 3cm and L 2cm, associated stranding s/f crural hematomas, extension of small volume hematoma superiorly into posterior mediastinum, no leodan diaphragmatic discontinuity or evidence of rupture, hepatomegaly/hepatic steatosis, splenomegaly  -Hemoglobin continues to slowly downtrend today to 8 2 from 8 4 yesterday and 9 0 prior   -Continue to monitor hemoglobin  Pulmonary embolism [facility])  Assessment & Plan  - 6/22 CTA PE shows No central PE but small filling defects in far peripheral fourth and fifth order branch artery supplying RLL, RV/LV ratio 0 9  -May continue heparin drip if pulmonary believes PE should be treated with anticoagulation    -Continue to trend hemoglobin  - Pulmonary consulted and note appreciated  May consider oral anticoagulation once hemoglobin is stable  Bowel Regimen: Per medicine  VTE Prophylaxis:Sequential compression device (Venodyne)  and Heparin     Disposition: Trauma will continue to follow until hemoglobin stabilizes      Subjective   Chief Complaint: \"I want to go home\"    Subjective: Patient complains of wanting " to go home this morning  Upon further questioning he does notice soreness in his back  He denies any abdominal pain, nausea, vomiting, shortness of breath, difficulty breathing, pain with inspiration, chest pain, palpitations  Patient denies any other complaints this morning besides back pain  He associates his back pain with sitting in the chair and laying in bed too much  He notes his back pain to be on the left side and is not particularly tender  Objective   Vitals:   Temp:  [98 °F (36 7 °C)-98 5 °F (36 9 °C)] 98 2 °F (36 8 °C)  HR:  [76-96] 76  Resp:  [15-18] 16  BP: (115-136)/(64-82) 133/82    I/O       06/24 0701  06/25 0700 06/25 0701 06/26 0700 06/26 0701 06/27 0700    P  O  320 642 520    Total Intake(mL/kg) 320 (2 9) 642 (5 8) 520 (4 7)    Net +320 +642 +520           Unmeasured Urine Occurrence 4 x 2 x            Physical Exam:   GENERAL APPEARANCE: No acute distress  NEURO: GCS is 15  HEENT: Normocephalic, atraumatic  Neck supple  CV: Regular rate and rhythm   +2 radial dorsalis pedis pulses, bilaterally  LUNGS: Angelica Crafts to auscultation, bilaterally  Chest wall is nontender  Chest wall is nontender  No deformities  GI: Abdomen is soft nontender  : Pelvis stable  MSK: Moving all extremities  No deformities  Back is nontender  SKIN: Warm, dry      Invasive Devices     Peripheral Intravenous Line  Duration           Peripheral IV 06/24/23 Left;Proximal;Ventral (anterior) Forearm 1 day                      Lab Results:   Results: I have personally reviewed all pertinent laboratory/tests results, BMP/CMP:   Lab Results   Component Value Date    SODIUM 134 (L) 06/26/2023    K 3 7 06/26/2023     06/26/2023    CO2 27 06/26/2023    BUN 10 06/26/2023    CREATININE 0 78 06/26/2023    CALCIUM 8 4 06/26/2023    EGFR 94 06/26/2023    and CBC:   Lab Results   Component Value Date    WBC 4 25 (L) 06/26/2023    HGB 8 6 (L) 06/26/2023    HCT 25 5 (L) 06/26/2023    MCV 95 06/26/2023    PLT 90 (L) 06/26/2023    RBC 2 68 (L) 06/26/2023    MCH 32 1 06/26/2023    MCHC 33 7 06/26/2023    RDW 12 7 06/26/2023    MPV 9 7 06/26/2023    NRBC 0 06/26/2023     Imaging: I have personally reviewed pertinent reports       Other Studies: none

## 2023-06-26 NOTE — PLAN OF CARE
Problem: MOBILITY - ADULT  Goal: Maintain or return to baseline ADL function  Description: INTERVENTIONS:  -  Assess patient's ability to carry out ADLs; assess patient's baseline for ADL function and identify physical deficits which impact ability to perform ADLs (bathing, care of mouth/teeth, toileting, grooming, dressing, etc )  - Assess/evaluate cause of self-care deficits   - Assess range of motion  - Assess patient's mobility; develop plan if impaired  - Assess patient's need for assistive devices and provide as appropriate  - Encourage maximum independence but intervene and supervise when necessary  - Involve family in performance of ADLs  - Assess for home care needs following discharge   - Consider OT consult to assist with ADL evaluation and planning for discharge  - Provide patient education as appropriate  Outcome: Progressing  Goal: Maintains/Returns to pre admission functional level  Description: INTERVENTIONS:  - Perform BMAT or MOVE assessment daily    - Set and communicate daily mobility goal to care team and patient/family/caregiver  - Collaborate with rehabilitation services on mobility goals if consulted  - Perform Range of [place] times a day  - Reposition patient every  hours    - Dangle patient  times a day  - Stand patient  times a day  - Ambulate patient  times a day  - Out of bed to chair  times a day   - Out of bed for meals  times a day  - Out of bed for toileting  - Record patient progress and toleration of activity level   Outcome: Progressing     Problem: PAIN - ADULT  Goal: Verbalizes/displays adequate comfort level or baseline comfort level  Description: Interventions:  - Encourage patient to monitor pain and request assistance  - Assess pain using appropriate pain scale  - Administer analgesics based on type and severity of pain and evaluate response  - Implement non-pharmacological measures as appropriate and evaluate response  - Consider cultural and social influences on pain and pain management  - Notify physician/advanced practitioner if interventions unsuccessful or patient reports new pain  Outcome: Progressing     Problem: INFECTION - ADULT  Goal: Absence or prevention of progression during hospitalization  Description: INTERVENTIONS:  - Assess and monitor for signs and symptoms of infection  - Monitor lab/diagnostic results  - Monitor all insertion sites, i e  indwelling lines, tubes, and drains  - Monitor endotracheal if appropriate and nasal secretions for changes in amount and color  - [place] appropriate cooling/warming therapies per order  - Administer medications as ordered  - Instruct and encourage patient and family to use good hand hygiene technique  - Identify and instruct in appropriate isolation precautions for identified infection/condition  Outcome: Progressing  Goal: Absence of fever/infection during neutropenic period  Description: INTERVENTIONS:  - Monitor WBC    Outcome: Progressing     Problem: SAFETY ADULT  Goal: Maintain or return to baseline ADL function  Description: INTERVENTIONS:  -  Assess patient's ability to carry out ADLs; assess patient's baseline for ADL function and identify physical deficits which impact ability to perform ADLs (bathing, care of mouth/teeth, toileting, grooming, dressing, etc )  - Assess/evaluate cause of self-care deficits   - Assess range of motion  - Assess patient's mobility; develop plan if impaired  - Assess patient's need for assistive devices and provide as appropriate  - Encourage maximum independence but intervene and supervise when necessary  - Involve family in performance of ADLs  - Assess for home care needs following discharge   - Consider OT consult to assist with ADL evaluation and planning for discharge  - Provide patient education as appropriate  Outcome: Progressing  Goal: Maintains/Returns to pre admission functional level  Description: INTERVENTIONS:  - Perform BMAT or MOVE assessment daily    - Set and communicate daily mobility goal to care team and patient/family/caregiver  - Collaborate with rehabilitation services on mobility goals if consulted  - Perform Range of Motion  times a day  - Reposition patient every  hours    - Dangle patient  times a day  - Stand patient  times a day  - Ambulate patient  times a day  - Out of bed to chair  times a day   - Out of bed for meals  times a day  - Out of bed for toileting  - Record patient progress and toleration of activity level   Outcome: Progressing  Goal: Patient will remain free of falls  Description: INTERVENTIONS:  - Educate patient/family on patient safety including physical limitations  - Instruct patient to call for assistance with activity   - Consult OT/PT to assist with strengthening/mobility   - Keep Call bell within reach  - Keep bed low and locked with side rails adjusted as appropriate  - Keep care items and personal belongings within reach  - Initiate and maintain comfort rounds  - Make Fall Risk Sign visible to staff  - Offer Toileting every  Hours, in advance of need  - Initiate/Maintain alarm  - Obtain necessary fall risk management equipment:   - Apply yellow socks and bracelet for high fall risk patients  - Consider moving patient to room near nurses station  Outcome: Progressing     Problem: DISCHARGE PLANNING  Goal: Discharge to home or other facility with appropriate resources  Description: INTERVENTIONS:  - Identify barriers to discharge w/patient and caregiver  - Arrange for needed discharge resources and transportation as appropriate  - Identify discharge learning needs (meds, wound care, etc )  - Arrange for interpretive services to assist at discharge as needed  - Refer to Case Management Department for coordinating discharge planning if the patient needs post-hospital services based on physician/advanced practitioner order or complex needs related to functional status, cognitive ability, or social support system  Outcome: Progressing Problem: Knowledge Deficit  Goal: Patient/family/caregiver demonstrates understanding of disease process, treatment plan, medications, and discharge instructions  Description: Complete learning assessment and assess knowledge base    Interventions:  - Provide teaching at level of understanding  - Provide teaching via preferred learning methods  Outcome: Progressing

## 2023-06-26 NOTE — PLAN OF CARE
Problem: PAIN - ADULT  Goal: Verbalizes/displays adequate comfort level or baseline comfort level  Description: Interventions:  - Encourage patient to monitor pain and request assistance  - Assess pain using appropriate pain scale  - Administer analgesics based on type and severity of pain and evaluate response  - Implement non-pharmacological measures as appropriate and evaluate response  - Consider cultural and social influences on pain and pain management  - Notify physician/advanced practitioner if interventions unsuccessful or patient reports new pain  Outcome: Progressing     Problem: SAFETY ADULT  Goal: Maintain or return to baseline ADL function  Description: INTERVENTIONS:  -  Assess patient's ability to carry out ADLs; assess patient's baseline for ADL function and identify physical deficits which impact ability to perform ADLs (bathing, care of mouth/teeth, toileting, grooming, dressing, etc )  - Assess/evaluate cause of self-care deficits   - Assess range of motion  - Assess patient's mobility; develop plan if impaired  - Assess patient's need for assistive devices and provide as appropriate  - Encourage maximum independence but intervene and supervise when necessary  - Involve family in performance of ADLs  - Assess for home care needs following discharge   - Consider OT consult to assist with ADL evaluation and planning for discharge  - Provide patient education as appropriate  Outcome: Progressing  Goal: Maintains/Returns to pre admission functional level  Description: INTERVENTIONS:  - Perform BMAT or MOVE assessment daily    - Set and communicate daily mobility goal to care team and patient/family/caregiver     - Collaborate with rehabilitation services on mobility goals if consulted  - Out of bed for toileting  - Record patient progress and toleration of activity level   Outcome: Progressing  Goal: Patient will remain free of falls  Description: INTERVENTIONS:  - Educate patient/family on patient safety including physical limitations  - Instruct patient to call for assistance with activity   - Consult OT/PT to assist with strengthening/mobility   - Keep Call bell within reach  - Keep bed low and locked with side rails adjusted as appropriate  - Keep care items and personal belongings within reach  - Initiate and maintain comfort rounds  - Make Fall Risk Sign visible to staff  - Apply yellow socks and bracelet for high fall risk patients  - Consider moving patient to room near nurses station  Outcome: Progressing     Problem: DISCHARGE PLANNING  Goal: Discharge to home or other facility with appropriate resources  Description: INTERVENTIONS:  - Identify barriers to discharge w/patient and caregiver  - Arrange for needed discharge resources and transportation as appropriate  - Identify discharge learning needs (meds, wound care, etc )  - Arrange for interpretive services to assist at discharge as needed  - Refer to Case Management Department for coordinating discharge planning if the patient needs post-hospital services based on physician/advanced practitioner order or complex needs related to functional status, cognitive ability, or social support system  Outcome: Progressing

## 2023-06-26 NOTE — UTILIZATION REVIEW
Continued Stay Review    Date: 6/24-26, 2023                       Current Patient Class: inpatient  Current Level of Care: med surg telemetry     HPI:68 y o  male initially admitted on 6/22/23    - 6/24 CT A/P: Finding suspicious for pleural hematoma more prominent on the right, concern for underlying diaphragmatic injury, new soft tissue fullness and partially visualized posterior mediastinum extending to the left  - 6/24 CT C/A/P PO/IV: Mildly asymmetric b/l  pleural thickening R 3cm and L 2cm, associated stranding s/f crural hematomas, extension of small volume hematoma superiorly into posterior mediastinum, no leodan diaphragmatic discontinuity or evidence of rupture, hepatomegaly/hepatic steatosis, splenomegaly    Assessment/Plan:  Pt with diaphragmatic hematoma after stretching followed by c/o low back pain  Trauma following pt, continue to monitor hgb, dropped from 12 4 to 10 2 on 6/24, followed by subsequent drop to 8 2on 6/26  Hold heparin drip  Possible pulmonary consult for PE and need for [facility]  Continue IVF, pain control, monitor labs and BP, orthostatics  Monitor electrolytes and replete prn  The patient will continue to require additional inpatient hospital stay due to diaphragmatic hematoma      Vital Signs:   Date/Time Temp Pulse Resp BP MAP (mmHg) SpO2 O2 Device Patient Position - Orthostatic VS   06/26/23 11:09:56 98 2 °F (36 8 °C) 76 16 133/82 99 94 % -- --   06/26/23 0712 -- -- -- -- -- -- None (Room air) --   06/26/23 07:06:49 98 3 °F (36 8 °C) -- 15 125/76 92 -- -- --   06/25/23 22:43:17 98 3 °F (36 8 °C) 84 18 136/82 100 92 % -- --   06/25/23 18:50:44 98 5 °F (36 9 °C) 88 18 124/77 93 95 % -- --   06/25/23 13:11:47 -- 96 16 115/67 83 98 % -- Sitting - Orthostatic VS   06/25/23 13:10:04 98 °F (36 7 °C) 93 16 119/68 85 97 % -- Sitting - Orthostatic VS   06/25/23 13:06:57 98 °F (36 7 °C) 96 16 117/64 82 98 % -- Lying - Orthostatic VS   06/25/23 11:11:13 97 9 °F (36 6 °C) 75 16 112/69 83 96 % -- -- 06/25/23 07:38:24 98 9 °F (37 2 °C) 79 15 104/63 77 92 % -- --   06/24/23 22:28:31 98 6 °F (37 °C) 92 20 113/74 87 94 % -- --   06/24/23 19:04:07 98 5 °F (36 9 °C) 99 18 146/86 106 95 % -- --   06/24/23 16:25:10 -- 90 20 141/76 98 97 % -- --   06/24/23 10:19:19 -- 107 Abnormal  -- 121/77 92 95 % -- Standing - Orthostatic VS   06/24/23 10:18:19 -- 98 -- 133/88 103 96 % -- Sitting - Orthostatic VS   06/24/23 10:17:29 -- 87 -- 133/78 96 96 % -- Lying - Orthostatic VS   06/24/23 07:22:28 98 °F (36 7 °C) 102 16 137/94 108 96 % -- --   06/24/23 03:04:07 97 6 °F (36 4 °C) 91 15 150/99 116 93 % -- --       Pertinent Labs/Diagnostic Results:       Results from last 7 days   Lab Units 06/26/23  1051 06/26/23  0400 06/25/23 2124 06/25/23  1439 06/25/23  0847 06/24/23  1955 06/24/23  1332 06/24/23  0541 06/22/23  1438   WBC Thousand/uL 4 25* 4 26*  --  4 72 4 61  --  5 25   < > 5 03   HEMOGLOBIN g/dL 8 6* 8 2* 8 4* 9 0* 9 0*  9 2*   < > 10 2*   < > 12 4   HEMATOCRIT % 25 5* 24 2* 24 6* 27 1* 26 5*  26 5*   < > 29 3*   < > 35 6*   PLATELETS Thousands/uL 90* 75*  --  92* 90*  --  92*   < > 133*   NEUTROS ABS Thousands/µL 2 74  --   --   --   --   --  4 30  --  3 30    < > = values in this interval not displayed           Results from last 7 days   Lab Units 06/26/23  0400 06/25/23  0847 06/24/23  1332 06/24/23  0541 06/23/23  0438 06/23/23  0436   SODIUM mmol/L 134* 133* 131* 133* 135  --    POTASSIUM mmol/L 3 7 4 0 3 7 3 5 4 3  --    CHLORIDE mmol/L 104 101 100 101 103  --    CO2 mmol/L 27 31 26 27 30  --    ANION GAP mmol/L 3 1 5 5 2  --    BUN mg/dL 10 8 10 10 11  --    CREATININE mg/dL 0 78 0 92 0 92 0 89 0 98  --    EGFR ml/min/1 73sq m 94 86 86 89 80  --    CALCIUM mg/dL 8 4 8 6 8 8 9 0 9 0  --    MAGNESIUM mg/dL  --  2 1 1 5* 1 8  --  1 3*   PHOSPHORUS mg/dL  --   --   --   --   --  3 2     Results from last 7 days   Lab Units 06/24/23  1332 06/24/23  0541 06/23/23  0438 06/22/23  1438   AST U/L 77* 88* 105* 143*   ALT U/L 54 56 61 84*   ALK PHOS U/L 77 78 76 92   TOTAL PROTEIN g/dL 7 1 7 1 6 8 8 1   ALBUMIN g/dL 3 4* 3 3* 3 3* 4 0   TOTAL BILIRUBIN mg/dL 0 80 0 77 0 97 0 44         Results from last 7 days   Lab Units 06/26/23  0400 06/25/23  0847 06/24/23  1332 06/24/23  0541 06/23/23  0438 06/22/23  1438   GLUCOSE RANDOM mg/dL 100 136 207* 119 106 94     Results from last 7 days   Lab Units 06/22/23  1911 06/22/23  1705 06/22/23  1438   HS TNI 0HR ng/L  --   --  7   HS TNI 2HR ng/L  --  8  --    HSTNI D2 ng/L  --  1  --    HS TNI 4HR ng/L 9  --   --    HSTNI D4 ng/L 2  --   --      Results from last 7 days   Lab Units 06/22/23  1438   D-DIMER QUANTITATIVE ug/ml FEU 1 39*     Results from last 7 days   Lab Units 06/26/23  1051 06/26/23  0358 06/25/23  2124 06/25/23  1439 06/23/23  0400 06/22/23  2121   PROTIME seconds  --   --   --  13 6  --  13 6   INR   --   --   --  1 02  --  1 02   PTT seconds 37 99* 85* 29   < > 28    < > = values in this interval not displayed       Results from last 7 days   Lab Units 06/22/23  1438   TSH 3RD GENERATON uIU/mL 3 464         Results from last 7 days   Lab Units 06/24/23  1332   LACTIC ACID mmol/L 2 0     Results from last 7 days   Lab Units 06/22/23  2121   ETHANOL LVL mg/dL <3     Medications:   Scheduled Medications:  Diclofenac Sodium, 2 g, Topical, 4x Daily  folic acid, 1 mg, Oral, Daily  losartan, 50 mg, Oral, Daily  melatonin, 3 mg, Oral, HS  multivitamin-minerals, 1 tablet, Oral, Daily  thiamine, 100 mg, Oral, Daily      Continuous IV Infusions:    multi-electrolyte (PLASMALYTE-A/ISOLYTE-S PH 7 4) IV solution  Rate: 75 mL/hr Dose: 75 mL/hr  Freq: Continuous Route: IV  Indications of Use: IV Hydration  Last Dose: 75 mL/hr (06/25/23 0451)  Start: 06/23/23 1330 End: 06/25/23 1444     PRN Meds:  acetaminophen, 650 mg, Oral, Q6H PRN x1 thus far  HYDROmorphone, 0 2 mg, Intravenous, Q4H PRN x1 thus far  methocarbamol, 750 mg, Oral, Q6H PRN x4 thus far  oxyCODONE, 5 mg, Oral, Q4H PRN x2 thus far  oxyCODONE, 2 5 mg, Oral, Q4H PRN        Discharge Plan: tbd    Network Utilization Review Department  ATTENTION: Please call with any questions or concerns to [phone] and carefully listen to the prompts so that you are directed to the right person  All voicemails are confidential   Mike Veliz all requests for admission clinical reviews, approved or denied determinations and any other requests to dedicated fax number below belonging to the campus where the patient is receiving treatment   List of dedicated fax numbers for the Facilities:  [address] DENIALS (Administrative/Medical Necessity) [phone]   [address] (Maternity/NICU/Pediatrics) [phone]   [address] [place] [phone]   Suraj Hoyos [address] [phone]   [address] [place] [address] Bessie Winters 28 [phone]   [address] Sherry Gonsalves [address] 134 815 [facility] [phone] [Constipation] : constipation [Negative] : Heme/Lymph [FreeTextEntry7] : as per HPI [FreeTextEntry8] : Vaginal dryness

## 2023-06-27 LAB
ALBUMIN SERPL BCP-MCNC: 2.9 G/DL (ref 3.5–5)
ALP SERPL-CCNC: 65 U/L (ref 46–116)
ALT SERPL W P-5'-P-CCNC: 47 U/L (ref 12–78)
ANION GAP SERPL CALCULATED.3IONS-SCNC: 4 MMOL/L
APTT PPP: 28 SECONDS (ref 23–37)
APTT PPP: 56 SECONDS (ref 23–37)
AST SERPL W P-5'-P-CCNC: 71 U/L (ref 5–45)
BASOPHILS # BLD AUTO: 0.02 THOUSANDS/ÂΜL (ref 0–0.1)
BASOPHILS NFR BLD AUTO: 0 % (ref 0–1)
BILIRUB SERPL-MCNC: 0.56 MG/DL (ref 0.2–1)
BUN SERPL-MCNC: 9 MG/DL (ref 5–25)
CALCIUM ALBUM COR SERPL-MCNC: 9.8 MG/DL (ref 8.3–10.1)
CALCIUM SERPL-MCNC: 8.9 MG/DL (ref 8.3–10.1)
CHLORIDE SERPL-SCNC: 104 MMOL/L (ref 96–108)
CO2 SERPL-SCNC: 25 MMOL/L (ref 21–32)
CREAT SERPL-MCNC: 0.77 MG/DL (ref 0.6–1.3)
EOSINOPHIL # BLD AUTO: 0.2 THOUSAND/ÂΜL (ref 0–0.61)
EOSINOPHIL NFR BLD AUTO: 4 % (ref 0–6)
ERYTHROCYTE [DISTWIDTH] IN BLOOD BY AUTOMATED COUNT: 12.9 % (ref 11.6–15.1)
ERYTHROCYTE [DISTWIDTH] IN BLOOD BY AUTOMATED COUNT: 13.1 % (ref 11.6–15.1)
GFR SERPL CREATININE-BSD FRML MDRD: 94 ML/MIN/1.73SQ M
GLUCOSE SERPL-MCNC: 100 MG/DL (ref 65–140)
HCT VFR BLD AUTO: 24.6 % (ref 36.5–49.3)
HCT VFR BLD AUTO: 24.7 % (ref 36.5–49.3)
HCT VFR BLD AUTO: 25.4 % (ref 36.5–49.3)
HGB BLD-MCNC: 8.4 G/DL (ref 12–17)
HGB BLD-MCNC: 8.5 G/DL (ref 12–17)
HGB BLD-MCNC: 8.8 G/DL (ref 12–17)
IMM GRANULOCYTES # BLD AUTO: 0.03 THOUSAND/UL (ref 0–0.2)
IMM GRANULOCYTES NFR BLD AUTO: 1 % (ref 0–2)
INR PPP: 1.03 (ref 0.84–1.19)
LYMPHOCYTES # BLD AUTO: 0.74 THOUSANDS/ÂΜL (ref 0.6–4.47)
LYMPHOCYTES NFR BLD AUTO: 16 % (ref 14–44)
MAGNESIUM SERPL-MCNC: 1.7 MG/DL (ref 1.6–2.6)
MCH RBC QN AUTO: 32.9 PG (ref 26.8–34.3)
MCH RBC QN AUTO: 33.1 PG (ref 26.8–34.3)
MCHC RBC AUTO-ENTMCNC: 34.6 G/DL (ref 31.4–37.4)
MCHC RBC AUTO-ENTMCNC: 34.6 G/DL (ref 31.4–37.4)
MCV RBC AUTO: 95 FL (ref 82–98)
MCV RBC AUTO: 96 FL (ref 82–98)
MONOCYTES # BLD AUTO: 0.48 THOUSAND/ÂΜL (ref 0.17–1.22)
MONOCYTES NFR BLD AUTO: 11 % (ref 4–12)
NEUTROPHILS # BLD AUTO: 3.11 THOUSANDS/ÂΜL (ref 1.85–7.62)
NEUTS SEG NFR BLD AUTO: 68 % (ref 43–75)
NRBC BLD AUTO-RTO: 0 /100 WBCS
PLATELET # BLD AUTO: 102 THOUSANDS/UL (ref 149–390)
PLATELET # BLD AUTO: 95 THOUSANDS/UL (ref 149–390)
PMV BLD AUTO: 9.4 FL (ref 8.9–12.7)
PMV BLD AUTO: 9.6 FL (ref 8.9–12.7)
POTASSIUM SERPL-SCNC: 3.7 MMOL/L (ref 3.5–5.3)
PROT SERPL-MCNC: 6.2 G/DL (ref 6.4–8.4)
PROTHROMBIN TIME: 13.7 SECONDS (ref 11.6–14.5)
RBC # BLD AUTO: 2.58 MILLION/UL (ref 3.88–5.62)
RBC # BLD AUTO: 2.66 MILLION/UL (ref 3.88–5.62)
SODIUM SERPL-SCNC: 133 MMOL/L (ref 135–147)
WBC # BLD AUTO: 4.25 THOUSAND/UL (ref 4.31–10.16)
WBC # BLD AUTO: 4.58 THOUSAND/UL (ref 4.31–10.16)

## 2023-06-27 PROCEDURE — 85730 THROMBOPLASTIN TIME PARTIAL: CPT | Performed by: FAMILY MEDICINE

## 2023-06-27 PROCEDURE — 85025 COMPLETE CBC W/AUTO DIFF WBC: CPT | Performed by: INTERNAL MEDICINE

## 2023-06-27 PROCEDURE — 85610 PROTHROMBIN TIME: CPT | Performed by: FAMILY MEDICINE

## 2023-06-27 PROCEDURE — 83735 ASSAY OF MAGNESIUM: CPT | Performed by: INTERNAL MEDICINE

## 2023-06-27 PROCEDURE — 99232 SBSQ HOSP IP/OBS MODERATE 35: CPT | Performed by: PHYSICIAN ASSISTANT

## 2023-06-27 PROCEDURE — 80053 COMPREHEN METABOLIC PANEL: CPT | Performed by: INTERNAL MEDICINE

## 2023-06-27 PROCEDURE — 85018 HEMOGLOBIN: CPT | Performed by: FAMILY MEDICINE

## 2023-06-27 PROCEDURE — 85027 COMPLETE CBC AUTOMATED: CPT | Performed by: FAMILY MEDICINE

## 2023-06-27 PROCEDURE — 99232 SBSQ HOSP IP/OBS MODERATE 35: CPT | Performed by: FAMILY MEDICINE

## 2023-06-27 PROCEDURE — 94760 N-INVAS EAR/PLS OXIMETRY 1: CPT

## 2023-06-27 PROCEDURE — 85014 HEMATOCRIT: CPT | Performed by: FAMILY MEDICINE

## 2023-06-27 RX ORDER — HEPARIN SODIUM 10000 [USP'U]/100ML
3-30 INJECTION, SOLUTION INTRAVENOUS
Status: DISCONTINUED | OUTPATIENT
Start: 2023-06-27 | End: 2023-06-29

## 2023-06-27 RX ADMIN — FOLIC ACID 1 MG: 1 TABLET ORAL at 08:42

## 2023-06-27 RX ADMIN — MELATONIN TAB 3 MG 3 MG: 3 TAB at 21:48

## 2023-06-27 RX ADMIN — METHOCARBAMOL 750 MG: 750 TABLET ORAL at 21:48

## 2023-06-27 RX ADMIN — HEPARIN SODIUM 18 UNITS/KG/HR: 10000 INJECTION, SOLUTION INTRAVENOUS at 16:16

## 2023-06-27 RX ADMIN — THIAMINE HCL TAB 100 MG 100 MG: 100 TAB at 08:42

## 2023-06-27 RX ADMIN — LOSARTAN POTASSIUM 50 MG: 50 TABLET, FILM COATED ORAL at 08:42

## 2023-06-27 RX ADMIN — Medication 1 TABLET: at 08:42

## 2023-06-27 NOTE — PROGRESS NOTES
[address] [facility]  Progress Note  Name: Christa Dyson  MRN: [id number]  Unit/Bed#: [facility] 821-01 I Date of Admission: 6/22/2023   Date of Service: 6/27/2023 I Hospital Day: 5    Assessment/Plan   Diaphragmatic hematoma  Assessment & Plan  - Recent history of multiple falls, admitted for rib fractures in mid May, recent fall on 6/21 and presented to the trauma clinic for evaluation, was tachycardic and sent to the ED for syncope/orthostatic hypotension work-up, initial CT scans negative for acute traumatic injury but positive for possible small PEs  - 6/24 CT A/P: Finding suspicious for pleural hematoma more prominent on the right, concern for underlying diaphragmatic injury, new soft tissue fullness and partially visualized posterior mediastinum extending to the left  - 6/24 CT C/A/P PO/IV: Mildly asymmetric b/l  pleural thickening R 3cm and L 2cm, associated stranding s/f crural hematomas, extension of small volume hematoma superiorly into posterior mediastinum, no leodan diaphragmatic discontinuity or evidence of rupture, hepatomegaly/hepatic steatosis, splenomegaly      -Hemoglobin seems to have stabilized  Understand the need to anticoagulate given findings of PE's however, would recommend when medically stable if absolutely needed heparin gtt without bolus, but with caution and monitor hemoglobin closely, such as q6-12H and consider a repeat heme for any changes in hemodynamics and when becomes therapeutic on heparin gtt  If hemoglobin starts to decline again with restarting would stop heparin and consider other alternatives (holding anticoagulation, IVC filter may be consideration)   -if hemoglobin does not drop on heparin gtt, becomes therapeutic without re bleeding, then it may be feasible to transition to oral agent  -benefit vs risk discussion with patient, did discuss heparin can increase his chances of bleeding again which he understands   He understands this is a difficult clinical decision  Please reach out to trauma with questions/concerns  Can continue to follow if plan is to restart heparin gtt  Rib fractures  Assessment & Plan  Pain control  IS for deep breathing to help prevent atelectasis   Encourage mobilization          Disposition: med/surg    Subjective   Chief Complaint: some rib pain    Subjective: Patient states he is doing well  Pain controlled  No shortness of breath  No chest pain  No nausea/vomiting  Had back pain previously, related to muscle spasms in the back  This resolved on its own  Objective   Vitals:   Temp:  [98 °F (36 7 °C)-98 8 °F (37 1 °C)] 98 °F (36 7 °C)  HR:  [76-93] 76  Resp:  [16-17] 16  BP: (113-146)/(67-91) 126/83    I/O       06/25 0701 06/26 0700 06/26 0701 06/27 0700 06/27 0701 06/28 0700    P  O  642 760 300    Total Intake(mL/kg) 642 (5 8) 760 (6 8) 300 (2 7)    Net +642 +760 +300           Unmeasured Urine Occurrence 2 x             Physical Exam:   GENERAL APPEARANCE: awake, alert  NEURO: GCS 15  HEENT: normocephalic   CV: regular rate  LUNGS: clear with good inspiratory effort bilateral   GI: abdomen soft, nontender  MSK: normal ROM, no edema or tenderness       Invasive Devices     Peripheral Intravenous Line  Duration           Peripheral IV 06/24/23 Left;Proximal;Ventral (anterior) Forearm 2 days                      Lab Results:   CBC:   Lab Results   Component Value Date    WBC 4 58 06/27/2023    HGB 8 5 (L) 06/27/2023    HCT 24 6 (L) 06/27/2023    MCV 95 06/27/2023     (L) 06/27/2023    RBC 2 58 (L) 06/27/2023    MCH 32 9 06/27/2023    MCHC 34 6 06/27/2023    RDW 12 9 06/27/2023    MPV 9 6 06/27/2023    NRBC 0 06/27/2023     Imaging: I have personally reviewed pertinent reports

## 2023-06-27 NOTE — PLAN OF CARE
Problem: PAIN - ADULT  Goal: Verbalizes/displays adequate comfort level or baseline comfort level  Description: Interventions:  - Encourage patient to monitor pain and request assistance  - Assess pain using appropriate pain scale  - Administer analgesics based on type and severity of pain and evaluate response  - Implement non-pharmacological measures as appropriate and evaluate response  - Consider cultural and social influences on pain and pain management  - Notify physician/advanced practitioner if interventions unsuccessful or patient reports new pain  Outcome: Progressing       Problem: DISCHARGE PLANNING  Goal: Discharge to home or other facility with appropriate resources  Description: INTERVENTIONS:  - Identify barriers to discharge w/patient and caregiver  - Arrange for needed discharge resources and transportation as appropriate  - Identify discharge learning needs (meds, wound care, etc )  - Arrange for interpretive services to assist at discharge as needed  - Refer to Case Management Department for coordinating discharge planning if the patient needs post-hospital services based on physician/advanced practitioner order or complex needs related to functional status, cognitive ability, or social support system  Outcome: Progressing     Problem: METABOLIC, FLUID AND ELECTROLYTES - ADULT  Goal: Electrolytes maintained within normal limits  Description: INTERVENTIONS:  - Monitor labs and assess patient for signs and symptoms of electrolyte imbalances  - Administer electrolyte replacement as ordered  - Monitor response to electrolyte replacements, including repeat lab results as appropriate  - Instruct patient on fluid and nutrition as appropriate  Outcome: Progressing

## 2023-06-27 NOTE — ASSESSMENT & PLAN NOTE
- Recent history of multiple falls, admitted for rib fractures in mid May, recent fall on 6/21 and presented to the trauma clinic for evaluation, was tachycardic and sent to the ED for syncope/orthostatic hypotension work-up, initial CT scans negative for acute traumatic injury but positive for possible small PEs  - 6/24 CT A/P: Finding suspicious for pleural hematoma more prominent on the right, concern for underlying diaphragmatic injury, new soft tissue fullness and partially visualized posterior mediastinum extending to the left  - 6/24 CT C/A/P PO/IV: Mildly asymmetric b/l  pleural thickening R 3cm and L 2cm, associated stranding s/f crural hematomas, extension of small volume hematoma superiorly into posterior mediastinum, no leodan diaphragmatic discontinuity or evidence of rupture, hepatomegaly/hepatic steatosis, splenomegaly      -Hemoglobin seems to have stabilized  Understand the need to anticoagulate given findings of PE's however, would recommend when medically stable if absolutely needed heparin gtt without bolus, but with caution and monitor hemoglobin closely, such as q6-12H and consider a repeat heme for any changes in hemodynamics and when becomes therapeutic on heparin gtt  If hemoglobin starts to decline again with restarting would stop heparin and consider other alternatives (holding anticoagulation, IVC filter may be consideration)   -if hemoglobin does not drop on heparin gtt, becomes therapeutic without re bleeding, then it may be feasible to transition to oral agent  -benefit vs risk discussion with patient, did discuss heparin can increase his chances of bleeding again which he understands  He understands this is a difficult clinical decision  Please reach out to trauma with questions/concerns  Can continue to follow if plan is to restart heparin gtt

## 2023-06-27 NOTE — ASSESSMENT & PLAN NOTE
"· Complained of low back pain radiating to the sides of the abdomen after leaning in his recliner chair yesterday - pain progressively worsened overnight  · CT A/P without contrast - \"Findings suspicious for crural hematoma more prominent on the right  This would raise concern for underlying diaphragmatic injury  New soft tissue fullness in the partially visualized posterior mediastinum extending to the left  This is incompletely visualized  Question related to extension of hematoma into the posterior mediastinum  \"  · CT C/A/P with contrast - \"Redemonstrated mildly asymmetric bilateral crural thickening measuring up to 3 cm on the right and 2 cm on the left in maximal thickness with associated stranding suspicious for crural hematomas  Extension of small volume hematoma superiorly into the posterior mediastinum at the level of the distal esophagus  No leodan diaphragmatic discontinuity or intrathoracic herniation of abdominal organs to suggest rupture  \"  · Drop in Hb from 12 4 to 8 2 today   · Heparin drip held again   · Discussed with trauma  "

## 2023-06-27 NOTE — PLAN OF CARE
Problem: PAIN - ADULT  Goal: Verbalizes/displays adequate comfort level or baseline comfort level  Description: Interventions:  - Encourage patient to monitor pain and request assistance  - Assess pain using appropriate pain scale  - Administer analgesics based on type and severity of pain and evaluate response  - Implement non-pharmacological measures as appropriate and evaluate response  - Consider cultural and social influences on pain and pain management  - Notify physician/advanced practitioner if interventions unsuccessful or patient reports new pain  Outcome: Progressing     Problem: INFECTION - ADULT  Goal: Absence or prevention of progression during hospitalization  Description: INTERVENTIONS:  - Assess and monitor for signs and symptoms of infection  - Monitor lab/diagnostic results  - Monitor all insertion sites, i e  indwelling lines, tubes, and drains  - Monitor endotracheal if appropriate and nasal secretions for changes in amount and color  - [place] appropriate cooling/warming therapies per order  - Administer medications as ordered  - Instruct and encourage patient and family to use good hand hygiene technique  - Identify and instruct in appropriate isolation precautions for identified infection/condition  Outcome: Progressing     Problem: DISCHARGE PLANNING  Goal: Discharge to home or other facility with appropriate resources  Description: INTERVENTIONS:  - Identify barriers to discharge w/patient and caregiver  - Arrange for needed discharge resources and transportation as appropriate  - Identify discharge learning needs (meds, wound care, etc )  - Arrange for interpretive services to assist at discharge as needed  - Refer to Case Management Department for coordinating discharge planning if the patient needs post-hospital services based on physician/advanced practitioner order or complex needs related to functional status, cognitive ability, or social support system  Outcome: Progressing Problem: Knowledge Deficit  Goal: Patient/family/caregiver demonstrates understanding of disease process, treatment plan, medications, and discharge instructions  Description: Complete learning assessment and assess knowledge base    Interventions:  - Provide teaching at level of understanding  - Provide teaching via preferred learning methods  Outcome: Progressing     Problem: CARDIOVASCULAR - ADULT  Goal: Maintains optimal cardiac output and hemodynamic stability  Description: INTERVENTIONS:  - Monitor I/O, vital signs and rhythm  - Monitor for S/S and trends of decreased cardiac output  - Administer and titrate ordered vasoactive medications to optimize hemodynamic stability  - Assess quality of pulses, skin color and temperature  - Assess for signs of decreased coronary artery perfusion  - Instruct patient to report change in severity of symptoms  Outcome: Progressing     Problem: METABOLIC, FLUID AND ELECTROLYTES - ADULT  Goal: Electrolytes maintained within normal limits  Description: INTERVENTIONS:  - Monitor labs and assess patient for signs and symptoms of electrolyte imbalances  - Administer electrolyte replacement as ordered  - Monitor response to electrolyte replacements, including repeat lab results as appropriate  - Instruct patient on fluid and nutrition as appropriate  Outcome: Progressing

## 2023-06-27 NOTE — ASSESSMENT & PLAN NOTE
· Home regimen: Amlodipine 5 mg daily, losartan 50 mg daily, triamterene-HCTZ 37 5/25 mg daily  · Amlodipine and triamterene HCTZ held   · Continue Losartan 50 mg daily  · Had orthostatic hypotension which has resolved  · BP acceptable  · Monitor BP

## 2023-06-27 NOTE — ASSESSMENT & PLAN NOTE
"· Presentation as above  · CTA chest PE study - \"No central pulmonary emboli  The peripheral branch arteries are not well opacified  There are small filling defects in the far peripheral fourth and fifth order branch arteries supplying the right lower lobe  \"  · Lower extremity venous doppler - no DVT  · Heparin drip held on 6/24/23 due to diaphragmatic hematoma and restarted on 6/26 after Hb noted to be stable   · Eliquis price checked with pharmacy - no cost to patient  · Discussed with pulmonology on 6/25 - anticoagulation recommended for current PE unless contraindicated  · Hb dropped to 8 2 this and heparin drip held  · Discussed with trauma - on intervention needed at present, ok to monitor Hb, keep off anticoagulation for now   · If Hb remains stable can attempt heparin drip again if ok with trauma and can be transitioned to Eliquis if Hb remains stable in heparin drip but based on response to heparin drip to date may have to keep off anticoagulation  "

## 2023-06-27 NOTE — ASSESSMENT & PLAN NOTE
"· Fall on 5/14/23 when he stood up quickly from the seated position, felt lightheaded and fell   · CT chest (5/14/23) - \"Nondisplaced fractures of the right anterior seventh and eighth ribs  \"  · Braden Ku again on 6/21/23 and was seen in the trauma office as follow-up from previous injury on 6/22/23 - was told to go to the ED  · Presyncope noted in the days following his therapeutic phlebotomy   · Noted to be tachycardic in the trauma office which resolved with IVF's  · Likely hypovolemia with orthostatic hypotension from therapeutic phlebotomy and decreased oral hydration in the setting of diuretic use for hypertension  · Home Dyazide held and received IVF's with resolution of orthostatic hypotension/tachycardia  · Echo - EF 60%  · Telemetry - no arrhythmia  · 2 week Zio patch arranged by cardiology   Patient should arrange pickup at [address] [place] [address] in [place]    "

## 2023-06-27 NOTE — PROGRESS NOTES
"[facility]  Progress Note  Name: Judy David  MRN: [id number]  Unit/Bed#: [facility] 821-01 I Date of Admission: 6/22/2023   Date of Service: 6/27/2023 I Hospital Day: 5    Assessment/Plan   * Recurrent falls - possible presyncope with orthostatic hypotension  Assessment & Plan  · Fall on 5/14/23 when he stood up quickly from the seated position, felt lightheaded and fell   · CT chest (5/14/23) - \"Nondisplaced fractures of the right anterior seventh and eighth ribs  \"  · Elvin Mccauley again on 6/21/23 and was seen in the trauma office as follow-up from previous injury on 6/22/23 - was told to go to the ED  · Presyncope noted in the days following his therapeutic phlebotomy   · Noted to be tachycardic in the trauma office which resolved with IVF's  · Likely hypovolemia with orthostatic hypotension from therapeutic phlebotomy and decreased oral hydration in the setting of diuretic use for hypertension  · Home Dyazide held and received IVF's with resolution of orthostatic hypotension/tachycardia  · Echo - EF 60%  · Telemetry - no arrhythmia  · 2 week Zio patch arranged by cardiology  Patient should arrange pickup at [address] in [place]      Pulmonary embolism [facility])  Assessment & Plan  · Presentation as above  · CTA chest PE study - \"No central pulmonary emboli  The peripheral branch arteries are not well opacified  There are small filling defects in the far peripheral fourth and fifth order branch arteries supplying the right lower lobe  \"  · Lower extremity venous doppler - no DVT  · Heparin drip held on 6/24/23 due to diaphragmatic hematoma and restarted on 6/26 after Hb noted to be stable   · Eliquis price checked with pharmacy - no cost to patient  · Discussed with pulmonology on 6/25 - anticoagulation recommended for current PE unless contraindicated  · Hb dropped to 8 2 this and heparin drip held  · Discussed with trauma - on intervention needed at present, ok to " "monitor Hb, keep off anticoagulation for now   · If Hb remains stable can attempt heparin drip again if ok with trauma and can be transitioned to Eliquis if Hb remains stable in heparin drip but based on response to heparin drip to date may have to keep off anticoagulation    Diaphragmatic hematoma  Assessment & Plan  · Complained of low back pain radiating to the sides of the abdomen after leaning in his recliner chair yesterday - pain progressively worsened overnight  · CT A/P without contrast - \"Findings suspicious for crural hematoma more prominent on the right  This would raise concern for underlying diaphragmatic injury  New soft tissue fullness in the partially visualized posterior mediastinum extending to the left  This is incompletely visualized  Question related to extension of hematoma into the posterior mediastinum  \"  · CT C/A/P with contrast - \"Redemonstrated mildly asymmetric bilateral crural thickening measuring up to 3 cm on the right and 2 cm on the left in maximal thickness with associated stranding suspicious for crural hematomas  Extension of small volume hematoma superiorly into the posterior mediastinum at the level of the distal esophagus  No leodan diaphragmatic discontinuity or intrathoracic herniation of abdominal organs to suggest rupture  \"  · Drop in Hb from 12 4 to 8 2 today   · Heparin drip held again   · Discussed with trauma    Rib fractures  Assessment & Plan  · Following fall on 5/14/2023  · Pain control    Primary hypertension  Assessment & Plan  · Home regimen: Amlodipine 5 mg daily, losartan 50 mg daily, triamterene-HCTZ 37 5/25 mg daily  · Amlodipine and triamterene HCTZ held   · Continue Losartan 50 mg daily  · Had orthostatic hypotension which has resolved  · BP acceptable  · Monitor BP     Elevated ferritin/iron overload  Assessment & Plan  · Follows with hematology as outpatient for regular phlebotomies    Fatty liver, alcoholic  Assessment & Plan  · Follows with GI as an " outpatient    Thrombocytopenia (HCC)  Assessment & Plan  · Chronic  · Monitor    Tachycardia  Assessment & Plan  · Likely due to hypovolemia   · Resolved with IV fluids  · Plan for Zio monitor as outpatient    FELIBERTO on CPAP  Assessment & Plan  · CPAP hs   · Wife to bring in home equipment on         VTE Pharmacologic Prophylaxis: VTE Score: 3 Moderate Risk (Score 3-4) - Pharmacological DVT Prophylaxis Contraindicated  Sequential Compression Devices Ordered  Patient Centered Rounds: Discussed with RN  Discussions with Specialists or Other Care Team Provider: Discussed with trauma    Education and Discussions with Family / Patient: Patient declined call to   Total Time Spent on Date of Encounter in care of patient: 35 minutes This time was spent on one or more of the following: performing physical exam; counseling and coordination of care; obtaining or reviewing history; documenting in the medical record; reviewing/ordering tests, medications or procedures; communicating with other healthcare professionals and discussing with patient's family/caregivers  Current Length of Stay: 5 day(s)  Current Patient Status: Inpatient   Certification Statement: The patient will continue to require additional inpatient hospital stay due to Diaphgramatic hematoma    Code Status: Level 1 - Full Code    Subjective:   Low back pain nearly resolved  Complains of minimal right interscapular discomfort  Objective:     Vitals:   Temp (24hrs), Av 4 °F (36 9 °C), Min:98 2 °F (36 8 °C), Max:98 8 °F (37 1 °C)    Temp:  [98 2 °F (36 8 °C)-98 8 °F (37 1 °C)] 98 2 °F (36 8 °C)  HR:  [76-93] 78  Resp:  [15-17] 17  BP: (113-146)/(67-91) 113/67  SpO2:  [94 %-98 %] 94 %  Body mass index is 37 17 kg/m²  Physical Exam:   Physical Exam  Vitals reviewed  HENT:      Head: Normocephalic        Nose: Nose normal       Mouth/Throat:      Mouth: Mucous membranes are moist    Eyes:      Extraocular Movements: Extraocular movements intact  Cardiovascular:      Rate and Rhythm: Normal rate and regular rhythm  Pulmonary:      Effort: Pulmonary effort is normal  No respiratory distress  Breath sounds: Normal breath sounds  No wheezing  Abdominal:      General: Bowel sounds are normal  There is no distension  Palpations: Abdomen is soft  Tenderness: There is no abdominal tenderness  Musculoskeletal:         General: No swelling  Cervical back: Neck supple  Skin:     General: Skin is warm and dry  Neurological:      General: No focal deficit present  Mental Status: He is alert and oriented to person, place, and time  Psychiatric:         Mood and Affect: Mood normal          Behavior: Behavior normal           Additional Data:     Labs:  Results from last 7 days   Lab Units 06/26/23 2004 06/26/23  1500 06/26/23  1051   WBC Thousand/uL  --   --  4 25*   HEMOGLOBIN g/dL 8 7*   < > 8 6*   HEMATOCRIT % 24 6*   < > 25 5*   PLATELETS Thousands/uL  --   --  90*   NEUTROS PCT %  --   --  64   LYMPHS PCT %  --   --  19   MONOS PCT %  --   --  11   EOS PCT %  --   --  4    < > = values in this interval not displayed  Results from last 7 days   Lab Units 06/26/23  0400 06/25/23  0847 06/24/23  1332   SODIUM mmol/L 134*   < > 131*   POTASSIUM mmol/L 3 7   < > 3 7   CHLORIDE mmol/L 104   < > 100   CO2 mmol/L 27   < > 26   BUN mg/dL 10   < > 10   CREATININE mg/dL 0 78   < > 0 92   ANION GAP mmol/L 3   < > 5   CALCIUM mg/dL 8 4   < > 8 8   ALBUMIN g/dL  --   --  3 4*   TOTAL BILIRUBIN mg/dL  --   --  0 80   ALK PHOS U/L  --   --  77   ALT U/L  --   --  54   AST U/L  --   --  77*   GLUCOSE RANDOM mg/dL 100   < > 207*    < > = values in this interval not displayed       Results from last 7 days   Lab Units 06/25/23  1439   INR  1 02             Results from last 7 days   Lab Units 06/24/23  1332   LACTIC ACID mmol/L 2 0       Lines/Drains:  Invasive Devices     Peripheral Intravenous Line  Duration Peripheral IV 06/24/23 Left;Proximal;Ventral (anterior) Forearm 2 days                  Last 24 Hours Medication List:   Current Facility-Administered Medications   Medication Dose Route Frequency Provider Last Rate   • acetaminophen  650 mg Oral Q6H PRN Nilson Navarro PA-C     • folic acid  1 mg Oral Daily Ananth Banai, DO     • HYDROmorphone  0 2 mg Intravenous Q4H PRN [place] Eli MD     • losartan  50 mg Oral Daily Ananth Banai, DO     • melatonin  3 mg Oral HS Nilson Navarro PA-C     • methocarbamol  750 mg Oral Q6H PRN [place] Eli MD     • multivitamin-minerals  1 tablet Oral Daily Ananth Banai, DO     • oxyCODONE  5 mg Oral Q4H PRN Jamaica Eli MD     • oxyCODONE  2 5 mg Oral Q4H PRN [place] Eli MD     • thiamine  100 mg Oral Daily Verneice Roque, DO          Today, Patient Was Seen By: Jamaica Eli MD    **Please Note: This note may have been constructed using a voice recognition system  **

## 2023-06-28 LAB
ANION GAP SERPL CALCULATED.3IONS-SCNC: 2 MMOL/L
APTT PPP: 64 SECONDS (ref 23–37)
APTT PPP: 71 SECONDS (ref 23–37)
APTT PPP: 93 SECONDS (ref 23–37)
BUN SERPL-MCNC: 8 MG/DL (ref 5–25)
CALCIUM SERPL-MCNC: 9.2 MG/DL (ref 8.3–10.1)
CHLORIDE SERPL-SCNC: 107 MMOL/L (ref 96–108)
CO2 SERPL-SCNC: 25 MMOL/L (ref 21–32)
CREAT SERPL-MCNC: 0.87 MG/DL (ref 0.6–1.3)
ERYTHROCYTE [DISTWIDTH] IN BLOOD BY AUTOMATED COUNT: 13.2 % (ref 11.6–15.1)
GFR SERPL CREATININE-BSD FRML MDRD: 89 ML/MIN/1.73SQ M
GLUCOSE SERPL-MCNC: 100 MG/DL (ref 65–140)
HCT VFR BLD AUTO: 25.3 % (ref 36.5–49.3)
HCT VFR BLD AUTO: 25.5 % (ref 36.5–49.3)
HCT VFR BLD AUTO: 26.1 % (ref 36.5–49.3)
HGB BLD-MCNC: 8.4 G/DL (ref 12–17)
HGB BLD-MCNC: 8.6 G/DL (ref 12–17)
HGB BLD-MCNC: 8.8 G/DL (ref 12–17)
MCH RBC QN AUTO: 31.9 PG (ref 26.8–34.3)
MCHC RBC AUTO-ENTMCNC: 33 G/DL (ref 31.4–37.4)
MCV RBC AUTO: 97 FL (ref 82–98)
PLATELET # BLD AUTO: 105 THOUSANDS/UL (ref 149–390)
PMV BLD AUTO: 9.6 FL (ref 8.9–12.7)
POTASSIUM SERPL-SCNC: 3.9 MMOL/L (ref 3.5–5.3)
RBC # BLD AUTO: 2.7 MILLION/UL (ref 3.88–5.62)
SODIUM SERPL-SCNC: 134 MMOL/L (ref 135–147)
WBC # BLD AUTO: 4.7 THOUSAND/UL (ref 4.31–10.16)

## 2023-06-28 PROCEDURE — 80048 BASIC METABOLIC PNL TOTAL CA: CPT | Performed by: FAMILY MEDICINE

## 2023-06-28 PROCEDURE — 85014 HEMATOCRIT: CPT | Performed by: FAMILY MEDICINE

## 2023-06-28 PROCEDURE — 99233 SBSQ HOSP IP/OBS HIGH 50: CPT | Performed by: NURSE PRACTITIONER

## 2023-06-28 PROCEDURE — 85730 THROMBOPLASTIN TIME PARTIAL: CPT | Performed by: FAMILY MEDICINE

## 2023-06-28 PROCEDURE — 94760 N-INVAS EAR/PLS OXIMETRY 1: CPT

## 2023-06-28 PROCEDURE — 85027 COMPLETE CBC AUTOMATED: CPT | Performed by: FAMILY MEDICINE

## 2023-06-28 PROCEDURE — 99232 SBSQ HOSP IP/OBS MODERATE 35: CPT | Performed by: FAMILY MEDICINE

## 2023-06-28 PROCEDURE — 85018 HEMOGLOBIN: CPT | Performed by: FAMILY MEDICINE

## 2023-06-28 RX ADMIN — MELATONIN TAB 3 MG 3 MG: 3 TAB at 20:14

## 2023-06-28 RX ADMIN — FOLIC ACID 1 MG: 1 TABLET ORAL at 08:07

## 2023-06-28 RX ADMIN — Medication 1 TABLET: at 08:07

## 2023-06-28 RX ADMIN — HEPARIN SODIUM 18 UNITS/KG/HR: 10000 INJECTION, SOLUTION INTRAVENOUS at 07:19

## 2023-06-28 RX ADMIN — THIAMINE HCL TAB 100 MG 100 MG: 100 TAB at 08:07

## 2023-06-28 RX ADMIN — METHOCARBAMOL 750 MG: 750 TABLET ORAL at 20:14

## 2023-06-28 RX ADMIN — LOSARTAN POTASSIUM 50 MG: 50 TABLET, FILM COATED ORAL at 08:07

## 2023-06-28 RX ADMIN — HEPARIN SODIUM 18 UNITS/KG/HR: 10000 INJECTION, SOLUTION INTRAVENOUS at 19:22

## 2023-06-28 NOTE — PROGRESS NOTES
"[facility]  Progress Note  Name: Navi Green  MRN: [id number]  Unit/Bed#: [facility]PHP 821-01 I Date of Admission: 6/22/2023   Date of Service: 6/27/2023 I Hospital Day: 5    Assessment/Plan   * Recurrent falls - possible presyncope with orthostatic hypotension  Assessment & Plan  · Fall on 5/14/23 when he stood up quickly from the seated position, felt lightheaded and fell   · CT chest (5/14/23) - \"Nondisplaced fractures of the right anterior seventh and eighth ribs  \"  · Denny Sharma again on 6/21/23 and was seen in the trauma office as follow-up from previous injury on 6/22/23 - was told to go to the ED  · Presyncope noted in the days following his therapeutic phlebotomy   · Noted to be tachycardic in the trauma office which resolved with IVF's  · Likely hypovolemia with orthostatic hypotension from therapeutic phlebotomy and decreased oral hydration in the setting of diuretic use for hypertension  · Home Dyazide held and received IVF's with resolution of orthostatic hypotension/tachycardia  · Echo - EF 60%  · Telemetry - no arrhythmia  · 2 week Zio patch arranged by cardiology  Patient should arrange pickup at [address] [place] [address] in [facility]      Diaphragmatic hematoma  Assessment & Plan  · Complained of low back pain radiating to the sides of the abdomen after leaning in his recliner chair yesterday - pain progressively worsened overnight  · CT A/P without contrast - \"Findings suspicious for crural hematoma more prominent on the right  This would raise concern for underlying diaphragmatic injury  New soft tissue fullness in the partially visualized posterior mediastinum extending to the left  This is incompletely visualized  Question related to extension of hematoma into the posterior mediastinum  \"  · CT C/A/P with contrast - \"Redemonstrated mildly asymmetric bilateral crural thickening measuring up to 3 cm on the right and 2 cm on the left in maximal thickness with " "associated stranding suspicious for crural hematomas  Extension of small volume hematoma superiorly into the posterior mediastinum at the level of the distal esophagus  No leodan diaphragmatic discontinuity or intrathoracic herniation of abdominal organs to suggest rupture  \"  · Hemoglobin is relatively stable  Cleared by trauma to start back on the heparin drip  Monitor H&H every 6 hours along with PT    Primary hypertension  Assessment & Plan  · Home regimen: Amlodipine 5 mg daily, losartan 50 mg daily, triamterene-HCTZ 37 5/25 mg daily  · Amlodipine and triamterene HCTZ held   · Continue Losartan 50 mg daily  · Had orthostatic hypotension which has resolved  · BP acceptable  · Monitor BP     Pulmonary embolism (HCC)  Assessment & Plan  · Presentation as above  · CTA chest PE study - \"No central pulmonary emboli  The peripheral branch arteries are not well opacified  There are small filling defects in the far peripheral fourth and fifth order branch arteries supplying the right lower lobe  \"  · Lower extremity venous doppler - no DVT  · Heparin drip held on 6/24/23 due to diaphragmatic hematoma and restarted on 6/26 after Hb noted to be stable   · Eliquis price checked with pharmacy - no cost to patient  · Discussed with pulmonology on 6/25 - anticoagulation recommended for current PE unless contraindicated  · Hb dropped to 8 2 this and heparin drip held  · Discussed with trauma - on intervention needed at present, ok to monitor Hb, keep off anticoagulation for now   · If Hb remains stable can attempt heparin drip again if ok with trauma and can be transitioned to Eliquis if Hb remains stable in heparin drip but based on response to heparin drip to date may have to keep off anticoagulation  · Discussed with trauma  Okay to restart back on the heparin with frequent H&H    No bolus    Tachycardia  Assessment & Plan  · Likely due to hypovolemia   · Resolved with IV fluids  · Plan for Zio monitor as outpatient    Rib " fractures  Assessment & Plan  · Following fall on 5/14/2023  · Pain control    Thrombocytopenia (HCC)  Assessment & Plan  · Chronic  · Monitor    FELIBERTO on CPAP  Assessment & Plan  · CPAP hs   · Wife to bring in home equipment on 6/27    Fatty liver, alcoholic  Assessment & Plan  · Follows with GI as an outpatient    Elevated ferritin/iron overload  Assessment & Plan  · Follows with hematology as outpatient for regular phlebotomies             VTE Pharmacologic Prophylaxis: VTE Score: 3 Moderate Risk (Score 3-4) - Pharmacological DVT Prophylaxis Ordered: heparin drip  Patient Centered Rounds: I performed bedside rounds with nursing staff today  Discussions with Specialists or Other Care Team Provider:     Education and Discussions with Family / Patient: Attempted to update  (wife) via phone  Left voicemail  Total Time Spent on Date of Encounter in care of patient: 45 minutes This time was spent on one or more of the following: performing physical exam; counseling and coordination of care; obtaining or reviewing history; documenting in the medical record; reviewing/ordering tests, medications or procedures; communicating with other healthcare professionals and discussing with patient's family/caregivers  Current Length of Stay: 5 day(s)  Current Patient Status: Inpatient   Certification Statement: The patient will continue to require additional inpatient hospital stay due to Restarting back on the heparin  Discharge Plan: Anticipate discharge in >72 hrs to home with home services  Code Status: Level 1 - Full Code    Subjective:   Patient seen and examined  Denies any specific complaints except pain at the rib fracture site  Trauma progress note appreciated discussed with trauma  Restart back on the heparin drip without any boluses    Plan is to obtain H&H every 6 hours along with PTT  Discussed with nursing if there is any hemodynamic instability or concern for bleeding plan is to stop the heparin drip immediately  Objective:     Vitals:   Temp (24hrs), Av 2 °F (36 8 °C), Min:98 °F (36 7 °C), Max:98 5 °F (36 9 °C)    Temp:  [98 °F (36 7 °C)-98 5 °F (36 9 °C)] 98 2 °F (36 8 °C)  HR:  [68-80] 80  Resp:  [14-17] 16  BP: (113-129)/(67-86) 126/74  SpO2:  [92 %-96 %] 96 %  Body mass index is 37 17 kg/m²  Input and Output Summary (last 24 hours): Intake/Output Summary (Last 24 hours) at 2023  Last data filed at 2023 1749  Gross per 24 hour   Intake 522 ml   Output --   Net 522 ml       Physical Exam:   Physical Exam  Constitutional:       General: He is not in acute distress  Appearance: He is not ill-appearing  HENT:      Head: Normocephalic  Nose: Nose normal    Eyes:      General: No scleral icterus  Cardiovascular:      Rate and Rhythm: Normal rate and regular rhythm  Heart sounds: Normal heart sounds  Pulmonary:      Comments: Decreased breath sounds bilaterally  Abdominal:      General: There is no distension  Musculoskeletal:         General: No swelling  Normal range of motion  Cervical back: Normal range of motion  Skin:     General: Skin is warm  Coloration: Skin is not jaundiced  Neurological:      General: No focal deficit present  Mental Status: He is alert  Cranial Nerves: No cranial nerve deficit          Additional Data:     Labs:  Results from last 7 days   Lab Units 23  1623 23  0604   WBC Thousand/uL 4 25* 4 58   HEMOGLOBIN g/dL 8 8* 8 5*   HEMATOCRIT % 25 4* 24 6*   PLATELETS Thousands/uL 95* 102*   NEUTROS PCT %  --  68   LYMPHS PCT %  --  16   MONOS PCT %  --  11   EOS PCT %  --  4     Results from last 7 days   Lab Units 23  0604   SODIUM mmol/L 133*   POTASSIUM mmol/L 3 7   CHLORIDE mmol/L 104   CO2 mmol/L 25   BUN mg/dL 9   CREATININE mg/dL 0 77   ANION GAP mmol/L 4   CALCIUM mg/dL 8 9   ALBUMIN g/dL 2 9*   TOTAL BILIRUBIN mg/dL 0 56   ALK PHOS U/L 65   ALT U/L 47   AST U/L 71*   GLUCOSE RANDOM mg/dL 100     Results from last 7 days   Lab Units 06/27/23  1623   INR  1 03             Results from last 7 days   Lab Units 06/24/23  1332   LACTIC ACID mmol/L 2 0       Lines/Drains:  Invasive Devices     Peripheral Intravenous Line  Duration           Peripheral IV 06/24/23 Left;Proximal;Ventral (anterior) Forearm 3 days                  Telemetry:  Telemetry Orders (From admission, onward)             24 Hour Telemetry Monitoring  Continuous x 24 Hours (Telem)        Question:  Reason for 24 Hour Telemetry  Answer:  Syncope suspected to be cardiac in origin                 Telemetry Reviewed: Sinus Tachycardia  Indication for Continued Telemetry Use: syncope / pe             Imaging: Reviewed radiology reports from this admission including: abdominal/pelvic CT    Recent Cultures (last 7 days):         Last 24 Hours Medication List:   Current Facility-Administered Medications   Medication Dose Route Frequency Provider Last Rate   • acetaminophen  650 mg Oral Q6H PRN Nilson Navarro PA-C     • folic acid  1 mg Oral Daily Ananth Lylaai, DO     • heparin (porcine)  3-30 Units/kg/hr (Order-Specific) Intravenous Titrated Dinora Pittman MD 18 Units/kg/hr (06/27/23 1616)   • HYDROmorphone  0 2 mg Intravenous Q4H PRN Amalia Jeans, MD     • losartan  50 mg Oral Daily Ananth Banai, DO     • melatonin  3 mg Oral HS Nilson Navarro PA-C     • methocarbamol  750 mg Oral Q6H PRN Amalia Jeans, MD     • multivitamin-minerals  1 tablet Oral Daily Ananth Banai, DO     • oxyCODONE  5 mg Oral Q4H PRN Amalia Jeans, MD     • oxyCODONE  2 5 mg Oral Q4H PRN Amalia Jeans, MD     • thiamine  100 mg Oral Daily Mile Slater DO          Today, Patient Was Seen By: Dinora Pittman MD    **Please Note: This note may have been constructed using a voice recognition system  **

## 2023-06-28 NOTE — PROGRESS NOTES
"[facility]  Progress Note  Name: Christa Dyson  MRN: [id number]  Unit/Bed#: [facility]PHP 821-01 I Date of Admission: 6/22/2023   Date of Service: 6/28/2023 I Hospital Day: 6    Assessment/Plan   * Recurrent falls - possible presyncope with orthostatic hypotension  Assessment & Plan  · Fall on 5/14/23 when he stood up quickly from the seated position, felt lightheaded and fell   · CT chest (5/14/23) - \"Nondisplaced fractures of the right anterior seventh and eighth ribs  \"  · Dane Monzon again on 6/21/23 and was seen in the trauma office as follow-up from previous injury on 6/22/23 - was told to go to the ED  · Presyncope noted in the days following his therapeutic phlebotomy   · Noted to be tachycardic in the trauma office which resolved with IVF's  · Likely hypovolemia with orthostatic hypotension from therapeutic phlebotomy and decreased oral hydration in the setting of diuretic use for hypertension  · Home Dyazide held and received IVF's with resolution of orthostatic hypotension/tachycardia  · Echo - EF 60%  · Telemetry - no arrhythmia  · 2 week Zio patch arranged by cardiology  Patient should arrange pickup at [address] [place] [address] in [place]      Diaphragmatic hematoma  Assessment & Plan  · Complained of low back pain radiating to the sides of the abdomen after leaning in his recliner chair yesterday - pain progressively worsened overnight  · CT A/P without contrast - \"Findings suspicious for crural hematoma more prominent on the right  This would raise concern for underlying diaphragmatic injury  New soft tissue fullness in the partially visualized posterior mediastinum extending to the left  This is incompletely visualized  Question related to extension of hematoma into the posterior mediastinum  \"  · CT C/A/P with contrast - \"Redemonstrated mildly asymmetric bilateral crural thickening measuring up to 3 cm on the right and 2 cm on the left in maximal thickness with " "associated stranding suspicious for crural hematomas  Extension of small volume hematoma superiorly into the posterior mediastinum at the level of the distal esophagus  No leodan diaphragmatic discontinuity or intrathoracic herniation of abdominal organs to suggest rupture  \"  · Restarted back on the heparin drip  Hemoglobin remained stable    Primary hypertension  Assessment & Plan  · Home regimen: Amlodipine 5 mg daily, losartan 50 mg daily, triamterene-HCTZ 37 5/25 mg daily  · Amlodipine and triamterene HCTZ held   · Continue Losartan 50 mg daily  · Had orthostatic hypotension which has resolved  · BP acceptable  · Monitor BP     Pulmonary embolism (HCC)  Assessment & Plan  · Presentation as above  · CTA chest PE study - \"No central pulmonary emboli  The peripheral branch arteries are not well opacified  There are small filling defects in the far peripheral fourth and fifth order branch arteries supplying the right lower lobe  \"  · Lower extremity venous doppler - no DVT  · Heparin drip held on 6/24/23 due to diaphragmatic hematoma and restarted on 6/26 after Hb noted to be stable   · Eliquis price checked with pharmacy - no cost to patient  · Discussed with pulmonology on 6/25 - anticoagulation recommended for current PE unless contraindicated  · Hb dropped to 8 2 this and heparin drip held  · Discussed with trauma - on intervention needed at present, ok to monitor Hb, keep off anticoagulation for now   · If Hb remains stable can attempt heparin drip again if ok with trauma and can be transitioned to Eliquis if Hb remains stable in heparin drip but based on response to heparin drip to date may have to keep off anticoagulation  · Restarted back on the heparin drip yesterday after discussion with trauma  Hemoglobin remained stable so far    Trauma is okay for the patient to be transitioned to oral anticoagulation-we will monitor overnight since the patient just became therapeutic and likely transition to oral " anticoagulation tomorrow    Tachycardia  Assessment & Plan  · Likely due to hypovolemia   · Resolved with IV fluids  · Plan for Zio monitor as outpatient    Rib fractures  Assessment & Plan  · Following fall on 2023  · Pain control    Thrombocytopenia (HCC)  Assessment & Plan  · Chronic  · Monitor    FELIBERTO on CPAP  Assessment & Plan  · CPAP hs   · Wife to bring in home equipment on     Fatty liver, alcoholic  Assessment & Plan  · Follows with GI as an outpatient    Elevated ferritin/iron overload  Assessment & Plan  · Follows with hematology as outpatient for regular phlebotomies               VTE Pharmacologic Prophylaxis: VTE Score: 3 Moderate Risk (Score 3-4) - Pharmacological DVT Prophylaxis Ordered: heparin drip  Patient Centered Rounds: I performed bedside rounds with nursing staff today  Discussions with Specialists or Other Care Team Provider:     Education and Discussions with Family / Patient: Updated  (wife) via phone  Total Time Spent on Date of Encounter in care of patient: 35 minutes This time was spent on one or more of the following: performing physical exam; counseling and coordination of care; obtaining or reviewing history; documenting in the medical record; reviewing/ordering tests, medications or procedures; communicating with other healthcare professionals and discussing with patient's family/caregivers  Current Length of Stay: 6 day(s)  Current Patient Status: Inpatient   Certification Statement: The patient will continue to require additional inpatient hospital stay due to heparin drip   Discharge Plan: Anticipate discharge in 48 hrs to home with home services  Code Status: Level 1 - Full Code    Subjective:   Patient seen and examined  No abdominal pain  No specific complaints offered  I discussed with trauma-cleared to start on oral anticoagulation      Objective:     Vitals:   Temp (24hrs), Av °F (36 7 °C), Min:97 5 °F (36 4 °C), Max:98 4 °F (36 9 °C)    Temp:  [97 5 °F (36 4 °C)-98 4 °F (36 9 °C)] 98 4 °F (36 9 °C)  HR:  [71-80] 73  Resp:  [16-17] 16  BP: ()/(54-79) 98/54  SpO2:  [93 %-97 %] 97 %  Body mass index is 37 17 kg/m²  Input and Output Summary (last 24 hours): Intake/Output Summary (Last 24 hours) at 6/28/2023 1744  Last data filed at 6/28/2023 1321  Gross per 24 hour   Intake 222 ml   Output --   Net 222 ml       Physical Exam:   Physical Exam  Constitutional:       General: He is not in acute distress  Appearance: He is not ill-appearing  HENT:      Head: Normocephalic  Nose: Nose normal       Mouth/Throat:      Mouth: Mucous membranes are moist    Eyes:      General: No scleral icterus  Cardiovascular:      Rate and Rhythm: Normal rate and regular rhythm  Pulses: Normal pulses  Heart sounds: Normal heart sounds  Pulmonary:      Effort: Pulmonary effort is normal  No respiratory distress  Abdominal:      General: There is no distension  Musculoskeletal:         General: No swelling  Normal range of motion  Skin:     General: Skin is warm  Coloration: Skin is not jaundiced  Neurological:      Mental Status: He is alert  Mental status is at baseline  Cranial Nerves: No cranial nerve deficit  Additional Data:     Labs:  Results from last 7 days   Lab Units 06/28/23  1339 06/28/23  0556 06/27/23  1623 06/27/23  0604   WBC Thousand/uL  --  4 70   < > 4 58   HEMOGLOBIN g/dL 8 8* 8 6*   < > 8 5*   HEMATOCRIT % 25 5* 26 1*   < > 24 6*   PLATELETS Thousands/uL  --  105*   < > 102*   NEUTROS PCT %  --   --   --  68   LYMPHS PCT %  --   --   --  16   MONOS PCT %  --   --   --  11   EOS PCT %  --   --   --  4    < > = values in this interval not displayed       Results from last 7 days   Lab Units 06/28/23  0556 06/27/23  0604   SODIUM mmol/L 134* 133*   POTASSIUM mmol/L 3 9 3 7   CHLORIDE mmol/L 107 104   CO2 mmol/L 25 25   BUN mg/dL 8 9   CREATININE mg/dL 0 87 0 77   ANION GAP mmol/L 2 4 CALCIUM mg/dL 9 2 8 9   ALBUMIN g/dL  --  2 9*   TOTAL BILIRUBIN mg/dL  --  0 56   ALK PHOS U/L  --  65   ALT U/L  --  47   AST U/L  --  71*   GLUCOSE RANDOM mg/dL 100 100     Results from last 7 days   Lab Units 06/27/23  1623   INR  1 03             Results from last 7 days   Lab Units 06/24/23  1332   LACTIC ACID mmol/L 2 0       Lines/Drains:  Invasive Devices     Peripheral Intravenous Line  Duration           Peripheral IV 06/24/23 Left;Proximal;Ventral (anterior) Forearm 4 days                      Imaging: No new images to review    Recent Cultures (last 7 days):         Last 24 Hours Medication List:   Current Facility-Administered Medications   Medication Dose Route Frequency Provider Last Rate   • acetaminophen  650 mg Oral Q6H PRN Nilson Navarro PA-C     • folic acid  1 mg Oral Daily Ananth Lylaai, DO     • heparin (porcine)  3-30 Units/kg/hr (Order-Specific) Intravenous Titrated Vita Dawson MD 18 Units/kg/hr (06/28/23 0719)   • HYDROmorphone  0 2 mg Intravenous Q4H PRN Lon Avila MD     • losartan  50 mg Oral Daily Naanth Lylaai, DO     • melatonin  3 mg Oral HS Nilson Navarro PA-C     • methocarbamol  750 mg Oral Q6H PRN Lon Avila MD     • multivitamin-minerals  1 tablet Oral Daily Ananthtravon Aritaai, DO     • oxyCODONE  5 mg Oral Q4H PRN Lon Avila MD     • oxyCODONE  2 5 mg Oral Q4H PRN Lon Avila MD     • thiamine  100 mg Oral Daily damalachi Sorensen DO          Today, Patient Was Seen By: Vita Dawson MD    **Please Note: This note may have been constructed using a voice recognition system  **

## 2023-06-28 NOTE — ASSESSMENT & PLAN NOTE
- 6/22 CTA PE shows No central PE but small filling defects in far peripheral fourth and fifth order branch artery supplying RLL, RV/LV ratio 0 9  -May continue heparin drip if pulmonary believes PE should be treated with anticoagulation    -Continue to trend hemoglobin  - Pulmonary consulted and note appreciated  May consider oral anticoagulation now that hemoglobin is stable

## 2023-06-28 NOTE — ASSESSMENT & PLAN NOTE
"· Fall on 5/14/23 when he stood up quickly from the seated position, felt lightheaded and fell   · CT chest (5/14/23) - \"Nondisplaced fractures of the right anterior seventh and eighth ribs  \"  · Hollie Killings again on 6/21/23 and was seen in the trauma office as follow-up from previous injury on 6/22/23 - was told to go to the ED  · Presyncope noted in the days following his therapeutic phlebotomy   · Noted to be tachycardic in the trauma office which resolved with IVF's  · Likely hypovolemia with orthostatic hypotension from therapeutic phlebotomy and decreased oral hydration in the setting of diuretic use for hypertension  · Home Dyazide held and received IVF's with resolution of orthostatic hypotension/tachycardia  · Echo - EF 60%  · Telemetry - no arrhythmia  · 2 week Zio patch arranged by cardiology   Patient should arrange pickup at [address] [place] [address] in [place]    "

## 2023-06-28 NOTE — ASSESSMENT & PLAN NOTE
"· Presentation as above  · CTA chest PE study - \"No central pulmonary emboli  The peripheral branch arteries are not well opacified  There are small filling defects in the far peripheral fourth and fifth order branch arteries supplying the right lower lobe  \"  · Lower extremity venous doppler - no DVT  · Heparin drip held on 6/24/23 due to diaphragmatic hematoma and restarted on 6/26 after Hb noted to be stable   · Eliquis price checked with pharmacy - no cost to patient  · Discussed with pulmonology on 6/25 - anticoagulation recommended for current PE unless contraindicated  · Hb dropped to 8 2 this and heparin drip held  · Discussed with trauma - on intervention needed at present, ok to monitor Hb, keep off anticoagulation for now   · If Hb remains stable can attempt heparin drip again if ok with trauma and can be transitioned to Eliquis if Hb remains stable in heparin drip but based on response to heparin drip to date may have to keep off anticoagulation  · Discussed with trauma  Okay to restart back on the heparin with frequent H&H    No bolus  "

## 2023-06-28 NOTE — ASSESSMENT & PLAN NOTE
"· Fall on 5/14/23 when he stood up quickly from the seated position, felt lightheaded and fell   · CT chest (5/14/23) - \"Nondisplaced fractures of the right anterior seventh and eighth ribs  \"  · Taryn Higgins again on 6/21/23 and was seen in the trauma office as follow-up from previous injury on 6/22/23 - was told to go to the ED  · Presyncope noted in the days following his therapeutic phlebotomy   · Noted to be tachycardic in the trauma office which resolved with IVF's  · Likely hypovolemia with orthostatic hypotension from therapeutic phlebotomy and decreased oral hydration in the setting of diuretic use for hypertension  · Home Dyazide held and received IVF's with resolution of orthostatic hypotension/tachycardia  · Echo - EF 60%  · Telemetry - no arrhythmia  · 2 week Zio patch arranged by cardiology   Patient should arrange pickup at [address] [place] [address] in [place]    "

## 2023-06-28 NOTE — ASSESSMENT & PLAN NOTE
"· Presentation as above  · CTA chest PE study - \"No central pulmonary emboli  The peripheral branch arteries are not well opacified  There are small filling defects in the far peripheral fourth and fifth order branch arteries supplying the right lower lobe  \"  · Lower extremity venous doppler - no DVT  · Heparin drip held on 6/24/23 due to diaphragmatic hematoma and restarted on 6/26 after Hb noted to be stable   · Eliquis price checked with pharmacy - no cost to patient  · Discussed with pulmonology on 6/25 - anticoagulation recommended for current PE unless contraindicated  · Hb dropped to 8 2 this and heparin drip held  · Discussed with trauma - on intervention needed at present, ok to monitor Hb, keep off anticoagulation for now   · If Hb remains stable can attempt heparin drip again if ok with trauma and can be transitioned to Eliquis if Hb remains stable in heparin drip but based on response to heparin drip to date may have to keep off anticoagulation  · Restarted back on the heparin drip yesterday after discussion with trauma  Hemoglobin remained stable so far    Trauma is okay for the patient to be transitioned to oral anticoagulation-we will monitor overnight since the patient just became therapeutic and likely transition to oral anticoagulation tomorrow  "

## 2023-06-28 NOTE — ASSESSMENT & PLAN NOTE
"· Complained of low back pain radiating to the sides of the abdomen after leaning in his recliner chair yesterday - pain progressively worsened overnight  · CT A/P without contrast - \"Findings suspicious for crural hematoma more prominent on the right  This would raise concern for underlying diaphragmatic injury  New soft tissue fullness in the partially visualized posterior mediastinum extending to the left  This is incompletely visualized  Question related to extension of hematoma into the posterior mediastinum  \"  · CT C/A/P with contrast - \"Redemonstrated mildly asymmetric bilateral crural thickening measuring up to 3 cm on the right and 2 cm on the left in maximal thickness with associated stranding suspicious for crural hematomas  Extension of small volume hematoma superiorly into the posterior mediastinum at the level of the distal esophagus  No leodan diaphragmatic discontinuity or intrathoracic herniation of abdominal organs to suggest rupture  \"  · Restarted back on the heparin drip    Hemoglobin remained stable  "

## 2023-06-28 NOTE — ASSESSMENT & PLAN NOTE
- Recent history of multiple falls, admitted for rib fractures in mid May, recent fall on 6/21 and presented to the trauma clinic for evaluation, was tachycardic and sent to the ED for syncope/orthostatic hypotension work-up, initial CT scans negative for acute traumatic injury but positive for possible small PEs  - 6/24 CT A/P: Finding suspicious for pleural hematoma more prominent on the right, concern for underlying diaphragmatic injury, new soft tissue fullness and partially visualized posterior mediastinum extending to the left  - 6/24 CT C/A/P PO/IV: Mildly asymmetric b/l  pleural thickening R 3cm and L 2cm, associated stranding s/f crural hematomas, extension of small volume hematoma superiorly into posterior mediastinum, no leodan diaphragmatic discontinuity or evidence of rupture, hepatomegaly/hepatic steatosis, splenomegaly  -Hemoglobin is now stable  May transition patient to oral anticoagulation as medical team sees fit  -Follow-up with trauma as scheduled  Trauma team will sign off now that hemoglobin is stable on therapeutic heparin  Please contact with any questions or concerns

## 2023-06-28 NOTE — ASSESSMENT & PLAN NOTE
"· Complained of low back pain radiating to the sides of the abdomen after leaning in his recliner chair yesterday - pain progressively worsened overnight  · CT A/P without contrast - \"Findings suspicious for crural hematoma more prominent on the right  This would raise concern for underlying diaphragmatic injury  New soft tissue fullness in the partially visualized posterior mediastinum extending to the left  This is incompletely visualized  Question related to extension of hematoma into the posterior mediastinum  \"  · CT C/A/P with contrast - \"Redemonstrated mildly asymmetric bilateral crural thickening measuring up to 3 cm on the right and 2 cm on the left in maximal thickness with associated stranding suspicious for crural hematomas  Extension of small volume hematoma superiorly into the posterior mediastinum at the level of the distal esophagus  No leodan diaphragmatic discontinuity or intrathoracic herniation of abdominal organs to suggest rupture  \"  · Hemoglobin is relatively stable  Cleared by trauma to start back on the heparin drip    Monitor H&H every 6 hours along with PT  "

## 2023-06-28 NOTE — PROGRESS NOTES
[facility]  Progress Note  Name: Danielle Vanegas  MRN: [id number]  [facility] Date of Admission: 6/22/2023   Date of Service: 6/28/2023 I Hospital Day: 6    Assessment/Plan   Diaphragmatic hematoma  Assessment & Plan  - Recent history of multiple falls, admitted for rib fractures in mid May, recent fall on 6/21 and presented to the trauma clinic for evaluation, was tachycardic and sent to the ED for syncope/orthostatic hypotension work-up, initial CT scans negative for acute traumatic injury but positive for possible small PEs  - 6/24 CT A/P: Finding suspicious for pleural hematoma more prominent on the right, concern for underlying diaphragmatic injury, new soft tissue fullness and partially visualized posterior mediastinum extending to the left  - 6/24 CT C/A/P PO/IV: Mildly asymmetric b/l  pleural thickening R 3cm and L 2cm, associated stranding s/f crural hematomas, extension of small volume hematoma superiorly into posterior mediastinum, no leodan diaphragmatic discontinuity or evidence of rupture, hepatomegaly/hepatic steatosis, splenomegaly  -Hemoglobin is now stable  May transition patient to oral anticoagulation as medical team sees fit  -Follow-up with trauma as scheduled  Trauma team will sign off now that hemoglobin is stable on therapeutic heparin  Please contact with any questions or concerns  Pulmonary embolism [facility])  Assessment & Plan  - 6/22 CTA PE shows No central PE but small filling defects in far peripheral fourth and fifth order branch artery supplying RLL, RV/LV ratio 0 9  -May continue heparin drip if pulmonary believes PE should be treated with anticoagulation    -Continue to trend hemoglobin  - Pulmonary consulted and note appreciated  May consider oral anticoagulation now that hemoglobin is stable        Rib fractures  Assessment & Plan  Pain control  IS for deep breathing to help prevent atelectasis   Encourage mobilization "  Follow-up with trauma as scheduled  Bowel Regimen: Per medical team  VTE Prophylaxis:Sequential compression device (Venodyne)  and Heparin     Disposition: Trauma team will sign off now that patient's hemoglobin has displayed stability on therapeutic heparin  Please contact trauma team with any questions or concerns  Subjective   Chief Complaint: \" \"I am stuck here\"    Subjective: Patient describes feeling well today  He denies any new complaints or concerns  He continues to have some subacute rib fractures from his injury several months ago  He denies any abdominal pain or difficulty breathing  Objective   Vitals:   Temp:  [97 5 °F (36 4 °C)-98 5 °F (36 9 °C)] 97 5 °F (36 4 °C)  HR:  [68-80] 71  Resp:  [16-17] 16  BP: (118-126)/(69-79) 118/73    I/O       06/26 0701  06/27 0700 06/27 0701  06/28 0700 06/28 0701  06/29 0700    P  O  760 522     Total Intake(mL/kg) 760 (6 8) 522 (4 7)     Net +760 +522            Unmeasured Urine Occurrence  5 x            Physical Exam:   GENERAL APPEARANCE: No acute distress  NEURO: GCS 15  Light touch sensation intact throughout  HEENT: Normocephalic, atraumatic  Neck supple  CV: Regular rate and rhythm  LUNGS: Clear to auscultation, bilaterally  No orthopnea  No tachypnea  GI: Abdomen is soft nontender  : Pelvis stable  MSK: Moving all extremities  No deformities  No extremity tenderness  SKIN: Warm, dry      Invasive Devices     Peripheral Intravenous Line  Duration           Peripheral IV 06/24/23 Left;Proximal;Ventral (anterior) Forearm 4 days                      Lab Results:   Results: I have personally reviewed all pertinent laboratory/tests results, BMP/CMP:   Lab Results   Component Value Date    SODIUM 134 (L) 06/28/2023    K 3 9 06/28/2023     06/28/2023    CO2 25 06/28/2023    BUN 8 06/28/2023    CREATININE 0 87 06/28/2023    CALCIUM 9 2 06/28/2023    EGFR 89 06/28/2023    and CBC:   Lab Results   Component Value Date    WBC " 4 70 06/28/2023    HGB 8 6 (L) 06/28/2023    HCT 26 1 (L) 06/28/2023    MCV 97 06/28/2023     (L) 06/28/2023    RBC 2 70 (L) 06/28/2023    MCH 31 9 06/28/2023    MCHC 33 0 06/28/2023    RDW 13 2 06/28/2023    MPV 9 6 06/28/2023     Imaging: I have personally reviewed pertinent reports       Other Studies: none

## 2023-06-28 NOTE — UTILIZATION REVIEW
"Continued Stay Review    Date: 6/28/23                          Current Patient Class: IP  Current Level of Care: MS    HPI:68 y o  male initially admitted on 6/22/23 - DX:  Diaphragmatic hematoma / Pulmonary embolism  / Rib fractures    Assessment/Plan:   6/28/23: Decreased breath sounds; restart heparin and transition to oral anticoagulants  Plan: cont heparin gtt;  Monitor H/H Q6; monitor labs; pain control    Vital Signs: BP 98/54   Pulse 73   Temp 98 4 °F (36 9 °C)   Resp 16   Ht 5' 8\" (1 727 m)   Wt 111 kg (244 lb 7 8 oz)   SpO2 97%   BMI 37 17 kg/m²       Pertinent Labs/Diagnostic Results:       Results from last 7 days   Lab Units 06/28/23  1339 06/28/23  0556 06/27/23  2317 06/27/23  1623 06/27/23  0604 06/26/23  1500 06/26/23  1051 06/24/23  1955 06/24/23  1332   WBC Thousand/uL  --  4 70  --  4 25* 4 58  --  4 25*   < > 5 25   HEMOGLOBIN g/dL 8 8* 8 6* 8 4* 8 8* 8 5*   < > 8 6*   < > 10 2*   HEMATOCRIT % 25 5* 26 1* 24 7* 25 4* 24 6*   < > 25 5*   < > 29 3*   PLATELETS Thousands/uL  --  105*  --  95* 102*  --  90*   < > 92*   NEUTROS ABS Thousands/µL  --   --   --   --  3 11  --  2 74  --  4 30    < > = values in this interval not displayed           Results from last 7 days   Lab Units 06/28/23  0556 06/27/23  0604 06/26/23  0400 06/25/23  0847 06/24/23  1332 06/24/23  0541 06/23/23  0438 06/23/23  0436   SODIUM mmol/L 134* 133* 134* 133* 131* 133*   < >  --    POTASSIUM mmol/L 3 9 3 7 3 7 4 0 3 7 3 5   < >  --    CHLORIDE mmol/L 107 104 104 101 100 101   < >  --    CO2 mmol/L 25 25 27 31 26 27   < >  --    ANION GAP mmol/L 2 4 3 1 5 5   < >  --    BUN mg/dL 8 9 10 8 10 10   < >  --    CREATININE mg/dL 0 87 0 77 0 78 0 92 0 92 0 89   < >  --    EGFR ml/min/1 73sq m 89 94 94 86 86 89   < >  --    CALCIUM mg/dL 9 2 8 9 8 4 8 6 8 8 9 0   < >  --    MAGNESIUM mg/dL  --  1 7  --  2 1 1 5* 1 8  --  1 3*   PHOSPHORUS mg/dL  --   --   --   --   --   --   --  3 2    < > = values in this interval not " displayed  Results from last 7 days   Lab Units 06/27/23  0604 06/24/23  1332 06/24/23  0541 06/23/23  0438 06/22/23  1438   AST U/L 71* 77* 88* 105* 143*   ALT U/L 47 54 56 61 84*   ALK PHOS U/L 65 77 78 76 92   TOTAL PROTEIN g/dL 6 2* 7 1 7 1 6 8 8 1   ALBUMIN g/dL 2 9* 3 4* 3 3* 3 3* 4 0   TOTAL BILIRUBIN mg/dL 0 56 0 80 0 77 0 97 0 44         Results from last 7 days   Lab Units 06/28/23  0556 06/27/23  0604 06/26/23  0400 06/25/23  0847 06/24/23  1332 06/24/23  0541 06/23/23  0438 06/22/23  1438   GLUCOSE RANDOM mg/dL 100 100 100 136 207* 119 106 94       Results from last 7 days   Lab Units 06/22/23  1911 06/22/23  1705 06/22/23  1438   HS TNI 0HR ng/L  --   --  7   HS TNI 2HR ng/L  --  8  --    HSTNI D2 ng/L  --  1  --    HS TNI 4HR ng/L 9  --   --    HSTNI D4 ng/L 2  --   --      Results from last 7 days   Lab Units 06/22/23  1438   D-DIMER QUANTITATIVE ug/ml FEU 1 39*     Results from last 7 days   Lab Units 06/28/23  1339 06/28/23  0556 06/27/23  2317 06/27/23  1623 06/25/23  2124 06/25/23  1439 06/23/23  0400 06/22/23  2121   PROTIME seconds  --   --   --  13 7  --  13 6  --  13 6   INR   --   --   --  1 03  --  1 02  --  1 02   PTT seconds 71* 93* 56* 28   < > 29   < > 28    < > = values in this interval not displayed       Results from last 7 days   Lab Units 06/22/23  1438   TSH 3RD GENERATON uIU/mL 3 464         Results from last 7 days   Lab Units 06/24/23  1332   LACTIC ACID mmol/L 2 0       Results from last 7 days   Lab Units 06/22/23  2121   ETHANOL LVL mg/dL <3       Medications:   Scheduled Medications:  folic acid, 1 mg, Oral, Daily  losartan, 50 mg, Oral, Daily  melatonin, 3 mg, Oral, HS  multivitamin-minerals, 1 tablet, Oral, Daily  thiamine, 100 mg, Oral, Daily      Continuous IV Infusions:  heparin (porcine), 3-30 Units/kg/hr (Order-Specific), Intravenous, Titrated      PRN Meds:  acetaminophen, 650 mg, Oral, Q6H PRN  HYDROmorphone, 0 2 mg, Intravenous, Q4H PRN  methocarbamol, 750 mg, Oral, Q6H PRN  oxyCODONE, 5 mg, Oral, Q4H PRN  oxyCODONE, 2 5 mg, Oral, Q4H PRN        Discharge Plan: TBD    Network Utilization Review Department  ATTENTION: Please call with any questions or concerns to [phone] and carefully listen to the prompts so that you are directed to the right person  All voicemails are confidential   Susan Brooks all requests for admission clinical reviews, approved or denied determinations and any other requests to dedicated fax number below belonging to the campus where the patient is receiving treatment   List of dedicated fax numbers for the Facilities:  [address] DENIALS (Administrative/Medical Necessity) [phone]   [address] (Maternity/NICU/Pediatrics) [phone]   [address] [place] [phone]   [address] [phone]   [address] [place] [address] Bessie Winters 28 [phone]   [address] [facility] Sherry Gonsalves [place] [address] [facility] [phone]

## 2023-06-28 NOTE — ASSESSMENT & PLAN NOTE
Pain control  IS for deep breathing to help prevent atelectasis   Encourage mobilization   Follow-up with trauma as scheduled

## 2023-06-28 NOTE — PLAN OF CARE
Problem: PAIN - ADULT  Goal: Verbalizes/displays adequate comfort level or baseline comfort level  Description: Interventions:  - Encourage patient to monitor pain and request assistance  - Assess pain using appropriate pain scale  - Administer analgesics based on type and severity of pain and evaluate response  - Implement non-pharmacological measures as appropriate and evaluate response  - Consider cultural and social influences on pain and pain management  - Notify physician/advanced practitioner if interventions unsuccessful or patient reports new pain  Outcome: Progressing     Problem: INFECTION - ADULT  Goal: Absence or prevention of progression during hospitalization  Description: INTERVENTIONS:  - Assess and monitor for signs and symptoms of infection  - Monitor lab/diagnostic results  - Monitor all insertion sites, i e  indwelling lines, tubes, and drains  - Monitor endotracheal if appropriate and nasal secretions for changes in amount and color  - [place] appropriate cooling/warming therapies per order  - Administer medications as ordered  - Instruct and encourage patient and family to use good hand hygiene technique  - Identify and instruct in appropriate isolation precautions for identified infection/condition  Outcome: Progressing     Problem: DISCHARGE PLANNING  Goal: Discharge to home or other facility with appropriate resources  Description: INTERVENTIONS:  - Identify barriers to discharge w/patient and caregiver  - Arrange for needed discharge resources and transportation as appropriate  - Identify discharge learning needs (meds, wound care, etc )  - Arrange for interpretive services to assist at discharge as needed  - Refer to Case Management Department for coordinating discharge planning if the patient needs post-hospital services based on physician/advanced practitioner order or complex needs related to functional status, cognitive ability, or social support system  Outcome: Progressing Problem: CARDIOVASCULAR - ADULT  Goal: Maintains optimal cardiac output and hemodynamic stability  Description: INTERVENTIONS:  - Monitor I/O, vital signs and rhythm  - Monitor for S/S and trends of decreased cardiac output  - Administer and titrate ordered vasoactive medications to optimize hemodynamic stability  - Assess quality of pulses, skin color and temperature  - Assess for signs of decreased coronary artery perfusion  - Instruct patient to report change in severity of symptoms  Outcome: Progressing

## 2023-06-29 LAB
APTT PPP: 69 SECONDS (ref 23–37)
ERYTHROCYTE [DISTWIDTH] IN BLOOD BY AUTOMATED COUNT: 13.2 % (ref 11.6–15.1)
HCT VFR BLD AUTO: 25.8 % (ref 36.5–49.3)
HCT VFR BLD AUTO: 27.8 % (ref 36.5–49.3)
HGB BLD-MCNC: 8.4 G/DL (ref 12–17)
HGB BLD-MCNC: 9.2 G/DL (ref 12–17)
MCH RBC QN AUTO: 32.1 PG (ref 26.8–34.3)
MCHC RBC AUTO-ENTMCNC: 32.6 G/DL (ref 31.4–37.4)
MCV RBC AUTO: 99 FL (ref 82–98)
PLATELET # BLD AUTO: 122 THOUSANDS/UL (ref 149–390)
PMV BLD AUTO: 9.8 FL (ref 8.9–12.7)
RBC # BLD AUTO: 2.62 MILLION/UL (ref 3.88–5.62)
WBC # BLD AUTO: 3.95 THOUSAND/UL (ref 4.31–10.16)

## 2023-06-29 PROCEDURE — 85018 HEMOGLOBIN: CPT | Performed by: FAMILY MEDICINE

## 2023-06-29 PROCEDURE — 99232 SBSQ HOSP IP/OBS MODERATE 35: CPT | Performed by: FAMILY MEDICINE

## 2023-06-29 PROCEDURE — 85730 THROMBOPLASTIN TIME PARTIAL: CPT | Performed by: FAMILY MEDICINE

## 2023-06-29 PROCEDURE — 97116 GAIT TRAINING THERAPY: CPT

## 2023-06-29 PROCEDURE — 85027 COMPLETE CBC AUTOMATED: CPT | Performed by: FAMILY MEDICINE

## 2023-06-29 PROCEDURE — 85014 HEMATOCRIT: CPT | Performed by: FAMILY MEDICINE

## 2023-06-29 PROCEDURE — 94760 N-INVAS EAR/PLS OXIMETRY 1: CPT

## 2023-06-29 RX ADMIN — LOSARTAN POTASSIUM 50 MG: 50 TABLET, FILM COATED ORAL at 08:14

## 2023-06-29 RX ADMIN — Medication 1 TABLET: at 08:14

## 2023-06-29 RX ADMIN — APIXABAN 5 MG: 5 TABLET, FILM COATED ORAL at 20:02

## 2023-06-29 RX ADMIN — MELATONIN TAB 3 MG 3 MG: 3 TAB at 20:02

## 2023-06-29 RX ADMIN — APIXABAN 5 MG: 5 TABLET, FILM COATED ORAL at 14:26

## 2023-06-29 RX ADMIN — HEPARIN SODIUM 18 UNITS/KG/HR: 10000 INJECTION, SOLUTION INTRAVENOUS at 08:16

## 2023-06-29 RX ADMIN — THIAMINE HCL TAB 100 MG 100 MG: 100 TAB at 08:14

## 2023-06-29 RX ADMIN — FOLIC ACID 1 MG: 1 TABLET ORAL at 08:14

## 2023-06-29 RX ADMIN — METHOCARBAMOL 750 MG: 750 TABLET ORAL at 20:02

## 2023-06-29 NOTE — ASSESSMENT & PLAN NOTE
Occupational Therapy  Visit Type: treatment  Precautions:  Medical precautions:  fall risk; standard precautions.  The patient is a 66 year old female admitted on 10/7/2021.  Pt admitted with diagnosis of Small bowel obstruction ;SBO (small bowel obstruction) ;Abnormal CT of the abdomen ;Acute kidney injury , Left lower quadrant abdominal pain   10/7:  s/p HERNIA REPAIR, SMALL BOWEL RESECTION, DEBRIDEMENT OF NECROTIC ABDOMINAL WALL  Anesthesia type: General  Required intubation, transferred to ICU extubated 10/09  10/10 transferred out of ICU to   10/18:  Wound vac placement to abdominal wounds, wound care M,W,F  10/26: Pt transferred to [facility].     Pt with premorbid B knee pain and loss of ROM, utilizes cane for home and community ambulation at baseline, resides in ranch home and was driving and caring for own affairs.     Lines:     Basic: external urinary catheter, wound vac, capped IV and PICC    Complex Lines: abdominal wound vac.      Lines in chart and on patient reviewed, precautions maintained throughout session.  Safety Measures: chair alarm and bed alarm    SUBJECTIVE  Patient agreed to participate in therapy this date.  \"I feel a lot of pressure after dressing change, I don't want to get OOB\".  Patient / Family Goal: maximize function    OBJECTIVE   Level of consciousness: alert    Oriented to person, place, time and situation     Affect/Behavior: alert, appropriate, calm, cooperative and pleasant  Range of Motion (measured in degrees unless otherwise indicated)  WFL: LUE RUE  Strength (out of 5 unless otherwise indicated)  WFL: LUE, RUE    Activities of Daily Living (ADLs):  Grooming/Oral Hygiene:     Grooming assist: set up    Oral hygiene assist: set up    Position: supine/bed      Interventions   Shoulder abduction: bilateral, supine, resistive and AROM, 10 reps sets   Shoulder adduction: bilateral, supine, AROM and resistive, 10 reps sets   Shoulder extension: bilateral, supine, AROM and  · Likely due to hypovolemia   · Resolved with IV fluids  · Plan for Zio monitor as outpatient resistive, 10 reps sets   Shoulder flexion: bilateral, supine, AROM and resistive, 10 reps sets   Elbow extension: bilateral, supine, AROM and resistive, 10 reps sets   Elbow flexion: bilateral, supine, AROM and resistive, 10 reps sets        ASSESSMENT    Impairments: activity tolerance, range of motion, body habitus and strength  Functional Limitations: bed mobility, functional mobility, bathing, toileting, functional transfers, dressing and participating in meaningful/purposeful activities  Pt cleared by RN, received in bed, agreeable to therapy but refusing OOB activities d/t pain after dressing change. Performed grooming and oral hygiene supine in bed, with set-up of items. Performed therapeutic UE exercises with yellow theraband- refer grid. Pt is motivated but was limited d/t pain this session. Pt will benefit from skilled OT services during IP stay. Pt in bed at the end of the session with all needs met.       Discharge Recommendations:   Recommendations for Discharge: OT IL: Patient is appropriate for daily Occupational Therapy     PT/OT Mobility Equipment for Discharge: TBD-post rehab             Skilled therapy is required to address these limitations in attempt to maximize the patient's independence.  Progress: progressing toward goals    End of Session:   Location: in bed  Safety measures: alarm system in place/re-engaged, call light within reach, equipment intact and lines intact  Handoff to: nurse  Education Provided:   Learning assessment:  - Primary learner: patient  - Are they ready to learn: yes  - Preferred learning style: verbal  - Barriers to learning: no barriers apparent at this time  Education provided during session:  - Receiving education: patient  - Results of above outlined education: Demonstrates understanding and Verbalizes understanding    PLAN  Suggestions for next session as indicated: OT Frequency: 3 days/week  Frequency Comments: 1/3 last 10/27 (R) MARILY, SNEHAL, AS      Interventions:  activity tolerance training, ADL retraining, energy conservation, functional transfer training, patient education, safety training, therapeutic activity, therapeutic exercise, transfer training, upper extremity strengthening/ROM and use of adaptive equipment      GOALS  Review Date: 10/27/2021  Long Term Goals: (to be met by time of discharge from hospital)  Grooming: Patient will complete grooming tasks at bedside set up and supervision.  Status: progressing/ongoing  Upper body dressing: Patient will complete upper body dressing at bedside set up.  Status: progressing/ongoing  Lower body dressing: Patient will complete lower body dressing minimal assist. Status: progressing/ongoing  Toilet transfer: Patient will complete toilet transfer with gait belt and standard walker, minimal assist.  Status: progressing/ongoing        Documented in the chart in the following areas: Assessment. Plan.      Therapy procedure time and total treatment time can be found documented on the Time Entry flowsheet

## 2023-06-29 NOTE — PLAN OF CARE
Problem: INFECTION - ADULT  Goal: Absence or prevention of progression during hospitalization  Description: INTERVENTIONS:  - Assess and monitor for signs and symptoms of infection  - Monitor lab/diagnostic results  - Monitor all insertion sites, i e  indwelling lines, tubes, and drains  - Monitor endotracheal if appropriate and nasal secretions for changes in amount and color  - [place] appropriate cooling/warming therapies per order  - Administer medications as ordered  - Instruct and encourage patient and family to use good hand hygiene technique  - Identify and instruct in appropriate isolation precautions for identified infection/condition  Outcome: Progressing     Problem: DISCHARGE PLANNING  Goal: Discharge to home or other facility with appropriate resources  Description: INTERVENTIONS:  - Identify barriers to discharge w/patient and caregiver  - Arrange for needed discharge resources and transportation as appropriate  - Identify discharge learning needs (meds, wound care, etc )  - Arrange for interpretive services to assist at discharge as needed  - Refer to Case Management Department for coordinating discharge planning if the patient needs post-hospital services based on physician/advanced practitioner order or complex needs related to functional status, cognitive ability, or social support system  Outcome: Progressing     Problem: CARDIOVASCULAR - ADULT  Goal: Maintains optimal cardiac output and hemodynamic stability  Description: INTERVENTIONS:  - Monitor I/O, vital signs and rhythm  - Monitor for S/S and trends of decreased cardiac output  - Administer and titrate ordered vasoactive medications to optimize hemodynamic stability  - Assess quality of pulses, skin color and temperature  - Assess for signs of decreased coronary artery perfusion  - Instruct patient to report change in severity of symptoms  Outcome: Progressing

## 2023-06-29 NOTE — ASSESSMENT & PLAN NOTE
"· Fall on 5/14/23 when he stood up quickly from the seated position, felt lightheaded and fell   · CT chest (5/14/23) - \"Nondisplaced fractures of the right anterior seventh and eighth ribs  \"  · Татьяна Lujanw again on 6/21/23 and was seen in the trauma office as follow-up from previous injury on 6/22/23 - was told to go to the ED  · Presyncope noted in the days following his therapeutic phlebotomy   · Noted to be tachycardic in the trauma office which resolved with IVF's  · Likely hypovolemia with orthostatic hypotension from therapeutic phlebotomy and decreased oral hydration in the setting of diuretic use for hypertension  · Home Dyazide held and received IVF's with resolution of orthostatic hypotension/tachycardia  · Echo - EF 60%  · Telemetry - no arrhythmia  · 2 week Zio patch arranged by cardiology   Patient should arrange pickup at [address] [place] [address] in [place]    "

## 2023-06-29 NOTE — PLAN OF CARE
Problem: PHYSICAL THERAPY ADULT  Goal: Performs mobility at highest level of function for planned discharge setting  See evaluation for individualized goals  Description: Treatment/Interventions: Functional transfer training, Elevations, Therapeutic exercise, Endurance training, Patient/family training, Equipment eval/education, Bed mobility, Gait training, Spoke to case management, Spoke to nursing, OT  Equipment Recommended: [place]       See flowsheet documentation for full assessment, interventions and recommendations  Outcome: Adequate for Discharge  Note: Prognosis: Fair  Problem List: Impaired balance, Decreased mobility  Assessment: Patient was received seated EOB   Patient was agreeable to therapy services today  PT session focused on gait today in order to improve functional mobility and independence  Functional mobility was performed as described above  Patient required increased encouragement to participate in PT services today  Patient did not require any hands on assist throughout therapy session  Pt displayed improved mobility throughout therapy compared to previous sessions  Pt with no questions or concerns regarding d/c home; appears to be functioning at/ near baseline mobility levels  Pt with no further acute inpatient PT needs at this time- please re-consult if needed  PT to discharge pt and recommends no rehab needs  Encourage pt to ambulate at least 3-4x/day with restorative/ nursing staff while remaining in hospital  The patient's [address] [place] [address] Mobility Inpatient Short Form Raw Score is 24, Standardized Score is 57 68  A standardized score greater than 42 9 suggests the patient may benefit from discharge to home  Please also refer to the recommendation of the Physical Therapist for safe discharge planning  Barriers to Discharge: Decreased caregiver support     PT Discharge Recommendation: No rehabilitation needs    See flowsheet documentation for full assessment

## 2023-06-29 NOTE — ASSESSMENT & PLAN NOTE
"· Complained of low back pain radiating to the sides of the abdomen after leaning in his recliner chair yesterday - pain progressively worsened overnight  · CT A/P without contrast - \"Findings suspicious for crural hematoma more prominent on the right  This would raise concern for underlying diaphragmatic injury  New soft tissue fullness in the partially visualized posterior mediastinum extending to the left  This is incompletely visualized  Question related to extension of hematoma into the posterior mediastinum  \"  · CT C/A/P with contrast - \"Redemonstrated mildly asymmetric bilateral crural thickening measuring up to 3 cm on the right and 2 cm on the left in maximal thickness with associated stranding suspicious for crural hematomas  Extension of small volume hematoma superiorly into the posterior mediastinum at the level of the distal esophagus  No leodan diaphragmatic discontinuity or intrathoracic herniation of abdominal organs to suggest rupture  \"  · Hemoglobin remained stable on heparin drip for 2 days    Will start on Eliquis  "

## 2023-06-29 NOTE — PROGRESS NOTES
"[facility]  Progress Note  Name: Carrie Drew  MRN: [id number]  Unit/Bed#: [facility] 821-01 I Date of Admission: 6/22/2023   Date of Service: 6/29/2023 I Hospital Day: 7    Assessment/Plan   * Recurrent falls - possible presyncope with orthostatic hypotension  Assessment & Plan  · Fall on 5/14/23 when he stood up quickly from the seated position, felt lightheaded and fell   · CT chest (5/14/23) - \"Nondisplaced fractures of the right anterior seventh and eighth ribs  \"  · Gary Aguirre again on 6/21/23 and was seen in the trauma office as follow-up from previous injury on 6/22/23 - was told to go to the ED  · Presyncope noted in the days following his therapeutic phlebotomy   · Noted to be tachycardic in the trauma office which resolved with IVF's  · Likely hypovolemia with orthostatic hypotension from therapeutic phlebotomy and decreased oral hydration in the setting of diuretic use for hypertension  · Home Dyazide held and received IVF's with resolution of orthostatic hypotension/tachycardia  · Echo - EF 60%  · Telemetry - no arrhythmia  · 2 week Zio patch arranged by cardiology  Patient should arrange pickup at [address] [place] [address] in [place]      Diaphragmatic hematoma  Assessment & Plan  · Complained of low back pain radiating to the sides of the abdomen after leaning in his recliner chair yesterday - pain progressively worsened overnight  · CT A/P without contrast - \"Findings suspicious for crural hematoma more prominent on the right  This would raise concern for underlying diaphragmatic injury  New soft tissue fullness in the partially visualized posterior mediastinum extending to the left  This is incompletely visualized  Question related to extension of hematoma into the posterior mediastinum  \"  · CT C/A/P with contrast - \"Redemonstrated mildly asymmetric bilateral crural thickening measuring up to 3 cm on the right and 2 cm on the left in maximal thickness with " "associated stranding suspicious for crural hematomas  Extension of small volume hematoma superiorly into the posterior mediastinum at the level of the distal esophagus  No leodan diaphragmatic discontinuity or intrathoracic herniation of abdominal organs to suggest rupture  \"  · Hemoglobin remained stable on heparin drip for 2 days  Will start on Eliquis    Primary hypertension  Assessment & Plan  · Home regimen: Amlodipine 5 mg daily, losartan 50 mg daily, triamterene-HCTZ 37 5/25 mg daily  · Amlodipine and triamterene HCTZ held   · Continue Losartan 50 mg daily  · Had orthostatic hypotension which has resolved  · BP acceptable  · Monitor BP     Pulmonary embolism (HCC)  Assessment & Plan  · Presentation as above  · CTA chest PE study - \"No central pulmonary emboli  The peripheral branch arteries are not well opacified  There are small filling defects in the far peripheral fourth and fifth order branch arteries supplying the right lower lobe  \"  · Lower extremity venous doppler - no DVT  · Heparin drip held on 6/24/23 due to diaphragmatic hematoma and restarted on 6/26 after Hb noted to be stable   · Eliquis price checked with pharmacy - no cost to patient  · Discussed with pulmonology on 6/25 - anticoagulation recommended for current PE unless contraindicated  · Hb dropped to 8 2 this and heparin drip held  · Discussed with trauma - on intervention needed at present, ok to monitor Hb, keep off anticoagulation for now   · If Hb remains stable can attempt heparin drip again if ok with trauma and can be transitioned to Eliquis if Hb remains stable in heparin drip but based on response to heparin drip to date may have to keep off anticoagulation  · Restarted back on the heparin drip yesterday after discussion with trauma  Hemoglobin remained stable so far    Trauma is okay for the patient to be transitioned to oral anticoagulation-we will monitor overnight since the patient just became therapeutic and likely transition " to oral anticoagulation tomorrow    Tachycardia  Assessment & Plan  · Likely due to hypovolemia   · Resolved with IV fluids  · Plan for Zio monitor as outpatient    Rib fractures  Assessment & Plan  · Following fall on 2023  · Pain control    Thrombocytopenia (HCC)  Assessment & Plan  · Chronic  · Monitor    FELIBERTO on CPAP  Assessment & Plan  · CPAP hs   · Wife to bring in home equipment on     Fatty liver, alcoholic  Assessment & Plan  · Follows with GI as an outpatient    Elevated ferritin/iron overload  Assessment & Plan  · Follows with hematology as outpatient for regular phlebotomies            VTE Pharmacologic Prophylaxis: VTE Score: 3 Moderate Risk (Score 3-4) - Pharmacological DVT Prophylaxis Ordered: apixaban (Eliquis)  Patient Centered Rounds: I performed bedside rounds with nursing staff today  Discussions with Specialists or Other Care Team Provider:     Education and Discussions with Family / Patient: wife updated  Total Time Spent on Date of Encounter in care of patient: 45 minutes This time was spent on one or more of the following: performing physical exam; counseling and coordination of care; obtaining or reviewing history; documenting in the medical record; reviewing/ordering tests, medications or procedures; communicating with other healthcare professionals and discussing with patient's family/caregivers  Current Length of Stay: 7 day(s)  Current Patient Status: Inpatient   Certification Statement: The patient will continue to require additional inpatient hospital stay due to pending monitoring   Discharge Plan: Anticipate discharge tomorrow to home      Code Status: Level 1 - Full Code    Subjective:       Objective:     Vitals:   Temp (24hrs), Av 2 °F (36 8 °C), Min:97 8 °F (36 6 °C), Max:98 7 °F (37 1 °C)    Temp:  [97 8 °F (36 6 °C)-98 7 °F (37 1 °C)] 98 1 °F (36 7 °C)  HR:  [64-67] 64  Resp:  [16-20] 20  BP: ()/(67-76) 98/67  SpO2:  [95 %-99 %] 99 %  Body mass index is 37 38 kg/m²  Input and Output Summary (last 24 hours): Intake/Output Summary (Last 24 hours) at 6/29/2023 1918  Last data filed at 6/29/2023 1829  Gross per 24 hour   Intake 660 ml   Output --   Net 660 ml       Physical Exam:   Physical Exam  Constitutional:       General: He is not in acute distress  Appearance: He is not ill-appearing  HENT:      Head: Normocephalic  Nose: Nose normal    Eyes:      General: No scleral icterus  Cardiovascular:      Rate and Rhythm: Normal rate and regular rhythm  Heart sounds: Normal heart sounds  Pulmonary:      Comments: Decreased breath sounds bilateral  Abdominal:      General: There is no distension  Tenderness: There is no abdominal tenderness  Musculoskeletal:         General: Normal range of motion  Skin:     General: Skin is warm  Coloration: Skin is not jaundiced  Neurological:      Mental Status: He is alert  Mental status is at baseline  Additional Data:     Labs:  Results from last 7 days   Lab Units 06/29/23  1821 06/29/23  0529 06/27/23  1623 06/27/23  0604   WBC Thousand/uL  --  3 95*   < > 4 58   HEMOGLOBIN g/dL 9 2* 8 4*   < > 8 5*   HEMATOCRIT % 27 8* 25 8*   < > 24 6*   PLATELETS Thousands/uL  --  122*   < > 102*   NEUTROS PCT %  --   --   --  68   LYMPHS PCT %  --   --   --  16   MONOS PCT %  --   --   --  11   EOS PCT %  --   --   --  4    < > = values in this interval not displayed       Results from last 7 days   Lab Units 06/28/23  0556 06/27/23  0604   SODIUM mmol/L 134* 133*   POTASSIUM mmol/L 3 9 3 7   CHLORIDE mmol/L 107 104   CO2 mmol/L 25 25   BUN mg/dL 8 9   CREATININE mg/dL 0 87 0 77   ANION GAP mmol/L 2 4   CALCIUM mg/dL 9 2 8 9   ALBUMIN g/dL  --  2 9*   TOTAL BILIRUBIN mg/dL  --  0 56   ALK PHOS U/L  --  65   ALT U/L  --  47   AST U/L  --  71*   GLUCOSE RANDOM mg/dL 100 100     Results from last 7 days   Lab Units 06/27/23  1623   INR  1 03             Results from last 7 days   Lab Units 06/24/23  1332   LACTIC ACID mmol/L 2 0       Lines/Drains:  Invasive Devices     Peripheral Intravenous Line  Duration           Peripheral IV 06/29/23 Distal;Right;Ventral (anterior) Forearm <1 day                      Imaging: No pertinent imaging reviewed  Recent Cultures (last 7 days):         Last 24 Hours Medication List:   Current Facility-Administered Medications   Medication Dose Route Frequency Provider Last Rate   • acetaminophen  650 mg Oral Q6H PRN Nilson Navarro PA-C     • apixaban  5 mg Oral BID Fito Callahan MD     • folic acid  1 mg Oral Daily Ananth Banai, DO     • HYDROmorphone  0 2 mg Intravenous Q4H PRN Edwin Godinze MD     • losartan  50 mg Oral Daily Ananth Banai, DO     • melatonin  3 mg Oral HS Nilson Navarro PA-C     • methocarbamol  750 mg Oral Q6H PRN Edwin Godinez MD     • multivitamin-minerals  1 tablet Oral Daily Ananth Banai, DO     • oxyCODONE  5 mg Oral Q4H PRN Edwin Godinez MD     • oxyCODONE  2 5 mg Oral Q4H PRN Edwin Godinez MD     • thiamine  100 mg Oral Daily Tyrone Marcano DO          Today, Patient Was Seen By: Fito Callahan MD    **Please Note: This note may have been constructed using a voice recognition system  **

## 2023-06-29 NOTE — PHYSICAL THERAPY NOTE
PHYSICAL THERAPY NOTE          Patient Name: Dennys Holland  RKXIM'R Date: 6/29/2023 06/29/23 0831   PT Last Visit   PT Visit Date 06/29/23   Note Type   Note Type Treatment   Pain Assessment   Pain Assessment Tool 0-10   Pain Score No Pain   Restrictions/Precautions   Weight Bearing Precautions Per Order No   Other Precautions Multiple lines   General   Chart Reviewed Yes   Response to Previous Treatment Patient with no complaints from previous session  Family/Caregiver Present No   Cognition   Overall Cognitive Status WFL   Arousal/Participation Alert; Responsive; Cooperative   Attention Within functional limits   Orientation Level Oriented X4   Memory Within functional limits   Following Commands Follows one step commands without difficulty   Subjective   Subjective pt pleasant and cooperative throughout therapy session  pt received seated at EOB   Transfers   Sit to Stand 6  Modified independent   Stand to Sit 6  Modified independent   Additional Comments transfers w/o AD   Ambulation/Elevation   Gait pattern Improper Weight shift; Wide CHENG; Short stride   Gait Assistance 5  Supervision   Assistive Device None   Distance 400'   Stair Management Assistance 5  Supervision   Stair Management Technique One rail L;Foreward;Reciprocal   Number of Stairs 3   Balance   Static Sitting Good   Dynamic Sitting Good   Static Standing Fair +   Dynamic Standing Fair +   Ambulatory Fair +   Activity Tolerance   Activity Tolerance Patient tolerated treatment well   Nurse Made Aware RN cleared and updated   Assessment   Prognosis Fair   Problem List Impaired balance;Decreased mobility   Assessment Patient was received seated EOB   Patient was agreeable to therapy services today  PT session focused on gait today in order to improve functional mobility and independence  Functional mobility was performed as described above   Patient required increased encouragement to participate in PT services today  Patient did not require any hands on assist throughout therapy session  Pt displayed improved mobility throughout therapy compared to previous sessions  Pt with no questions or concerns regarding d/c home; appears to be functioning at/ near baseline mobility levels  Pt with no further acute inpatient PT needs at this time- please re-consult if needed  PT to discharge pt and recommends no rehab needs  Encourage pt to ambulate at least 3-4x/day with restorative/ nursing staff while remaining in hospital  The patient's [address] [place] [address] Mobility Inpatient Short Form Raw Score is 24, Standardized Score is 57 68  A standardized score greater than 42 9 suggests the patient may benefit from discharge to home  Please also refer to the recommendation of the Physical Therapist for safe discharge planning  Barriers to Discharge Decreased caregiver support   Goals   Patient Goals to go home   Plan   Progress Discontinue PT   Recommendation   PT Discharge Recommendation No rehabilitation needs   AM-PAC Basic Mobility Inpatient   Turning in Flat Bed Without Bedrails 4   Lying on Back to Sitting on Edge of Flat Bed Without Bedrails 4   Moving Bed to Chair 4   Standing Up From Chair Using Arms 4   Walk in Room 4   Climb 3-5 Stairs With Railing 4   Basic Mobility Inpatient Raw Score 24   Basic Mobility Standardized Score 57 68   Highest Level Of Mobility   JH-HLM Goal 8: Walk 250 feet or more   JH-HLM Achieved 8: Walk 250 feet ot more   Education   Education Provided Mobility training;Home exercise program   Patient Demonstrates acceptance/verbal understanding   End of Consult   Patient Position at End of Consult Seated edge of bed; All needs within reach         Luz Maria Rhodes PT, DPT

## 2023-06-30 VITALS
WEIGHT: 243.83 LBS | HEIGHT: 68 IN | TEMPERATURE: 98.4 F | RESPIRATION RATE: 16 BRPM | BODY MASS INDEX: 36.95 KG/M2 | SYSTOLIC BLOOD PRESSURE: 149 MMHG | DIASTOLIC BLOOD PRESSURE: 85 MMHG | OXYGEN SATURATION: 96 % | HEART RATE: 67 BPM

## 2023-06-30 LAB
APTT PPP: 32 SECONDS (ref 23–37)
ERYTHROCYTE [DISTWIDTH] IN BLOOD BY AUTOMATED COUNT: 13.2 % (ref 11.6–15.1)
HCT VFR BLD AUTO: 26.5 % (ref 36.5–49.3)
HGB BLD-MCNC: 8.6 G/DL (ref 12–17)
MCH RBC QN AUTO: 31.9 PG (ref 26.8–34.3)
MCHC RBC AUTO-ENTMCNC: 32.5 G/DL (ref 31.4–37.4)
MCV RBC AUTO: 98 FL (ref 82–98)
PLATELET # BLD AUTO: 128 THOUSANDS/UL (ref 149–390)
PMV BLD AUTO: 9.9 FL (ref 8.9–12.7)
RBC # BLD AUTO: 2.7 MILLION/UL (ref 3.88–5.62)
WBC # BLD AUTO: 4.07 THOUSAND/UL (ref 4.31–10.16)

## 2023-06-30 PROCEDURE — 85027 COMPLETE CBC AUTOMATED: CPT | Performed by: FAMILY MEDICINE

## 2023-06-30 PROCEDURE — 85730 THROMBOPLASTIN TIME PARTIAL: CPT | Performed by: FAMILY MEDICINE

## 2023-06-30 PROCEDURE — 99239 HOSP IP/OBS DSCHRG MGMT >30: CPT | Performed by: FAMILY MEDICINE

## 2023-06-30 RX ADMIN — LOSARTAN POTASSIUM 50 MG: 50 TABLET, FILM COATED ORAL at 08:12

## 2023-06-30 RX ADMIN — Medication 1 TABLET: at 08:11

## 2023-06-30 RX ADMIN — THIAMINE HCL TAB 100 MG 100 MG: 100 TAB at 08:11

## 2023-06-30 RX ADMIN — FOLIC ACID 1 MG: 1 TABLET ORAL at 08:12

## 2023-06-30 RX ADMIN — APIXABAN 5 MG: 5 TABLET, FILM COATED ORAL at 08:11

## 2023-07-01 NOTE — DISCHARGE SUMMARY
Discharge Summary - [facility] Internal Medicine    Patient Information: Nacho Lopez 79 y o  male MRN: [id number]  Unit/Bed#: [facility] 821-01 Encounter: [id number]    Discharging Physician / Practitioner: Memo Cornejo MD  PCP: Tre Zafar MD  Admission Date: 6/22/2023  Discharge Date: 6/30/2023  Disposition:     Home    Reason for Admission: Syncope    Discharge Diagnoses:     Principal Problem:    Recurrent falls - possible presyncope with orthostatic hypotension  Active Problems:    Elevated ferritin/iron overload    Fatty liver, alcoholic    FELIBERTO on CPAP    Thrombocytopenia (HCC)    Rib fractures    Tachycardia    Pulmonary embolism ([address] )    Primary hypertension    Diaphragmatic hematoma  Resolved Problems:    * No resolved hospital problems  *      Consultations During Hospital Stay:  · Pulmonology  · Trauma  · Cardiology    Procedures Performed:         Significant Findings / Test Results:     CT chest abdomen and pelvis-mildly asymmetric bilateral crural thickening measuring up to 3 cm on the right and 2 cm on the left associated with stranding suspicious for crural hematoma  Extension of small volume hematoma superiorly into the posterior mediastinum at the level of the distal esophagus    No diaphragmatic discontinuity  or intrathoracic herniation of abdominal organs to suggest rupture  Lower extremity Doppler -No evidence of DVT  CTA PE study-peripheral branch arteries are not well opacified there are small filling defects in the far peripheral fourth and fifth order branch artery supplying the right lower lobe  CT cervical spine-no cervical spine fracture or traumatic malalignment  CT head-no acute intracranial abnormality  Incidental Findings:   ·     Test Results Pending at Discharge (will require follow up):   ·      Outpatient Tests Requested:  · CBC CMP in a week    Complications:  none    Hospital Course:     Nacho Lopez is a 79 y o  male patient who originally presented to the hospital on 6/22/2023 due to fall/syncope  Patient came to the hospital after he suffered a fall the day before admission  Denies any loss of consciousness but unable to give the events preceding the fall or immediately afterwards  This happened at night after getting out of bed to go to the bathroom  Patient had a phlebotomy recently  Patient had multiple episodes of syncope and each happened several days after phlebotomy  Patient was admitted to the hospital   Cardiology evaluated the patient due to tachycardia and cardiology is planning for outpatient Zio patch monitoring  During the work-up patient had a CAT scan which was concerning for peripheral branch PE  Patient was started on heparin drip  Patient noted to have possible crural hematoma on CAT scan done on the CT abdomen and pelvis on 6/24  Trauma evaluated the patient and had a CT abdomen and pelvis with p o  and IV contrast   Confirmed the presence of crural hematoma there was no concern for diaphragmatic injury or perforation  Heparin was put on hold in the possible setting of hematoma and monitored hemoglobin  There was some hemoglobin drop associated with a hematoma  Trauma continued to follow the patient once the hemoglobin was stabilized and cleared by trauma to start back on anticoagulation patient was started on heparin drip without any bolus dosing  Patient was on therapeutic heparin for more than 48 hours and hemoglobin remained stable  Transition to oral anticoagulation and hemoglobin remained stable at 24 hours at that time patient was discharged home with outpatient follow-up  Patient need outpatient follow-up with cardiology for Zio patch monitoring  Recommended to get a CBC BMP in about a week as outpatient through the primary care physician  For details refer to the chart  This patient had a prolonged and complicated hospital stay  Condition at Discharge: good     Discharge Day Visit / Exam:     Subjective: Seen and examined    No "pain   Hemoglobin remained stable on oral anticoagulation stable for discharge and patient is agreeable to for discharge  Vitals: Blood Pressure: 149/85 (06/30/23 0729)  Pulse: 67 (06/30/23 0729)  Temperature: 98 4 °F (36 9 °C) (06/30/23 0729)  Temp Source: Oral (06/22/23 1500)  Respirations: 16 (06/30/23 0728)  Height: 5' 8\" (172 7 cm) (06/23/23 1012)  Weight - Scale: 111 kg (243 lb 13 3 oz) (06/30/23 0516)  SpO2: 96 % (06/30/23 0729)  Exam:   Physical Exam  Constitutional:       General: He is not in acute distress  Appearance: Normal appearance  HENT:      Head: Normocephalic and atraumatic  Nose: Nose normal    Eyes:      General: No scleral icterus  Cardiovascular:      Rate and Rhythm: Normal rate and regular rhythm  Pulses: Normal pulses  Heart sounds: Normal heart sounds  Pulmonary:      Effort: Pulmonary effort is normal    Abdominal:      General: Bowel sounds are normal  There is no distension  Musculoskeletal:         General: Normal range of motion  Skin:     General: Skin is warm  Coloration: Skin is not jaundiced  Neurological:      General: No focal deficit present  Mental Status: He is alert  Cranial Nerves: No cranial nerve deficit  Discussion with Family: wife  Discharge instructions/Information to patient and family:   See after visit summary for information provided to patient and family  Provisions for Follow-Up Care:  See after visit summary for information related to follow-up care and any pertinent home health orders  Planned Readmission:  none     Discharge Statement:  I spent  45 minutes discharging the patient  This time was spent on the day of discharge  I had direct contact with the patient on the day of discharge  Greater than 50% of the total time was spent examining patient, answering all patient questions, arranging and discussing plan of care with patient as well as directly providing post-discharge instructions    " Additional time then spent on discharge activities  Discharge Medications:  See after visit summary for reconciled discharge medications provided to patient and family        ** Please Note: This note has been constructed using a voice recognition system **

## 2023-07-03 NOTE — UTILIZATION REVIEW
NOTIFICATION OF ADMISSION DISCHARGE   This is a Notification of Discharge from [facility]. Please be advised that this patient has been discharge from our facility. Below you will find the admission and discharge date and time including the patient’s disposition. UTILIZATION REVIEW CONTACT:  Jordan Fernando  Utilization   Network Utilization Review Department  Phone: [phone] x carefully listen to the prompts. All voicemails are confidential.  Email: [email or web address]     ADMISSION INFORMATION  PRESENTATION DATE: 6/22/2023  2:02 PM  OBERVATION ADMISSION DATE:   INPATIENT ADMISSION DATE: 6/22/23  7:23 PM   DISCHARGE DATE: 6/30/2023  1:37 PM   DISPOSITION:Home/Self Care    IMPORTANT INFORMATION:  Send all requests for admission clinical reviews, approved or denied determinations and any other requests to dedicated fax number below belonging to the campus where the patient is receiving treatment.  List of dedicated fax numbers:  [place] DENIALS (Administrative/Medical Necessity) [phone] [address] [facility] (Maternity/NICU/Pediatrics) [phone]   [facility] [phone]   [facility] [phone] [address] [facility] [address] [phone] [address] [facility] [phone]   [facility] [phone]   [address] [place] [address] [address] [place] [address] [phone]   [address] [place] [address] [phone]   [address] [facility] [phone] [address] [facility] [address] [facility] [phone]

## 2023-07-05 ENCOUNTER — TRANSITIONAL CARE MANAGEMENT (OUTPATIENT)
Dept: FAMILY MEDICINE CLINIC | Facility: CLINIC | Age: 68
End: 2023-07-05

## 2023-07-05 ENCOUNTER — OFFICE VISIT (OUTPATIENT)
Dept: FAMILY MEDICINE CLINIC | Facility: CLINIC | Age: 68
End: 2023-07-05
Payer: COMMERCIAL

## 2023-07-05 VITALS
WEIGHT: 252.2 LBS | HEIGHT: 68 IN | TEMPERATURE: 98.5 F | HEART RATE: 123 BPM | OXYGEN SATURATION: 98 % | SYSTOLIC BLOOD PRESSURE: 146 MMHG | BODY MASS INDEX: 38.22 KG/M2 | DIASTOLIC BLOOD PRESSURE: 84 MMHG

## 2023-07-05 DIAGNOSIS — Z76.89 ENCOUNTER FOR SUPPORT AND COORDINATION OF TRANSITION OF CARE: Primary | ICD-10-CM

## 2023-07-05 DIAGNOSIS — I26.94 MULTIPLE SUBSEGMENTAL PULMONARY EMBOLI WITHOUT ACUTE COR PULMONALE (HCC): ICD-10-CM

## 2023-07-05 DIAGNOSIS — D69.6 THROMBOCYTOPENIA (HCC): ICD-10-CM

## 2023-07-05 DIAGNOSIS — E66.01 SEVERE OBESITY (BMI 35.0-39.9) WITH COMORBIDITY (HCC): ICD-10-CM

## 2023-07-05 PROCEDURE — 99496 TRANSJ CARE MGMT HIGH F2F 7D: CPT | Performed by: FAMILY MEDICINE

## 2023-07-05 PROCEDURE — 1111F DSCHRG MED/CURRENT MED MERGE: CPT | Performed by: FAMILY MEDICINE

## 2023-07-05 PROCEDURE — 99214 OFFICE O/P EST MOD 30 MIN: CPT | Performed by: FAMILY MEDICINE

## 2023-07-05 PROCEDURE — 1159F MED LIST DOCD IN RCRD: CPT | Performed by: FAMILY MEDICINE

## 2023-07-05 PROCEDURE — 1160F RVW MEDS BY RX/DR IN RCRD: CPT | Performed by: FAMILY MEDICINE

## 2023-07-05 NOTE — PROGRESS NOTES
Assessment & Plan     1. Encounter for support and coordination of transition of care    2. Multiple subsegmental pulmonary emboli without acute cor pulmonale (HCC)  -     CBC  -     Basic metabolic panel    3. Thrombocytopenia (HCC)  -     CBC  -     Basic metabolic panel    4. Severe obesity (BMI 35.0-39. 9) with comorbidity ([address])        Falls Plan of Care: balance, strength, and gait training instructions were provided. Subjective     Transitional Care Management Review:   Mike Mcnamara is a 79 y.o. male here for TCM follow up. During the TCM phone call patient stated:  TCM Call     Date and time call was made  7/5/2023 10:24 AM    Patient was hospitialized at  [address] [place] [address]    Date of Admission  06/22/23    Date of discharge  06/30/23    Diagnosis  RECURRENT FALLS-PRESYNCOPE/ORTHOSTATIC HYPOTENSION      TCM Call     I have advised the patient to call PCP with any new or worsening symptoms  KAREN Petty, [address]        Patient presents with:  Transition of Care Management: Pt was hospitalized for a  blood clot from 6/22/2023 till 6/30/2023    He fell at home after getting up from the couch. He appears to have had vasovagal incident. Discharge notes appreciated. Review of Systems   Constitutional: Negative. HENT: Negative. Eyes: Negative. Respiratory: Negative. Cardiovascular: Negative. Gastrointestinal: Negative. Endocrine: Negative. Genitourinary: Negative. Skin: Negative. Allergic/Immunologic: Negative. Neurological: Negative. Hematological: Negative. Psychiatric/Behavioral: Negative. All other systems reviewed and are negative. Objective     /84 (BP Location: Left arm, Patient Position: Sitting, Cuff Size: Large)   Pulse (!) 123   Temp 98.5 °F (36.9 °C) (Tympanic)   Ht 5' 8" (1.727 m)   Wt 114 kg (252 lb 3.2 oz)   SpO2 98%   BMI 38.35 kg/m²      Physical Exam  Vitals and nursing note reviewed.    Constitutional:       Appearance: He is well-developed. HENT:      Head: Normocephalic and atraumatic. Eyes:      Pupils: Pupils are equal, round, and reactive to light. Cardiovascular:      Rate and Rhythm: Regular rhythm. Tachycardia present. Pulmonary:      Effort: Pulmonary effort is normal.   Abdominal:      Palpations: Abdomen is soft. Musculoskeletal:         General: Normal range of motion. Cervical back: Normal range of motion and neck supple. Lymphadenopathy:      Cervical: No cervical adenopathy. Skin:     General: Skin is warm. Neurological:      General: No focal deficit present. Mental Status: He is alert and oriented to person, place, and time.    Psychiatric:         Mood and Affect: Mood normal.         Behavior: Behavior normal.       Medications have been reviewed by provider in current encounter    Nii Amaral DO

## 2023-07-06 ENCOUNTER — APPOINTMENT (OUTPATIENT)
Dept: LAB | Facility: MEDICAL CENTER | Age: 68
End: 2023-07-06
Payer: COMMERCIAL

## 2023-07-06 LAB
ALBUMIN SERPL BCP-MCNC: 3 G/DL (ref 3.5–5)
ALP SERPL-CCNC: 68 U/L (ref 46–116)
ALT SERPL W P-5'-P-CCNC: 59 U/L (ref 12–78)
ANION GAP SERPL CALCULATED.3IONS-SCNC: 3 MMOL/L
AST SERPL W P-5'-P-CCNC: 91 U/L (ref 5–45)
BASOPHILS # BLD AUTO: 0.05 THOUSANDS/ÂΜL (ref 0–0.1)
BASOPHILS NFR BLD AUTO: 1 % (ref 0–1)
BILIRUB SERPL-MCNC: 0.54 MG/DL (ref 0.2–1)
BUN SERPL-MCNC: 7 MG/DL (ref 5–25)
CALCIUM ALBUM COR SERPL-MCNC: 10.1 MG/DL (ref 8.3–10.1)
CALCIUM SERPL-MCNC: 9.3 MG/DL (ref 8.3–10.1)
CHLORIDE SERPL-SCNC: 108 MMOL/L (ref 96–108)
CO2 SERPL-SCNC: 27 MMOL/L (ref 21–32)
CREAT SERPL-MCNC: 0.92 MG/DL (ref 0.6–1.3)
EOSINOPHIL # BLD AUTO: 0.43 THOUSAND/ÂΜL (ref 0–0.61)
EOSINOPHIL NFR BLD AUTO: 7 % (ref 0–6)
ERYTHROCYTE [DISTWIDTH] IN BLOOD BY AUTOMATED COUNT: 12.9 % (ref 11.6–15.1)
FERRITIN SERPL-MCNC: 203 NG/ML (ref 24–336)
GFR SERPL CREATININE-BSD FRML MDRD: 86 ML/MIN/1.73SQ M
GLUCOSE P FAST SERPL-MCNC: 108 MG/DL (ref 65–99)
HCT VFR BLD AUTO: 31.4 % (ref 36.5–49.3)
HGB BLD-MCNC: 10.3 G/DL (ref 12–17)
IMM GRANULOCYTES # BLD AUTO: 0.06 THOUSAND/UL (ref 0–0.2)
IMM GRANULOCYTES NFR BLD AUTO: 1 % (ref 0–2)
IRON SATN MFR SERPL: 31 % (ref 20–50)
IRON SERPL-MCNC: 74 UG/DL (ref 65–175)
LYMPHOCYTES # BLD AUTO: 1.26 THOUSANDS/ÂΜL (ref 0.6–4.47)
LYMPHOCYTES NFR BLD AUTO: 20 % (ref 14–44)
MCH RBC QN AUTO: 31.9 PG (ref 26.8–34.3)
MCHC RBC AUTO-ENTMCNC: 32.8 G/DL (ref 31.4–37.4)
MCV RBC AUTO: 97 FL (ref 82–98)
MONOCYTES # BLD AUTO: 0.44 THOUSAND/ÂΜL (ref 0.17–1.22)
MONOCYTES NFR BLD AUTO: 7 % (ref 4–12)
NEUTROPHILS # BLD AUTO: 4.11 THOUSANDS/ÂΜL (ref 1.85–7.62)
NEUTS SEG NFR BLD AUTO: 64 % (ref 43–75)
NRBC BLD AUTO-RTO: 0 /100 WBCS
PLATELET # BLD AUTO: 172 THOUSANDS/UL (ref 149–390)
PMV BLD AUTO: 9.9 FL (ref 8.9–12.7)
POTASSIUM SERPL-SCNC: 4.6 MMOL/L (ref 3.5–5.3)
PROT SERPL-MCNC: 7.1 G/DL (ref 6.4–8.4)
RBC # BLD AUTO: 3.23 MILLION/UL (ref 3.88–5.62)
SODIUM SERPL-SCNC: 138 MMOL/L (ref 135–147)
TIBC SERPL-MCNC: 240 UG/DL (ref 250–450)
WBC # BLD AUTO: 6.35 THOUSAND/UL (ref 4.31–10.16)

## 2023-07-06 NOTE — UTILIZATION REVIEW
Insurance requesting additional information  Auth# [id number]      Continued Stay Review    Date: 06/29/2023                          Current Patient Class: Inpatient  Current Level of Care: Med/Surg    HPI:68 y.o. male initially admitted on 06/22/2023    Assessment/Plan: Diaphragmatic Hematoma / Pulmonary Embolism  / Rib Fractures. Continue to monitor on telemetry. Hemoglobin remained stable on heparin drip for 2 days. Continue IV heparin gtt. Will start on Eliquis. Monitor H/H. Monitor labs. Analgesia Control PRN.   PT.       Vital Signs:   Temp Pulse Resp BP MAP (mmHg) SpO2 O2 Device O2 Interface Device   98.4 °F (36.9 °C) 67 -- 149/85 106 96 % -- --   98.4 °F (36.9 °C) -- 16 149/85 106 -- -- --   -- -- -- -- -- -- CPAP Nasal pillows   98.5 °F (36.9 °C) 67 20 100/51 67 93 % -- --   98.1 °F (36.7 °C) 64 20 98/67 77 99 % -- --   97.8 °F (36.6 °C) 64 16 118/76 90 96 % -- --       Pertinent Labs/Diagnostic Results:     Results from last 7 days   Lab Units 07/06/23  0803 06/30/23  0516 06/29/23  1821   WBC Thousand/uL 6.35 4.07*  --    HEMOGLOBIN g/dL 10.3* 8.6* 9.2*   HEMATOCRIT % 31.4* 26.5* 27.8*   PLATELETS Thousands/uL 172 128*  --    NEUTROS ABS Thousands/µL 4.11  --   --      Results from last 7 days   Lab Units 06/30/23  0516   PTT seconds 32     Results from last 7 days   Lab Units 07/06/23  0803   FERRITIN ng/mL 203     Medications:   Medication Dose Route Frequency   • acetaminophen  650 mg Oral Q6H PRN   • apixaban  5 mg Oral BID   • folic acid  1 mg Oral Daily   • HYDROmorphone  0.2 mg Intravenous Q4H PRN   • losartan  50 mg Oral Daily   • melatonin  3 mg Oral HS   • methocarbamol  750 mg Oral Q6H PRN   X 1 dose 6/29   • multivitamin-minerals  1 tablet Oral Daily   • oxyCODONE  5 mg Oral Q4H PRN   • oxyCODONE  2.5 mg Oral Q4H PRN   • thiamine  100 mg Oral Daily    heparin (porcine) 25,000 units in 0.45% NaCl 250 mL infusion (premix)  Rate: 3.3-33 mL/hr Dose: 3-30 Units/kg/hr  Weight Dosing Info: 110 kg (Order-Specific)  Freq: Titrated Route: IV  Last Dose: Stopped (06/29/23 1100)  Start: 06/27/23 1615 End: 06/29/23 1050    Discharge Plan: D      Network Utilization Review Department  ATTENTION: Please call with any questions or concerns to [phone] and carefully listen to the prompts so that you are directed to the right person. All voicemails are confidential.  Steph Montes all requests for admission clinical reviews, approved or denied determinations and any other requests to dedicated fax number below belonging to the campus where the patient is receiving treatment.  List of dedicated fax numbers for the Facilities:  [place] DENIALS (Administrative/Medical Necessity) [phone] [facility] [facility] [phone]   [address] [place] [address] [phone]   [facility] [address] [facility] [phone]   [address] [phone]   [address] [address] [place] [address] [address] [phone]

## 2023-07-12 ENCOUNTER — APPOINTMENT (OUTPATIENT)
Dept: LAB | Facility: MEDICAL CENTER | Age: 68
End: 2023-07-12
Payer: COMMERCIAL

## 2023-07-12 DIAGNOSIS — R79.89 ELEVATED FERRITIN: ICD-10-CM

## 2023-07-12 DIAGNOSIS — R79.89 ELEVATED FERRITIN: Primary | ICD-10-CM

## 2023-07-12 LAB
ANION GAP SERPL CALCULATED.3IONS-SCNC: 2 MMOL/L
BASOPHILS # BLD AUTO: 0.04 THOUSANDS/ÂΜL (ref 0–0.1)
BASOPHILS NFR BLD AUTO: 1 % (ref 0–1)
BUN SERPL-MCNC: 6 MG/DL (ref 5–25)
CALCIUM SERPL-MCNC: 9.1 MG/DL (ref 8.3–10.1)
CHLORIDE SERPL-SCNC: 107 MMOL/L (ref 96–108)
CO2 SERPL-SCNC: 28 MMOL/L (ref 21–32)
CREAT SERPL-MCNC: 0.92 MG/DL (ref 0.6–1.3)
EOSINOPHIL # BLD AUTO: 0.39 THOUSAND/ÂΜL (ref 0–0.61)
EOSINOPHIL NFR BLD AUTO: 7 % (ref 0–6)
ERYTHROCYTE [DISTWIDTH] IN BLOOD BY AUTOMATED COUNT: 13.3 % (ref 11.6–15.1)
FERRITIN SERPL-MCNC: 155 NG/ML (ref 24–336)
GFR SERPL CREATININE-BSD FRML MDRD: 86 ML/MIN/1.73SQ M
GLUCOSE P FAST SERPL-MCNC: 165 MG/DL (ref 65–99)
HCT VFR BLD AUTO: 35.4 % (ref 36.5–49.3)
HGB BLD-MCNC: 11.3 G/DL (ref 12–17)
IMM GRANULOCYTES # BLD AUTO: 0.06 THOUSAND/UL (ref 0–0.2)
IMM GRANULOCYTES NFR BLD AUTO: 1 % (ref 0–2)
IRON SATN MFR SERPL: 30 % (ref 20–50)
IRON SERPL-MCNC: 104 UG/DL (ref 65–175)
LYMPHOCYTES # BLD AUTO: 0.95 THOUSANDS/ÂΜL (ref 0.6–4.47)
LYMPHOCYTES NFR BLD AUTO: 16 % (ref 14–44)
MCH RBC QN AUTO: 31.1 PG (ref 26.8–34.3)
MCHC RBC AUTO-ENTMCNC: 31.9 G/DL (ref 31.4–37.4)
MCV RBC AUTO: 98 FL (ref 82–98)
MONOCYTES # BLD AUTO: 0.51 THOUSAND/ÂΜL (ref 0.17–1.22)
MONOCYTES NFR BLD AUTO: 9 % (ref 4–12)
NEUTROPHILS # BLD AUTO: 3.85 THOUSANDS/ÂΜL (ref 1.85–7.62)
NEUTS SEG NFR BLD AUTO: 66 % (ref 43–75)
NRBC BLD AUTO-RTO: 0 /100 WBCS
PLATELET # BLD AUTO: 149 THOUSANDS/UL (ref 149–390)
PMV BLD AUTO: 10.2 FL (ref 8.9–12.7)
POTASSIUM SERPL-SCNC: 4.2 MMOL/L (ref 3.5–5.3)
RBC # BLD AUTO: 3.63 MILLION/UL (ref 3.88–5.62)
SODIUM SERPL-SCNC: 137 MMOL/L (ref 135–147)
TIBC SERPL-MCNC: 344 UG/DL (ref 250–450)
WBC # BLD AUTO: 5.8 THOUSAND/UL (ref 4.31–10.16)

## 2023-07-12 PROCEDURE — 85025 COMPLETE CBC W/AUTO DIFF WBC: CPT

## 2023-07-12 PROCEDURE — 83550 IRON BINDING TEST: CPT

## 2023-07-12 PROCEDURE — 82728 ASSAY OF FERRITIN: CPT

## 2023-07-12 PROCEDURE — 83540 ASSAY OF IRON: CPT

## 2023-07-12 PROCEDURE — 80048 BASIC METABOLIC PNL TOTAL CA: CPT | Performed by: FAMILY MEDICINE

## 2023-07-12 PROCEDURE — 36415 COLL VENOUS BLD VENIPUNCTURE: CPT | Performed by: FAMILY MEDICINE

## 2023-07-13 PROBLEM — Z86.010 HISTORY OF COLON POLYPS: Chronic | Status: ACTIVE | Noted: 2019-04-02

## 2023-07-13 PROBLEM — Z96.653 STATUS POST TOTAL BILATERAL KNEE REPLACEMENT: Chronic | Status: ACTIVE | Noted: 2018-12-14

## 2023-07-13 PROBLEM — Z86.0100 HISTORY OF COLON POLYPS: Chronic | Status: ACTIVE | Noted: 2019-04-02

## 2023-07-13 NOTE — PROGRESS NOTES
General Cardiology Outpatient Progress Note    Abdullahi Balbuena 79 y.o. male   MRN: [id number]  Encounter: [id number]    Assessment:  Patient Active Problem List    Diagnosis Date Noted   • Diaphragmatic hematoma 06/24/2023   • Recurrent falls - possible presyncope with orthostatic hypotension 06/23/2023   • Primary hypertension 06/23/2023   • Tachycardia 06/22/2023   • Pulmonary embolism ([address]) 06/22/2023   • Severe obesity (BMI 35.0-39. 9) with comorbidity ([address]) 06/05/2023   • Prostate cancer screening 06/02/2023   • Fall 06/01/2023   • Rib fractures 06/01/2023   • Venous insufficiency of both lower extremities 09/15/2022   • FELIBERTO on CPAP 09/15/2022   • Osteoarthritis 09/15/2022   • Fatty liver, alcoholic 45/89/8133   • Elevated ferritin/iron overload 02/03/2020   • History of colon polyps 04/02/2019   • Status post total bilateral knee replacement 12/14/2018   • BMI 40.0-44.9, adult ([address]) 11/29/2018   • Hypercholesterolemia 11/29/2018   • Primary osteoarthritis of both knees 08/02/2018   • Thrombocytopenia ([address]) 04/13/2018   • Vitamin D deficiency 07/21/2015   • Family history of colon cancer 07/07/2015       Today's Plan:  • Elevated BP readings since discharge (amlodipine and triamterene-HCTZ were stopped during admission). o Restart amlodipine at 2.5 mg daily. o Advised to monitor for lower extremity swelling/weight gain with resumption of this medication. o Recommended patient purchase blood pressure cuff for home monitoring. • Will have patient register for Laura Adair today. • RTC s/p ZioPatch to review results. Consider addition of BB at next visit for BP/tachycardia if appropriate (however, sinus tachycardia may also be in the setting of presumed PE diagnosed last month).       Plan:  History of syncope / possible orthostasis / sinus tachycardia   Impression: assessed by cardiology during recent admission who felt syncopal event and tachycardia to be in setting of poor hydration s/p therapeutic phlebotomies. TTE 06/23/2023: LVEF 60%. LVIDd 5.1 cm. Normal RV. No PFO with Doppler. Mild MR. Trace TR. Mildly dilated aortic root and ascending aorta. Normal IVC. 2-week ZioPatch ordered. Hypertension   BP of 168/88 mmHg in office today. Repeat /98 mmHg. Continues on losartan 50 mg daily. Start amlodipine 2.5 mg daily (previsously on 5 mg daily). Obstructive sleep apnea  Possible pulmonary embolism of peripheral branches: CTA chest 06/22/2023: "No central pulmonary emboli. The peripheral branch arteries are not well opacified. There are small filling defects in the far peripheral fourth and fifth order branch arteries supplying the right lower lobe." [facility]. Elevated ferritin: continues with therapeutic phlebotomies. Fatty liver    HPI:   Edith Sena is a 19-year-old man with a PMH as above who presents to the office for hospital follow-up. Admitted to [facility] from 06/22 to 06/30/2023 after presenting s/p fall with pre-syncope. Patient with multiple episodes of pre-syncope/syncope/falls occurring a few days after outpatient therapeutic phlebotomy. Cardiology consulted for tachycardia in setting of syncope. Felt syncopal event and tachycardia to be in setting of poor hydration s/p therapeutic phlebotomies. Outpatient ZioPatch ordered. Amlodipine and triamterene-HCTZ stopped during admission    07/17/2023: Patient presents with his wife for hospital follow-up. Feeling well today with no current cardiac complaints. Denies any episodes of presyncope/syncope/falls since returning home. Patient also denies lightheadedness, dizziness, palpitations, and chest pain. Patient has noted elevated BP readings at his hospital follow-up appointments but also states that he feels that he may have whitecoat syndrome. Does not have BP cuff at home to monitor blood pressures.  With discontinuation of triamterene-HCTZ, patient denies rapid weight gains, SOB, STALLINGS, PND, orthopnea, and lower extremity swelling. Past Medical History:   Diagnosis Date   • Broken ribs    • Elevated ferritin level    • Hypertension    • Obstructive sleep apnea        Review of Systems   Constitutional: Negative for activity change, appetite change, fatigue and unexpected weight change. Eyes: Negative. Respiratory: Negative for cough, chest tightness and shortness of breath. Cardiovascular: Negative for chest pain, palpitations and leg swelling. Gastrointestinal: Negative for abdominal distention, abdominal pain and blood in stool. Endocrine: Negative. Genitourinary: Negative for difficulty urinating, dysuria, hematuria and urgency. Musculoskeletal: Negative. Skin: Negative. Allergic/Immunologic: Negative. Neurological: Negative for dizziness, syncope, weakness, light-headedness and headaches. Psychiatric/Behavioral: Negative for sleep disturbance. The patient is not nervous/anxious. Allergies   Allergen Reactions   • Ciprofloxacin Vomiting   • Flagyl [Metronidazole] Vomiting   • Other Other (See Comments)     Running nose         Current Outpatient Medications:   •  amLODIPine (NORVASC) 2.5 mg tablet, Take 1 tablet (2.5 mg total) by mouth daily, Disp: 30 tablet, Rfl: 5  •  apixaban (Eliquis) 5 mg, Take 1 tablet (5 mg total) by mouth 2 (two) times a day, Disp: 60 tablet, Rfl: 0  •  Cholecalciferol (Vitamin D3) 50 MCG (2000 UT) capsule, Take 2,000 Units by mouth daily, Disp: , Rfl:   •  escitalopram (LEXAPRO) 5 mg tablet, Take 5 mg by mouth daily, Disp: , Rfl:   •  folic acid (FOLVITE) 1 mg tablet, Take 1,000 mcg by mouth daily, Disp: , Rfl:   •  loratadine (CLARITIN) 10 mg tablet, Take 10 mg by mouth, Disp: , Rfl:   •  losartan (COZAAR) 50 mg tablet, TAKE' 1 TABLET (50 MG TOTAL) BY MOUTH DAILY.  IN THE MORNING, Disp: , Rfl:   •  Melatonin 5 MG TABS, TAKE 1 TABLET BY MOUTH EVERY DAY AS NEEDED FOR SLEEP, Disp: , Rfl:   •  Dermarest Eczema 1 % lotion, Apply 1 application topically 2 (two) times a day To affected area (Patient not taking: Reported on 7/5/2023), Disp: , Rfl:   •  methocarbamol (ROBAXIN) 500 mg tablet, Take 1 tablet (500 mg total) by mouth 4 (four) times a day for 14 days (Patient taking differently: Take 500 mg by mouth if needed), Disp: 56 tablet, Rfl: 0  No current facility-administered medications for this visit. Social History     Socioeconomic History   • Marital status: /Civil Union     Spouse name: Not on file   • Number of children: Not on file   • Years of education: Not on file   • Highest education level: Not on file   Occupational History   • Not on file   Tobacco Use   • Smoking status: Former   • Smokeless tobacco: Never   Vaping Use   • Vaping Use: Never used   Substance and Sexual Activity   • Alcohol use: Yes     Comment: occassionally   • Drug use: Not Currently   • Sexual activity: Not on file   Other Topics Concern   • Not on file   Social History Narrative   • Not on file     Social Determinants of Health     Financial Resource Strain: Not on file   Food Insecurity: No Food Insecurity (6/23/2023)    Hunger Vital Sign    • Worried About Running Out of Food in the Last Year: Never true    • Ran Out of Food in the Last Year: Never true   Transportation Needs: Unknown (6/23/2023)    PRAPARE - Transportation    • Lack of Transportation (Medical): Not on file    • Lack of Transportation (Non-Medical): No   Physical Activity: Not on file   Stress: Not on file   Social Connections: Not on file   Intimate Partner Violence: Not on file   Housing Stability: Unknown (6/23/2023)    Housing Stability Vital Sign    • Unable to Pay for Housing in the Last Year: No    • Number of Places Lived in the Last Year: Not on file    • Unstable Housing in the Last Year: No     Family History   Problem Relation Age of Onset   • Cancer Father        Vitals:   Blood pressure 158/98, pulse 66, height 5' 8" (1.727 m), weight 110 kg (242 lb 3.2 oz).     Wt Readings from Last 10 Encounters:   07/17/23 110 kg (242 lb 3.2 oz)   07/05/23 114 kg (252 lb 3.2 oz)   06/30/23 111 kg (243 lb 13.3 oz)   06/22/23 112 kg (247 lb 4.8 oz)   06/02/23 113 kg (248 lb 3.2 oz)   06/01/23 111 kg (244 lb 14.4 oz)   05/14/23 112 kg (245 lb 13 oz)   02/20/23 116 kg (256 lb)   10/12/22 122 kg (269 lb 12.8 oz)   10/06/22 124 kg (273 lb 9.6 oz)     Vitals:    07/17/23 0911 07/17/23 0938   BP: 168/88 158/98   BP Location: Right arm Left arm   Patient Position: Sitting Sitting   Cuff Size: Large Large   Pulse: 66    Weight: 110 kg (242 lb 3.2 oz)    Height: 5' 8" (1.727 m)        Physical Exam  Vitals reviewed. Constitutional:       General: He is awake. He is not in acute distress. Appearance: Normal appearance. He is well-developed and overweight. He is not toxic-appearing or diaphoretic. HENT:      Head: Normocephalic. Nose: Nose normal.      Mouth/Throat:      Mouth: Mucous membranes are moist.   Eyes:      General: No scleral icterus. Conjunctiva/sclera: Conjunctivae normal.   Neck:      Vascular: No JVD. Trachea: No tracheal deviation. Cardiovascular:      Rate and Rhythm: Regular rhythm. Tachycardia present. No extrasystoles are present. Heart sounds: No murmur heard. Pulmonary:      Effort: Pulmonary effort is normal. No tachypnea, bradypnea or respiratory distress. Breath sounds: Normal air entry. No decreased air movement. No decreased breath sounds or wheezing. Abdominal:      General: Bowel sounds are normal. There is no distension. Palpations: Abdomen is soft. Tenderness: There is no abdominal tenderness. Musculoskeletal:      Cervical back: Neck supple. Right lower leg: No edema. Left lower leg: No edema. Skin:     General: Skin is warm and dry. Coloration: Skin is not jaundiced or pale. Neurological:      General: No focal deficit present. Mental Status: He is alert and oriented to person, place, and time.    Psychiatric: Attention and Perception: Attention normal.         Mood and Affect: Mood and affect normal.         Speech: Speech normal.         Behavior: Behavior normal. Behavior is cooperative. Thought Content:  Thought content normal.       Labs & Results:  Lab Results   Component Value Date    WBC 5.80 07/12/2023    HGB 11.3 (L) 07/12/2023    HCT 35.4 (L) 07/12/2023    MCV 98 07/12/2023     07/12/2023     Lab Results   Component Value Date    SODIUM 137 07/12/2023    K 4.2 07/12/2023     07/12/2023    CO2 28 07/12/2023    BUN 6 07/12/2023    CREATININE 0.92 07/12/2023    GLUC 100 06/28/2023    CALCIUM 9.1 07/12/2023     Lab Results   Component Value Date    INR 1.03 06/27/2023    INR 1.02 06/25/2023    INR 1.02 06/22/2023    PROTIME 13.7 06/27/2023    PROTIME 13.6 06/25/2023    PROTIME 13.6 06/22/2023     No results found for: "NTBNP"   No results found for: "BNP"     Jose Raul Beck PA-C

## 2023-07-14 ENCOUNTER — HOSPITAL ENCOUNTER (OUTPATIENT)
Dept: INFUSION CENTER | Facility: CLINIC | Age: 68
Discharge: HOME | End: 2023-07-14
Payer: COMMERCIAL

## 2023-07-14 VITALS
HEART RATE: 105 BPM | RESPIRATION RATE: 16 BRPM | DIASTOLIC BLOOD PRESSURE: 109 MMHG | SYSTOLIC BLOOD PRESSURE: 175 MMHG | TEMPERATURE: 97.3 F

## 2023-07-14 DIAGNOSIS — R79.89 ELEVATED FERRITIN: ICD-10-CM

## 2023-07-14 DIAGNOSIS — R79.89 ELEVATED FERRITIN: Primary | ICD-10-CM

## 2023-07-14 PROCEDURE — 99195 PHLEBOTOMY: CPT

## 2023-07-14 NOTE — PROGRESS NOTES
Patient here for therapeutic phlebotomy. Vital signs stable. Hemoglobin is 11.3, Hematocrit 35.4  and Ferritin 155 from labs drawn on 7/12/23. Started at  and ended at 1110.  450 ml blood removed. Gauze dressing applied and pressure held. Patient tolerated well. Vital signs repeated and are stable and patient observed for any reactions.

## 2023-07-17 ENCOUNTER — OFFICE VISIT (OUTPATIENT)
Dept: CARDIOLOGY CLINIC | Facility: CLINIC | Age: 68
End: 2023-07-17
Payer: COMMERCIAL

## 2023-07-17 VITALS
SYSTOLIC BLOOD PRESSURE: 158 MMHG | HEART RATE: 66 BPM | DIASTOLIC BLOOD PRESSURE: 98 MMHG | WEIGHT: 242.2 LBS | BODY MASS INDEX: 36.71 KG/M2 | HEIGHT: 68 IN

## 2023-07-17 DIAGNOSIS — R00.0 TACHYCARDIA, UNSPECIFIED: ICD-10-CM

## 2023-07-17 DIAGNOSIS — I10 ESSENTIAL HYPERTENSION: ICD-10-CM

## 2023-07-17 DIAGNOSIS — Z09 HOSPITAL DISCHARGE FOLLOW-UP: Primary | ICD-10-CM

## 2023-07-17 DIAGNOSIS — R29.6 RECURRENT FALLS: ICD-10-CM

## 2023-07-17 PROCEDURE — 99214 OFFICE O/P EST MOD 30 MIN: CPT | Performed by: PHYSICIAN ASSISTANT

## 2023-07-17 RX ORDER — CHOLECALCIFEROL (VITAMIN D3) 125 MCG
CAPSULE ORAL
COMMUNITY
Start: 2023-07-07

## 2023-07-17 RX ORDER — ESCITALOPRAM OXALATE 5 MG/1
5 TABLET ORAL DAILY
COMMUNITY
Start: 2023-07-07

## 2023-07-17 RX ORDER — AMLODIPINE BESYLATE 2.5 MG/1
2.5 TABLET ORAL DAILY
Qty: 30 TABLET | Refills: 5 | Status: SHIPPED | OUTPATIENT
Start: 2023-07-17

## 2023-07-17 NOTE — PATIENT INSTRUCTIONS
Start amlodipine at 2.5 mg daily. Okay to take in the evenings. Our office will work on arranging the cardiac event monitor.

## 2023-07-18 ENCOUNTER — OFFICE VISIT (OUTPATIENT)
Dept: FAMILY MEDICINE CLINIC | Facility: CLINIC | Age: 68
End: 2023-07-18
Payer: COMMERCIAL

## 2023-07-18 VITALS
TEMPERATURE: 98 F | OXYGEN SATURATION: 99 % | HEART RATE: 67 BPM | BODY MASS INDEX: 37.1 KG/M2 | SYSTOLIC BLOOD PRESSURE: 136 MMHG | HEIGHT: 68 IN | WEIGHT: 244.8 LBS | DIASTOLIC BLOOD PRESSURE: 82 MMHG

## 2023-07-18 DIAGNOSIS — I10 PRIMARY HYPERTENSION: Primary | Chronic | ICD-10-CM

## 2023-07-18 DIAGNOSIS — E66.01 SEVERE OBESITY (BMI 35.0-39.9) WITH COMORBIDITY (HCC): ICD-10-CM

## 2023-07-18 DIAGNOSIS — R73.9 ELEVATED BLOOD SUGAR LEVEL: ICD-10-CM

## 2023-07-18 DIAGNOSIS — I26.94 MULTIPLE SUBSEGMENTAL PULMONARY EMBOLI WITHOUT ACUTE COR PULMONALE (HCC): ICD-10-CM

## 2023-07-18 DIAGNOSIS — D69.6 THROMBOCYTOPENIA (HCC): ICD-10-CM

## 2023-07-18 PROCEDURE — 99214 OFFICE O/P EST MOD 30 MIN: CPT | Performed by: FAMILY MEDICINE

## 2023-07-18 RX ORDER — AMLODIPINE BESYLATE 2.5 MG/1
TABLET ORAL
COMMUNITY
Start: 2023-07-17 | End: 2023-07-18

## 2023-07-18 NOTE — PROGRESS NOTES
Assessment/Plan:      Diagnoses and all orders for this visit:    Primary hypertension  -     Hemoglobin A1C  -     Basic metabolic panel  -     UA w Reflex to Microscopic w Reflex to Culture  -     Albumin / creatinine urine ratio    Multiple subsegmental pulmonary emboli without acute cor pulmonale (HCC)    Thrombocytopenia (HCC)    Severe obesity (BMI 35.0-39. 9) with comorbidity (HCC)    Elevated blood sugar level  -     Hemoglobin A1C  -     Basic metabolic panel  -     UA w Reflex to Microscopic w Reflex to Culture  -     Albumin / creatinine urine ratio    Other orders  -     Discontinue: amLODIPine (NORVASC) 2.5 mg tablet          Subjective:     Patient ID: Penelope Edge is a 79 y.o. male. He is in today for follow-up. Noted his blood sugar was elevated on recent lab work. He has never had an elevated blood sugar in the past.  He did recently see cardiology medication was adjusted his blood pressure is doing better. Review of Systems   Constitutional: Negative. Respiratory: Negative. Cardiovascular: Negative. Gastrointestinal: Negative. Objective:     Physical Exam  Vitals and nursing note reviewed. Constitutional:       Appearance: He is well-developed. HENT:      Head: Normocephalic and atraumatic. Eyes:      Pupils: Pupils are equal, round, and reactive to light. Cardiovascular:      Rate and Rhythm: Normal rate. Pulses: Normal pulses. Heart sounds: Normal heart sounds. Pulmonary:      Effort: Pulmonary effort is normal.      Breath sounds: No wheezing. Abdominal:      Palpations: Abdomen is soft. Musculoskeletal:      Cervical back: Normal range of motion and neck supple. Lymphadenopathy:      Cervical: No cervical adenopathy. Skin:     General: Skin is warm. Neurological:      Mental Status: He is alert and oriented to person, place, and time.    Psychiatric:         Mood and Affect: Mood normal.

## 2023-07-20 ENCOUNTER — OFFICE VISIT (OUTPATIENT)
Dept: SURGERY | Facility: CLINIC | Age: 68
End: 2023-07-20
Payer: COMMERCIAL

## 2023-07-20 VITALS
SYSTOLIC BLOOD PRESSURE: 155 MMHG | DIASTOLIC BLOOD PRESSURE: 98 MMHG | HEIGHT: 68 IN | TEMPERATURE: 97.3 F | OXYGEN SATURATION: 98 % | BODY MASS INDEX: 36.72 KG/M2 | RESPIRATION RATE: 14 BRPM | HEART RATE: 78 BPM | WEIGHT: 242.3 LBS

## 2023-07-20 DIAGNOSIS — I26.94 MULTIPLE SUBSEGMENTAL PULMONARY EMBOLI WITHOUT ACUTE COR PULMONALE (HCC): Primary | ICD-10-CM

## 2023-07-20 DIAGNOSIS — S22.49XD CLOSED FRACTURE OF MULTIPLE RIBS WITH ROUTINE HEALING, UNSPECIFIED LATERALITY, SUBSEQUENT ENCOUNTER: ICD-10-CM

## 2023-07-20 PROCEDURE — 99211 OFF/OP EST MAY X REQ PHY/QHP: CPT | Performed by: NURSE PRACTITIONER

## 2023-07-20 NOTE — ASSESSMENT & PLAN NOTE
Patient s/p Fall and Syncope with Rib fractures  Seen in ED originally on 5/14 in ED, then Trauma clinic on 6/1. Seen again in Trauma clinic on 6/22 after additional syncopal falls after phlebotomy. Sent to ED and admitted for Syncopal workup found to have possible small PE and Crural hematoma. - Developed Possible small PE (CTA chest 06/22/2023: "No central pulmonary emboli. The peripheral branch arteries are not well opacified. There are small filling defects in the far peripheral fourth and fifth order branch arteries supplying the right lower lobe." On Eliquis for [facility].)  - Patient states is feeling better.  Denies SOB, Difficulty breathing or CP.  - Patient now on Eliquis for presumed PE and has follow up with Cardiology for Zio patch for outpatient monitoring.   - F/U trauma PRN

## 2023-07-20 NOTE — PROGRESS NOTES
Office Visit - General Surgery  Darshan Covarrubias MRN: [id number]  Encounter: [id number]    Assessment and Plan  Problem List Items Addressed This Visit        Cardiovascular and Mediastinum    Pulmonary embolism [facility]) - Primary     Patient s/p Fall and Syncope with Rib fractures  Seen in ED originally on 5/14 in ED, then Trauma clinic on 6/1. Seen again in Trauma clinic on 6/22 after additional syncopal falls after phlebotomy. Sent to ED and admitted for Syncopal workup found to have possible small PE and Crural hematoma. - Developed Possible small PE (CTA chest 06/22/2023: "No central pulmonary emboli. The peripheral branch arteries are not well opacified. There are small filling defects in the far peripheral fourth and fifth order branch arteries supplying the right lower lobe." On Eliquis for [facility].)  - Patient states is feeling better. Denies SOB, Difficulty breathing or CP.  - Patient now on Eliquis for presumed PE and has follow up with Cardiology for Zio patch for outpatient monitoring.   - F/U trauma PRN            Musculoskeletal and Integument    Rib fractures     Patient with right sided 7/8 rib fractures  - doing well  - pain has improved  - F/U PRN            Chief Complaint:  Darshan Covarrubias is a 79 y.o. male who presents for Follow-up (F/u )    Subjective  Patient states he is feeling much better. He states he is better rested. Patient states that his work is very taxing and he has been exhausted because of it. He states that he works as a  working 12 to 13-hour shifts for 29 years. He states that sometimes he works during the day sometimes at nighttime and does not get rest.  He contributes much of his problems to being overworked. Now that he has rested he states that he is looking to make a change potentially retire. All in all he appears well. He is not short of breath or having difficulty breathing. He denies headache dizziness lightheadedness.   From a rib standpoint he is healing well his rib pain is much improved he is no longer taking pain medications, he is breathing well. He is currently being treated for presumed PE on Eliquis. And has follow-up with cardiology for his tachycardia which they will be looking at closer with his ZIO patch. Past Medical History:   Diagnosis Date   • Broken ribs    • Elevated ferritin level    • Hypertension    • Obstructive sleep apnea        Past Surgical History:   Procedure Laterality Date   • IR BIOPSY LIVER RANDOM  9/22/2022   • REPLACEMENT TOTAL KNEE Bilateral        Family History   Problem Relation Age of Onset   • Cancer Father        Social History     Tobacco Use   • Smoking status: Former   • Smokeless tobacco: Never   Vaping Use   • Vaping Use: Never used   Substance Use Topics   • Alcohol use: Yes     Comment: occassionally   • Drug use: Not Currently        Medications  Current Outpatient Medications on File Prior to Visit   Medication Sig Dispense Refill   • amLODIPine (NORVASC) 2.5 mg tablet Take 1 tablet (2.5 mg total) by mouth daily 30 tablet 5   • apixaban (Eliquis) 5 mg Take 1 tablet (5 mg total) by mouth 2 (two) times a day 60 tablet 0   • Cholecalciferol (Vitamin D3) 50 MCG (2000 UT) capsule Take 2,000 Units by mouth daily     • escitalopram (LEXAPRO) 5 mg tablet Take 5 mg by mouth daily     • folic acid (FOLVITE) 1 mg tablet Take 1,000 mcg by mouth daily     • loratadine (CLARITIN) 10 mg tablet Take 10 mg by mouth     • losartan (COZAAR) 50 mg tablet TAKE' 1 TABLET (50 MG TOTAL) BY MOUTH DAILY.  IN THE MORNING     • Melatonin 5 MG TABS TAKE 1 TABLET BY MOUTH EVERY DAY AS NEEDED FOR SLEEP     • Dermarest Eczema 1 % lotion Apply 1 application topically 2 (two) times a day To affected area (Patient not taking: Reported on 7/5/2023)     • methocarbamol (ROBAXIN) 500 mg tablet Take 1 tablet (500 mg total) by mouth 4 (four) times a day for 14 days (Patient taking differently: Take 500 mg by mouth if needed) 56 tablet 0     No current facility-administered medications on file prior to visit. Allergies  Allergies   Allergen Reactions   • Ciprofloxacin Vomiting   • Flagyl [Metronidazole] Vomiting   • Other Other (See Comments)     Running nose       Review of Systems   Constitutional: Negative. Negative for fatigue. Respiratory: Negative. Negative for shortness of breath. Cardiovascular: Negative. Negative for chest pain and palpitations. Gastrointestinal: Negative. Neurological: Negative. Negative for dizziness, light-headedness and headaches. Objective  Vitals:    07/20/23 1245   BP: 155/98   Pulse: 78   Resp: 14   Temp: (!) 97.3 °F (36.3 °C)   SpO2: 98%       Physical Exam  Constitutional:       General: He is not in acute distress. Appearance: Normal appearance. He is not ill-appearing or toxic-appearing. HENT:      Head: Atraumatic. Cardiovascular:      Rate and Rhythm: Normal rate and regular rhythm. Pulses: Normal pulses. Heart sounds: Normal heart sounds. No murmur heard. No gallop. Pulmonary:      Effort: Pulmonary effort is normal. No respiratory distress. Breath sounds: Normal breath sounds. No wheezing or rales. Chest:      Chest wall: No tenderness. Abdominal:      General: Bowel sounds are normal.      Palpations: Abdomen is soft. Skin:     General: Skin is warm. Neurological:      General: No focal deficit present. Mental Status: He is alert.    Psychiatric:         Behavior: Behavior normal.

## 2023-07-27 DIAGNOSIS — I26.99 PULMONARY EMBOLISM (HCC): ICD-10-CM

## 2023-08-01 PROBLEM — Z12.5 PROSTATE CANCER SCREENING: Status: RESOLVED | Noted: 2023-06-02 | Resolved: 2023-08-01

## 2023-08-02 ENCOUNTER — APPOINTMENT (OUTPATIENT)
Dept: LAB | Facility: MEDICAL CENTER | Age: 68
End: 2023-08-02
Payer: COMMERCIAL

## 2023-08-02 LAB
ANION GAP SERPL CALCULATED.3IONS-SCNC: 7 MMOL/L
BACTERIA UR QL AUTO: ABNORMAL /HPF
BILIRUB UR QL STRIP: NEGATIVE
BUN SERPL-MCNC: 9 MG/DL (ref 5–25)
CALCIUM SERPL-MCNC: 9.2 MG/DL (ref 8.3–10.1)
CHLORIDE SERPL-SCNC: 107 MMOL/L (ref 96–108)
CLARITY UR: CLEAR
CO2 SERPL-SCNC: 26 MMOL/L (ref 21–32)
COLOR UR: ABNORMAL
CREAT SERPL-MCNC: 1.06 MG/DL (ref 0.6–1.3)
EST. AVERAGE GLUCOSE BLD GHB EST-MCNC: 91 MG/DL
GFR SERPL CREATININE-BSD FRML MDRD: 72 ML/MIN/1.73SQ M
GLUCOSE P FAST SERPL-MCNC: 96 MG/DL (ref 65–99)
GLUCOSE UR STRIP-MCNC: NEGATIVE MG/DL
HBA1C MFR BLD: 4.8 %
HGB UR QL STRIP.AUTO: NEGATIVE
HYALINE CASTS #/AREA URNS LPF: ABNORMAL /LPF
KETONES UR STRIP-MCNC: NEGATIVE MG/DL
LEUKOCYTE ESTERASE UR QL STRIP: NEGATIVE
NITRITE UR QL STRIP: NEGATIVE
NON-SQ EPI CELLS URNS QL MICRO: ABNORMAL /HPF
PH UR STRIP.AUTO: 6 [PH]
POTASSIUM SERPL-SCNC: 3.7 MMOL/L (ref 3.5–5.3)
PROT UR STRIP-MCNC: ABNORMAL MG/DL
RBC #/AREA URNS AUTO: ABNORMAL /HPF
SODIUM SERPL-SCNC: 140 MMOL/L (ref 135–147)
SP GR UR STRIP.AUTO: 1.01 (ref 1–1.03)
UROBILINOGEN UR STRIP-ACNC: <2 MG/DL
WBC #/AREA URNS AUTO: ABNORMAL /HPF

## 2023-08-08 DIAGNOSIS — I10 ESSENTIAL HYPERTENSION: ICD-10-CM

## 2023-08-08 RX ORDER — AMLODIPINE BESYLATE 2.5 MG/1
2.5 TABLET ORAL DAILY
Qty: 90 TABLET | Refills: 2 | Status: SHIPPED | OUTPATIENT
Start: 2023-08-08 | End: 2023-08-11 | Stop reason: SDUPTHER

## 2023-08-10 ENCOUNTER — APPOINTMENT (OUTPATIENT)
Dept: LAB | Facility: MEDICAL CENTER | Age: 68
End: 2023-08-10
Payer: COMMERCIAL

## 2023-08-10 DIAGNOSIS — R79.89 ELEVATED FERRITIN: ICD-10-CM

## 2023-08-10 LAB
BASOPHILS # BLD AUTO: 0.04 THOUSANDS/ÂΜL (ref 0–0.1)
BASOPHILS NFR BLD AUTO: 1 % (ref 0–1)
EOSINOPHIL # BLD AUTO: 0.27 THOUSAND/ÂΜL (ref 0–0.61)
EOSINOPHIL NFR BLD AUTO: 6 % (ref 0–6)
ERYTHROCYTE [DISTWIDTH] IN BLOOD BY AUTOMATED COUNT: 13.5 % (ref 11.6–15.1)
FERRITIN SERPL-MCNC: 76 NG/ML (ref 24–336)
HCT VFR BLD AUTO: 38.2 % (ref 36.5–49.3)
HGB BLD-MCNC: 12.8 G/DL (ref 12–17)
IMM GRANULOCYTES # BLD AUTO: 0.03 THOUSAND/UL (ref 0–0.2)
IMM GRANULOCYTES NFR BLD AUTO: 1 % (ref 0–2)
IRON SATN MFR SERPL: 72 % (ref 20–50)
IRON SERPL-MCNC: 302 UG/DL (ref 65–175)
LYMPHOCYTES # BLD AUTO: 0.99 THOUSANDS/ÂΜL (ref 0.6–4.47)
LYMPHOCYTES NFR BLD AUTO: 23 % (ref 14–44)
MCH RBC QN AUTO: 32 PG (ref 26.8–34.3)
MCHC RBC AUTO-ENTMCNC: 33.5 G/DL (ref 31.4–37.4)
MCV RBC AUTO: 96 FL (ref 82–98)
MONOCYTES # BLD AUTO: 0.54 THOUSAND/ÂΜL (ref 0.17–1.22)
MONOCYTES NFR BLD AUTO: 13 % (ref 4–12)
NEUTROPHILS # BLD AUTO: 2.38 THOUSANDS/ÂΜL (ref 1.85–7.62)
NEUTS SEG NFR BLD AUTO: 56 % (ref 43–75)
NRBC BLD AUTO-RTO: 0 /100 WBCS
PLATELET # BLD AUTO: 97 THOUSANDS/UL (ref 149–390)
PLATELET BLD QL SMEAR: ABNORMAL
PMV BLD AUTO: 10.4 FL (ref 8.9–12.7)
RBC # BLD AUTO: 4 MILLION/UL (ref 3.88–5.62)
RBC MORPH BLD: NORMAL
TIBC SERPL-MCNC: 417 UG/DL (ref 250–450)
WBC # BLD AUTO: 4.25 THOUSAND/UL (ref 4.31–10.16)

## 2023-08-10 PROCEDURE — 85025 COMPLETE CBC W/AUTO DIFF WBC: CPT

## 2023-08-10 PROCEDURE — 83550 IRON BINDING TEST: CPT

## 2023-08-10 PROCEDURE — 36415 COLL VENOUS BLD VENIPUNCTURE: CPT

## 2023-08-10 PROCEDURE — 82728 ASSAY OF FERRITIN: CPT

## 2023-08-10 PROCEDURE — 83540 ASSAY OF IRON: CPT

## 2023-08-11 ENCOUNTER — OFFICE VISIT (OUTPATIENT)
Dept: FAMILY MEDICINE CLINIC | Facility: CLINIC | Age: 68
End: 2023-08-11
Payer: COMMERCIAL

## 2023-08-11 ENCOUNTER — HOSPITAL ENCOUNTER (OUTPATIENT)
Dept: INFUSION CENTER | Facility: CLINIC | Age: 68
Discharge: HOME | End: 2023-08-11
Payer: COMMERCIAL

## 2023-08-11 VITALS
SYSTOLIC BLOOD PRESSURE: 144 MMHG | RESPIRATION RATE: 14 BRPM | DIASTOLIC BLOOD PRESSURE: 96 MMHG | TEMPERATURE: 98 F | HEART RATE: 114 BPM

## 2023-08-11 VITALS
OXYGEN SATURATION: 97 % | TEMPERATURE: 97.3 F | WEIGHT: 246.2 LBS | DIASTOLIC BLOOD PRESSURE: 90 MMHG | SYSTOLIC BLOOD PRESSURE: 138 MMHG | HEART RATE: 123 BPM | HEIGHT: 68 IN | BODY MASS INDEX: 37.31 KG/M2

## 2023-08-11 DIAGNOSIS — K21.9 GASTROESOPHAGEAL REFLUX DISEASE WITHOUT ESOPHAGITIS: Primary | ICD-10-CM

## 2023-08-11 DIAGNOSIS — Z00.00 ROUTINE ADULT HEALTH MAINTENANCE: ICD-10-CM

## 2023-08-11 DIAGNOSIS — I10 ESSENTIAL HYPERTENSION: ICD-10-CM

## 2023-08-11 DIAGNOSIS — E66.01 SEVERE OBESITY (BMI 35.0-39.9) WITH COMORBIDITY (HCC): ICD-10-CM

## 2023-08-11 DIAGNOSIS — I26.94 MULTIPLE SUBSEGMENTAL PULMONARY EMBOLI WITHOUT ACUTE COR PULMONALE (HCC): ICD-10-CM

## 2023-08-11 DIAGNOSIS — R79.89 ELEVATED FERRITIN: ICD-10-CM

## 2023-08-11 DIAGNOSIS — F41.9 ANXIETY: ICD-10-CM

## 2023-08-11 DIAGNOSIS — R79.89 ELEVATED FERRITIN: Primary | ICD-10-CM

## 2023-08-11 PROCEDURE — 99195 PHLEBOTOMY: CPT

## 2023-08-11 PROCEDURE — 99214 OFFICE O/P EST MOD 30 MIN: CPT | Performed by: FAMILY MEDICINE

## 2023-08-11 PROCEDURE — 99397 PER PM REEVAL EST PAT 65+ YR: CPT | Performed by: FAMILY MEDICINE

## 2023-08-11 RX ORDER — OMEPRAZOLE 40 MG/1
40 CAPSULE, DELAYED RELEASE ORAL DAILY
Qty: 90 CAPSULE | Refills: 1 | Status: SHIPPED | OUTPATIENT
Start: 2023-08-11

## 2023-08-11 RX ORDER — AMLODIPINE BESYLATE 5 MG/1
5 TABLET ORAL
Qty: 90 TABLET | Refills: 1 | Status: SHIPPED | OUTPATIENT
Start: 2023-08-11

## 2023-08-11 RX ORDER — BUSPIRONE HYDROCHLORIDE 5 MG/1
5 TABLET ORAL 2 TIMES DAILY
Qty: 60 TABLET | Refills: 1 | Status: SHIPPED | OUTPATIENT
Start: 2023-08-11

## 2023-08-11 NOTE — PROGRESS NOTES
Pt. Arrived to unit stating he has been noting an elevated BP and HR. BP noted 159/108. . Pt. States he has also been having shaking episodes. Pt. Has recently been hospitalized for PE and started on Eliques. Pt. Has appointment this afternoon with PCP. RN notified office of Dr. Low Barnhart. Per Himanshu Jolley, will discuss with [facility].

## 2023-08-11 NOTE — PLAN OF CARE
Problem: INFECTION - ADULT  Goal: Absence or prevention of progression during hospitalization  Description: INTERVENTIONS:  - Assess and monitor for signs and symptoms of infection  - Monitor lab/diagnostic results  - Monitor all insertion sites, i.e. indwelling lines, tubes, and drains  - Monitor endotracheal if appropriate and nasal secretions for changes in amount and color  - [place] appropriate cooling/warming therapies per order  - Administer medications as ordered  - Instruct and encourage patient and family to use good hand hygiene technique  - Identify and instruct in appropriate isolation precautions for identified infection/condition  Outcome: Progressing  Goal: Absence of fever/infection during neutropenic period  Description: INTERVENTIONS:  - Monitor WBC    Outcome: Progressing     Problem: Knowledge Deficit  Goal: Patient/family/caregiver demonstrates understanding of disease process, treatment plan, medications, and discharge instructions  Description: Complete learning assessment and assess knowledge base.   Interventions:  - Provide teaching at level of understanding  - Provide teaching via preferred learning methods  Outcome: Progressing

## 2023-08-11 NOTE — PROGRESS NOTES
Received ok to proceed. Parameters met. Hgb 12.8  Ferritin 72 . Donna Macias RN verified parameters. 450 ml blood removed from [facility]. Pt. Tolerated well. Will allow pt. To sit 15 minutes and obtain follow up BP.

## 2023-08-11 NOTE — PROGRESS NOTES
BP improved post phlebotomy, but remains elevated. Pt. Discharged with spouse in stable condition. Future appointments reviewed. AVS provided.

## 2023-08-14 ENCOUNTER — CLINICAL SUPPORT (OUTPATIENT)
Dept: CARDIOLOGY CLINIC | Facility: CLINIC | Age: 68
End: 2023-08-14
Payer: COMMERCIAL

## 2023-08-14 DIAGNOSIS — R55 SYNCOPE: ICD-10-CM

## 2023-08-14 DIAGNOSIS — R55 SYNCOPE, UNSPECIFIED SYNCOPE TYPE: ICD-10-CM

## 2023-08-14 PROCEDURE — 93248 EXT ECG>7D<15D REV&INTERPJ: CPT | Performed by: STUDENT IN AN ORGANIZED HEALTH CARE EDUCATION/TRAINING PROGRAM

## 2023-08-16 PROBLEM — F41.9 ANXIETY: Status: ACTIVE | Noted: 2023-08-16

## 2023-08-16 PROBLEM — K21.9 GASTROESOPHAGEAL REFLUX DISEASE WITHOUT ESOPHAGITIS: Status: ACTIVE | Noted: 2023-08-16

## 2023-08-16 PROBLEM — I10 ESSENTIAL HYPERTENSION: Status: ACTIVE | Noted: 2023-08-16

## 2023-08-16 PROBLEM — Z00.00 ROUTINE ADULT HEALTH MAINTENANCE: Status: ACTIVE | Noted: 2023-08-16

## 2023-08-16 NOTE — PROGRESS NOTES
Assessment/Plan:    78 y/o male with: PE, HTN, GERD, anxiety, severe obesity with comorbidity along with annual well visit. Will continue meds. Discussed supportive care and return parameters. Pt to remain off work, will reassess in 1 mo. Encouraged continued lifestyle changes otherwise. Regarding Annual well visit. Discussed various safety and health maintenance issues including healthy diet like the Mediterranean diet, exercise, ample sleep, stress reduction, and healthy weight as tolerated. Discussed supportive care and return parameters. No problem-specific Assessment & Plan notes found for this encounter. Diagnoses and all orders for this visit:    Gastroesophageal reflux disease without esophagitis  -     omeprazole (PriLOSEC) 40 MG capsule; Take 1 capsule (40 mg total) by mouth daily    Essential hypertension  -     amLODIPine (NORVASC) 5 mg tablet; Take 1 tablet (5 mg total) by mouth daily at bedtime    Anxiety  -     busPIRone (BUSPAR) 5 mg tablet; Take 1 tablet (5 mg total) by mouth 2 (two) times a day    Severe obesity (BMI 35.0-39. 9) with comorbidity ([address])    Routine adult health maintenance    Multiple subsegmental pulmonary emboli without acute cor pulmonale (HCC)          Subjective:     Chief Complaint   Patient presents with   • Follow-up     3 week follow up appointment, discuss issues. • Well Check     Annual physical, please discuss colorectal screening. Patient ID: Summer Hernandez is a 79 y.o. male. Patient is a 78 y/o male who presents for follow-up on PE, HTN, GERD, anxiety, severe obesity with comorbidity. Patient admits being stable on meds. , continuing to improve but still some fatigue and SOB and feels unable to return to work right now no fevers chills nausea or vomiting. Patient also here for annual well visit admits being active eats and sleeps well.       The following portions of the patient's history were reviewed and updated as appropriate: allergies, current medications, past family history, past medical history, past social history, past surgical history and problem list.    Review of Systems   Constitutional: Negative. HENT: Negative. Eyes: Negative. Respiratory: Negative. Cardiovascular: Negative. Gastrointestinal: Negative. Endocrine: Negative. Genitourinary: Negative. Musculoskeletal: Negative. Allergic/Immunologic: Negative. Neurological: Negative. Hematological: Negative. Psychiatric/Behavioral: Negative. All other systems reviewed and are negative. Objective:      /90 (BP Location: Right arm, Patient Position: Sitting, Cuff Size: Large)   Pulse (!) 123   Temp (!) 97.3 °F (36.3 °C) (Tympanic)   Ht 5' 8" (1.727 m)   Wt 112 kg (246 lb 3.2 oz)   SpO2 97%   BMI 37.43 kg/m²          Physical Exam  Constitutional:       Appearance: He is well-developed. HENT:      Head: Atraumatic. Right Ear: External ear normal.      Left Ear: External ear normal.   Eyes:      Conjunctiva/sclera: Conjunctivae normal.      Pupils: Pupils are equal, round, and reactive to light. Cardiovascular:      Rate and Rhythm: Normal rate and regular rhythm. Heart sounds: Normal heart sounds. Pulmonary:      Effort: Pulmonary effort is normal. No respiratory distress. Breath sounds: Normal breath sounds. Abdominal:      General: There is no distension. Palpations: Abdomen is soft. Tenderness: There is no abdominal tenderness. There is no guarding or rebound. Musculoskeletal:         General: Normal range of motion. Cervical back: Normal range of motion. Skin:     General: Skin is warm and dry. Neurological:      Mental Status: He is alert and oriented to person, place, and time. Cranial Nerves: No cranial nerve deficit. Psychiatric:         Behavior: Behavior normal.         Thought Content: Thought content normal.         Judgment: Judgment normal.       BMI Counseling:  Body mass index is 37.43 kg/m². The BMI is above normal. Nutrition recommendations include encouraging healthy choices of fruits and vegetables. Exercise recommendations include moderate physical activity 150 minutes/week. Rationale for BMI follow-up plan is due to patient being overweight or obese.

## 2023-08-21 ENCOUNTER — TELEPHONE (OUTPATIENT)
Age: 68
End: 2023-08-21

## 2023-08-21 ENCOUNTER — NURSE TRIAGE (OUTPATIENT)
Age: 68
End: 2023-08-21

## 2023-08-21 NOTE — TELEPHONE ENCOUNTER
----- Message from Berkley Lema sent at 8/21/2023  9:16 AM EDT -----  Dr. Luis Green pt-- Sedrick Hodsgon on line -looking for an urgent ASAP if available- pt suffering from gagging and vomiting - soonest appt I could give was 11/27, wife said no way can wait that long. I put him on wait list as well - she's extremely concerned that fastest appt depends on someone canceling.  If can't get an urgent slot - would like one of the nurses to call her to discuss pt's issues/options/directions

## 2023-08-21 NOTE — TELEPHONE ENCOUNTER
Patients GI provider:  Dr. Connolly Ramp    Number to return call: [phone]    Reason for call: Pt spouse Dylan Marte is returning a call the Gerri Herrera RN. Dylan Marte would like a call back.     Scheduled procedure/appointment date if applicable: Apt/procedure

## 2023-08-21 NOTE — TELEPHONE ENCOUNTER
Patients GI provider:  Dr. Ashley Srivastava    Number to return call: [phone]    Reason for call: Pt gagging and vomiting    Scheduled procedure/appointment date if applicable: Appt 83/67/77

## 2023-08-22 ENCOUNTER — TELEPHONE (OUTPATIENT)
Dept: FAMILY MEDICINE CLINIC | Facility: CLINIC | Age: 68
End: 2023-08-22

## 2023-08-22 NOTE — TELEPHONE ENCOUNTER
Pt's wife called again stating she has not heard anything back from one of the nurses, asking for a call back at [phone].

## 2023-08-22 NOTE — TELEPHONE ENCOUNTER
SPOKE WITH PT WIFE, REPORTS PT C/O NAUSEA, VOMITING 3 TIMES PER DAY, POOR PO INTAKE, PT IS HYDRATING WELL. DENIES ABD PAIN, SOB, FEVER, CHILLS, BLACK OR BLOODY BM'S, ANASARCA, DARK URINE. F/U APPT SCHEDULED FOR 9/14/23 AND PUT ON WAIT LIST. REVIEWED ALARM SYMPTOMS THAT WOULD NECESSITATE ER EVAL, ALSO RECOMMENDED PCP F/U. ANY RECOMMENDATIONS?

## 2023-08-22 NOTE — TELEPHONE ENCOUNTER
PTS SPOUSE CALLED REGARDING DISABILITY FORMS /  THESE FORMS NEED TO BE FILLED OUT BY WEDNESDAY , IF NOT THEY WOULD NEED TO START A NEW CLAIM .  PLEASE REVIEW

## 2023-08-22 NOTE — TELEPHONE ENCOUNTER
Patients GI provider:  Dr. Iola Bosworth    Number to return call: [phone]    Reason for call: Pt's wife Binh Porter returning [address] [place] call and would like a call back at the above number. Binh Porter stated she will have her phone on her all day.      Scheduled procedure/appointment date if applicable: Apt/procedure

## 2023-08-23 ENCOUNTER — TELEPHONE (OUTPATIENT)
Dept: FAMILY MEDICINE CLINIC | Facility: CLINIC | Age: 68
End: 2023-08-23

## 2023-08-23 NOTE — TELEPHONE ENCOUNTER
Form was received and filled out on 08/23/23;awaiting Dr. Daniel Purcell' review, signature and date.

## 2023-08-26 DIAGNOSIS — I26.99 PULMONARY EMBOLISM (HCC): ICD-10-CM

## 2023-08-28 RX ORDER — APIXABAN 5 MG/1
5 TABLET, FILM COATED ORAL 2 TIMES DAILY
Qty: 60 TABLET | Refills: 6 | Status: SHIPPED | OUTPATIENT
Start: 2023-08-28

## 2023-08-31 ENCOUNTER — OFFICE VISIT (OUTPATIENT)
Dept: HEMATOLOGY ONCOLOGY | Facility: CLINIC | Age: 68
End: 2023-08-31
Payer: COMMERCIAL

## 2023-08-31 ENCOUNTER — TELEPHONE (OUTPATIENT)
Dept: HEMATOLOGY ONCOLOGY | Facility: CLINIC | Age: 68
End: 2023-08-31

## 2023-08-31 VITALS
SYSTOLIC BLOOD PRESSURE: 126 MMHG | BODY MASS INDEX: 37.28 KG/M2 | DIASTOLIC BLOOD PRESSURE: 64 MMHG | RESPIRATION RATE: 16 BRPM | TEMPERATURE: 97.7 F | HEART RATE: 73 BPM | WEIGHT: 246 LBS | OXYGEN SATURATION: 99 % | HEIGHT: 68 IN

## 2023-08-31 DIAGNOSIS — D69.6 THROMBOCYTOPENIA (HCC): ICD-10-CM

## 2023-08-31 DIAGNOSIS — E83.19 IRON OVERLOAD: ICD-10-CM

## 2023-08-31 DIAGNOSIS — I26.99 PULMONARY EMBOLISM, OTHER, UNSPECIFIED CHRONICITY, UNSPECIFIED WHETHER ACUTE COR PULMONALE PRESENT (HCC): Primary | ICD-10-CM

## 2023-08-31 PROCEDURE — 99215 OFFICE O/P EST HI 40 MIN: CPT | Performed by: INTERNAL MEDICINE

## 2023-08-31 NOTE — PROGRESS NOTES
Hematology / Oncology Outpatient Follow Up Note    Radhika Martinez 79 y.o. male HARJINDER:[id number] URU:[id number]         Date:  8/31/2023    Assessment / Plan: A 80-year-old gentleman with past history of excessive alcohol consumption. He has what appears to be early cirrhosis based on the liver biopsy. He has chronic mild thrombocytopenia as well as elevated ferritin above 1000. He is negative for hemochromatosis gene mutation. The etiology of some cytopenia and elevated ferritin is due to his alcoholic liver disease. He underwent several phlebotomy which reduced her ferritin less than 100 which is adequate. Therefore, I am going to discontinue therapeutic phlebotomy. Regarding his recent pulmonary embolism, this appears to be provoked after the rib fracture. It is unlikely that he would need indefinite anticoagulation. He also has crual thickening which is likely to be hematoma detected back in June 2023. I recommended him to have CTA of the chest in December 2023 which cover the diaphragm to make sure that he has resolved pulmonary embolism. If this is the case, he may discontinue apixaban. Since he has liver cirrhosis, D-dimer does not have predictive value for the venous thromboembolism. Therefore, I did not order D-dimer. I also would like to obtain CBC and CMP and ferritin to monitor the iron overload in December 2023 before his next visit. He and his wife are in agreement with my recommendations. Subjective:     HPI:          Interval History:   A 80-year-old gentleman who was initially referred to hematology with iron overload as well as chronic mild thrombocytopenia. His ferritin was above 1300. He was negative for hemochromatosis gene mutation. He was a heavy drinker with beer as well as liquor for years until few years back. His platelet count has been around . He has mild LFT abnormality.   His coagulation parameters is normal.  He underwent liver biopsy in 2022 which showed some fibrotic change. He has hepatosplenomegaly. Since 2022, he has been on phlebotomy which brought his ferritin to below 100. In June 2023, he fell and broke several ribs on the right side. Few weeks after the rib fracture, he developed right lower lobe pulmonary embolism. Since then, he has been on apixaban. He presented today for routine follow-up. He has chronic fatigue. He has no complaint of pain. His weight is stable. He does not drink alcohol any longer. He has no respiratory symptoms. His performance status is 0-1 out of 4 on ECOG scale. Objective:     Primary Diagnosis:    1. Alcoholic liver disease. 2.  Iron overload. 3.  Mild chronic thrombocytopenia probably due to the alcoholic liver disease. 4.  Provoked pulmonary embolism, diagnosed in June 2023. Cancer Staging:  Cancer Staging   No matching staging information was found for the patient. Previous Hematologic/ Oncologic Treatment:     Therapeutic phlebotomy until June 2023. Current Hematologic/ Oncologic Treatment:      Apixaban 5 mg twice daily since June 2023. Disease Status:     N/A    Test Results:    Pathology:    Liver biopsy in 2023 showed steatohepatitis with moderate centrilobular pericellular and periportal fibrosis. Radiology:    Previous MRI showed hepatosplenomegaly spleen measured 14.7 cm. No liver mass. Recent CTA of the chest in June 2023 showed small filling defect in the right lower lobe. Healing fracture of right sixth seventh eighth and ninth ribs. Laboratory:    See below for CBC, CMP and ferritin. I reviewed his lab over the last 2 years. Physical Exam:      General Appearance:    Alert, oriented        Eyes:    PERRL   Ears:    Normal external ear canals, both ears   Nose:   Nares normal, septum midline   Throat:   Mucosa moist. Pharynx without injection.     Neck:   Supple       Lungs:     Clear to auscultation bilaterally   Chest Wall:    No tenderness or deformity Heart:    Regular rate and rhythm       Abdomen:     Soft, non-tender, bowel sounds +, no organomegaly           Extremities:   Extremities no cyanosis or edema       Skin:   no rash or icterus. Lymph nodes:   Cervical, supraclavicular, and axillary nodes normal   Neurologic:   CNII-XII intact, normal strength, sensation and reflexes     Throughout          Breast exam:  N/A.         ROS: Review of Systems   All other systems reviewed and are negative. Imaging: No results found. Labs:   Lab Results   Component Value Date    WBC 4.25 (L) 08/10/2023    HGB 12.8 08/10/2023    HCT 38.2 08/10/2023    MCV 96 08/10/2023    PLT 97 (L) 08/10/2023     Lab Results   Component Value Date    K 3.7 08/02/2023     08/02/2023    CO2 26 08/02/2023    BUN 9 08/02/2023    CREATININE 1.06 08/02/2023    GLUF 96 08/02/2023    CALCIUM 9.2 08/02/2023    CORRECTEDCA 10.1 07/06/2023    AST 91 (H) 07/06/2023    ALT 59 07/06/2023    ALKPHOS 68 07/06/2023    EGFR 72 08/02/2023       No components found for: "CA27.29"    No components found for: ""    No results found for: "LDH"    No results found for: "SPEP", "UPEP"    Lab Results   Component Value Date    PSA 0.90 06/15/2023       No results found for: "CEA"    No components found for: ""    No results found for: ""    No results found for: "AFP"    No results found for: "HCG"    Lab Results   Component Value Date    IRON 302 (H) 08/10/2023    TIBC 417 08/10/2023    FERRITIN 76 08/10/2023       Lab Results   Component Value Date    [id number] 745 06/22/2023       No results found for: "FOLATE"      Current Medications: Reviewed  Allergies: Reviewed  PMH/FH/SH:  Reviewed      Vital Sign:    Body surface area is 2.24 meters squared.     Wt Readings from Last 3 Encounters:   08/31/23 112 kg (246 lb)   08/11/23 112 kg (246 lb 3.2 oz)   07/20/23 110 kg (242 lb 4.8 oz)        Temp Readings from Last 3 Encounters:   08/31/23 97.7 °F (36.5 °C) (Temporal) 08/11/23 98 °F (36.7 °C)   08/11/23 (!) 97.3 °F (36.3 °C) (Tympanic)        BP Readings from Last 3 Encounters:   08/31/23 126/64   08/11/23 144/96   08/11/23 138/90         Pulse Readings from Last 3 Encounters:   08/31/23 73   08/11/23 (!) 114   08/11/23 (!) 123     @NVKOLGX6(5)@

## 2023-09-01 ENCOUNTER — HOSPITAL ENCOUNTER (OUTPATIENT)
Dept: CT IMAGING | Facility: HOSPITAL | Age: 68
End: 2023-09-01
Attending: INTERNAL MEDICINE
Payer: COMMERCIAL

## 2023-09-01 DIAGNOSIS — D69.6 THROMBOCYTOPENIA (HCC): ICD-10-CM

## 2023-09-01 DIAGNOSIS — E83.19 IRON OVERLOAD: ICD-10-CM

## 2023-09-01 DIAGNOSIS — I26.99 PULMONARY EMBOLISM, OTHER, UNSPECIFIED CHRONICITY, UNSPECIFIED WHETHER ACUTE COR PULMONALE PRESENT (HCC): ICD-10-CM

## 2023-09-01 PROCEDURE — 71275 CT ANGIOGRAPHY CHEST: CPT

## 2023-09-01 PROCEDURE — G1004 CDSM NDSC: HCPCS

## 2023-09-01 RX ADMIN — IOHEXOL 150 ML: 350 INJECTION, SOLUTION INTRAVENOUS at 10:09

## 2023-09-03 DIAGNOSIS — F41.9 ANXIETY: ICD-10-CM

## 2023-09-05 RX ORDER — BUSPIRONE HYDROCHLORIDE 5 MG/1
5 TABLET ORAL 2 TIMES DAILY
Qty: 180 TABLET | Refills: 1 | Status: SHIPPED | OUTPATIENT
Start: 2023-09-05

## 2023-09-13 ENCOUNTER — OFFICE VISIT (OUTPATIENT)
Dept: FAMILY MEDICINE CLINIC | Facility: CLINIC | Age: 68
End: 2023-09-13
Payer: COMMERCIAL

## 2023-09-13 VITALS
OXYGEN SATURATION: 98 % | WEIGHT: 253 LBS | HEIGHT: 68 IN | SYSTOLIC BLOOD PRESSURE: 160 MMHG | BODY MASS INDEX: 38.34 KG/M2 | HEART RATE: 112 BPM | DIASTOLIC BLOOD PRESSURE: 90 MMHG | TEMPERATURE: 97.1 F

## 2023-09-13 DIAGNOSIS — I10 PRIMARY HYPERTENSION: Chronic | ICD-10-CM

## 2023-09-13 DIAGNOSIS — N39.0 URINARY TRACT INFECTION WITHOUT HEMATURIA, SITE UNSPECIFIED: ICD-10-CM

## 2023-09-13 DIAGNOSIS — I26.94 MULTIPLE SUBSEGMENTAL PULMONARY EMBOLI WITHOUT ACUTE COR PULMONALE (HCC): ICD-10-CM

## 2023-09-13 DIAGNOSIS — N39.0 URINARY TRACT INFECTION WITHOUT HEMATURIA, SITE UNSPECIFIED: Primary | ICD-10-CM

## 2023-09-13 PROCEDURE — 99214 OFFICE O/P EST MOD 30 MIN: CPT | Performed by: FAMILY MEDICINE

## 2023-09-13 PROCEDURE — 87086 URINE CULTURE/COLONY COUNT: CPT | Performed by: FAMILY MEDICINE

## 2023-09-13 RX ORDER — CEPHALEXIN 500 MG/1
500 CAPSULE ORAL EVERY 12 HOURS SCHEDULED
Qty: 14 CAPSULE | Refills: 0 | Status: SHIPPED | OUTPATIENT
Start: 2023-09-13 | End: 2023-09-20

## 2023-09-14 ENCOUNTER — OFFICE VISIT (OUTPATIENT)
Dept: GASTROENTEROLOGY | Facility: CLINIC | Age: 68
End: 2023-09-14
Payer: COMMERCIAL

## 2023-09-14 VITALS
SYSTOLIC BLOOD PRESSURE: 144 MMHG | DIASTOLIC BLOOD PRESSURE: 89 MMHG | TEMPERATURE: 99.6 F | HEIGHT: 68 IN | WEIGHT: 250.2 LBS | BODY MASS INDEX: 37.92 KG/M2 | HEART RATE: 122 BPM | OXYGEN SATURATION: 99 %

## 2023-09-14 DIAGNOSIS — E66.9 OBESITY (BMI 35.0-39.9 WITHOUT COMORBIDITY): ICD-10-CM

## 2023-09-14 DIAGNOSIS — R79.89 ELEVATED LFTS: ICD-10-CM

## 2023-09-14 DIAGNOSIS — K75.81 NASH (NONALCOHOLIC STEATOHEPATITIS): ICD-10-CM

## 2023-09-14 DIAGNOSIS — K59.00 CONSTIPATION, UNSPECIFIED CONSTIPATION TYPE: ICD-10-CM

## 2023-09-14 DIAGNOSIS — R11.2 NAUSEA AND VOMITING, UNSPECIFIED VOMITING TYPE: Primary | ICD-10-CM

## 2023-09-14 PROCEDURE — 99214 OFFICE O/P EST MOD 30 MIN: CPT | Performed by: INTERNAL MEDICINE

## 2023-09-14 RX ORDER — TRIAMTERENE AND HYDROCHLOROTHIAZIDE 37.5; 25 MG/1; MG/1
CAPSULE ORAL
COMMUNITY
Start: 2023-09-10

## 2023-09-14 NOTE — PROGRESS NOTES
[facility] - Outpatient Follow-up Note  Sumit Fernandes 79 y.o. male MRN: [id number]  Encounter: [id number]          ASSESSMENT AND PLAN:    Sumit Fernandes is a 79 y.o. male who was previously seen last year with symptoms of nausea, vomiting, poor p.o. intake and weight loss as well as fatty liver thought to be related to alcohol use versus nonalcoholic fatty liver disease, FELIBERTO, hypertension, who now presents for follow-up. He recently had an exacerbation of his nausea and vomiting in the setting of constipation as well as fever. He has since been moving his bowels regularly and the nausea and vomiting improved. This may have been secondary to the constipation although he may have also had an acute infectious gastroenteritis as he did have some potential fever as well. Currently he is feeling much better. Prior imaging was suggestive of colitis and it was thought that possibly was related to an infectious or enteritis and colitis with a postinfectious functional disorder including possible gastroparesis or ileus, which subsequently improved. CT chest abdomen pelvis from June 2023 notable for mildly asymmetric bilateral crural thickening with associated stranding suspicious for crural hematomas but no leodan diaphragmatic discontinuity or intrathoracic herniation of abdominal organs to suggest rupture, hepatomegaly/hepatic steatosis, splenomegaly. Prior MRI with slight iron overload and strong steatosis. Prior liver biopsy with steatohepatitis and moderate centrilobular pericellular and periportal fibrosis. Prior CMP notable for AST of 91 and albumin of 3. Most recent BMP normal.  Iron studies with iron of 302 and iron saturation of 72%. CBC with white count of 4.25 but normal hemoglobin and MCV. Platelets 97. Hemoglobin A1c 4.8.     MELD 3.0: 10 at 6/28/2023  5:56 AM  MELD-Na: 6 at 6/28/2023  5:56 AM  Calculated from:  Serum Creatinine: 0.87 mg/dL (Using min of 1 mg/dL) at 6/28/2023  5:56 AM  Serum Sodium: 134 mmol/L at 6/28/2023  5:56 AM  Total Bilirubin: 0.56 mg/dL (Using min of 1 mg/dL) at 6/27/2023  6:04 AM  Serum Albumin: 2.9 g/dL at 6/27/2023  6:04 AM  INR(ratio): 1.03 at 6/27/2023  4:23 PM  Age at listing (hypothetical): 79 years  Sex: Male at 6/28/2023  5:56 AM        1. Nausea and vomiting, unspecified vomiting type    2. REEVES (nonalcoholic steatohepatitis)    3. Elevated LFTs    4. Obesity (BMI 35.0-39.9 without comorbidity)    5. Constipation, unspecified constipation type        Orders Placed This Encounter   Procedures   • CBC and differential   • Comprehensive metabolic panel   • Protime-INR   • REEVES Fibrosure   • Ambulatory Referral to Weight Management     Repeat blood work including CMP, CBC, INR. Continue omeprazole daily    If nausea returns can consider scopolamine as well as Zofran as needed  Stay well hydrated. Stool softener or miralax as needed    Weight loss via diet and exercise to help with fatty liver  Can follow-up with weight management and consider GLP-1 agonist to help with weight loss. Can also consider bariatric surgery  Recommend avoiding alcohol  And continue to follow-up with Dr. Daya Vance from hepatology  ______________________________________________________________________    SUBJECTIVE:    Tesfaye Deluca is a 79 y.o. male who presents for follow-up for nausea and vomiting. He was diagnosed with a rib fracture, blood clot in his lung, hematoma. He fell again    He had some nausea, vomiting. He was having the chills. He started pepto and a stool softener. He had a large BM. After that he felt fine. Now he is having a BM every morning. No nausea or vomiting now. His stools are regular. Appetite is relatively good. His weight is stable. No alcohol use. No blood in the stool or black stools. No heartburn. REVIEW OF SYSTEMS IS OTHERWISE NEGATIVE.   10 point ROS reviewed and negative, except as above      Historical Information   Past Medical History:   Diagnosis Date   • Allergic    • Anxiety    • Arthritis    • Broken ribs    • Depression    • Elevated ferritin level    • GERD (gastroesophageal reflux disease)    • Hypertension    • Obstructive sleep apnea      Past Surgical History:   Procedure Laterality Date   • IR BIOPSY LIVER RANDOM  9/22/2022   • REPLACEMENT TOTAL KNEE Bilateral      Social History   Social History     Substance and Sexual Activity   Alcohol Use Yes    Comment: occassionally     Social History     Substance and Sexual Activity   Drug Use Never     Social History     Tobacco Use   Smoking Status Former   • Passive exposure: Past   Smokeless Tobacco Never     Family History   Problem Relation Age of Onset   • Cancer Father        Meds/Allergies       Current Outpatient Medications:   •  amLODIPine (NORVASC) 5 mg tablet  •  busPIRone (BUSPAR) 5 mg tablet  •  cephalexin (KEFLEX) 500 mg capsule  •  Cholecalciferol (Vitamin D3) 50 MCG (2000 UT) capsule  •  Eliquis 5 MG  •  folic acid (FOLVITE) 1 mg tablet  •  loratadine (CLARITIN) 10 mg tablet  •  losartan (COZAAR) 50 mg tablet  •  omeprazole (PriLOSEC) 40 MG capsule  •  triamterene-hydrochlorothiazide (DYAZIDE) 37.5-25 mg per capsule  •  Dermarest Eczema 1 % lotion  •  Melatonin 5 MG TABS  •  methocarbamol (ROBAXIN) 500 mg tablet    Allergies   Allergen Reactions   • Ciprofloxacin Vomiting   • Flagyl [Metronidazole] Vomiting   • Other Other (See Comments)     Running nose           Objective     Blood pressure 144/89, pulse (!) 122, temperature 99.6 °F (37.6 °C), temperature source Tympanic, height 5' 8" (1.727 m), weight 113 kg (250 lb 3.2 oz), SpO2 99 %. Body mass index is 38.04 kg/m². PHYSICAL EXAMINATION:    General Appearance:   Alert, cooperative, no distress   HEENT:  Normocephalic, atraumatic, anicteric. Neck supple, symmetrical, trachea midline. Lungs:   Equal chest rise and unlabored breathing, normal effort, no coughing. Cardiovascular:   No visualized JVD. Abdomen:   No abdominal distension. Skin:   No jaundice, rashes, or lesions. Musculoskeletal:   Normal range of motion visualized. Psych:  Normal affect and normal insight. Neuro:  Alert and appropriate. Lab Results:   No visits with results within 1 Day(s) from this visit. Latest known visit with results is:   Orders Only on 09/13/2023   Component Date Value   • Urine Culture 09/13/2023 Culture too young- will reincubate        Lab Results   Component Value Date    WBC 4.25 (L) 08/10/2023    HGB 12.8 08/10/2023    HCT 38.2 08/10/2023    MCV 96 08/10/2023    PLT 97 (L) 08/10/2023       Lab Results   Component Value Date    SODIUM 140 08/02/2023    K 3.7 08/02/2023     08/02/2023    CO2 26 08/02/2023    AGAP 7 08/02/2023    BUN 9 08/02/2023    CREATININE 1.06 08/02/2023    GLUC 100 06/28/2023    GLUF 96 08/02/2023    CALCIUM 9.2 08/02/2023    AST 91 (H) 07/06/2023    ALT 59 07/06/2023    ALKPHOS 68 07/06/2023    TP 7.1 07/06/2023    TBILI 0.54 07/06/2023    EGFR 72 08/02/2023       No results found for: "CRP"    Lab Results   Component Value Date    JLK5NJYWWHYD 3.464 06/22/2023       Lab Results   Component Value Date    IRON 302 (H) 08/10/2023    TIBC 417 08/10/2023    FERRITIN 76 08/10/2023       Radiology Results:   CTA chest pe study    Result Date: 9/1/2023  Narrative: CTA - CHEST WITH IV CONTRAST - PULMONARY ANGIOGRAM INDICATION:   I26.99: Other pulmonary embolism without acute cor pulmonale E83.19: Other disorders of iron metabolism D69.6: Thrombocytopenia, unspecified. COMPARISON: CT chest abdomen and pelvis dated 6/20/2023 TECHNIQUE: CTA examination of the chest was performed using angiographic technique according to a protocol specifically tailored to evaluate for pulmonary embolism. Multiplanar 2D reformatted images were created from the source data. In addition, coronal 3D MIP postprocessing was performed on the acquisition scanner.  Radiation dose length product (DLP) for this visit:  1272 mGy-cm . This examination, like all CT scans performed in the [facility], was performed utilizing techniques to minimize radiation dose exposure, including the use of iterative reconstruction and automated exposure control. IV Contrast:  150 mL of iohexol (OMNIPAQUE) FINDINGS: PULMONARY ARTERIAL TREE:  No pulmonary embolus is seen. LUNGS:  Lungs are clear. There is no tracheal or endobronchial lesion. PLEURA:  Unremarkable. HEART/GREAT VESSELS:  Unremarkable for patient's age. No thoracic aortic aneurysm. MEDIASTINUM AND ROBBY:  Unremarkable. CHEST WALL AND LOWER NECK:   Unremarkable. VISUALIZED STRUCTURES IN THE UPPER ABDOMEN:  Visualized hepatic parenchyma is diffusely decreased in density consistent with hepatic steatosis. Otherwise no clinical significant abnormality identified in the visualized upper abdomen. OSSEOUS STRUCTURES:  No acute fracture or destructive osseous lesion. Impression: 1. No evidence of acute thromboembolic disease. 2. Clear lungs. 3. Imaged portions of the liver with hepatic steatosis.  Workstation performed: [id number]

## 2023-09-14 NOTE — PATIENT INSTRUCTIONS
Repeat blood work   Continue omeprazole daily  Stay well hydrated.    Stool softener or miralax as needed for constipation  Weight loss via diet and exercise to help with fatty liver  Can follow-up with weight management  Recommend avoiding alcohol  Continue to follow-up with Dr. Suzan Whittington from hepatology

## 2023-09-15 LAB — BACTERIA UR CULT: NORMAL

## 2023-09-18 PROBLEM — N39.0 URINARY TRACT INFECTION WITHOUT HEMATURIA: Status: ACTIVE | Noted: 2023-09-18

## 2023-09-18 NOTE — PROGRESS NOTES
Assessment/Plan:    78 y/o male with: PE and HTN along with UTI. Will give antibiotic, continue meds. Discussed supportive care and return parameters. Pt to remain off work. Will reassess in 3 mo. No problem-specific Assessment & Plan notes found for this encounter. Diagnoses and all orders for this visit:    Urinary tract infection without hematuria, site unspecified  -     cephalexin (KEFLEX) 500 mg capsule; Take 1 capsule (500 mg total) by mouth every 12 (twelve) hours for 7 days  -     Urine culture; Future    Multiple subsegmental pulmonary emboli without acute cor pulmonale (HCC)    Primary hypertension          Subjective:     Chief Complaint   Patient presents with   • Follow-up     Constipation , burning with urination , frequency cloudy urine         Patient ID: Kristen Chapa is a 79 y.o. male. Patient is a 78 y/o male who presents for follow-up on PE and HTN patient admits being stable on meds. And complains only of urinary frequency and dysuria no fevers chills nausea or vomiting. The following portions of the patient's history were reviewed and updated as appropriate: allergies, current medications, past family history, past medical history, past social history, past surgical history and problem list.    Review of Systems   Constitutional: Negative. HENT: Negative. Eyes: Negative. Respiratory: Negative. Cardiovascular: Negative. Gastrointestinal: Negative. Endocrine: Negative. Genitourinary: Positive for dysuria. Musculoskeletal: Negative. Allergic/Immunologic: Negative. Neurological: Negative. Hematological: Negative. Psychiatric/Behavioral: Negative. All other systems reviewed and are negative. Objective:      /90   Pulse (!) 112   Temp (!) 97.1 °F (36.2 °C)   Ht 5' 8" (1.727 m)   Wt 115 kg (253 lb)   SpO2 98%   BMI 38.47 kg/m²          Physical Exam  Constitutional:       Appearance: He is well-developed.    HENT:      Head: Atraumatic. Right Ear: External ear normal.      Left Ear: External ear normal.   Eyes:      Conjunctiva/sclera: Conjunctivae normal.      Pupils: Pupils are equal, round, and reactive to light. Cardiovascular:      Rate and Rhythm: Normal rate and regular rhythm. Heart sounds: Normal heart sounds. Pulmonary:      Effort: Pulmonary effort is normal. No respiratory distress. Breath sounds: Normal breath sounds. Abdominal:      General: Bowel sounds are normal. There is no distension. Palpations: Abdomen is soft. Tenderness: There is no abdominal tenderness. There is no guarding or rebound. Musculoskeletal:         General: Normal range of motion. Cervical back: Normal range of motion. Skin:     General: Skin is warm and dry. Neurological:      Mental Status: He is alert and oriented to person, place, and time. Cranial Nerves: No cranial nerve deficit. Psychiatric:         Behavior: Behavior normal.         Thought Content:  Thought content normal.         Judgment: Judgment normal.

## 2023-09-22 ENCOUNTER — APPOINTMENT (OUTPATIENT)
Dept: LAB | Facility: MEDICAL CENTER | Age: 68
End: 2023-09-22
Attending: INTERNAL MEDICINE
Payer: COMMERCIAL

## 2023-09-22 DIAGNOSIS — R79.89 ELEVATED LFTS: ICD-10-CM

## 2023-09-22 DIAGNOSIS — K75.81 NASH (NONALCOHOLIC STEATOHEPATITIS): ICD-10-CM

## 2023-09-22 LAB
ALBUMIN SERPL BCP-MCNC: 3.5 G/DL (ref 3.5–5)
ALP SERPL-CCNC: 76 U/L (ref 34–104)
ALT SERPL W P-5'-P-CCNC: 32 U/L (ref 7–52)
ANION GAP SERPL CALCULATED.3IONS-SCNC: 8 MMOL/L
AST SERPL W P-5'-P-CCNC: 104 U/L (ref 13–39)
BASOPHILS # BLD AUTO: 0.04 THOUSANDS/ÂΜL (ref 0–0.1)
BASOPHILS NFR BLD AUTO: 1 % (ref 0–1)
BILIRUB SERPL-MCNC: 0.75 MG/DL (ref 0.2–1)
BUN SERPL-MCNC: 7 MG/DL (ref 5–25)
CALCIUM SERPL-MCNC: 9 MG/DL (ref 8.4–10.2)
CHLORIDE SERPL-SCNC: 102 MMOL/L (ref 96–108)
CO2 SERPL-SCNC: 29 MMOL/L (ref 21–32)
CREAT SERPL-MCNC: 0.92 MG/DL (ref 0.6–1.3)
EOSINOPHIL # BLD AUTO: 0.38 THOUSAND/ÂΜL (ref 0–0.61)
EOSINOPHIL NFR BLD AUTO: 8 % (ref 0–6)
ERYTHROCYTE [DISTWIDTH] IN BLOOD BY AUTOMATED COUNT: 13.9 % (ref 11.6–15.1)
GFR SERPL CREATININE-BSD FRML MDRD: 86 ML/MIN/1.73SQ M
GLUCOSE P FAST SERPL-MCNC: 113 MG/DL (ref 65–99)
HCT VFR BLD AUTO: 36.1 % (ref 36.5–49.3)
HGB BLD-MCNC: 11.7 G/DL (ref 12–17)
IMM GRANULOCYTES # BLD AUTO: 0.02 THOUSAND/UL (ref 0–0.2)
IMM GRANULOCYTES NFR BLD AUTO: 0 % (ref 0–2)
INR PPP: 1.26 (ref 0.84–1.19)
LYMPHOCYTES # BLD AUTO: 1.25 THOUSANDS/ÂΜL (ref 0.6–4.47)
LYMPHOCYTES NFR BLD AUTO: 25 % (ref 14–44)
MCH RBC QN AUTO: 32.1 PG (ref 26.8–34.3)
MCHC RBC AUTO-ENTMCNC: 32.4 G/DL (ref 31.4–37.4)
MCV RBC AUTO: 99 FL (ref 82–98)
MONOCYTES # BLD AUTO: 0.47 THOUSAND/ÂΜL (ref 0.17–1.22)
MONOCYTES NFR BLD AUTO: 9 % (ref 4–12)
NEUTROPHILS # BLD AUTO: 2.9 THOUSANDS/ÂΜL (ref 1.85–7.62)
NEUTS SEG NFR BLD AUTO: 57 % (ref 43–75)
NRBC BLD AUTO-RTO: 0 /100 WBCS
PLATELET # BLD AUTO: 102 THOUSANDS/UL (ref 149–390)
PMV BLD AUTO: 9.9 FL (ref 8.9–12.7)
POTASSIUM SERPL-SCNC: 4.3 MMOL/L (ref 3.5–5.3)
PROT SERPL-MCNC: 6.7 G/DL (ref 6.4–8.4)
PROTHROMBIN TIME: 16 SECONDS (ref 11.6–14.5)
RBC # BLD AUTO: 3.65 MILLION/UL (ref 3.88–5.62)
SODIUM SERPL-SCNC: 139 MMOL/L (ref 135–147)
WBC # BLD AUTO: 5.06 THOUSAND/UL (ref 4.31–10.16)

## 2023-09-22 PROCEDURE — 84478 ASSAY OF TRIGLYCERIDES: CPT

## 2023-09-22 PROCEDURE — 85610 PROTHROMBIN TIME: CPT

## 2023-09-22 PROCEDURE — 85025 COMPLETE CBC W/AUTO DIFF WBC: CPT

## 2023-09-22 PROCEDURE — 82977 ASSAY OF GGT: CPT

## 2023-09-22 PROCEDURE — 82172 ASSAY OF APOLIPOPROTEIN: CPT

## 2023-09-22 PROCEDURE — 83883 ASSAY NEPHELOMETRY NOT SPEC: CPT

## 2023-09-22 PROCEDURE — 80053 COMPREHEN METABOLIC PANEL: CPT

## 2023-09-22 PROCEDURE — 82465 ASSAY BLD/SERUM CHOLESTEROL: CPT

## 2023-09-22 PROCEDURE — 82247 BILIRUBIN TOTAL: CPT

## 2023-09-22 PROCEDURE — 84460 ALANINE AMINO (ALT) (SGPT): CPT

## 2023-09-22 PROCEDURE — 36415 COLL VENOUS BLD VENIPUNCTURE: CPT

## 2023-09-22 PROCEDURE — 84450 TRANSFERASE (AST) (SGOT): CPT

## 2023-09-22 PROCEDURE — 82947 ASSAY GLUCOSE BLOOD QUANT: CPT

## 2023-09-22 PROCEDURE — 83010 ASSAY OF HAPTOGLOBIN QUANT: CPT

## 2023-09-26 LAB
A2 MACROGLOB SERPL-MCNC: 196 MG/DL (ref 110–276)
ALT SERPL W P-5'-P-CCNC: 36 IU/L (ref 0–55)
APO A-I SERPL-MCNC: 138 MG/DL (ref 101–178)
AST SERPL W P-5'-P-CCNC: 128 IU/L (ref 0–40)
BILIRUB SERPL-MCNC: 0.3 MG/DL (ref 0–1.2)
CHOLEST SERPL-MCNC: 158 MG/DL (ref 100–199)
FIBROSIS SCORING:: ABNORMAL
FIBROSIS STAGE SERPL QL: ABNORMAL
GGT SERPL-CCNC: 201 IU/L (ref 0–65)
GLUCOSE SERPL-MCNC: 118 MG/DL (ref 70–99)
HAPTOGLOB SERPL-MCNC: 115 MG/DL (ref 32–363)
INTERPRETATION: ABNORMAL
LABORATORY COMMENT REPORT: ABNORMAL
LIVER FIBR SCORE SERPL CALC.FIBROSURE: 0.45 (ref 0–0.21)
NASH GRADE SERPL QL: ABNORMAL
NASH SCORE SERPL: 0.91 (ref 0–0.25)
REF LAB TEST METHOD: ABNORMAL
SL AMB NASH SCORING: ABNORMAL
SL AMB STEATOSIS GRADE: ABNORMAL
SL AMB STEATOSIS SCORE: 0.69 (ref 0–0.4)
STEATOSIS SCORING: ABNORMAL
TEST PERFORMANCE INFO SPEC: ABNORMAL
TRIGL SERPL-MCNC: 127 MG/DL (ref 0–149)

## 2023-10-04 ENCOUNTER — PATIENT MESSAGE (OUTPATIENT)
Dept: FAMILY MEDICINE CLINIC | Facility: CLINIC | Age: 68
End: 2023-10-04

## 2023-10-06 ENCOUNTER — TELEPHONE (OUTPATIENT)
Dept: FAMILY MEDICINE CLINIC | Facility: CLINIC | Age: 68
End: 2023-10-06

## 2023-10-06 NOTE — TELEPHONE ENCOUNTER
Spoke with patient's wife. She explained the dx are for frequent falls, cardiac issues and Bp issues among other things. He did have previous paperwork completed in August. This is just an update / renewal regarding this. She also commented the employer/[employer] requires renewal every 6 weeks. The $30 fee was already paid when the paperwork was dropped off. Paperwork to be faxed once completed and signed.

## 2023-10-06 NOTE — TELEPHONE ENCOUNTER
I left a message asking for a call back to clarify a few things. A) what is the paperwork in reference to - dx? B) dates he is off - 1st day and when is he expected to return? C) hospitalized? Aware there is a $30 fee? ?

## 2023-10-06 NOTE — TELEPHONE ENCOUNTER
Patient's wife left a message regarding her 's fmla paperwork they dropped off. Could you please call her. They are due today.

## 2023-10-11 ENCOUNTER — TELEPHONE (OUTPATIENT)
Dept: BARIATRICS | Facility: CLINIC | Age: 68
End: 2023-10-11

## 2023-10-15 PROBLEM — Z00.00 ROUTINE ADULT HEALTH MAINTENANCE: Status: RESOLVED | Noted: 2023-08-16 | Resolved: 2023-10-15

## 2023-10-16 PROBLEM — I10 PRIMARY HYPERTENSION: Chronic | Status: RESOLVED | Noted: 2023-06-23 | Resolved: 2023-10-16

## 2023-11-01 ENCOUNTER — TELEPHONE (OUTPATIENT)
Dept: FAMILY MEDICINE CLINIC | Facility: CLINIC | Age: 68
End: 2023-11-01

## 2023-11-06 ENCOUNTER — OFFICE VISIT (OUTPATIENT)
Dept: FAMILY MEDICINE CLINIC | Facility: CLINIC | Age: 68
End: 2023-11-06
Payer: COMMERCIAL

## 2023-11-06 VITALS
BODY MASS INDEX: 38.95 KG/M2 | SYSTOLIC BLOOD PRESSURE: 136 MMHG | HEART RATE: 107 BPM | DIASTOLIC BLOOD PRESSURE: 82 MMHG | TEMPERATURE: 97.9 F | WEIGHT: 257 LBS | HEIGHT: 68 IN | OXYGEN SATURATION: 99 %

## 2023-11-06 DIAGNOSIS — R22.42 MASS OF LEFT THIGH: Primary | ICD-10-CM

## 2023-11-06 PROCEDURE — 99213 OFFICE O/P EST LOW 20 MIN: CPT | Performed by: FAMILY MEDICINE

## 2023-11-06 NOTE — PROGRESS NOTES
Assessment/Plan: Patient go for x-ray as well as ultrasound of the left thigh. To consider MRI. Patient will follow-up with Dr. Oli Suero in 2 weeks. Diagnoses and all orders for this visit:    Mass of left thigh  -     XR femur 2 vw left; Future  -     US extremity soft tissue; Future            Subjective:        Patient ID: Wade Rojas is a 79 y.o. male. Patient is here with mass left lateral thigh for roughly 2 weeks. No trauma noted at this time. Patient did have trauma in the past with hematoma 6 years ago. Hematoma resolved fully as per patient. No imaging done. Patient has pain with ambulation. Minimal lumbar pain noted. No medication use          The following portions of the patient's history were reviewed and updated as appropriate: allergies, current medications, past family history, past medical history, past social history, past surgical history and problem list.      Review of Systems   Constitutional: Negative. HENT: Negative. Eyes: Negative. Respiratory: Negative. Cardiovascular: Negative. Gastrointestinal: Negative. Endocrine: Negative. Genitourinary: Negative. Musculoskeletal:  Positive for arthralgias and gait problem. Skin: Negative. Allergic/Immunologic: Negative. Hematological: Negative. Psychiatric/Behavioral: Negative. Objective:               /82 (BP Location: Right arm, Patient Position: Sitting, Cuff Size: Large)   Pulse (!) 107   Temp 97.9 °F (36.6 °C) (Temporal)   Ht 5' 8" (1.727 m)   Wt 117 kg (257 lb)   SpO2 99%   BMI 39.08 kg/m²          Physical Exam  Vitals and nursing note reviewed. Constitutional:       General: He is not in acute distress. Appearance: Normal appearance. He is not ill-appearing, toxic-appearing or diaphoretic. HENT:      Head: Normocephalic and atraumatic. Nose: Nose normal. No congestion or rhinorrhea.       Mouth/Throat:      Mouth: Mucous membranes are moist. Pharynx: No oropharyngeal exudate or posterior oropharyngeal erythema. Eyes:      General: No scleral icterus. Right eye: No discharge. Left eye: No discharge. Neck:      Vascular: No carotid bruit. Cardiovascular:      Rate and Rhythm: Normal rate and regular rhythm. Pulses: Normal pulses. Heart sounds: Normal heart sounds. No murmur heard. No friction rub. No gallop. Pulmonary:      Effort: Pulmonary effort is normal. No respiratory distress. Breath sounds: Normal breath sounds. No stridor. No wheezing, rhonchi or rales. Chest:      Chest wall: No tenderness. Musculoskeletal:         General: Tenderness present. No swelling, deformity or signs of injury. Cervical back: Normal range of motion and neck supple. No rigidity. No muscular tenderness. Right lower leg: No edema. Left lower leg: No edema. Lymphadenopathy:      Cervical: No cervical adenopathy. Skin:     General: Skin is warm and dry. Capillary Refill: Capillary refill takes less than 2 seconds. Coloration: Skin is not jaundiced. Findings: Lesion present. No bruising, erythema or rash. Comments: Large left posterior thigh mass   Neurological:      Mental Status: He is alert and oriented to person, place, and time. Mental status is at baseline. Cranial Nerves: No cranial nerve deficit. Sensory: No sensory deficit. Motor: No weakness. Coordination: Coordination normal.      Gait: Gait normal.   Psychiatric:         Mood and Affect: Mood normal.         Behavior: Behavior normal.         Thought Content:  Thought content normal.         Judgment: Judgment normal.

## 2023-11-09 ENCOUNTER — APPOINTMENT (OUTPATIENT)
Dept: RADIOLOGY | Facility: MEDICAL CENTER | Age: 68
End: 2023-11-09
Payer: COMMERCIAL

## 2023-11-09 ENCOUNTER — HOSPITAL ENCOUNTER (OUTPATIENT)
Dept: ULTRASOUND IMAGING | Facility: MEDICAL CENTER | Age: 68
Discharge: HOME | End: 2023-11-09
Payer: COMMERCIAL

## 2023-11-09 DIAGNOSIS — R22.42 MASS OF LEFT THIGH: ICD-10-CM

## 2023-11-09 PROCEDURE — 76882 US LMTD JT/FCL EVL NVASC XTR: CPT

## 2023-11-09 PROCEDURE — 73552 X-RAY EXAM OF FEMUR 2/>: CPT

## 2023-11-14 ENCOUNTER — TELEPHONE (OUTPATIENT)
Dept: FAMILY MEDICINE CLINIC | Facility: CLINIC | Age: 68
End: 2023-11-14

## 2023-11-14 NOTE — TELEPHONE ENCOUNTER
Completed the above form for Attending Physician's Stmnt-Initial Dated 11/144/2023; awaiting review, signature and date on form fr Dr. Krunal Peter.

## 2023-11-15 ENCOUNTER — TELEPHONE (OUTPATIENT)
Dept: FAMILY MEDICINE CLINIC | Facility: CLINIC | Age: 68
End: 2023-11-15

## 2023-11-15 NOTE — RESULT ENCOUNTER NOTE
Spoke with patient and advised of results patient states he will discuss with Dr at upcoming appointment.

## 2023-11-15 NOTE — TELEPHONE ENCOUNTER
----- Message from aPdmini Norton DO sent at 11/15/2023 12:41 PM EST -----  Call patient for x-ray of the left femur shows no acute fracture or dislocation. Unremarkable appearance of left knee arthroplasty.

## 2023-11-15 NOTE — TELEPHONE ENCOUNTER
----- Message from Jonathan Flores DO sent at 11/15/2023 12:43 PM EST -----  Call patient. Left lateral thigh ultrasound shows no discrete mass.

## 2023-11-15 NOTE — RESULT ENCOUNTER NOTE
Spoke with patient and advised of results, patient states he will discuss with Dr at upcoming appointment.

## 2023-11-16 NOTE — TELEPHONE ENCOUNTER
The above completed, scanned and faxed today 11/16/23; patient was notified, via cell phone left VM.

## 2023-11-17 ENCOUNTER — OFFICE VISIT (OUTPATIENT)
Dept: FAMILY MEDICINE CLINIC | Facility: CLINIC | Age: 68
End: 2023-11-17
Payer: COMMERCIAL

## 2023-11-17 ENCOUNTER — DOCUMENTATION (OUTPATIENT)
Dept: HEMATOLOGY ONCOLOGY | Facility: CLINIC | Age: 68
End: 2023-11-17

## 2023-11-17 VITALS
OXYGEN SATURATION: 97 % | TEMPERATURE: 98 F | WEIGHT: 258 LBS | HEART RATE: 111 BPM | BODY MASS INDEX: 39.1 KG/M2 | DIASTOLIC BLOOD PRESSURE: 90 MMHG | HEIGHT: 68 IN | SYSTOLIC BLOOD PRESSURE: 140 MMHG

## 2023-11-17 DIAGNOSIS — Z59.9 FINANCIAL DIFFICULTIES: ICD-10-CM

## 2023-11-17 DIAGNOSIS — R22.42 MASS OF LEFT THIGH: Primary | ICD-10-CM

## 2023-11-17 PROBLEM — N39.0 URINARY TRACT INFECTION WITHOUT HEMATURIA: Status: RESOLVED | Noted: 2023-09-18 | Resolved: 2023-11-17

## 2023-11-17 PROCEDURE — 99213 OFFICE O/P EST LOW 20 MIN: CPT | Performed by: FAMILY MEDICINE

## 2023-11-17 SDOH — ECONOMIC STABILITY - INCOME SECURITY: PROBLEM RELATED TO HOUSING AND ECONOMIC CIRCUMSTANCES, UNSPECIFIED: Z59.9

## 2023-11-17 NOTE — PROGRESS NOTES
Received Paperwork   What type of form Disability   Scanned blank form into patient's Epic chart Yes   Method received form  Fax   Provider responsible for form Sumit Rodriguez   Informed patient our office turn around time for completing patient forms is 5-7 business days.  NA   Comments

## 2023-11-20 ENCOUNTER — PATIENT OUTREACH (OUTPATIENT)
Dept: FAMILY MEDICINE CLINIC | Facility: CLINIC | Age: 68
End: 2023-11-20

## 2023-11-20 NOTE — PROGRESS NOTES
Assessment/Plan:    80 y/o male with: left thigh mass. Will check MRI. Discussed supportive care and return parameters. No problem-specific Assessment & Plan notes found for this encounter. Diagnoses and all orders for this visit:    Mass of left thigh  -     MRI femur left w wo contrast; Future  -     Comprehensive metabolic panel; Future    Financial difficulties  -     Ambulatory Referral to Social Work Care Management Program; Future          Subjective:     Chief Complaint   Patient presents with    Follow-up     Med management , left leg pain , , anxiety         Patient ID: Althea Sandhu is a 79 y.o. male. Patient is a 80 y/o male presents complaining of worsening left thigh mass, tender when he is walking for a while no fevers chills nausea or vomiting. The following portions of the patient's history were reviewed and updated as appropriate: allergies, current medications, past family history, past medical history, past social history, past surgical history and problem list.    Review of Systems   Constitutional: Negative. HENT: Negative. Eyes: Negative. Respiratory: Negative. Cardiovascular: Negative. Gastrointestinal: Negative. Endocrine: Negative. Genitourinary: Negative. Musculoskeletal: Negative. Allergic/Immunologic: Negative. Neurological: Negative. Hematological: Negative. Psychiatric/Behavioral: Negative. All other systems reviewed and are negative. Objective:      /90   Pulse (!) 111   Temp 98 °F (36.7 °C)   Ht 5' 8" (1.727 m)   Wt 117 kg (258 lb)   SpO2 97%   BMI 39.23 kg/m²          Physical Exam  Constitutional:       Appearance: He is well-developed. HENT:      Head: Atraumatic. Right Ear: External ear normal.      Left Ear: External ear normal.   Eyes:      Conjunctiva/sclera: Conjunctivae normal.      Pupils: Pupils are equal, round, and reactive to light.    Cardiovascular:      Rate and Rhythm: Normal rate and regular rhythm. Heart sounds: Normal heart sounds. Pulmonary:      Effort: Pulmonary effort is normal. No respiratory distress. Breath sounds: Normal breath sounds. Abdominal:      General: Bowel sounds are normal. There is no distension. Palpations: Abdomen is soft. Tenderness: There is no abdominal tenderness. There is no guarding or rebound. Musculoskeletal:      Cervical back: Normal range of motion. Comments: Palpable mass left thigh, non-tender at this time. Skin:     General: Skin is warm and dry. Neurological:      Mental Status: He is alert and oriented to person, place, and time. Cranial Nerves: No cranial nerve deficit. Psychiatric:         Behavior: Behavior normal.         Thought Content:  Thought content normal.         Judgment: Judgment normal.

## 2023-11-21 ENCOUNTER — TELEPHONE (OUTPATIENT)
Dept: HEMATOLOGY ONCOLOGY | Facility: CLINIC | Age: 68
End: 2023-11-21

## 2023-11-21 NOTE — TELEPHONE ENCOUNTER
Called and LVM for patient about his paper work. Just want to make sure it gets filled out correctly so looking for patient to let me know what kind of leave he is looking. There are questions about patient's level of functionality, again just wanted to make sure the paper work gets completed correctly. Mentioned patient should please give the office a call back.

## 2023-11-21 NOTE — TELEPHONE ENCOUNTER
Patient Call    Who are you speaking with? Patient    If it is not the patient, are they listed on an active communication consent form? N/A   What is the reason for this call? Patient calling to speak with Dawn Sorensen. He's returning her call   Does this require a call back? yes   If a call back is required, please list best call back number [phone]     If a call back is required, advise that a message will be forwarded to their care team and someone will return their call as soon as possible. Did you relay this information to the patient?  Yes

## 2023-11-22 ENCOUNTER — TELEPHONE (OUTPATIENT)
Dept: HEMATOLOGY ONCOLOGY | Facility: CLINIC | Age: 68
End: 2023-11-22

## 2023-11-22 NOTE — TELEPHONE ENCOUNTER
ATTEMPTED TO CALL PT W/NO ANSWER & I WAS UNABLE TO LEAVE A VOICEMAIL. IF PT IS CALL BACK PLEASE FWD CALL TO ME CRISTIAN DYE.

## 2023-11-22 NOTE — TELEPHONE ENCOUNTER
Called and LVM for reyna patient's wife making her aware that upon speaking with  he is not agreeable to sign off on patient's disability paper work at this time. Patient does not have a cancer diagnosis currently.     Patient has been seen in the past by /PRAFUL Nelson for a PE.    Mentioned that PRAFUL Nelson is out on maternity leave therefore she is unable to sign off on any paper work.     I apologized for the inconvenience and mentioned that patient does have a follow up appointment with PRAFUL Nelson on 1/29/2024. Maybe the disability paper work can be addressed and completed at that time.     Mentioned that if patient/wife had any questions and or concerns to please call the office as I will do what I can to possibly help.

## 2023-11-22 NOTE — TELEPHONE ENCOUNTER
Called and LVM for patient about his [employer] disability paper work. Mentioned there are questions that I would like patient's guidance on as I do not want to fill the paper work out incorrectly. Mentioned that some of the questions are things like patient's ability to do the following such as bending,sitting,standing,driving,climbing and carry. Mentioned patient should please give the office a call back- provided patient with the office's phone number.

## 2023-11-22 NOTE — TELEPHONE ENCOUNTER
Call Transfer   Who are you speaking with? Spouse   If it is not the patient, are they listed on an active communication consent form? Yes   Who is the patients HemOnc/SurgOnc provider? PRAFUL Walter   What is the reason for this call? [facility] is requesting to speak to [facility]    Person/Department that the call was transferred to? Time that call was transferred?    transferred to [facility] @12:23PM   Your call will be transferred now. If you receive a voicemail, please leave a detailed message and a member of the team will return your call as soon as possible. Did you relay this information to the caller?   Yes

## 2023-11-24 ENCOUNTER — PATIENT OUTREACH (OUTPATIENT)
Dept: FAMILY MEDICINE CLINIC | Facility: CLINIC | Age: 68
End: 2023-11-24

## 2023-11-24 ENCOUNTER — APPOINTMENT (OUTPATIENT)
Dept: LAB | Facility: MEDICAL CENTER | Age: 68
End: 2023-11-24
Payer: COMMERCIAL

## 2023-11-24 DIAGNOSIS — R22.42 MASS OF LEFT THIGH: ICD-10-CM

## 2023-11-24 LAB
ALBUMIN SERPL BCP-MCNC: 3.7 G/DL (ref 3.5–5)
ALP SERPL-CCNC: 75 U/L (ref 34–104)
ALT SERPL W P-5'-P-CCNC: 33 U/L (ref 7–52)
ANION GAP SERPL CALCULATED.3IONS-SCNC: 7 MMOL/L
AST SERPL W P-5'-P-CCNC: 77 U/L (ref 13–39)
BILIRUB SERPL-MCNC: 0.73 MG/DL (ref 0.2–1)
BUN SERPL-MCNC: 8 MG/DL (ref 5–25)
CALCIUM SERPL-MCNC: 8.7 MG/DL (ref 8.4–10.2)
CHLORIDE SERPL-SCNC: 101 MMOL/L (ref 96–108)
CO2 SERPL-SCNC: 33 MMOL/L (ref 21–32)
CREAT SERPL-MCNC: 0.69 MG/DL (ref 0.6–1.3)
GFR SERPL CREATININE-BSD FRML MDRD: 98 ML/MIN/1.73SQ M
GLUCOSE P FAST SERPL-MCNC: 121 MG/DL (ref 65–99)
POTASSIUM SERPL-SCNC: 3.5 MMOL/L (ref 3.5–5.3)
PROT SERPL-MCNC: 7 G/DL (ref 6.4–8.4)
SODIUM SERPL-SCNC: 141 MMOL/L (ref 135–147)

## 2023-11-24 PROCEDURE — 36415 COLL VENOUS BLD VENIPUNCTURE: CPT

## 2023-11-24 PROCEDURE — 80053 COMPREHEN METABOLIC PANEL: CPT

## 2023-11-24 NOTE — PROGRESS NOTES
PANTERA LAURENT placed second call to the patient, Jp Ervin and left message. PANTERA LAURENT sent Unable to Reach letter via [address] [place] [address]. PANTERA LAURENT will close referral due to no response from patient. PANTERA CM will remain available for psychosocial support as needed.

## 2023-11-24 NOTE — LETTER
[address]    Re: Care Coordination   11/24/2023       Dear Mahin Drew,    I would like to talk with you about financial difficulties. Please contact me at [phone]. If you have other questions, please do not hesitate to contact me about those as well. If I do not have an answer I will assist you in finding the appropriate agency or individual who can help.     Sincerely,         [place] [employer]

## 2023-11-27 ENCOUNTER — HOSPITAL ENCOUNTER (OUTPATIENT)
Dept: MRI IMAGING | Facility: HOSPITAL | Age: 68
Discharge: HOME | End: 2023-11-27
Payer: COMMERCIAL

## 2023-11-27 ENCOUNTER — TELEPHONE (OUTPATIENT)
Dept: FAMILY MEDICINE CLINIC | Facility: CLINIC | Age: 68
End: 2023-11-27

## 2023-11-27 DIAGNOSIS — R22.42 MASS OF LEFT THIGH: ICD-10-CM

## 2023-11-27 PROCEDURE — 73720 MRI LWR EXTREMITY W/O&W/DYE: CPT

## 2023-11-27 PROCEDURE — A9585 GADOBUTROL INJECTION: HCPCS | Performed by: FAMILY MEDICINE

## 2023-11-27 PROCEDURE — G1004 CDSM NDSC: HCPCS

## 2023-11-27 RX ORDER — GADOBUTROL 604.72 MG/ML
11 INJECTION INTRAVENOUS
Status: COMPLETED | OUTPATIENT
Start: 2023-11-27 | End: 2023-11-27

## 2023-11-27 RX ADMIN — GADOBUTROL 11 ML: 604.72 INJECTION INTRAVENOUS at 08:09

## 2023-11-27 NOTE — RESULT ENCOUNTER NOTE
Please call patient. Labs are stable, LFTs improving, CO2 elevated possibly from lung issues or sleep apnea. If pt is not having any respiratory symptoms, consider sleep study. Will discuss more fully at next visit.

## 2023-11-27 NOTE — TELEPHONE ENCOUNTER
----- Message from Naga Mina MD sent at 11/27/2023  6:48 AM EST -----  Please call patient. Labs are stable, LFTs improving, CO2 elevated possibly from lung issues or sleep apnea. If pt is not having any respiratory symptoms, consider sleep study. Will discuss more fully at next visit.

## 2023-11-29 NOTE — RESULT ENCOUNTER NOTE
Please call patient. MRI shows varicose veins in the area that is palpable.  If pt is symptomatic recommend referral to vascular surgeron

## 2023-11-30 DIAGNOSIS — K21.9 GASTROESOPHAGEAL REFLUX DISEASE WITHOUT ESOPHAGITIS: ICD-10-CM

## 2023-11-30 RX ORDER — OMEPRAZOLE 40 MG/1
40 CAPSULE, DELAYED RELEASE ORAL DAILY
Qty: 90 CAPSULE | Refills: 1 | Status: SHIPPED | OUTPATIENT
Start: 2023-11-30

## 2023-12-19 ENCOUNTER — APPOINTMENT (OUTPATIENT)
Dept: LAB | Facility: MEDICAL CENTER | Age: 68
End: 2023-12-19
Payer: COMMERCIAL

## 2023-12-19 DIAGNOSIS — D69.6 THROMBOCYTOPENIA (HCC): ICD-10-CM

## 2023-12-19 DIAGNOSIS — E83.19 IRON OVERLOAD: ICD-10-CM

## 2023-12-19 DIAGNOSIS — I26.99 PULMONARY EMBOLISM, OTHER, UNSPECIFIED CHRONICITY, UNSPECIFIED WHETHER ACUTE COR PULMONALE PRESENT (HCC): ICD-10-CM

## 2023-12-19 LAB
ALBUMIN SERPL BCP-MCNC: 3.8 G/DL (ref 3.5–5)
ALP SERPL-CCNC: 69 U/L (ref 34–104)
ALT SERPL W P-5'-P-CCNC: 34 U/L (ref 7–52)
ANION GAP SERPL CALCULATED.3IONS-SCNC: 7 MMOL/L
AST SERPL W P-5'-P-CCNC: 94 U/L (ref 13–39)
BASOPHILS # BLD AUTO: 0.06 THOUSANDS/ÂΜL (ref 0–0.1)
BASOPHILS NFR BLD AUTO: 1 % (ref 0–1)
BILIRUB SERPL-MCNC: 0.71 MG/DL (ref 0.2–1)
BUN SERPL-MCNC: 8 MG/DL (ref 5–25)
CALCIUM SERPL-MCNC: 9.1 MG/DL (ref 8.4–10.2)
CHLORIDE SERPL-SCNC: 103 MMOL/L (ref 96–108)
CO2 SERPL-SCNC: 30 MMOL/L (ref 21–32)
CREAT SERPL-MCNC: 0.83 MG/DL (ref 0.6–1.3)
EOSINOPHIL # BLD AUTO: 0.37 THOUSAND/ÂΜL (ref 0–0.61)
EOSINOPHIL NFR BLD AUTO: 7 % (ref 0–6)
ERYTHROCYTE [DISTWIDTH] IN BLOOD BY AUTOMATED COUNT: 12.8 % (ref 11.6–15.1)
FERRITIN SERPL-MCNC: 32 NG/ML (ref 24–336)
GFR SERPL CREATININE-BSD FRML MDRD: 91 ML/MIN/1.73SQ M
GLUCOSE P FAST SERPL-MCNC: 137 MG/DL (ref 65–99)
HCT VFR BLD AUTO: 37.7 % (ref 36.5–49.3)
HGB BLD-MCNC: 12.8 G/DL (ref 12–17)
IMM GRANULOCYTES # BLD AUTO: 0.03 THOUSAND/UL (ref 0–0.2)
IMM GRANULOCYTES NFR BLD AUTO: 1 % (ref 0–2)
LYMPHOCYTES # BLD AUTO: 1.32 THOUSANDS/ÂΜL (ref 0.6–4.47)
LYMPHOCYTES NFR BLD AUTO: 24 % (ref 14–44)
MCH RBC QN AUTO: 32.4 PG (ref 26.8–34.3)
MCHC RBC AUTO-ENTMCNC: 34 G/DL (ref 31.4–37.4)
MCV RBC AUTO: 95 FL (ref 82–98)
MONOCYTES # BLD AUTO: 0.51 THOUSAND/ÂΜL (ref 0.17–1.22)
MONOCYTES NFR BLD AUTO: 9 % (ref 4–12)
NEUTROPHILS # BLD AUTO: 3.15 THOUSANDS/ÂΜL (ref 1.85–7.62)
NEUTS SEG NFR BLD AUTO: 58 % (ref 43–75)
NRBC BLD AUTO-RTO: 0 /100 WBCS
PLATELET # BLD AUTO: 116 THOUSANDS/UL (ref 149–390)
PMV BLD AUTO: 10.5 FL (ref 8.9–12.7)
POTASSIUM SERPL-SCNC: 4.1 MMOL/L (ref 3.5–5.3)
PROT SERPL-MCNC: 7 G/DL (ref 6.4–8.4)
RBC # BLD AUTO: 3.95 MILLION/UL (ref 3.88–5.62)
SODIUM SERPL-SCNC: 140 MMOL/L (ref 135–147)
WBC # BLD AUTO: 5.44 THOUSAND/UL (ref 4.31–10.16)

## 2023-12-19 PROCEDURE — 85025 COMPLETE CBC W/AUTO DIFF WBC: CPT

## 2023-12-19 PROCEDURE — 82728 ASSAY OF FERRITIN: CPT

## 2023-12-19 PROCEDURE — 80053 COMPREHEN METABOLIC PANEL: CPT

## 2023-12-19 PROCEDURE — 36415 COLL VENOUS BLD VENIPUNCTURE: CPT

## 2024-01-26 ENCOUNTER — PATIENT OUTREACH (OUTPATIENT)
Dept: HEMATOLOGY ONCOLOGY | Facility: CLINIC | Age: 69
End: 2024-01-26

## 2024-01-26 ENCOUNTER — TELEPHONE (OUTPATIENT)
Dept: HEMATOLOGY ONCOLOGY | Facility: CLINIC | Age: 69
End: 2024-01-26

## 2024-01-26 NOTE — TELEPHONE ENCOUNTER
Appointment Schedule   Who are you speaking with? [facility] Lori   If it is not the patient, are they listed on an active communication consent form? N/A   Which provider is the appointment scheduled with? PRAFUL [place]   At which location is the appointment scheduled for? [place]   When is the appointment scheduled?  Please list date and time 3/1/24 4:00pm   What is the reason for this appointment? Follow up   Did patient voice understanding of the details of this appointment? Yes   Was the no show policy reviewed with patient? Yes

## 2024-01-26 NOTE — PROGRESS NOTES
Spoke with PT wife regarding Eliquis application. PT wife indicated interest in an application for her own Eliquis as well. Information and consent obtained. Applications FWD to HCP for signature. Appt scheduled with Conchita Nelson on March 1, 20204, as this was the earliest appt at the [place] location. PT had to reschedule 1/29/24 appt due to insurance lapse in coverage. Indicated that this writer would call the PT/ PT wife next week for a status update.     Wife: Shea Smith.     Lori Rodriguez, MPH  Phone:[phone]  Email: [email or web address]

## 2024-01-30 ENCOUNTER — TELEPHONE (OUTPATIENT)
Dept: HEMATOLOGY ONCOLOGY | Facility: CLINIC | Age: 69
End: 2024-01-30

## 2024-01-30 NOTE — TELEPHONE ENCOUNTER
Appointment Change  Cancel, Reschedule, Change to Virtual      Who are you speaking with? Patient   If it is not the patient, is the caller listed on the communication consent form? N/A   Which provider is the appointment scheduled with? PRAFUL [place]   When was the original appointment scheduled?    Please list date and time 03/01/2054 @ 4PM    At which location is the appointment scheduled to take place? [place]   Was the appointment rescheduled?     Was the appointment changed from an in person visit to a virtual visit?    If so, please list the details of the change. 03/11/2024 @ 2:30PM    What is the reason for the appointment change? Provider

## 2024-01-31 ENCOUNTER — PATIENT OUTREACH (OUTPATIENT)
Dept: HEMATOLOGY ONCOLOGY | Facility: CLINIC | Age: 69
End: 2024-01-31

## 2024-01-31 NOTE — PROGRESS NOTES
Called PT to inform him that the application was secured from the HCP and faxed to the company on 1/31/24.   Aurelia Rodriguez, MPH  Phone:[phone]  Email: [email or web address]

## 2024-02-06 ENCOUNTER — TELEPHONE (OUTPATIENT)
Dept: HEMATOLOGY ONCOLOGY | Facility: CLINIC | Age: 69
End: 2024-02-06

## 2024-02-06 ENCOUNTER — DOCUMENTATION (OUTPATIENT)
Dept: HEMATOLOGY ONCOLOGY | Facility: CLINIC | Age: 69
End: 2024-02-06

## 2024-02-06 NOTE — PROGRESS NOTES
Selena called from  to inform this writer that this application is under review and will have a processing verdict within 72 hours.           Lori Rodriguez MPH  Phone:[phone]  Email: [email or web address]

## 2024-02-06 NOTE — TELEPHONE ENCOUNTER
Left voicemail on wife's phone advising Eliquis samples are available if needed. Provided my direct line.

## 2024-02-07 ENCOUNTER — PATIENT OUTREACH (OUTPATIENT)
Dept: HEMATOLOGY ONCOLOGY | Facility: CLINIC | Age: 69
End: 2024-02-07

## 2024-02-07 NOTE — PROGRESS NOTES
Received fax indicated that PT was denied from [facility] support program.       Lori Rodriguez MPH  Phone:[phone]  Email: [email or web address]

## 2024-02-21 NOTE — PROGRESS NOTES
Cardiology Consultation     Burak Smith  [id number]  1955  [facility] [place]  [address] [address]      1. Syncope, unspecified syncope type  POCT ECG      2. Multiple subsegmental pulmonary emboli without acute cor pulmonale (HCC)        3. Essential hypertension        4. Elevated ferritin/iron overload        5. FELIBERTO on CPAP        6. Rapid heart rate  POCT ECG          Discussion/Summary:  Syncope  - Patient hospitalized in June 2023 after syncopal episode, had a second syncopal episode over the summer in 2023, no recurrent episodes since then  - 2 weeks Zio patch 8/14/2023 showed 20 episodes SVT the longest of which was 20 beats the longest of which was 20 beats, no other significant arrhythmias, 2 triggered events correlated with sinus rhythm/sinus tachycardia  - TTE 6/23/2023 showed EF 60%, mildly dilated RA, no PFO, mild MR, aortic root 4.3 cm, ascending aorta 4.1 cm  -His syncopal events are concerning since he has no prodromal symptoms, however no arrhythmias noted on Zio patch and no recent recurrent syncopal episodes  - Instructed to call the office if he has any further events, can consider repeating Zio patch at that time versus doing a loop recorder  Possible PE  - CTA chest 6/22/2023 showed no central PE, peripheral branch arteries not well-opacified, small filling defect in the far peripheral fourth and fifth order branch arteries  - CTA chest 9/1/2023 showed no acute PE  - Currently anticoagulated on Eliquis 5 mg twice daily  - Patient to follow-up with hematology  Tachycardia  - EKG today shows sinus tachycardia at a rate of 100 bpm, prior EKG on 6/22/2023 shows sinus tachycardia at a rate of 118 bpm  - Average heart rate on 2 weeks Zio patch on 8/14/2023 was 81 bpm  -Reports significant anxiety going to doctors appointments-> likely cause for his tachycardia during office visits given his normal average heart rate on Zio patch  - Encouraged him to remain  well-hydrated  Hypertension  - Continue with amlodipine 5 mg daily and losartan 50 mg daily  -Blood pressure is well-controlled today  Elevated ferritin  - Goes for therapeutic phlebotomies  - Follows with hematology  Obesity  - BMI of 39  FELIBERTO  Fatty liver    Follow-up as needed.    History of Present Illness:  Burak Smith is a 68 y.o. year old male with a past medical history of hypertension, possible PE, elevated ferritin, FELIBERTO, obesity and fatty liver.  He reports having 2 syncopal episodes.  He was hospitalized in June 2023 after passing out at night while walking to the bathroom.  He reports getting out of bed quickly because he had to urinate and then woke up on the floor.  His wife saw him pass out and reports he was only out for few seconds before waking up.  He went to the emergency department and was noted to be tachycardic.  CTA showed a possible PE, so he was started on Eliquis at that time.  He also had several rib fractures from the fall.  Several months later over the summer, he reports of a single syncopal episode.  He reports walking out from his kitchen into another room when he passed out again.  He does not remember much from this episode and it was also witnessed by his wife.  Reports he was only out for few seconds again and had no injuries from the second episode.  Reports having 1 prior episode of syncope in his lifetime which was after his knee replacement surgery.  Reports being in PT after surgery and had a mild syncopal event which was witnessed by the staff.  Denies any prior episodes.  Denies any prior cardiac history.  Reports his father passed away from an MI and cancer at age 54.  Reports his mother has hypertension.  No other significant family history of heart disease.    Patient Active Problem List   Diagnosis    Elevated ferritin/iron overload    Family history of colon cancer    Fatty liver, alcoholic    History of colon polyps    Hypercholesterolemia    Venous insufficiency of  both lower extremities    FELIBERTO on CPAP    Osteoarthritis    Primary osteoarthritis of both knees    Status post total bilateral knee replacement    Thrombocytopenia (HCC)    Vitamin D deficiency    Fall    Rib fractures    Severe obesity (BMI 35.0-39.9) with comorbidity (HCC)    Tachycardia    Pulmonary embolism (HCC)    Recurrent falls - possible presyncope with orthostatic hypotension    Diaphragmatic hematoma    Essential hypertension    Gastroesophageal reflux disease without esophagitis    Anxiety    Mass of left thigh    Financial difficulties     Past Medical History:   Diagnosis Date    Allergic     Anxiety     Arthritis     Broken ribs     Depression     Elevated ferritin level     GERD (gastroesophageal reflux disease)     Hypertension     Obesity     Obstructive sleep apnea      Social History     Socioeconomic History    Marital status: /Civil Union     Spouse name: Not on file    Number of children: Not on file    Years of education: Not on file    Highest education level: Not on file   Occupational History    Occupation: disability   Tobacco Use    Smoking status: Former     Passive exposure: Past    Smokeless tobacco: Never   Vaping Use    Vaping status: Never Used   Substance and Sexual Activity    Alcohol use: Not Currently     Comment: occassionally    Drug use: Not Currently     Types: Cocaine, Marijuana    Sexual activity: Not Currently     Partners: Female     Birth control/protection: Abstinence, None   Other Topics Concern    Not on file   Social History Narrative    Not on file     Social Determinants of Health     Financial Resource Strain: Not on file   Food Insecurity: No Food Insecurity (6/23/2023)    Hunger Vital Sign     Worried About Running Out of Food in the Last Year: Never true     Ran Out of Food in the Last Year: Never true   Transportation Needs: Unknown (6/23/2023)    PRAPARE - Transportation     Lack of Transportation (Medical): Not on file     Lack of Transportation  (Non-Medical): No   Physical Activity: Not on file   Stress: Not on file   Social Connections: Not on file   Intimate Partner Violence: Not on file   Housing Stability: Unknown (6/23/2023)    Housing Stability Vital Sign     Unable to Pay for Housing in the Last Year: No     Number of Places Lived in the Last Year: Not on file     Unstable Housing in the Last Year: No      Family History   Problem Relation Age of Onset    Cancer Father      Past Surgical History:   Procedure Laterality Date    IR BIOPSY LIVER RANDOM  9/22/2022    REPLACEMENT TOTAL KNEE Bilateral        Current Outpatient Medications:     amLODIPine (NORVASC) 5 mg tablet, Take 1 tablet (5 mg total) by mouth daily at bedtime, Disp: 90 tablet, Rfl: 1    busPIRone (BUSPAR) 5 mg tablet, TAKE 1 TABLET BY MOUTH TWICE A DAY, Disp: 180 tablet, Rfl: 1    Cholecalciferol (Vitamin D3) 50 MCG (2000 UT) capsule, Take 2,000 Units by mouth daily, Disp: , Rfl:     Eliquis 5 MG, TAKE 1 TABLET BY MOUTH TWICE A DAY, Disp: 60 tablet, Rfl: 6    folic acid (FOLVITE) 1 mg tablet, Take 1 tablet (1,000 mcg total) by mouth daily, Disp: 90 tablet, Rfl: 1    loratadine (CLARITIN) 10 mg tablet, Take 10 mg by mouth, Disp: , Rfl:     losartan (COZAAR) 50 mg tablet, TAKE' 1 TABLET (50 MG TOTAL) BY MOUTH DAILY. IN THE MORNING, Disp: , Rfl:     omeprazole (PriLOSEC) 40 MG capsule, TAKE 1 CAPSULE (40 MG TOTAL) BY MOUTH DAILY., Disp: 90 capsule, Rfl: 1    Dermarest Eczema 1 % lotion, Apply 1 application. topically 2 (two) times a day To affected area, Disp: , Rfl:     Melatonin 5 MG TABS, , Disp: , Rfl:     methocarbamol (ROBAXIN) 500 mg tablet, Take 1 tablet (500 mg total) by mouth 4 (four) times a day for 14 days, Disp: 56 tablet, Rfl: 0    triamterene-hydrochlorothiazide (DYAZIDE) 37.5-25 mg per capsule, , Disp: , Rfl:   Allergies   Allergen Reactions    Ciprofloxacin Vomiting    Flagyl [Metronidazole] Vomiting    Other Other (See Comments)     Running nose         Labs:  Lab  Results   Component Value Date    ALT 34 2023    AST 94 (H) 2023    BUN 8 2023    CALCIUM 9.1 2023     2023    CO2 30 2023    CREATININE 0.83 2023     06/15/2023    HCT 37.7 2023    HGB 12.8 2023    HGBA1C 4.8 2023    MG 1.7 2023    PHOS 3.2 2023     (L) 2023    K 4.1 2023    PSA 0.90 06/15/2023    TRIG 127 2023    WBC 5.44 2023       Imaging: No results found.    EC2024: Sinus tachycardia rate 100 bpm, possible inferior infarct age-indeterminate    Review of Systems:  Review of Systems   Constitutional:  Negative for chills, diaphoresis, fatigue and fever.   HENT:  Negative for congestion.    Eyes:  Negative for photophobia and visual disturbance.   Respiratory:  Negative for chest tightness and shortness of breath.    Cardiovascular:  Negative for chest pain, palpitations and leg swelling.   Gastrointestinal:  Negative for abdominal distention, abdominal pain, diarrhea, nausea and vomiting.   Genitourinary:  Negative for difficulty urinating and dysuria.   Musculoskeletal:  Negative for arthralgias, gait problem and joint swelling.   Skin:  Negative for color change, pallor and rash.   Neurological:  Positive for syncope. Negative for dizziness, numbness and headaches.   Psychiatric/Behavioral:  Negative for agitation, behavioral problems and confusion.          Vitals:    24 1354   BP: 134/84   Pulse: (!) 106      Vitals:    24 1354   Weight: 119 kg (262 lb 9.6 oz)           Physical Exam:  General appearance:  Appears stated age, alert, well appearing and in no distress  HEENT:  PERRLA, EOMI, no scleral icterus, no conjunctival pallor  NECK:  Supple, No elevated JVP, no thyromegaly, no carotid bruits  HEART: Tachycardic, regular rhythm, normal S1/S2, no S3/S4, no murmur or rub  LUNGS:  Clear to auscultation bilaterally  ABDOMEN:  Soft, non-tender, positive bowel sounds, no  rebound or guarding, no organomegaly   EXTREMITIES:  No edema  VASCULAR:  Normal pedal pulses   SKIN: No lesions or rashes on exposed skin  NEURO:  CN II-XII intact, no focal deficits

## 2024-02-22 ENCOUNTER — OFFICE VISIT (OUTPATIENT)
Dept: CARDIOLOGY CLINIC | Facility: CLINIC | Age: 69
End: 2024-02-22
Payer: MEDICARE

## 2024-02-22 VITALS
HEART RATE: 106 BPM | BODY MASS INDEX: 39.93 KG/M2 | DIASTOLIC BLOOD PRESSURE: 84 MMHG | SYSTOLIC BLOOD PRESSURE: 134 MMHG | WEIGHT: 262.6 LBS

## 2024-02-22 DIAGNOSIS — R00.0 RAPID HEART RATE: ICD-10-CM

## 2024-02-22 DIAGNOSIS — I10 ESSENTIAL HYPERTENSION: ICD-10-CM

## 2024-02-22 DIAGNOSIS — R55 SYNCOPE, UNSPECIFIED SYNCOPE TYPE: Primary | ICD-10-CM

## 2024-02-22 DIAGNOSIS — R79.89 ELEVATED FERRITIN: ICD-10-CM

## 2024-02-22 DIAGNOSIS — G47.33 OSA ON CPAP: ICD-10-CM

## 2024-02-22 DIAGNOSIS — I26.94 MULTIPLE SUBSEGMENTAL PULMONARY EMBOLI WITHOUT ACUTE COR PULMONALE (HCC): ICD-10-CM

## 2024-02-22 PROCEDURE — 93000 ELECTROCARDIOGRAM COMPLETE: CPT | Performed by: STUDENT IN AN ORGANIZED HEALTH CARE EDUCATION/TRAINING PROGRAM

## 2024-02-22 PROCEDURE — 99214 OFFICE O/P EST MOD 30 MIN: CPT | Performed by: STUDENT IN AN ORGANIZED HEALTH CARE EDUCATION/TRAINING PROGRAM

## 2024-03-08 ENCOUNTER — OFFICE VISIT (OUTPATIENT)
Dept: FAMILY MEDICINE CLINIC | Facility: CLINIC | Age: 69
End: 2024-03-08
Payer: MEDICARE

## 2024-03-08 VITALS
DIASTOLIC BLOOD PRESSURE: 82 MMHG | HEART RATE: 82 BPM | HEIGHT: 68 IN | OXYGEN SATURATION: 97 % | BODY MASS INDEX: 40.01 KG/M2 | TEMPERATURE: 98 F | SYSTOLIC BLOOD PRESSURE: 163 MMHG | WEIGHT: 264 LBS

## 2024-03-08 DIAGNOSIS — J01.81 OTHER ACUTE RECURRENT SINUSITIS: Primary | ICD-10-CM

## 2024-03-08 DIAGNOSIS — I10 ESSENTIAL HYPERTENSION: ICD-10-CM

## 2024-03-08 DIAGNOSIS — D69.6 THROMBOCYTOPENIA (HCC): ICD-10-CM

## 2024-03-08 DIAGNOSIS — E66.01 MORBID OBESITY (HCC): ICD-10-CM

## 2024-03-08 PROCEDURE — G2211 COMPLEX E/M VISIT ADD ON: HCPCS | Performed by: FAMILY MEDICINE

## 2024-03-08 PROCEDURE — 99214 OFFICE O/P EST MOD 30 MIN: CPT | Performed by: FAMILY MEDICINE

## 2024-03-08 RX ORDER — AMOXICILLIN 500 MG/1
500 TABLET, FILM COATED ORAL 3 TIMES DAILY
Qty: 21 TABLET | Refills: 0 | Status: SHIPPED | OUTPATIENT
Start: 2024-03-08 | End: 2024-03-15

## 2024-03-11 ENCOUNTER — OFFICE VISIT (OUTPATIENT)
Dept: HEMATOLOGY ONCOLOGY | Facility: CLINIC | Age: 69
End: 2024-03-11
Payer: MEDICARE

## 2024-03-11 VITALS
OXYGEN SATURATION: 99 % | WEIGHT: 268 LBS | HEIGHT: 68 IN | BODY MASS INDEX: 40.62 KG/M2 | HEART RATE: 102 BPM | RESPIRATION RATE: 17 BRPM | DIASTOLIC BLOOD PRESSURE: 86 MMHG | TEMPERATURE: 97.1 F | SYSTOLIC BLOOD PRESSURE: 144 MMHG

## 2024-03-11 DIAGNOSIS — I26.99 PULMONARY EMBOLISM (HCC): ICD-10-CM

## 2024-03-11 DIAGNOSIS — I26.99 PULMONARY EMBOLISM, OTHER, UNSPECIFIED CHRONICITY, UNSPECIFIED WHETHER ACUTE COR PULMONALE PRESENT (HCC): Primary | ICD-10-CM

## 2024-03-11 DIAGNOSIS — R79.89 ELEVATED FERRITIN: ICD-10-CM

## 2024-03-11 DIAGNOSIS — D69.6 THROMBOCYTOPENIA (HCC): ICD-10-CM

## 2024-03-11 DIAGNOSIS — R79.89 ELEVATED LFTS: ICD-10-CM

## 2024-03-11 PROCEDURE — 99214 OFFICE O/P EST MOD 30 MIN: CPT

## 2024-03-11 NOTE — PROGRESS NOTES
"Assessment/Plan:    69 y/o male with: acute sinusitis, thrombocytopenia, morbid obesity and HTN. Will continue meds. Discussed supportive care and return parameters. Add antibiotic in case not improving or worsening. Recommend returning for BP check in 2 weeks and encourage continued lifestyle changes.    No problem-specific Assessment & Plan notes found for this encounter.       Diagnoses and all orders for this visit:    Other acute recurrent sinusitis  -     amoxicillin (AMOXIL) 500 MG tablet; Take 1 tablet (500 mg total) by mouth 3 (three) times a day for 7 days    Thrombocytopenia (HCC)    Morbid obesity (HCC)    Essential hypertension          Subjective:     No chief complaint on file.       Patient ID: Burak Smith is a 68 y.o. male.    Patient is a 69 y/o male who presents complaining of cough congestion sinus pressure no nausea or vomiting in the setting of pt with thrombocytopenia, morbid obesity and HTN. Pt admits being otherwise stable on meds.         The following portions of the patient's history were reviewed and updated as appropriate: allergies, current medications, past family history, past medical history, past social history, past surgical history and problem list.    Review of Systems   Constitutional: Negative.    HENT:  Positive for congestion, sinus pressure and sore throat.    Eyes: Negative.    Respiratory:  Positive for cough.    Cardiovascular: Negative.    Gastrointestinal: Negative.    Endocrine: Negative.    Genitourinary: Negative.    Musculoskeletal: Negative.    Allergic/Immunologic: Negative.    Neurological: Negative.    Hematological: Negative.    Psychiatric/Behavioral: Negative.     All other systems reviewed and are negative.        Objective:      /82 (BP Location: Right arm, Patient Position: Sitting, Cuff Size: Standard)   Pulse 82   Temp 98 °F (36.7 °C) (Temporal)   Ht 5' 8\" (1.727 m)   Wt 120 kg (264 lb)   SpO2 97%   BMI 40.14 kg/m²          Physical " Exam  Constitutional:       Appearance: He is well-developed.   HENT:      Head: Atraumatic.      Right Ear: External ear normal.      Left Ear: External ear normal.   Eyes:      Conjunctiva/sclera: Conjunctivae normal.      Pupils: Pupils are equal, round, and reactive to light.   Cardiovascular:      Rate and Rhythm: Normal rate and regular rhythm.      Heart sounds: Normal heart sounds.   Pulmonary:      Effort: Pulmonary effort is normal. No respiratory distress.      Breath sounds: Normal breath sounds.   Abdominal:      General: There is no distension.      Palpations: Abdomen is soft.      Tenderness: There is no abdominal tenderness. There is no guarding or rebound.   Musculoskeletal:         General: Normal range of motion.      Cervical back: Normal range of motion.   Skin:     General: Skin is warm and dry.   Neurological:      Mental Status: He is alert and oriented to person, place, and time.      Cranial Nerves: No cranial nerve deficit.   Psychiatric:         Behavior: Behavior normal.         Thought Content: Thought content normal.         Judgment: Judgment normal.

## 2024-03-11 NOTE — PROGRESS NOTES
[address]  [facility] [place]  [address]  [facility] [address]  Hematology Ambulatory Follow-Up  Burak Smith, 1955, [id number]  3/11/2024    Assessment/Plan:  1. Pulmonary embolism, other, unspecified chronicity, unspecified whether acute cor pulmonale present (HCC)  Mr. Smith is a 68-year-old male seen in follow-up for anticoagulation recommendations and thrombocytopenia and iron overload due to liver disease.  See below for thrombocytopenia and and iron overload recommendations.  He was evaluated in August 2023 after what appeared to be provoked subsegmental PEs in June 2023 after rib fracture.  At the time there was also note of cruel thickening on CT.  Repeat CT in September demonstrated resolution of PE as well as no thickening noted.  He completed 6 months of anticoagulation.  Discussions today in regards to obesity and male gender as well as now VTE we discussed discontinuing Eliquis as he had 1 provoked event versus continuing lifelong/indefinite due to unmodifiable risk factors including male gender and obesity.  Patient wishes to continue on anticoagulation for now.  5 mg twice daily of Eliquis refills provided today.  He he will determine if it is too financially taxing on new insurance and discuss further with his PCP moving forward.  We did discuss that moving forward if liver disease worsens and impairs clotting factors he may be too high risk for bleeding and further discussion should be had if continuing anticoagulation is still recommended.  He will see us back in follow-up if this is a conversation that needs to be held.    - apixaban (Eliquis) 5 mg; Take 1 tablet (5 mg total) by mouth 2 (two) times a day  Dispense: 60 tablet; Refill: 6    2. Elevated ferritin/iron overload  3. Thrombocytopenia (HCC)  4. Elevated LFTs  He has not had therapeutic phlebotomy since August 2023.  Ferritin in December was still downtrending to 32.  He will repeat labs at  his earliest convenience.  As long as his iron levels within normal limits there is no ongoing need for hematology follow-up and he should continue to follow with hepatology regularly.  If there is iron overload in the future due to liver disease he can see us back in consultation.  Thrombocytopenia also stable and will should be monitored regularly.  Suggest CBC every 6 months moving forward can be done by GI or PCP.    - Iron Panel (Includes Ferritin, Iron Sat%, Iron, and TIBC)  - Comprehensive metabolic panel  - CBC and differential    Patient voiced agreement and understanding to the above. Patient knows to call the Hematology/Oncology office with any questions and concerns regarding the above.      Barrier(s) to care: None  The patient is able to self care.  ------------------------------------------------------------------------------------------------------    No chief complaint on file.      History of present illness:   Burak Smith is a 68-year-old gentleman who was initially referred to hematology with iron overload as well as chronic mild thrombocytopenia. His ferritin was above 1300. He was negative for hemochromatosis gene mutation. He was a heavy drinker with beer as well as liquor for years until few years back. His platelet count has been around . He has mild LFT abnormality. His coagulation parameters is normal. He underwent liver biopsy in 2022 which showed some fibrotic change. He has hepatosplenomegaly. Since 2022, he has been on phlebotomy which brought his ferritin to below 100. In June 2023, he fell and broke several ribs on the right side. Few weeks after the rib fracture, he developed right lower lobe pulmonary embolism. Since then, he has been on apixaban.       Interval history: Since retiring he is feeling much better.  He realized that his nausea and vomiting episodes were mostly triggered by constipation.  Since having regular bowel movements he has not had any of these episodes  since.  He has been having issues with financially taxing Eliquis co-pays.  He does have new insurance now that he is retired and has not had a refill since.  He tolerates this well without any excess bleeding or bruising.  He has not had repeat labs since December but most recent ferritin was stable and within normal limits and no evidence of iron overload.  He continues to follow with gastroenterology for his liver disease.      Review of Systems   Constitutional:  Negative for activity change, appetite change (improved), diaphoresis, fatigue, fever and unexpected weight change.   HENT:  Negative for trouble swallowing and voice change.    Eyes:  Negative for photophobia and visual disturbance.   Respiratory:  Negative for cough, chest tightness and shortness of breath.    Cardiovascular:  Negative for chest pain, palpitations and leg swelling.   Gastrointestinal:  Negative for abdominal distention, abdominal pain, blood in stool, constipation, diarrhea and nausea.   Endocrine: Negative for cold intolerance and heat intolerance.   Genitourinary:  Negative for dysuria, hematuria and urgency.   Musculoskeletal:  Negative for arthralgias, back pain, gait problem and joint swelling.   Skin:  Negative for pallor and rash.   Neurological:  Negative for dizziness, seizures, weakness, light-headedness, numbness and headaches.   Hematological:  Negative for adenopathy. Does not bruise/bleed easily.   Psychiatric/Behavioral:  Negative for confusion and sleep disturbance.        Patient Active Problem List   Diagnosis    Elevated ferritin/iron overload    Family history of colon cancer    Fatty liver, alcoholic    History of colon polyps    Hypercholesterolemia    Venous insufficiency of both lower extremities    FELIBERTO on CPAP    Osteoarthritis    Primary osteoarthritis of both knees    Status post total bilateral knee replacement    Thrombocytopenia (HCC)    Vitamin D deficiency    Fall    Rib fractures    Severe obesity (BMI  35.0-39.9) with comorbidity (HCC)    Tachycardia    Pulmonary embolism (HCC)    Recurrent falls - possible presyncope with orthostatic hypotension    Diaphragmatic hematoma    Essential hypertension    Gastroesophageal reflux disease without esophagitis    Anxiety    Mass of left thigh    Financial difficulties    Other acute recurrent sinusitis       Past Medical History:   Diagnosis Date    Allergic     Anxiety     Arthritis     Broken ribs     Depression     Elevated ferritin level     GERD (gastroesophageal reflux disease)     Hypertension     Obesity     Obstructive sleep apnea        Past Surgical History:   Procedure Laterality Date    IR BIOPSY LIVER RANDOM  9/22/2022    REPLACEMENT TOTAL KNEE Bilateral        Family History   Problem Relation Age of Onset    Cancer Father        Social History     Socioeconomic History    Marital status: /Civil Union     Spouse name: Not on file    Number of children: Not on file    Years of education: Not on file    Highest education level: Not on file   Occupational History    Occupation: disability   Tobacco Use    Smoking status: Former     Passive exposure: Past    Smokeless tobacco: Never   Vaping Use    Vaping status: Never Used   Substance and Sexual Activity    Alcohol use: Not Currently     Comment: occassionally    Drug use: Not Currently     Types: Cocaine, Marijuana    Sexual activity: Not Currently     Partners: Female     Birth control/protection: Abstinence, None   Other Topics Concern    Not on file   Social History Narrative    Not on file     Social Determinants of Health     Financial Resource Strain: Not on file   Food Insecurity: No Food Insecurity (6/23/2023)    Hunger Vital Sign     Worried About Running Out of Food in the Last Year: Never true     Ran Out of Food in the Last Year: Never true   Transportation Needs: Unknown (6/23/2023)    PRAPARE - Transportation     Lack of Transportation (Medical): Not on file     Lack of Transportation  (Non-Medical): No   Physical Activity: Not on file   Stress: Not on file   Social Connections: Not on file   Intimate Partner Violence: Not on file   Housing Stability: Unknown (6/23/2023)    Housing Stability Vital Sign     Unable to Pay for Housing in the Last Year: No     Number of Places Lived in the Last Year: Not on file     Unstable Housing in the Last Year: No         Current Outpatient Medications:     amLODIPine (NORVASC) 5 mg tablet, Take 1 tablet (5 mg total) by mouth daily at bedtime, Disp: 90 tablet, Rfl: 1    amoxicillin (AMOXIL) 500 MG tablet, Take 1 tablet (500 mg total) by mouth 3 (three) times a day for 7 days, Disp: 21 tablet, Rfl: 0    busPIRone (BUSPAR) 5 mg tablet, TAKE 1 TABLET BY MOUTH TWICE A DAY, Disp: 180 tablet, Rfl: 1    Cholecalciferol (Vitamin D3) 50 MCG (2000 UT) capsule, Take 2,000 Units by mouth daily, Disp: , Rfl:     Eliquis 5 MG, TAKE 1 TABLET BY MOUTH TWICE A DAY, Disp: 60 tablet, Rfl: 6    folic acid (FOLVITE) 1 mg tablet, Take 1 tablet (1,000 mcg total) by mouth daily, Disp: 90 tablet, Rfl: 1    loratadine (CLARITIN) 10 mg tablet, Take 10 mg by mouth, Disp: , Rfl:     losartan (COZAAR) 50 mg tablet, TAKE' 1 TABLET (50 MG TOTAL) BY MOUTH DAILY. IN THE MORNING, Disp: , Rfl:     omeprazole (PriLOSEC) 40 MG capsule, TAKE 1 CAPSULE (40 MG TOTAL) BY MOUTH DAILY., Disp: 90 capsule, Rfl: 1    triamterene-hydrochlorothiazide (DYAZIDE) 37.5-25 mg per capsule, , Disp: , Rfl:     Allergies   Allergen Reactions    Ciprofloxacin Vomiting    Flagyl [Metronidazole] Vomiting    Other Other (See Comments)     Running nose       Objective:  There were no vitals taken for this visit.   Physical Exam  Constitutional:       General: He is not in acute distress.     Appearance: Normal appearance. He is not ill-appearing.   HENT:      Head: Normocephalic and atraumatic.   Eyes:      Extraocular Movements: Extraocular movements intact.      Conjunctiva/sclera: Conjunctivae normal.   Cardiovascular:       Rate and Rhythm: Normal rate and regular rhythm.      Pulses: Normal pulses.      Heart sounds: Normal heart sounds.   Pulmonary:      Effort: Pulmonary effort is normal. No respiratory distress.   Abdominal:      General: Bowel sounds are normal. There is no distension.      Tenderness: There is no abdominal tenderness.   Musculoskeletal:         General: Normal range of motion.      Cervical back: Normal range of motion.      Right lower leg: No edema.      Left lower leg: No edema.   Skin:     General: Skin is warm and dry.      Capillary Refill: Capillary refill takes less than 2 seconds.      Coloration: Skin is not jaundiced or pale.   Neurological:      Mental Status: He is alert and oriented to person, place, and time. Mental status is at baseline.   Psychiatric:         Mood and Affect: Mood normal.         Behavior: Behavior normal.         Thought Content: Thought content normal.         Judgment: Judgment normal.         Result Review  Labs:  Lab Results   Component Value Date    WBC 5.44 12/19/2023    HGB 12.8 12/19/2023    HCT 37.7 12/19/2023    MCV 95 12/19/2023     (L) 12/19/2023     Lab Results   Component Value Date    SODIUM 140 12/19/2023    K 4.1 12/19/2023     12/19/2023    CO2 30 12/19/2023    AGAP 7 12/19/2023    BUN 8 12/19/2023    CREATININE 0.83 12/19/2023    GLUC 118 (H) 09/22/2023    GLUF 137 (H) 12/19/2023    CALCIUM 9.1 12/19/2023    AST 94 (H) 12/19/2023    ALT 34 12/19/2023    ALKPHOS 69 12/19/2023    TP 7.0 12/19/2023    TBILI 0.71 12/19/2023    EGFR 91 12/19/2023     Lab Results   Component Value Date    IRON 302 (H) 08/10/2023    TIBC 417 08/10/2023    FERRITIN 32 12/19/2023       Imaging:   No relevant imaging to review     Please note:  This report has been generated by a voice recognition software system. Therefore there may be syntax, spelling, and/or grammatical errors. Please call if you have any questions.

## 2024-03-13 ENCOUNTER — TELEPHONE (OUTPATIENT)
Dept: BARIATRICS | Facility: CLINIC | Age: 69
End: 2024-03-13

## 2024-03-14 ENCOUNTER — APPOINTMENT (OUTPATIENT)
Dept: LAB | Facility: MEDICAL CENTER | Age: 69
End: 2024-03-14
Payer: MEDICARE

## 2024-03-14 LAB
ALBUMIN SERPL BCP-MCNC: 3.9 G/DL (ref 3.5–5)
ALP SERPL-CCNC: 86 U/L (ref 34–104)
ALT SERPL W P-5'-P-CCNC: 42 U/L (ref 7–52)
ANION GAP SERPL CALCULATED.3IONS-SCNC: 12 MMOL/L (ref 4–13)
AST SERPL W P-5'-P-CCNC: 126 U/L (ref 13–39)
BASOPHILS # BLD AUTO: 0.03 THOUSANDS/ÂΜL (ref 0–0.1)
BASOPHILS NFR BLD AUTO: 1 % (ref 0–1)
BILIRUB SERPL-MCNC: 0.68 MG/DL (ref 0.2–1)
BUN SERPL-MCNC: 6 MG/DL (ref 5–25)
CALCIUM SERPL-MCNC: 9.5 MG/DL (ref 8.4–10.2)
CHLORIDE SERPL-SCNC: 101 MMOL/L (ref 96–108)
CO2 SERPL-SCNC: 26 MMOL/L (ref 21–32)
CREAT SERPL-MCNC: 0.7 MG/DL (ref 0.6–1.3)
EOSINOPHIL # BLD AUTO: 0.22 THOUSAND/ÂΜL (ref 0–0.61)
EOSINOPHIL NFR BLD AUTO: 4 % (ref 0–6)
ERYTHROCYTE [DISTWIDTH] IN BLOOD BY AUTOMATED COUNT: 12.8 % (ref 11.6–15.1)
FERRITIN SERPL-MCNC: 44 NG/ML (ref 24–336)
GFR SERPL CREATININE-BSD FRML MDRD: 98 ML/MIN/1.73SQ M
GLUCOSE P FAST SERPL-MCNC: 107 MG/DL (ref 65–99)
HCT VFR BLD AUTO: 38.4 % (ref 36.5–49.3)
HGB BLD-MCNC: 12.9 G/DL (ref 12–17)
IMM GRANULOCYTES # BLD AUTO: 0.03 THOUSAND/UL (ref 0–0.2)
IMM GRANULOCYTES NFR BLD AUTO: 1 % (ref 0–2)
IRON SATN MFR SERPL: 29 % (ref 15–50)
IRON SERPL-MCNC: 114 UG/DL (ref 50–212)
LYMPHOCYTES # BLD AUTO: 1.35 THOUSANDS/ÂΜL (ref 0.6–4.47)
LYMPHOCYTES NFR BLD AUTO: 22 % (ref 14–44)
MCH RBC QN AUTO: 31.9 PG (ref 26.8–34.3)
MCHC RBC AUTO-ENTMCNC: 33.6 G/DL (ref 31.4–37.4)
MCV RBC AUTO: 95 FL (ref 82–98)
MONOCYTES # BLD AUTO: 0.67 THOUSAND/ÂΜL (ref 0.17–1.22)
MONOCYTES NFR BLD AUTO: 11 % (ref 4–12)
NEUTROPHILS # BLD AUTO: 3.76 THOUSANDS/ÂΜL (ref 1.85–7.62)
NEUTS SEG NFR BLD AUTO: 61 % (ref 43–75)
NRBC BLD AUTO-RTO: 0 /100 WBCS
PLATELET # BLD AUTO: 111 THOUSANDS/UL (ref 149–390)
PMV BLD AUTO: 10.2 FL (ref 8.9–12.7)
POTASSIUM SERPL-SCNC: 3.8 MMOL/L (ref 3.5–5.3)
PROT SERPL-MCNC: 7.3 G/DL (ref 6.4–8.4)
RBC # BLD AUTO: 4.04 MILLION/UL (ref 3.88–5.62)
SODIUM SERPL-SCNC: 139 MMOL/L (ref 135–147)
TIBC SERPL-MCNC: 391 UG/DL (ref 250–450)
UIBC SERPL-MCNC: 277 UG/DL (ref 155–355)
WBC # BLD AUTO: 6.06 THOUSAND/UL (ref 4.31–10.16)

## 2024-03-14 PROCEDURE — 83540 ASSAY OF IRON: CPT

## 2024-03-14 PROCEDURE — 85025 COMPLETE CBC W/AUTO DIFF WBC: CPT

## 2024-03-14 PROCEDURE — 82728 ASSAY OF FERRITIN: CPT

## 2024-03-14 PROCEDURE — 83550 IRON BINDING TEST: CPT

## 2024-03-14 PROCEDURE — 36415 COLL VENOUS BLD VENIPUNCTURE: CPT

## 2024-03-14 PROCEDURE — 80053 COMPREHEN METABOLIC PANEL: CPT

## 2024-05-01 PROBLEM — J01.81 OTHER ACUTE RECURRENT SINUSITIS: Status: RESOLVED | Noted: 2024-03-08 | Resolved: 2024-05-01

## 2024-05-16 DIAGNOSIS — K21.9 GASTROESOPHAGEAL REFLUX DISEASE WITHOUT ESOPHAGITIS: ICD-10-CM

## 2024-05-16 RX ORDER — OMEPRAZOLE 40 MG/1
40 CAPSULE, DELAYED RELEASE ORAL DAILY
Qty: 90 CAPSULE | Refills: 1 | Status: SHIPPED | OUTPATIENT
Start: 2024-05-16

## 2024-05-17 ENCOUNTER — OFFICE VISIT (OUTPATIENT)
Dept: FAMILY MEDICINE CLINIC | Facility: CLINIC | Age: 69
End: 2024-05-17
Payer: MEDICARE

## 2024-05-17 VITALS
WEIGHT: 265 LBS | BODY MASS INDEX: 40.16 KG/M2 | OXYGEN SATURATION: 98 % | HEART RATE: 109 BPM | DIASTOLIC BLOOD PRESSURE: 80 MMHG | TEMPERATURE: 98.3 F | SYSTOLIC BLOOD PRESSURE: 150 MMHG | HEIGHT: 68 IN

## 2024-05-17 DIAGNOSIS — Z00.00 MEDICARE ANNUAL WELLNESS VISIT, SUBSEQUENT: ICD-10-CM

## 2024-05-17 DIAGNOSIS — Z12.11 COLON CANCER SCREENING: Primary | ICD-10-CM

## 2024-05-17 PROCEDURE — G0402 INITIAL PREVENTIVE EXAM: HCPCS | Performed by: FAMILY MEDICINE

## 2024-05-17 NOTE — PROGRESS NOTES
Ambulatory Visit  Name: Burak Smith      : 1955      MRN: [id number]  Encounter Provider: Calvin Elizondo MD  Encounter Date: 2024   Encounter department: [facility]    Assessment & Plan   1. Colon cancer screening  -     Cologuard  2. Medicare annual wellness visit, subsequent       Preventive health issues were discussed with patient, and age appropriate screening tests were ordered as noted in patient's After Visit Summary. Personalized health advice and appropriate referrals for health education or preventive services given if needed, as noted in patient's After Visit Summary.    History of Present Illness     HPI   Patient Care Team:  Calvin Elizondo MD as PCP - General (Family Medicine)    Review of Systems   Constitutional: Negative.    HENT: Negative.     Eyes: Negative.    Respiratory: Negative.     Cardiovascular: Negative.    Gastrointestinal: Negative.    Endocrine: Negative.    Genitourinary: Negative.    Musculoskeletal: Negative.    Allergic/Immunologic: Negative.    Neurological: Negative.    Hematological: Negative.    Psychiatric/Behavioral: Negative.     All other systems reviewed and are negative.    Medical History Reviewed by provider this encounter:       Annual Wellness Visit Questionnaire   Burak is here for his Subsequent Wellness visit. Last Medicare Wellness visit information reviewed, patient interviewed and updates made to the record today.      Health Risk Assessment:   Patient rates overall health as good. Patient feels that their physical health rating is slightly better. Patient is satisfied with their life. Eyesight was rated as same. Hearing was rated as same. Patient feels that their emotional and mental health rating is slightly better. Patients states they are sometimes angry. Patient states they are sometimes unusually tired/fatigued. Pain experienced in the last 7 days has been some. Patient's pain rating has been 2/10. Patient  states that he has experienced no weight loss or gain in last 6 months. Yyyyyyyyyyyyyyyyyyyyyy    Depression Screening:   PHQ-2 Score: 0      Fall Risk Screening:   In the past year, patient has experienced: no history of falling in past year      Home Safety:  Patient does not have trouble with stairs inside or outside of their home. Patient has working smoke alarms and has working carbon monoxide detector. Home safety hazards include: none.     Nutrition:   Current diet is Regular.     Medications:   Patient is not currently taking any over-the-counter supplements. Patient is able to manage medications.     Activities of Daily Living (ADLs)/Instrumental Activities of Daily Living (IADLs):   Walk and transfer into and out of bed and chair?: Yes  Dress and groom yourself?: Yes    Bathe or shower yourself?: Yes    Feed yourself? Yes  Do your laundry/housekeeping?: Yes  Manage your money, pay your bills and track your expenses?: Yes  Make your own meals?: Yes    Do your own shopping?: Yes    Previous Hospitalizations:   Any hospitalizations or ED visits within the last 12 months?: No      Advance Care Planning:   Living will: No    Durable POA for healthcare: No    Advanced directive: No      Cognitive Screening:   Provider or family/friend/caregiver concerned regarding cognition?: No    PREVENTIVE SCREENINGS      Cardiovascular Screening:    General: Screening Not Indicated and History Lipid Disorder      Diabetes Screening:     General: Screening Current      Colorectal Cancer Screening:     General: Risks and Benefits Discussed    Due for: Cologuard      Prostate Cancer Screening:    General: Screening Current      Osteoporosis Screening:    General: Screening Not Indicated      Abdominal Aortic Aneurysm (AAA) Screening:    Risk factors include: age between 65-74 yo and tobacco use        General: Screening Not Indicated      Lung Cancer Screening:     General: Screening Not Indicated      Hepatitis C Screening:     "General: Screening Current    Screening, Brief Intervention, and Referral to Treatment (SBIRT)    Screening  Typical number of drinks in a day: 1  Typical number of drinks in a week: 3  Interpretation: Low risk drinking behavior.    AUDIT-C Screenin) How often did you have a drink containing alcohol in the past year? 2 to 4 times a month  2) How many drinks did you have on a typical day when you were drinking in the past year? 1 to 2  3) How often did you have 6 or more drinks on one occasion in the past year? never    AUDIT-C Score: 2  Interpretation: Score 0-3 (male): Negative screen for alcohol misuse    Single Item Drug Screening:  How often have you used an illegal drug (including marijuana) or a prescription medication for non-medical reasons in the past year? never    Single Item Drug Screen Score: 0  Interpretation: Negative screen for possible drug use disorder    Social Determinants of Health     Food Insecurity: No Food Insecurity (2024)    Hunger Vital Sign     Worried About Running Out of Food in the Last Year: Never true     Ran Out of Food in the Last Year: Never true   Transportation Needs: No Transportation Needs (2024)    PRAPARE - Transportation     Lack of Transportation (Medical): No     Lack of Transportation (Non-Medical): No   Housing Stability: Low Risk  (2024)    Housing Stability Vital Sign     Unable to Pay for Housing in the Last Year: No     Number of Times Moved in the Last Year: 1     Homeless in the Last Year: No   Utilities: Not At Risk (2024)    [facility] Utilities     Threatened with loss of utilities: No     No results found.    Objective     /80 (BP Location: Right arm, Patient Position: Sitting, Cuff Size: Large)   Pulse (!) 109   Temp 98.3 °F (36.8 °C) (Temporal)   Ht 5' 8\" (1.727 m)   Wt 120 kg (265 lb)   SpO2 98%   BMI 40.29 kg/m²     Physical Exam  Vitals reviewed.   Constitutional:       General: He is not in acute distress.     Appearance: " He is well-developed. He is not diaphoretic.   HENT:      Head: Normocephalic and atraumatic.      Right Ear: External ear normal.      Left Ear: External ear normal.      Nose: Nose normal.   Eyes:      General: No scleral icterus.        Right eye: No discharge.         Left eye: No discharge.      Conjunctiva/sclera: Conjunctivae normal.      Pupils: Pupils are equal, round, and reactive to light.   Neck:      Thyroid: No thyromegaly.      Trachea: No tracheal deviation.   Cardiovascular:      Rate and Rhythm: Normal rate and regular rhythm.      Heart sounds: Normal heart sounds. No murmur heard.     No friction rub.   Pulmonary:      Effort: Pulmonary effort is normal. No respiratory distress.      Breath sounds: Normal breath sounds. No stridor. No wheezing or rales.   Abdominal:      General: There is no distension.      Palpations: Abdomen is soft. There is no mass.      Tenderness: There is no abdominal tenderness. There is no guarding or rebound.   Musculoskeletal:         General: Normal range of motion.      Cervical back: Normal range of motion and neck supple.   Lymphadenopathy:      Cervical: No cervical adenopathy.   Skin:     General: Skin is warm.   Neurological:      Mental Status: He is alert and oriented to person, place, and time.      Cranial Nerves: No cranial nerve deficit.   Psychiatric:         Behavior: Behavior normal.         Thought Content: Thought content normal.         Judgment: Judgment normal.

## 2024-05-17 NOTE — PROGRESS NOTES
Ambulatory Visit  Name: Burak Smith      : 1955      MRN: [id number]  Encounter Provider: Calvin Elizondo MD  Encounter Date: 2024   Encounter department: [facility]    Assessment & Plan   {There are no diagnoses linked to this encounter. (Refresh or delete this SmartLink)}     Preventive health issues were discussed with patient, and age appropriate screening tests were ordered as noted in patient's After Visit Summary. Personalized health advice and appropriate referrals for health education or preventive services given if needed, as noted in patient's After Visit Summary.    History of Present Illness   {Disappearing Hyperlinks I Encounters * My Last Note * Since Last Visit * History :01712}  HPI   Patient Care Team:  Calvin Elizondo MD as PCP - General (Family Medicine)    Review of Systems  Medical History Reviewed by provider this encounter:       Annual Wellness Visit Questionnaire   Burak is here for his Subsequent Wellness visit.     Health Risk Assessment:   Patient rates overall health as good. Patient feels that their physical health rating is slightly better. Patient is satisfied with their life. Eyesight was rated as same. Hearing was rated as same. Patient feels that their emotional and mental health rating is slightly better. Patients states they are sometimes angry. Patient states they are sometimes unusually tired/fatigued. Pain experienced in the last 7 days has been some. Patient's pain rating has been 2/10. Patient states that he has experienced no weight loss or gain in last 6 months.     Depression Screening:   PHQ-2 Score: 0      Fall Risk Screening:   In the past year, patient has experienced: no history of falling in past year      Home Safety:  Patient does not have trouble with stairs inside or outside of their home. Patient has working smoke alarms and has working carbon monoxide detector. Home safety hazards include: none.     Nutrition:    Current diet is Regular.     Medications:   Patient is not currently taking any over-the-counter supplements. Patient is able to manage medications.     Activities of Daily Living (ADLs)/Instrumental Activities of Daily Living (IADLs):   Walk and transfer into and out of bed and chair?: Yes  Dress and groom yourself?: Yes    Bathe or shower yourself?: Yes    Feed yourself? Yes  Do your laundry/housekeeping?: Yes  Manage your money, pay your bills and track your expenses?: Yes  Make your own meals?: Yes    Do your own shopping?: Yes    Previous Hospitalizations:   Any hospitalizations or ED visits within the last 12 months?: No      Advance Care Planning:   Living will: No    Durable POA for healthcare: No    Advanced directive: No      PREVENTIVE SCREENINGS      Cardiovascular Screening:    General: Screening Not Indicated and History Lipid Disorder      Diabetes Screening:     General: Screening Current      Prostate Cancer Screening:    General: Screening Current      Abdominal Aortic Aneurysm (AAA) Screening:    Risk factors include: age between 65-74 yo and tobacco use        Lung Cancer Screening:     General: Screening Not Indicated      Hepatitis C Screening:    General: Screening Current    Screening, Brief Intervention, and Referral to Treatment (SBIRT)    Screening  Typical number of drinks in a day: 1  Typical number of drinks in a week: 3  Interpretation: Low risk drinking behavior.    AUDIT-C Screenin) How often did you have a drink containing alcohol in the past year? 2 to 4 times a month  2) How many drinks did you have on a typical day when you were drinking in the past year? 1 to 2  3) How often did you have 6 or more drinks on one occasion in the past year? never    AUDIT-C Score: 2  Interpretation: Score 0-3 (male): Negative screen for alcohol misuse    Single Item Drug Screening:  How often have you used an illegal drug (including marijuana) or a prescription medication for non-medical  reasons in the past year? never    Single Item Drug Screen Score: 0  Interpretation: Negative screen for possible drug use disorder    Social Determinants of Health     Food Insecurity: No Food Insecurity (5/16/2024)    Hunger Vital Sign     Worried About Running Out of Food in the Last Year: Never true     Ran Out of Food in the Last Year: Never true   Transportation Needs: No Transportation Needs (5/16/2024)    PRAPARE - Transportation     Lack of Transportation (Medical): No     Lack of Transportation (Non-Medical): No   Housing Stability: Unknown (5/16/2024)    Housing Stability Vital Sign     Unable to Pay for Housing in the Last Year: No     Homeless in the Last Year: No   Utilities: Not At Risk (5/16/2024)    [facility] Utilities     Threatened with loss of utilities: No     No results found.    Objective   {Disappearing Hyperlinks   Review Vitals * Enter New Vitals * Results Review * Labs * Imaging * Cardiology * Procedures * Lung Cancer Screening :54853}  There were no vitals taken for this visit.    Physical Exam  Administrative Statements {Disappearing Hyperlinks I  Level of Service * PCMH/PCSP:48884}  {Time Spent Statement (Optional):47802}

## 2024-05-22 PROBLEM — Z00.00 MEDICARE ANNUAL WELLNESS VISIT, SUBSEQUENT: Status: ACTIVE | Noted: 2024-05-22

## 2024-06-12 ENCOUNTER — TELEPHONE (OUTPATIENT)
Dept: FAMILY MEDICINE CLINIC | Facility: CLINIC | Age: 69
End: 2024-06-12

## 2024-06-12 ENCOUNTER — TELEPHONE (OUTPATIENT)
Age: 69
End: 2024-06-12

## 2024-06-12 LAB — COLOGUARD RESULT REPORTABLE: POSITIVE

## 2024-06-12 NOTE — TELEPHONE ENCOUNTER
I did speak to patient regarding his cologuard results.  He sees Dr Crowley for Colonoscopies.  He will call their office to schedule an appt.

## 2024-06-12 NOTE — TELEPHONE ENCOUNTER
Patient returning call for cologuard results. Read Dr. Nina note to him: Please call patient. Cologard was positive. Please remind pt that this does not definitively mean that he has cancer, but that he is high risk. He should go for colonoscopy ASAP    Patient is agreeable to a colonoscopy.

## 2024-06-12 NOTE — TELEPHONE ENCOUNTER
----- Message from Calvin Elizondo MD sent at 6/12/2024  1:14 PM EDT -----  Please call patient. Cologard was positive. Please remind pt that this does not definitively mean that he has cancer, but that he is high risk. He should go for colonoscopy ASAP.

## 2024-06-21 ENCOUNTER — TELEPHONE (OUTPATIENT)
Dept: FAMILY MEDICINE CLINIC | Facility: CLINIC | Age: 69
End: 2024-06-21

## 2024-06-21 DIAGNOSIS — I26.99 PULMONARY EMBOLISM, OTHER, UNSPECIFIED CHRONICITY, UNSPECIFIED WHETHER ACUTE COR PULMONALE PRESENT (HCC): ICD-10-CM

## 2024-06-21 PROBLEM — Z00.00 MEDICARE ANNUAL WELLNESS VISIT, SUBSEQUENT: Status: RESOLVED | Noted: 2024-05-22 | Resolved: 2024-06-21

## 2024-07-24 ENCOUNTER — TELEPHONE (OUTPATIENT)
Dept: FAMILY MEDICINE CLINIC | Facility: CLINIC | Age: 69
End: 2024-07-24

## 2024-07-24 DIAGNOSIS — Z12.11 COLON CANCER SCREENING: Primary | ICD-10-CM

## 2024-08-02 DIAGNOSIS — Z12.11 COLON CANCER SCREENING: Primary | ICD-10-CM

## 2024-08-07 DIAGNOSIS — K21.9 GASTROESOPHAGEAL REFLUX DISEASE WITHOUT ESOPHAGITIS: ICD-10-CM

## 2024-08-08 RX ORDER — OMEPRAZOLE 40 MG/1
40 CAPSULE, DELAYED RELEASE ORAL DAILY
Qty: 90 CAPSULE | Refills: 1 | Status: SHIPPED | OUTPATIENT
Start: 2024-08-08

## 2024-08-12 ENCOUNTER — TELEPHONE (OUTPATIENT)
Age: 69
End: 2024-08-12

## 2024-08-30 DIAGNOSIS — I10 ESSENTIAL HYPERTENSION: ICD-10-CM

## 2024-08-30 RX ORDER — AMLODIPINE BESYLATE 5 MG/1
5 TABLET ORAL
Qty: 90 TABLET | Refills: 1 | Status: SHIPPED | OUTPATIENT
Start: 2024-08-30

## 2024-09-10 DIAGNOSIS — F41.9 ANXIETY: ICD-10-CM

## 2024-09-11 RX ORDER — BUSPIRONE HYDROCHLORIDE 5 MG/1
5 TABLET ORAL 2 TIMES DAILY
Qty: 180 TABLET | Refills: 1 | Status: SHIPPED | OUTPATIENT
Start: 2024-09-11

## 2024-09-24 ENCOUNTER — TELEPHONE (OUTPATIENT)
Dept: GASTROENTEROLOGY | Facility: MEDICAL CENTER | Age: 69
End: 2024-09-24

## 2024-09-24 ENCOUNTER — OFFICE VISIT (OUTPATIENT)
Dept: GASTROENTEROLOGY | Facility: MEDICAL CENTER | Age: 69
End: 2024-09-24
Payer: MEDICARE

## 2024-09-24 VITALS
TEMPERATURE: 97.9 F | WEIGHT: 265 LBS | SYSTOLIC BLOOD PRESSURE: 152 MMHG | HEART RATE: 96 BPM | OXYGEN SATURATION: 97 % | HEIGHT: 68 IN | BODY MASS INDEX: 40.16 KG/M2 | DIASTOLIC BLOOD PRESSURE: 90 MMHG

## 2024-09-24 DIAGNOSIS — R19.5 POSITIVE COLORECTAL CANCER SCREENING USING COLOGUARD TEST: Primary | ICD-10-CM

## 2024-09-24 DIAGNOSIS — K75.81 STEATOHEPATITIS: ICD-10-CM

## 2024-09-24 DIAGNOSIS — Z12.11 COLON CANCER SCREENING: ICD-10-CM

## 2024-09-24 DIAGNOSIS — E83.19 IRON OVERLOAD: ICD-10-CM

## 2024-09-24 PROCEDURE — 99214 OFFICE O/P EST MOD 30 MIN: CPT | Performed by: NURSE PRACTITIONER

## 2024-09-24 RX ORDER — SODIUM PICOSULFATE, MAGNESIUM OXIDE, AND ANHYDROUS CITRIC ACID 12; 3.5; 1 G/175ML; G/175ML; MG/175ML
LIQUID ORAL
Qty: 350 ML | Refills: 0 | Status: SHIPPED | OUTPATIENT
Start: 2024-09-24

## 2024-09-24 NOTE — H&P (VIEW-ONLY)
[facility] - Outpatient Follow-up Note  Burak Smith 69 y.o. male MRN: [id number]  Encounter: [id number]          ASSESSMENT AND PLAN:      1. Positive Cologuard  2.  Family history of colon cancer  3.  Personal history of colon polyps    BMs are brown and formed daily to every other day.  Denies any melena hematochezia.  No abdominal pain.  Last colonoscopy was  and polyps were noted.  Pathology report not available during visit today.  Recently had a positive Cologuard study .  Father and sister both had colon cancer in their 50s.  Will rule out colon polyps and neoplasm.    -Colonoscopy with Clenpiq prep  -Follow-up in office after testing    4.  Hepatomegaly  5.  Steatohepatitis  6.  Iron overload    History of steatohepatitis and hepatomegaly.  Liver serology workup negative other than patient does have a history of iron overload.  He is followed by hematology for this.  He has not required phlebotomy since 2023.  Last imaging of liver was .  Liver biopsy  noted steatohepatitis with fibrosis 2.  No recent labs.  No recent imaging.    -CBC, CMP, INR, iron studies  -Follow-up with hematology  -Ultrasound of the abdomen  -Ultrasound elastography  -After testing    I reviewed with patient/family potential risks of endoscopic evaluation including possible infection, bleeding or perforation.  Patient/family verbalized understanding of potential risks and agreed to procedure(s).    ______________________________________________________________________    SUBJECTIVE: 69-year-old male here for follow-up.  He was last seen by Dr. Crowley  nausea and vomiting, REEVES, elevated LFTs and constipation.  He does have a history of PEs (on Eliquis), HTN, FELIBERTO and family history of colon cancer.  His father and sister both had colon cancer.  Father  in his 50s and his sister also had it in her 50s.    BMs are brown and formed daily to every other day.  Denies any melena  hematochezia.  No abdominal pain.  Currently dieting and trying to lose weight.    History of steatohepatitis and hepatomegaly.  Liver serology workup negative other than patient does have a history of iron overload.  He is followed by hematology for this.  He has not required phlebotomy since August 2023.  Last imaging of liver was 6/23.  Liver biopsy 2022 noted steatohepatitis with fibrosis 2.    T chest abdomen pelvis from June 2023 notable for mildly asymmetric bilateral crural thickening with associated stranding suspicious for crural hematomas but no leodan diaphragmatic discontinuity or intrathoracic herniation of abdominal organs to suggest rupture, hepatomegaly/hepatic steatosis, splenomegaly. Prior MRI with slight iron overload and strong steatosis.     Prior liver biopsy with steatohepatitis and moderate centrilobular pericellular and periportal fibrosis (stage 2)-9/22    No recent abdominal imaging or labs to review.        Prior EGD/colonoscopy     Reports last colonoscopy was 2022 and was told he had polyps.  Report not available during visit today.      EGD 2022-report not available during visit today.    Colonoscopy 2018-polyps.  Biopsy report not available during visit today.        REVIEW OF SYSTEMS IS OTHERWISE NEGATIVE.  10 point review of systems negative other than per HPI    Historical Information   Past Medical History:   Diagnosis Date    Allergic     Anxiety     Arthritis     Broken ribs     Depression     Elevated ferritin level     GERD (gastroesophageal reflux disease)     Hypertension     Obesity     Obstructive sleep apnea      Past Surgical History:   Procedure Laterality Date    IR BIOPSY LIVER RANDOM  9/22/2022    REPLACEMENT TOTAL KNEE Bilateral      Social History   Social History     Substance and Sexual Activity   Alcohol Use Not Currently    Comment: occassionally     Social History     Substance and Sexual Activity   Drug Use Never     Social History     Tobacco Use   Smoking  "Status Former    Passive exposure: Past   Smokeless Tobacco Never     Family History   Problem Relation Age of Onset    Cancer Father        Meds/Allergies       Current Outpatient Medications:     amLODIPine (NORVASC) 5 mg tablet    apixaban (Eliquis) 5 mg    busPIRone (BUSPAR) 5 mg tablet    Cholecalciferol (Vitamin D3) 50 MCG (2000 UT) capsule    folic acid (FOLVITE) 1 mg tablet    loratadine (CLARITIN) 10 mg tablet    losartan (COZAAR) 50 mg tablet    omeprazole (PriLOSEC) 40 MG capsule    triamterene-hydrochlorothiazide (DYAZIDE) 37.5-25 mg per capsule    Allergies   Allergen Reactions    Ciprofloxacin Vomiting    Flagyl [Metronidazole] Vomiting    Other Other (See Comments)     Seasonal- Running nose           Objective     Blood pressure 152/90, pulse 96, temperature 97.9 °F (36.6 °C), height 5' 8\" (1.727 m), weight 120 kg (265 lb), SpO2 97%. Body mass index is 40.29 kg/m².      PHYSICAL EXAM:      General Appearance:   Alert, cooperative, no distress   HEENT:   Normocephalic, atraumatic, anicteric.     Neck:  Supple, symmetrical, trachea midline   Lungs:   Clear to auscultation bilaterally; no rales, rhonchi or wheezing; respirations unlabored    Heart::   Regular rate and rhythm; no murmur, rub, or gallop.   Abdomen:   Soft, non-tender, non-distended; normal bowel sounds; no masses, no organomegaly    Genitalia:   Deferred    Rectal:   Deferred    Extremities:  No cyanosis, clubbing or edema    Pulses:  2+ and symmetric    Skin:  No jaundice, rashes, or lesions    Lymph nodes:  No palpable cervical lymphadenopathy        Lab Results:   No visits with results within 1 Day(s) from this visit.   Latest known visit with results is:   Office Visit on 05/17/2024   Component Date Value    Cologuard Result 06/04/2024 Positive (A)          Radiology Results:   No results found.  "

## 2024-09-24 NOTE — TELEPHONE ENCOUNTER
Our mutual patient, Burak Smith, is scheduled for the following   procedure: Colonoscopy   On: October 10   With: Dr. Schmidt    Burak Smith is taking the following blood thinner: Eliquis      Can this be stopped 2 days prior to the procedure?         Thank you,  Deyanira Frazier [facility]

## 2024-09-24 NOTE — TELEPHONE ENCOUNTER
Scheduled date of Colonoscopy (as of today) October 10  Physician performing: Dr. Schmidt  Location of procedure:  [facility]  Instructions given to patient: Clenpiq  Clearances: Eliquis hold-Dr. Elizondo-approved  Diabetic: no

## 2024-09-24 NOTE — PROGRESS NOTES
[facility] - Outpatient Follow-up Note  Burak Smith 69 y.o. male MRN: [id number]  Encounter: [id number]          ASSESSMENT AND PLAN:      1. Positive Cologuard  2.  Family history of colon cancer  3.  Personal history of colon polyps    BMs are brown and formed daily to every other day.  Denies any melena hematochezia.  No abdominal pain.  Last colonoscopy was  and polyps were noted.  Pathology report not available during visit today.  Recently had a positive Cologuard study .  Father and sister both had colon cancer in their 50s.  Will rule out colon polyps and neoplasm.    -Colonoscopy with Clenpiq prep  -Follow-up in office after testing    4.  Hepatomegaly  5.  Steatohepatitis  6.  Iron overload    History of steatohepatitis and hepatomegaly.  Liver serology workup negative other than patient does have a history of iron overload.  He is followed by hematology for this.  He has not required phlebotomy since 2023.  Last imaging of liver was .  Liver biopsy  noted steatohepatitis with fibrosis 2.  No recent labs.  No recent imaging.    -CBC, CMP, INR, iron studies  -Follow-up with hematology  -Ultrasound of the abdomen  -Ultrasound elastography  -After testing    I reviewed with patient/family potential risks of endoscopic evaluation including possible infection, bleeding or perforation.  Patient/family verbalized understanding of potential risks and agreed to procedure(s).    ______________________________________________________________________    SUBJECTIVE: 69-year-old male here for follow-up.  He was last seen by Dr. Crowley  nausea and vomiting, REEVES, elevated LFTs and constipation.  He does have a history of PEs (on Eliquis), HTN, FELIBERTO and family history of colon cancer.  His father and sister both had colon cancer.  Father  in his 50s and his sister also had it in her 50s.    BMs are brown and formed daily to every other day.  Denies any melena  hematochezia.  No abdominal pain.  Currently dieting and trying to lose weight.    History of steatohepatitis and hepatomegaly.  Liver serology workup negative other than patient does have a history of iron overload.  He is followed by hematology for this.  He has not required phlebotomy since August 2023.  Last imaging of liver was 6/23.  Liver biopsy 2022 noted steatohepatitis with fibrosis 2.    T chest abdomen pelvis from June 2023 notable for mildly asymmetric bilateral crural thickening with associated stranding suspicious for crural hematomas but no leodan diaphragmatic discontinuity or intrathoracic herniation of abdominal organs to suggest rupture, hepatomegaly/hepatic steatosis, splenomegaly. Prior MRI with slight iron overload and strong steatosis.     Prior liver biopsy with steatohepatitis and moderate centrilobular pericellular and periportal fibrosis (stage 2)-9/22    No recent abdominal imaging or labs to review.        Prior EGD/colonoscopy     Reports last colonoscopy was 2022 and was told he had polyps.  Report not available during visit today.      EGD 2022-report not available during visit today.    Colonoscopy 2018-polyps.  Biopsy report not available during visit today.        REVIEW OF SYSTEMS IS OTHERWISE NEGATIVE.  10 point review of systems negative other than per HPI    Historical Information   Past Medical History:   Diagnosis Date    Allergic     Anxiety     Arthritis     Broken ribs     Depression     Elevated ferritin level     GERD (gastroesophageal reflux disease)     Hypertension     Obesity     Obstructive sleep apnea      Past Surgical History:   Procedure Laterality Date    IR BIOPSY LIVER RANDOM  9/22/2022    REPLACEMENT TOTAL KNEE Bilateral      Social History   Social History     Substance and Sexual Activity   Alcohol Use Not Currently    Comment: occassionally     Social History     Substance and Sexual Activity   Drug Use Never     Social History     Tobacco Use   Smoking  "Status Former    Passive exposure: Past   Smokeless Tobacco Never     Family History   Problem Relation Age of Onset    Cancer Father        Meds/Allergies       Current Outpatient Medications:     amLODIPine (NORVASC) 5 mg tablet    apixaban (Eliquis) 5 mg    busPIRone (BUSPAR) 5 mg tablet    Cholecalciferol (Vitamin D3) 50 MCG (2000 UT) capsule    folic acid (FOLVITE) 1 mg tablet    loratadine (CLARITIN) 10 mg tablet    losartan (COZAAR) 50 mg tablet    omeprazole (PriLOSEC) 40 MG capsule    triamterene-hydrochlorothiazide (DYAZIDE) 37.5-25 mg per capsule    Allergies   Allergen Reactions    Ciprofloxacin Vomiting    Flagyl [Metronidazole] Vomiting    Other Other (See Comments)     Seasonal- Running nose           Objective     Blood pressure 152/90, pulse 96, temperature 97.9 °F (36.6 °C), height 5' 8\" (1.727 m), weight 120 kg (265 lb), SpO2 97%. Body mass index is 40.29 kg/m².      PHYSICAL EXAM:      General Appearance:   Alert, cooperative, no distress   HEENT:   Normocephalic, atraumatic, anicteric.     Neck:  Supple, symmetrical, trachea midline   Lungs:   Clear to auscultation bilaterally; no rales, rhonchi or wheezing; respirations unlabored    Heart::   Regular rate and rhythm; no murmur, rub, or gallop.   Abdomen:   Soft, non-tender, non-distended; normal bowel sounds; no masses, no organomegaly    Genitalia:   Deferred    Rectal:   Deferred    Extremities:  No cyanosis, clubbing or edema    Pulses:  2+ and symmetric    Skin:  No jaundice, rashes, or lesions    Lymph nodes:  No palpable cervical lymphadenopathy        Lab Results:   No visits with results within 1 Day(s) from this visit.   Latest known visit with results is:   Office Visit on 05/17/2024   Component Date Value    Cologuard Result 06/04/2024 Positive (A)          Radiology Results:   No results found.  "

## 2024-09-24 NOTE — TELEPHONE ENCOUNTER
Left voicemail for patient to notify that Eliquis hold has been approved and he should hold his Eliquis for 2 days prior to colonoscopy

## 2024-09-26 ENCOUNTER — ANESTHESIA EVENT (OUTPATIENT)
Dept: ANESTHESIOLOGY | Facility: HOSPITAL | Age: 69
End: 2024-09-26

## 2024-09-26 ENCOUNTER — ANESTHESIA (OUTPATIENT)
Dept: ANESTHESIOLOGY | Facility: HOSPITAL | Age: 69
End: 2024-09-26

## 2024-09-27 DIAGNOSIS — I26.99 PULMONARY EMBOLISM, OTHER, UNSPECIFIED CHRONICITY, UNSPECIFIED WHETHER ACUTE COR PULMONALE PRESENT (HCC): ICD-10-CM

## 2024-09-27 RX ORDER — APIXABAN 5 MG/1
5 TABLET, FILM COATED ORAL 2 TIMES DAILY
Qty: 180 TABLET | Refills: 1 | Status: SHIPPED | OUTPATIENT
Start: 2024-09-27

## 2024-09-30 ENCOUNTER — APPOINTMENT (OUTPATIENT)
Dept: LAB | Facility: MEDICAL CENTER | Age: 69
End: 2024-09-30
Payer: MEDICARE

## 2024-09-30 DIAGNOSIS — K75.81 STEATOHEPATITIS: ICD-10-CM

## 2024-09-30 DIAGNOSIS — E83.19 IRON OVERLOAD: ICD-10-CM

## 2024-09-30 LAB
ALBUMIN SERPL BCG-MCNC: 4.1 G/DL (ref 3.5–5)
ALP SERPL-CCNC: 70 U/L (ref 34–104)
ALT SERPL W P-5'-P-CCNC: 36 U/L (ref 7–52)
ANION GAP SERPL CALCULATED.3IONS-SCNC: 10 MMOL/L (ref 4–13)
AST SERPL W P-5'-P-CCNC: 86 U/L (ref 13–39)
BASOPHILS # BLD AUTO: 0.05 THOUSANDS/ÂΜL (ref 0–0.1)
BASOPHILS NFR BLD AUTO: 1 % (ref 0–1)
BILIRUB SERPL-MCNC: 0.79 MG/DL (ref 0.2–1)
BUN SERPL-MCNC: 16 MG/DL (ref 5–25)
CALCIUM SERPL-MCNC: 9.6 MG/DL (ref 8.4–10.2)
CHLORIDE SERPL-SCNC: 96 MMOL/L (ref 96–108)
CO2 SERPL-SCNC: 31 MMOL/L (ref 21–32)
CREAT SERPL-MCNC: 0.95 MG/DL (ref 0.6–1.3)
EOSINOPHIL # BLD AUTO: 0.28 THOUSAND/ÂΜL (ref 0–0.61)
EOSINOPHIL NFR BLD AUTO: 5 % (ref 0–6)
ERYTHROCYTE [DISTWIDTH] IN BLOOD BY AUTOMATED COUNT: 12.9 % (ref 11.6–15.1)
FERRITIN SERPL-MCNC: 39 NG/ML (ref 24–336)
GFR SERPL CREATININE-BSD FRML MDRD: 82 ML/MIN/1.73SQ M
GLUCOSE P FAST SERPL-MCNC: 129 MG/DL (ref 65–99)
HCT VFR BLD AUTO: 39.6 % (ref 36.5–49.3)
HGB BLD-MCNC: 13.1 G/DL (ref 12–17)
IMM GRANULOCYTES # BLD AUTO: 0.02 THOUSAND/UL (ref 0–0.2)
IMM GRANULOCYTES NFR BLD AUTO: 0 % (ref 0–2)
INR PPP: 1.34 (ref 0.85–1.19)
IRON SATN MFR SERPL: 41 % (ref 15–50)
IRON SERPL-MCNC: 184 UG/DL (ref 50–212)
LYMPHOCYTES # BLD AUTO: 1.45 THOUSANDS/ÂΜL (ref 0.6–4.47)
LYMPHOCYTES NFR BLD AUTO: 25 % (ref 14–44)
MCH RBC QN AUTO: 32.7 PG (ref 26.8–34.3)
MCHC RBC AUTO-ENTMCNC: 33.1 G/DL (ref 31.4–37.4)
MCV RBC AUTO: 99 FL (ref 82–98)
MONOCYTES # BLD AUTO: 0.52 THOUSAND/ÂΜL (ref 0.17–1.22)
MONOCYTES NFR BLD AUTO: 9 % (ref 4–12)
NEUTROPHILS # BLD AUTO: 3.59 THOUSANDS/ÂΜL (ref 1.85–7.62)
NEUTS SEG NFR BLD AUTO: 60 % (ref 43–75)
NRBC BLD AUTO-RTO: 0 /100 WBCS
PLATELET # BLD AUTO: 113 THOUSANDS/UL (ref 149–390)
PMV BLD AUTO: 10.7 FL (ref 8.9–12.7)
POTASSIUM SERPL-SCNC: 4.3 MMOL/L (ref 3.5–5.3)
PROT SERPL-MCNC: 7.5 G/DL (ref 6.4–8.4)
PROTHROMBIN TIME: 16.8 SECONDS (ref 12.3–15)
RBC # BLD AUTO: 4.01 MILLION/UL (ref 3.88–5.62)
SODIUM SERPL-SCNC: 137 MMOL/L (ref 135–147)
TIBC SERPL-MCNC: 445 UG/DL (ref 250–450)
UIBC SERPL-MCNC: 261 UG/DL (ref 155–355)
WBC # BLD AUTO: 5.91 THOUSAND/UL (ref 4.31–10.16)

## 2024-09-30 PROCEDURE — 82728 ASSAY OF FERRITIN: CPT

## 2024-09-30 PROCEDURE — 36415 COLL VENOUS BLD VENIPUNCTURE: CPT

## 2024-09-30 PROCEDURE — 83540 ASSAY OF IRON: CPT

## 2024-09-30 PROCEDURE — 80053 COMPREHEN METABOLIC PANEL: CPT

## 2024-09-30 PROCEDURE — 85025 COMPLETE CBC W/AUTO DIFF WBC: CPT

## 2024-09-30 PROCEDURE — 83550 IRON BINDING TEST: CPT

## 2024-09-30 PROCEDURE — 85610 PROTHROMBIN TIME: CPT

## 2024-10-04 ENCOUNTER — HOSPITAL ENCOUNTER (OUTPATIENT)
Dept: ULTRASOUND IMAGING | Facility: MEDICAL CENTER | Age: 69
Discharge: HOME | End: 2024-10-04
Payer: MEDICARE

## 2024-10-04 ENCOUNTER — TELEPHONE (OUTPATIENT)
Dept: GASTROENTEROLOGY | Facility: MEDICAL CENTER | Age: 69
End: 2024-10-04

## 2024-10-04 DIAGNOSIS — K75.81 STEATOHEPATITIS: ICD-10-CM

## 2024-10-04 PROCEDURE — 76700 US EXAM ABDOM COMPLETE: CPT

## 2024-10-04 PROCEDURE — 76981 USE PARENCHYMA: CPT

## 2024-10-04 NOTE — TELEPHONE ENCOUNTER
Confirming Upcoming Procedure: Colonoscopy on October 10  Physician performing: Dr. Schmidt  Location of procedure:  [facility]  Prep: Clenpiq   Diabetic: No  Eliquis hold for 2 days prior

## 2024-10-08 NOTE — TELEPHONE ENCOUNTER
Patient called in regarding the prep instructions advised to follow instructions provided as they are from the PROVIDER

## 2024-10-09 ENCOUNTER — TELEPHONE (OUTPATIENT)
Age: 69
End: 2024-10-09

## 2024-10-09 NOTE — TELEPHONE ENCOUNTER
Patients GI provider:  EDWIGE Kilpatrick     Number to return call: [phone]    Reason for call: Pt calling as he and his wife would like a call back to go over the ultrasound results as they are very concerned and would like to know if there is any treatment and would also like the results explained to them. Pt would also like a sooner f/u appt then his 2/26/25    Scheduled procedure/appointment date if applicable: procedure 10/10/24

## 2024-10-10 ENCOUNTER — ANESTHESIA EVENT (OUTPATIENT)
Dept: GASTROENTEROLOGY | Facility: MEDICAL CENTER | Age: 69
End: 2024-10-10
Payer: MEDICARE

## 2024-10-10 ENCOUNTER — ANESTHESIA (OUTPATIENT)
Dept: GASTROENTEROLOGY | Facility: MEDICAL CENTER | Age: 69
End: 2024-10-10
Payer: MEDICARE

## 2024-10-10 ENCOUNTER — HOSPITAL ENCOUNTER (OUTPATIENT)
Dept: GASTROENTEROLOGY | Facility: MEDICAL CENTER | Age: 69
Setting detail: OUTPATIENT SURGERY
End: 2024-10-10
Payer: MEDICARE

## 2024-10-10 VITALS
HEIGHT: 68 IN | SYSTOLIC BLOOD PRESSURE: 106 MMHG | OXYGEN SATURATION: 94 % | WEIGHT: 254 LBS | TEMPERATURE: 97 F | DIASTOLIC BLOOD PRESSURE: 62 MMHG | HEART RATE: 68 BPM | BODY MASS INDEX: 38.49 KG/M2 | RESPIRATION RATE: 20 BRPM

## 2024-10-10 DIAGNOSIS — R19.5 POSITIVE COLORECTAL CANCER SCREENING USING COLOGUARD TEST: ICD-10-CM

## 2024-10-10 PROCEDURE — 88305 TISSUE EXAM BY PATHOLOGIST: CPT | Performed by: PATHOLOGY

## 2024-10-10 PROCEDURE — 45385 COLONOSCOPY W/LESION REMOVAL: CPT | Performed by: STUDENT IN AN ORGANIZED HEALTH CARE EDUCATION/TRAINING PROGRAM

## 2024-10-10 RX ORDER — PROPOFOL 10 MG/ML
INJECTION, EMULSION INTRAVENOUS CONTINUOUS PRN
Status: DISCONTINUED | OUTPATIENT
Start: 2024-10-10 | End: 2024-10-10

## 2024-10-10 RX ORDER — PROPOFOL 10 MG/ML
INJECTION, EMULSION INTRAVENOUS AS NEEDED
Status: DISCONTINUED | OUTPATIENT
Start: 2024-10-10 | End: 2024-10-10

## 2024-10-10 RX ORDER — ONDANSETRON 2 MG/ML
4 INJECTION INTRAMUSCULAR; INTRAVENOUS ONCE AS NEEDED
Status: DISCONTINUED | OUTPATIENT
Start: 2024-10-10 | End: 2024-10-14 | Stop reason: HOSPADM

## 2024-10-10 RX ORDER — SODIUM CHLORIDE 9 MG/ML
125 INJECTION, SOLUTION INTRAVENOUS CONTINUOUS
Status: DISCONTINUED | OUTPATIENT
Start: 2024-10-10 | End: 2024-10-14 | Stop reason: HOSPADM

## 2024-10-10 RX ORDER — LIDOCAINE HYDROCHLORIDE 20 MG/ML
INJECTION, SOLUTION EPIDURAL; INFILTRATION; INTRACAUDAL; PERINEURAL AS NEEDED
Status: DISCONTINUED | OUTPATIENT
Start: 2024-10-10 | End: 2024-10-10

## 2024-10-10 RX ADMIN — PROPOFOL 90 MCG/KG/MIN: 10 INJECTION, EMULSION INTRAVENOUS at 08:27

## 2024-10-10 RX ADMIN — PROPOFOL 30 MG: 10 INJECTION, EMULSION INTRAVENOUS at 08:43

## 2024-10-10 RX ADMIN — PROPOFOL 30 MG: 10 INJECTION, EMULSION INTRAVENOUS at 08:24

## 2024-10-10 RX ADMIN — SODIUM CHLORIDE 125 ML/HR: 0.9 INJECTION, SOLUTION INTRAVENOUS at 08:08

## 2024-10-10 RX ADMIN — PROPOFOL 30 MG: 10 INJECTION, EMULSION INTRAVENOUS at 08:33

## 2024-10-10 RX ADMIN — LIDOCAINE HYDROCHLORIDE 100 MG: 20 INJECTION, SOLUTION EPIDURAL; INFILTRATION; INTRACAUDAL at 08:23

## 2024-10-10 RX ADMIN — PROPOFOL 100 MG: 10 INJECTION, EMULSION INTRAVENOUS at 08:23

## 2024-10-10 RX ADMIN — PROPOFOL 30 MG: 10 INJECTION, EMULSION INTRAVENOUS at 08:25

## 2024-10-10 NOTE — TELEPHONE ENCOUNTER
Left message for patient to review results of ultrasound and elastography.  Patient was scheduled for a colonoscopy today.  Will attempt to contact patient tomorrow.

## 2024-10-10 NOTE — ANESTHESIA POSTPROCEDURE EVALUATION
Post-Op Assessment Note    CV Status:  Stable    Pain management: adequate       Mental Status:  Alert and awake   Hydration Status:  Euvolemic   PONV Controlled:  Controlled   Airway Patency:  Patent     Post Op Vitals Reviewed: Yes    No anethesia notable event occurred.    Staff: Anesthesiologist           Last Filed PACU Vitals:  Vitals Value Taken Time   Temp     Pulse 68 10/10/24 0924   /62 10/10/24 0924   Resp 20 10/10/24 0924   SpO2 94 % 10/10/24 0924       Modified Ricky:  Activity: 2 (10/10/2024  9:39 AM)  Respiration: 2 (10/10/2024  9:39 AM)  Circulation: 2 (10/10/2024  9:39 AM)  Consciousness: 2 (10/10/2024  9:39 AM)  Oxygen Saturation: 2 (10/10/2024  9:39 AM)  Modified Ricky Score: 10 (10/10/2024  9:39 AM)

## 2024-10-10 NOTE — ANESTHESIA PREPROCEDURE EVALUATION
Procedure:  COLONOSCOPY    Relevant Problems   CARDIO   (+) Essential hypertension   (+) Hypercholesterolemia      GI/HEPATIC   (+) Fatty liver, alcoholic   (+) Gastroesophageal reflux disease without esophagitis      HEMATOLOGY   (+) Thrombocytopenia (HCC)      MUSCULOSKELETAL   (+) Diaphragmatic hematoma   (+) Osteoarthritis   (+) Primary osteoarthritis of both knees      NEURO/PSYCH   (+) Anxiety      PULMONARY   (+) FELIBERTO on CPAP     Left Ventricle: Left ventricular cavity size is normal. Wall thickness is normal. The left ventricular ejection fraction is 60%. Systolic function is normal. Wall motion is normal. Diastolic function is normal for age.    Right Ventricle: Right ventricular cavity size is normal. Systolic function is normal.    Right Atrium: The atrium is mildly dilated.    Atrial Septum: No patent foramen ovale detected using color flow Doppler at rest.    Mitral Valve: There is mild regurgitation.    Aorta: The aortic root is mildly dilated. The ascending aorta is mildly dilated. The aortic root is 4.30 cm. The ascending aorta is 4.1 cm.     Physical Exam    Airway    Mallampati score: III  TM Distance: >3 FB  Neck ROM: full     Dental   No notable dental hx     Cardiovascular  Rhythm: regular, No weak pulses    Pulmonary   No stridor    Other Findings        Anesthesia Plan  ASA Score- 3     Anesthesia Type- IV sedation with anesthesia with ASA Monitors.         Additional Monitors:     Airway Plan:            Plan Factors-    Chart reviewed.   Existing labs reviewed. Patient summary reviewed.                  Induction- intravenous.    Postoperative Plan-         Informed Consent- Anesthetic plan and risks discussed with patient.  I personally reviewed this patient with the CRNA. Discussed and agreed on the Anesthesia Plan with the CRNA..

## 2024-10-10 NOTE — ANESTHESIA POSTPROCEDURE EVALUATION
Post-Op Assessment Note    CV Status:  Stable (IVF bolus being given for hypotension)  Pain Score: 0    Pain management: adequate       Mental Status:  Alert and awake   Hydration Status:  Euvolemic   PONV Controlled:  Controlled   Airway Patency:  Patent     Post Op Vitals Reviewed: Yes    No anethesia notable event occurred.    Staff: Anesthesiologist, CRNA           Last Filed PACU Vitals:  Vitals Value Taken Time   Temp     Pulse 75 10/10/24 0909   BP 84/54 10/10/24 0909   Resp 20 10/10/24 0909   SpO2 88 %  10/10/24 0909       Modified Ricky:  Activity: 2 (10/10/2024  9:09 AM)  Respiration: 2 (10/10/2024  9:09 AM)  Circulation: 1 (10/10/2024  9:09 AM)  Consciousness: 1 (10/10/2024  9:09 AM)  Oxygen Saturation: 1 (10/10/2024  9:09 AM)  Modified Ricky Score: 7 (10/10/2024  9:09 AM)

## 2024-10-15 ENCOUNTER — OFFICE VISIT (OUTPATIENT)
Age: 69
End: 2024-10-15
Payer: MEDICARE

## 2024-10-15 VITALS
HEART RATE: 93 BPM | DIASTOLIC BLOOD PRESSURE: 86 MMHG | TEMPERATURE: 97.8 F | OXYGEN SATURATION: 100 % | BODY MASS INDEX: 39.07 KG/M2 | HEIGHT: 68 IN | WEIGHT: 257.8 LBS | SYSTOLIC BLOOD PRESSURE: 144 MMHG

## 2024-10-15 DIAGNOSIS — M79.671 HEEL PAIN, BILATERAL: ICD-10-CM

## 2024-10-15 DIAGNOSIS — R60.9 EDEMA, UNSPECIFIED TYPE: Primary | ICD-10-CM

## 2024-10-15 DIAGNOSIS — M79.672 HEEL PAIN, BILATERAL: ICD-10-CM

## 2024-10-15 PROCEDURE — 99214 OFFICE O/P EST MOD 30 MIN: CPT | Performed by: FAMILY MEDICINE

## 2024-10-15 PROCEDURE — 88305 TISSUE EXAM BY PATHOLOGIST: CPT | Performed by: PATHOLOGY

## 2024-10-15 PROCEDURE — G2211 COMPLEX E/M VISIT ADD ON: HCPCS | Performed by: FAMILY MEDICINE

## 2024-10-15 RX ORDER — AMILORIDE HYDROCHLORIDE 5 MG/1
5 TABLET ORAL DAILY
Qty: 30 TABLET | Refills: 1 | Status: SHIPPED | OUTPATIENT
Start: 2024-10-15

## 2024-10-17 PROBLEM — R60.9 EDEMA: Status: ACTIVE | Noted: 2024-10-17

## 2024-10-17 NOTE — PROGRESS NOTES
"Assessment/Plan:    70 y/o male with: heel pain and edema. Will switch diuretic and check labs and refer to podiatry. Discussed supportive care and return parameters.     No problem-specific Assessment & Plan notes found for this encounter.       Diagnoses and all orders for this visit:    Edema, unspecified type  -     AMILoride 5 mg tablet; Take 1 tablet (5 mg total) by mouth daily  -     Comprehensive metabolic panel; Future    Heel pain, bilateral  -     Ambulatory Referral to Podiatry; Future          Subjective:     Chief Complaint   Patient presents with    Leg Swelling     Both legs swelling, would like to be on a water pill med. Pt stated he received flu and covid vaccine somewhere else. No further concerns. CC         Patient ID: Burak Smith is a 69 y.o. male.    Patient is a 70 y/o male who presents for follow-up heel pain and edema no fevers chills nausea or vomiting no CP or SOB.        The following portions of the patient's history were reviewed and updated as appropriate: allergies, current medications, past family history, past medical history, past social history, past surgical history and problem list.    Review of Systems   Constitutional: Negative.    HENT: Negative.     Eyes: Negative.    Respiratory: Negative.     Cardiovascular: Negative.    Gastrointestinal: Negative.    Endocrine: Negative.    Genitourinary: Negative.    Musculoskeletal:  Positive for arthralgias.   Allergic/Immunologic: Negative.    Neurological: Negative.    Hematological: Negative.    Psychiatric/Behavioral: Negative.     All other systems reviewed and are negative.        Objective:      /86 (BP Location: Left arm, Patient Position: Sitting, Cuff Size: Large)   Pulse 93   Temp 97.8 °F (36.6 °C) (Temporal)   Ht 5' 8\" (1.727 m)   Wt 117 kg (257 lb 12.8 oz)   SpO2 100%   BMI 39.20 kg/m²          Physical Exam  Constitutional:       Appearance: He is well-developed.   HENT:      Head: Atraumatic.      Right Ear: " External ear normal.      Left Ear: External ear normal.   Eyes:      Conjunctiva/sclera: Conjunctivae normal.      Pupils: Pupils are equal, round, and reactive to light.   Cardiovascular:      Rate and Rhythm: Normal rate and regular rhythm.      Heart sounds: Normal heart sounds.   Pulmonary:      Effort: Pulmonary effort is normal. No respiratory distress.      Breath sounds: Normal breath sounds.   Abdominal:      General: There is no distension.      Palpations: Abdomen is soft.      Tenderness: There is no abdominal tenderness. There is no guarding or rebound.   Musculoskeletal:         General: Normal range of motion.      Cervical back: Normal range of motion.   Skin:     General: Skin is warm and dry.   Neurological:      Mental Status: He is alert and oriented to person, place, and time.      Cranial Nerves: No cranial nerve deficit.   Psychiatric:         Behavior: Behavior normal.         Thought Content: Thought content normal.         Judgment: Judgment normal.

## 2024-10-28 ENCOUNTER — APPOINTMENT (OUTPATIENT)
Dept: LAB | Facility: MEDICAL CENTER | Age: 69
End: 2024-10-28
Payer: MEDICARE

## 2024-10-28 DIAGNOSIS — R60.9 EDEMA, UNSPECIFIED TYPE: ICD-10-CM

## 2024-10-28 LAB
ALBUMIN SERPL BCG-MCNC: 4.2 G/DL (ref 3.5–5)
ALP SERPL-CCNC: 76 U/L (ref 34–104)
ALT SERPL W P-5'-P-CCNC: 34 U/L (ref 7–52)
ANION GAP SERPL CALCULATED.3IONS-SCNC: 11 MMOL/L (ref 4–13)
AST SERPL W P-5'-P-CCNC: 77 U/L (ref 13–39)
BILIRUB SERPL-MCNC: 0.65 MG/DL (ref 0.2–1)
BUN SERPL-MCNC: 16 MG/DL (ref 5–25)
CALCIUM SERPL-MCNC: 9.9 MG/DL (ref 8.4–10.2)
CHLORIDE SERPL-SCNC: 101 MMOL/L (ref 96–108)
CO2 SERPL-SCNC: 25 MMOL/L (ref 21–32)
CREAT SERPL-MCNC: 0.89 MG/DL (ref 0.6–1.3)
GFR SERPL CREATININE-BSD FRML MDRD: 88 ML/MIN/1.73SQ M
GLUCOSE P FAST SERPL-MCNC: 116 MG/DL (ref 65–99)
POTASSIUM SERPL-SCNC: 4.6 MMOL/L (ref 3.5–5.3)
PROT SERPL-MCNC: 7.7 G/DL (ref 6.4–8.4)
SODIUM SERPL-SCNC: 137 MMOL/L (ref 135–147)

## 2024-10-28 PROCEDURE — 80053 COMPREHEN METABOLIC PANEL: CPT

## 2024-10-28 PROCEDURE — 36415 COLL VENOUS BLD VENIPUNCTURE: CPT

## 2024-11-05 ENCOUNTER — OFFICE VISIT (OUTPATIENT)
Dept: GASTROENTEROLOGY | Facility: AMBULARY SURGERY CENTER | Age: 69
End: 2024-11-05
Payer: MEDICARE

## 2024-11-05 VITALS
WEIGHT: 252.8 LBS | OXYGEN SATURATION: 97 % | HEART RATE: 90 BPM | HEIGHT: 68 IN | BODY MASS INDEX: 38.31 KG/M2 | DIASTOLIC BLOOD PRESSURE: 80 MMHG | SYSTOLIC BLOOD PRESSURE: 140 MMHG

## 2024-11-05 DIAGNOSIS — Z11.59 ENCOUNTER FOR SCREENING FOR OTHER VIRAL DISEASES: ICD-10-CM

## 2024-11-05 DIAGNOSIS — K70.9 ALCOHOLIC LIVER DISEASE (HCC): ICD-10-CM

## 2024-11-05 DIAGNOSIS — K74.02 ADVANCED HEPATIC FIBROSIS: ICD-10-CM

## 2024-11-05 DIAGNOSIS — K75.81 NASH (NONALCOHOLIC STEATOHEPATITIS): Primary | ICD-10-CM

## 2024-11-05 PROCEDURE — G2211 COMPLEX E/M VISIT ADD ON: HCPCS | Performed by: STUDENT IN AN ORGANIZED HEALTH CARE EDUCATION/TRAINING PROGRAM

## 2024-11-05 PROCEDURE — 99214 OFFICE O/P EST MOD 30 MIN: CPT | Performed by: STUDENT IN AN ORGANIZED HEALTH CARE EDUCATION/TRAINING PROGRAM

## 2024-11-05 RX ORDER — RESMETIROM 80 MG/1
80 TABLET, COATED ORAL DAILY
Qty: 90 TABLET | Refills: 3 | Status: SHIPPED | OUTPATIENT
Start: 2024-11-05 | End: 2024-11-05

## 2024-11-05 RX ORDER — RESMETIROM 100 MG/1
100 TABLET, COATED ORAL DAILY
Qty: 90 TABLET | Refills: 3 | Status: SHIPPED | OUTPATIENT
Start: 2024-11-05 | End: 2025-11-05

## 2024-11-06 ENCOUNTER — TELEPHONE (OUTPATIENT)
Age: 69
End: 2024-11-06

## 2024-11-06 NOTE — TELEPHONE ENCOUNTER
Patient completed Rezdiffra [facility] Patient Support form in office 11/5/24. Enrollment form faxed .

## 2024-11-07 NOTE — TELEPHONE ENCOUNTER
PA for Rezdiffra 100 mg   SUBMITTED to [facility]    via    []CMM-KEY:   [x]Surescripts-Case ID #   [id number]    Payer: [employer] Part D   Phone: [phone]   Fax: [phone]     []Availity-Auth ID # NDC #   []Faxed to plan   []Other website   []Phone call Case ID #     []PA sent as URGENT    All office notes, labs and other pertaining documents and studies sent. Clinical questions answered. Awaiting determination from insurance company.     Turnaround time for your insurance to make a decision on your Prior Authorization can take 7-21 business days.

## 2024-11-11 ENCOUNTER — OFFICE VISIT (OUTPATIENT)
Dept: PODIATRY | Facility: CLINIC | Age: 69
End: 2024-11-11
Payer: MEDICARE

## 2024-11-11 DIAGNOSIS — M72.2 PLANTAR FASCIITIS, BILATERAL: ICD-10-CM

## 2024-11-11 DIAGNOSIS — M76.61 ACHILLES TENDINITIS OF BOTH LOWER EXTREMITIES: Primary | ICD-10-CM

## 2024-11-11 DIAGNOSIS — M76.62 ACHILLES TENDINITIS OF BOTH LOWER EXTREMITIES: Primary | ICD-10-CM

## 2024-11-11 PROCEDURE — 99203 OFFICE O/P NEW LOW 30 MIN: CPT | Performed by: PODIATRIST

## 2024-11-11 NOTE — PATIENT INSTRUCTIONS
Patient Education     Plantar Fasciitis Exercises   About this topic   Plantar fasciitis is swelling of the thick tissue on the bottom of the foot. This tissue is called the plantar fascia. It connects the heel bone to the toes and supports the arch of the foot. Exercise is an important part of making this problem better.  General   Before starting with a program, ask your doctor if you are healthy enough to do these exercises. Your doctor may have you work with a  or physical therapist to make a safe exercise program to meet your needs.  Stretching Exercises   Stretching exercises keep your muscles flexible. They also stop them from getting tight. Start by doing each of these stretches 2 to 3 times. In order for your body to make changes, you will need to hold these stretches for 20 to 30 seconds. Try to do the stretches 2 to 3 times each day. Do all exercises slowly.  Calf stretches standing ? Stand about 12 to 18 inches (30 to 45 cm) away from a wall. Place your hands on the wall at shoulder level. Lean forward.  Knee straight - Stretch your left leg straight behind you. Make sure the left heel is flat on the floor and the left knee is straight. Now, bend the knee of the right leg until you feel a stretch in your left calf. Be sure that the heel does not come up. Repeat on the other side.  Knee bent - Take a small step forward with your left leg so your feet are slightly closer together. With your right leg bent, bend your left knee forward until you feel a stretch in the back of the calf of your left leg. This will feel strange, but it is the best way to stretch this calf muscle. Repeat on the other side.  Calf stretches lying down with belt or towel ? Lie on your back with both legs straight. Loop a belt or towel around the ball of one foot. Lift the leg up, keeping the knee straight until you feel a stretch in the back of your thigh. Pull back on the belt or towel to bend your foot more for a better  stretch. Repeat on the other foot. This stretch gets both your calf and the hamstrings on the back of your thigh.  Calf stretches on stairs ? Stand on a step and hold onto a rail. Position your feet so only the balls of your feet are on the step. Lower both heels until you feel a stretch in the back of your calves. Do not do this stretch if you have trouble with balance.  Crossed leg foot stretches ? Sit in a chair. Bend one leg up and rest the outside of the foot on the knee. Grab your foot and pull your toes and foot forward until you feel a stretch along the bottom of your foot. Repeat with the other foot.  Rolling foot ? Use a golf ball, tennis ball, soup can, a frozen water bottle, or a rolling pin for this exercise. Sit in a sturdy chair. Put the ball, can, or rolling pin underneath your sore foot. Push down firmly and roll it back and forth with your foot for 1 to 2 minutes. Work up to 3 to 4 minutes. If this exercise is too easy, try doing it while standing up with more pressure on the foot. Do this exercise last if you use a frozen water bottle. Heated tissue stretches better than cold tissue. Doing this last with a frozen water bottle can help lessen the pain and swelling that may happen with stretching.  Strengthening Exercises   Strengthening exercises keep your muscles firm and strong. Start by repeating each exercise 2 to 3 times. Work up to doing each exercise 10 times. Try to do the exercises 2 to 3 times each day. Do all exercises slowly.  Towel pick-ups ? Sit in a chair with your feet flat on the floor. Make sure you do not have shoes or socks on your feet. Place a towel under one foot with one end of the towel lined up with your heel. Keep your heel on the floor and try grabbing the towel with your toes. Gradually work it back until there is no more towel to move. Now, try pushing the towel back out while keeping your heel still. You can make this harder by putting a small weight on the end of the  towel.               What will the results be?   Less pain  Less pulling  Less swelling  Better flexibility and range of motion  Easier to walk and do other activities  Helpful tips   Stay active and work out to keep your muscles strong and flexible.  Keep a healthy weight to avoid putting too much stress on your joints. Eat a healthy diet to keep your muscles healthy.  Be sure you do not hold your breath when exercising. This can raise your blood pressure. If you tend to hold your breath, try counting out loud when exercising. If any exercise bothers you, stop right away.  Always warm up before stretching. Heated muscles stretch much easier than cool muscles. Stretching cool muscles can lead to injury.  Try walking or cycling at an easy pace for a few minutes to warm up your muscles. Do this again after exercising.  Never bounce when doing stretches.  Doing exercises before a meal may be a good way to get into a routine.  After exercising, it is a good idea to ice the bottom of your foot. A good way to do this is by sitting in a chair and rolling a frozen water bottle back and forth under your foot. Do not do this longer than 15 to 20 minutes.  Exercise may be slightly uncomfortable, but you should not have sharp pains. If you do get sharp pains, stop what you are doing. If the sharp pains continue, call your doctor.  Last Reviewed Date   2021-05-19  Consumer Information Use and Disclaimer   This generalized information is a limited summary of diagnosis, treatment, and/or medication information. It is not meant to be comprehensive and should be used as a tool to help the user understand and/or assess potential diagnostic and treatment options. It does NOT include all information about conditions, treatments, medications, side effects, or risks that may apply to a specific patient. It is not intended to be medical advice or a substitute for the medical advice, diagnosis, or treatment of a health care provider based  on the health care provider's examination and assessment of a patient’s specific and unique circumstances. Patients must speak with a health care provider for complete information about their health, medical questions, and treatment options, including any risks or benefits regarding use of medications. This information does not endorse any treatments or medications as safe, effective, or approved for treating a specific patient. UpTo[employer], Inc[employer] and its affiliates disclaim any warranty or liability relating to this information or the use thereof. The use of this information is governed by the Terms of Use, available at https://www.wolFirstCry.comuwer.com/en/know/clinical-effectiveness-terms   Copyright   Copyright © 2024 UpToDate, Inc. and its affiliates and/or licensors. All rights reserved.

## 2024-11-11 NOTE — ASSESSMENT & PLAN NOTE
Diagnosis and options discussed with patient  Patient agreeable to the plan as stated below    1. Provided home therapy and stretching exercises. These should be done AT MINIMUM three times per day  2. Deferred injections today, but did discuss the possibility.   3. Stressed compliance with home/formal PT and arch supports.  4. Patient is in weight management program which will also help immensely.     Orders:    Ambulatory Referral to Podiatry    Ambulatory Referral to Physical Therapy; Future

## 2024-11-11 NOTE — PROGRESS NOTES
Ambulatory Visit  Name: Burak Smith      : 1955      MRN: [id number]  Encounter Provider: Adrián Gilliam DPM  Encounter Date: 2024   Encounter department: [facility]OD[facility] [place]    Assessment & Plan  Plantar fasciitis, bilateral  Diagnosis and options discussed with patient  Patient agreeable to the plan as stated below    1. Provided home therapy and stretching exercises. These should be done AT MINIMUM three times per day  2. Deferred injections today, but did discuss the possibility.   3. Stressed compliance with home/formal PT and arch supports.  4. Patient is in weight management program which will also help immensely.     Orders:    Ambulatory Referral to Podiatry    Ambulatory Referral to Physical Therapy; Future    Achilles tendinitis of both lower extremities    Orders:    Ambulatory Referral to Physical Therapy; Future      History of Present Illness     Burak Smith is a 69 y.o. male who presents with heel pain. He gets pain in the back and the bottom of both heels. It began maybe a year ago. Activity makes it worse. He is trying to walk to lose weight and they're getting really sore. Left is worse than the right.       Review of Systems  As stated in HPI, otherwise normal    Medical History Reviewed by provider this encounter:  Tobacco  Allergies  Meds  Problems  Med Hx  Surg Hx  Fam Hx            Objective     There were no vitals taken for this visit.    Physical Exam  Vitals reviewed.   Constitutional:       General: He is not in acute distress.     Appearance: He is obese.   Cardiovascular:      Rate and Rhythm: Normal rate.      Pulses: Normal pulses.           Dorsalis pedis pulses are 2+ on the right side and 2+ on the left side.        Posterior tibial pulses are 2+ on the right side and 2+ on the left side.   Pulmonary:      Effort: Pulmonary effort is normal. No respiratory distress.   Musculoskeletal:         General: Deformity (ankle equinus moderate)  present.      Right foot: Decreased range of motion.      Left foot: Decreased range of motion (B/L gastroc soleal equinus with normal STJ ROM).        Feet:    Feet:      Right foot:      Protective Sensation: 10 sites tested.  10 sites sensed.      Skin integrity: Skin integrity normal.      Left foot:      Protective Sensation: 10 sites tested.  10 sites sensed.      Skin integrity: Skin integrity normal.   Skin:     Capillary Refill: Capillary refill takes less than 2 seconds.      Findings: No bruising.   Neurological:      Mental Status: He is alert and oriented to person, place, and time.      Sensory: No sensory deficit.      Gait: Gait normal.

## 2024-11-11 NOTE — TELEPHONE ENCOUNTER
PA for Rezdiffra APPROVED     Date(s) approved Authorized from November 7, 2024 to December 31, 2025        Case #[id number]     Patient advised by          [][employer] Message  []Phone call   [x]LMOM  []L/M to call office as no active Communication consent on file  []Unable to leave detailed message as VM not approved on Communication consent       Pharmacy advised by    [x]Fax  []Phone call    Approval letter scanned into Media Yes

## 2024-11-13 DIAGNOSIS — K21.9 GASTROESOPHAGEAL REFLUX DISEASE WITHOUT ESOPHAGITIS: ICD-10-CM

## 2024-11-13 RX ORDER — OMEPRAZOLE 40 MG/1
40 CAPSULE, DELAYED RELEASE ORAL DAILY
Qty: 90 CAPSULE | Refills: 1 | Status: SHIPPED | OUTPATIENT
Start: 2024-11-13

## 2024-11-14 NOTE — PROGRESS NOTES
Weight Management Medical Nutrition Assessment  Burak presented for a meal planning session. Presents with wife at today's visit. Today's weight is 259.9#. Hx HTN, GERD, fatty liver. Referred by PCP. Burak is primarily concerned about his weight and fatty liver. Unfortunately he is not as active due to plantar fascitis and tendonitis. Per dietary recall patient isn't hungry and lacks desire to eat. Educated patient on metabolic function and how meal skipping can lead to less calories being burned. Encouraged at least 3 meals per day to assist with adequate calorie intake. Provided examples of portion sizes to help with building plate. We developed and reviewed a low calorie meal plan with a focus on 35g protein per meal. Encouraged patient to read food label to determine calorie/protein information. Will f/u in 6 weeks.     Patient seen by Medical Provider in past 6 months:  no  Requested to schedule appointment with Medical Provider: No      Anthropometric Measurements  Start Weight (#) & Date: 259.9# 11/22/2024  Current Weight (#): 259.9#  TBW % Change from start weight: n/a  Ideal Body Weight (#): 145#  Goal Weight (#): no specific goal weight  Highest: 290#  Lowest: 240#    Weight Loss History  Previous weight loss attempts: no previous attempts    Food and Nutrition Related History  Wake up: 6-6:30AM   Bed Time: 8-9AM   Uses CPAP at night.    Food Recall  Breakfast: 7AM banana + hard boiled egg + 1 slice Rye toast + 1 cup black coffee    Snack: skip  Lunch: 12PM salad (lettuce, tomatoes, green peppers, carrots, onion, italian or oil and vinegar dressing, no protein)  Snack: (no specific time) cashews or pistachios  Dinner: 5PM 3oz chicken or steak (bakes the protein, does not use salt, oil or butter)+ 1 cup rice or mashed potatoes + 1/2 cup carrots or string beans or broccoli  Snack: skip    Beverages: 8oz black coffee, 32oz water, will sip on Gatorade, no alcohol   Volume of beverage intake:  40-60oz    Weekends: Same  Cravings: chips, dip, sweets   Trouble area of day: not hungry throughout the day    Frequency of Eating out: once every 3 months  Food restrictions: allergy to citrus fruit  Cooking: wife  Food Shopping: self and wife    Physical Activity Intake  Activity: no formal exercise due to pain. Goes to physical therapy 2x per week (does exercises 2-3 times per day)   Frequency: PT 2x per week  Physical limitations/barriers to exercise: plantar fascitis, tendonitis     Estimated Needs  Energy  SECA: BMR: n/a      X 1.3 -1000 =  [place] [employer] Needs: BMR : 1905   1-2# loss weekly sedentary:  0078-3975             1-2# loss weekly lightly active: 5872-5999  Maintenance calories for sedentary activity level: 2286  Protein: 84-105g      (1.2-1.5g/kg IBW)  Fluid: 82oz     (35mL/kg IBW)    Nutrition Diagnosis  Yes;    Overweight/obesity  related to Food and nutrition related knowledge deficit as evidenced by  BMI more than normative standard for age and sex (obesity-grade III 40+)       Nutrition Intervention    Nutrition Prescription  Calories: 1700  Protein: 120g  Fluid: 82oz    Meal Plan (Karri/Pro/Carb)  Breakfast: 400-450/35  Snack: 200/10  Lunch: 400-450/35  Snack: 200/10  Dinner: 400-450/35  Snack:    Nutrition Education:    Calorie controlled menu  Lean protein food choices  Healthy snack options  Food journaling tips      Nutrition Counseling:  Strategies: meal planning, portion sizes, healthy snack choices, hydration, fiber intake, protein intake, exercise, food journal      Monitoring and Evaluation:  Evaluation criteria:  Energy Intake  Meet protein needs  Maintain adequate hydration  Monitor weekly weight  Meal planning/preparation  Food journal   Decreased portions at mealtimes and snacks  Physical activity     Barriers to learning:none  Readiness to change: Preparation:  (Getting ready to change)   Comprehension: good  Expected Compliance: fair

## 2024-11-15 ENCOUNTER — EVALUATION (OUTPATIENT)
Dept: PHYSICAL THERAPY | Facility: MEDICAL CENTER | Age: 69
End: 2024-11-15
Payer: MEDICARE

## 2024-11-15 DIAGNOSIS — M76.61 ACHILLES TENDINITIS OF BOTH LOWER EXTREMITIES: Primary | ICD-10-CM

## 2024-11-15 DIAGNOSIS — M72.2 PLANTAR FASCIITIS, BILATERAL: ICD-10-CM

## 2024-11-15 DIAGNOSIS — M76.62 ACHILLES TENDINITIS OF BOTH LOWER EXTREMITIES: Primary | ICD-10-CM

## 2024-11-15 PROCEDURE — 97162 PT EVAL MOD COMPLEX 30 MIN: CPT

## 2024-11-15 PROCEDURE — 97112 NEUROMUSCULAR REEDUCATION: CPT

## 2024-11-15 NOTE — PROGRESS NOTES
PT Evaluation     Today's date: 11/15/2024  Patient name: Burak Smith  : 1955  MRN: [id number]  Referring provider: Adrián Gilliam D*  Dx:   Encounter Diagnosis     ICD-10-CM    1. Achilles tendinitis of both lower extremities  M76.61 Ambulatory Referral to Physical Therapy    M76.62       2. Plantar fasciitis, bilateral  M72.2 Ambulatory Referral to Physical Therapy                     Assessment  Impairments: abnormal gait, abnormal muscle firing, abnormal muscle tone, abnormal or restricted ROM, abnormal movement, activity intolerance, impaired balance, impaired physical strength, lacks appropriate home exercise program and pain with function    Assessment details: Burak Smith  is a pleasant 69 y.o. male who presents with bilateral heel pain.  The primary movement problem is ankle hypomobility resulting in limited ROM, strength deficit, abnormal gait pattern, and tissue extensibility deficit, which limit her ability to perform ADLs, IADLs and recreational activity.  No referral is necessary at this time based on examination results.   The patient's greatest concerns is not being able to stay active.     Problem List:  1) ankle hypomobility  2) tissue extensibility deficit   3) ROM deficit    Etiologic factors include overuse.  Pt. will benefit from skilled PT services that includes manual therapy techniques to enhance tissue extensibility, neuromuscular re-education to facilitate motor control, therapeutic exercise to increase functional mobility, and modalities prn to reduce pain and inflammation.  Understanding of Dx/Px/POC: good     Prognosis: good  Prognosis details: Positive prognostic indicators: motivation to improve   Negative prognostic indicators: chronicity of symptoms    Goals  Impairment Goals  - Pt I with initial HEP in 1-2 visits  - Improve ROM equal to contralateral side in 4-6 weeks  - Increase strength to 5/5 in all affected areas in 4-6 weeks    Functional Goals  - Increase  Functional Status Measure to: 61 in 6-8 weeks  - Patient will be independent with comprehensive HEP in 6-8 weeks  - Ambulation is improved to prior level of function in 6-8 weeks  - Stair climbing is improved to prior level of function in 6-8 weeks  - Squatting is improved to prior level of function in 6-8 weeks      Plan  Patient would benefit from: skilled physical therapy  Planned modality interventions: low level laser therapy, TENS and cryotherapy (EPAT)    Planned therapy interventions: joint mobilization, manual therapy, massage, neuromuscular re-education, patient education, postural training, strengthening, stretching, therapeutic activities, therapeutic exercise, flexibility, functional ROM exercises, graded exercise, home exercise program, IASTM, kinesiology taping, Gar taping, balance and balance/weight bearing training    Treatment plan discussed with: patient  Plan details: Prognosis above is given PT services 2x/week tapering to 1x/week over the next 2 months and home program adherence.        Subjective Evaluation    History of Present Illness  Mechanism of injury: Burak Smith presents with c/c of b/l heel pain L>R. Symptoms began about a year ago with mechanism of injury: when he was working as a . He reports that when he was working he would ignore it. In February he retired and starting walking again for weight loss. He would walk for a couple of minutes and get foot pain. He gets pain at night.   Aggravating factors: walking , standing, lifting   Relieving factors: rest, Tylenol  24hr pain pattern: 3/10 (current), 0/10 (best), 10/10 (worst), location: heel, back of heel, and in between the toes, descriptors: discomfort  Imaging: none  Previous treatments: none  Occupation/recreation: walking, everyday stuff  Primary concern: not being able to get back to normal/walk  Patient goals: get back to normal, no pain          Objective     Tenderness   Left Ankle/Foot   Tenderness in the  lateral malleolus.     Right Ankle/Foot   Tenderness in the lateral malleolus and plantar fascia.     Active Range of Motion   Left Ankle/Foot   Dorsiflexion (ke): 5 degrees   Plantar flexion: 40 degrees   Inversion: 20 degrees   Eversion: 10 degrees     Right Ankle/Foot   Dorsiflexion (ke): 5 degrees   Plantar flexion: 40 degrees   Inversion: 20 degrees   Eversion: 10 degrees     Joint Play   Left Ankle/Foot  Hypomobile in the talocrural joint, subtalar joint and midfoot.     Right Ankle/Foot  Hypomobile in the talocrural joint, subtalar joint and midfoot.     Strength/Myotome Testing     Left Ankle/Foot   Dorsiflexion: 4  Plantar flexion: 4  Inversion: 4  Eversion: 4    Right Ankle/Foot   Dorsiflexion: 4  Plantar flexion: 4  Inversion: 4  Eversion: 4    Ambulation     Observational Gait   Decreased left stance time and right stance time.     Additional Observational Gait Details  Initial contact: foot flat b/l             Precautions: venous insufficiency, tachycardia, orthostatic hypotension, b/l TKA    HEP: calf stretch, soleus stretch   Manuals 11/15            TCJ mob             STJ mob             Midfoot mob                          Neuro Re-Ed                                                                                           HEP review and pt education 25'            Ther Ex             bike             4 way CR             Calf stretch              Soleus stretch             LP             Self DF stretch                                       Ther Activity                                       Gait Training                                       Modalities

## 2024-11-18 ENCOUNTER — TELEPHONE (OUTPATIENT)
Age: 69
End: 2024-11-18

## 2024-11-18 NOTE — TELEPHONE ENCOUNTER
Patients GI provider: Dr Funk    Number to return call: [phone]    Reason for call: Ann Marie from [facility] called stating they have been trying to contact pt in regard to Rezdiffra medication. Pharmacy is asking if the office can contact pt.    Scheduled procedure/appointment date if applicable: 2/26/2025

## 2024-11-19 ENCOUNTER — OFFICE VISIT (OUTPATIENT)
Dept: PHYSICAL THERAPY | Facility: MEDICAL CENTER | Age: 69
End: 2024-11-19
Payer: MEDICARE

## 2024-11-19 DIAGNOSIS — M72.2 PLANTAR FASCIITIS, BILATERAL: ICD-10-CM

## 2024-11-19 DIAGNOSIS — M76.61 ACHILLES TENDINITIS OF BOTH LOWER EXTREMITIES: Primary | ICD-10-CM

## 2024-11-19 DIAGNOSIS — M76.62 ACHILLES TENDINITIS OF BOTH LOWER EXTREMITIES: Primary | ICD-10-CM

## 2024-11-19 PROCEDURE — 97140 MANUAL THERAPY 1/> REGIONS: CPT

## 2024-11-19 PROCEDURE — 97110 THERAPEUTIC EXERCISES: CPT

## 2024-11-19 NOTE — PROGRESS NOTES
Daily Note     Today's date: 2024  Patient name: Burak Smith  : 1955  MRN: [id number]  Referring provider: Adrián Gilliam D*  Dx:   Encounter Diagnosis     ICD-10-CM    1. Achilles tendinitis of both lower extremities  M76.61     M76.62       2. Plantar fasciitis, bilateral  M72.2                      Subjective: Patient reports that he feels better. He reports that he is moving around better.       Objective: See treatment diary below      Assessment: POC initiated. No adverse effects noted. Tolerated treatment well. Patient demonstrated fatigue post treatment, exhibited good technique with therapeutic exercises, and would benefit from continued PT      Plan: Continue per plan of care.      Precautions: venous insufficiency, tachycardia, orthostatic hypotension, b/l TKA    HEP: calf stretch, soleus stretch   Manuals             TCJ mob JH AP            STJ new KIRKPATRICK            Midfoot new KIRKPATRICK                         Neuro Re-Ed                                                                                           HEP review and pt education             Ther Ex             bike 5'            4 way CR x10            Calf stretch  3x30s            Soleus stretch 3x30s            LP             Self DF stretch 2' ea                                      Ther Activity                                       Gait Training                                       Modalities

## 2024-11-19 NOTE — TELEPHONE ENCOUNTER
Spoke to patient and gave him the number to [facility] [phone] to speak to them about a copay card because the cost of the medication is too high even with the approval.

## 2024-11-21 ENCOUNTER — OFFICE VISIT (OUTPATIENT)
Dept: PHYSICAL THERAPY | Facility: MEDICAL CENTER | Age: 69
End: 2024-11-21
Payer: MEDICARE

## 2024-11-21 DIAGNOSIS — M76.62 ACHILLES TENDINITIS OF BOTH LOWER EXTREMITIES: Primary | ICD-10-CM

## 2024-11-21 DIAGNOSIS — M72.2 PLANTAR FASCIITIS, BILATERAL: ICD-10-CM

## 2024-11-21 DIAGNOSIS — M76.61 ACHILLES TENDINITIS OF BOTH LOWER EXTREMITIES: Primary | ICD-10-CM

## 2024-11-21 PROCEDURE — 97110 THERAPEUTIC EXERCISES: CPT

## 2024-11-21 PROCEDURE — 97140 MANUAL THERAPY 1/> REGIONS: CPT

## 2024-11-21 NOTE — PROGRESS NOTES
Daily Note     Today's date: 2024  Patient name: Burak Smith  : 1955  MRN: [id number]  Referring provider: Adrián Gilliam D*  Dx:   Encounter Diagnosis     ICD-10-CM    1. Achilles tendinitis of both lower extremities  M76.61     M76.62       2. Plantar fasciitis, bilateral  M72.2                      Subjective: Patient reports that he feels better than when he first came in but still has the heel and achilles pain.       Objective: See treatment diary below      Assessment: Continued with POC. Tolerated treatment well. Patient demonstrated fatigue post treatment, exhibited good technique with therapeutic exercises, and would benefit from continued PT      Plan: Continue per plan of care.      Precautions: venous insufficiency, tachycardia, orthostatic hypotension, b/l TKA    HEP: calf stretch, soleus stretch   Manuals             TCJ mob JH AP            STJ mob KAYA            Midfoot new KIRKPATRICK                         Neuro Re-Ed                                                                                           HEP review and pt education             Ther Ex             bike 5'            4 way CR x10            Calf stretch  3x30s            Soleus stretch 3x30s            LP             Self DF stretch 2' ea            Post tib CR 2x10                         Ther Activity                                       Gait Training                                       Modalities

## 2024-11-22 ENCOUNTER — CLINICAL SUPPORT (OUTPATIENT)
Dept: BARIATRICS | Facility: CLINIC | Age: 69
End: 2024-11-22

## 2024-11-22 VITALS — BODY MASS INDEX: 40.79 KG/M2 | HEIGHT: 67 IN | WEIGHT: 259.9 LBS

## 2024-11-22 DIAGNOSIS — R63.5 ABNORMAL WEIGHT GAIN: Primary | ICD-10-CM

## 2024-11-22 DIAGNOSIS — K75.81 NASH (NONALCOHOLIC STEATOHEPATITIS): ICD-10-CM

## 2024-11-22 PROCEDURE — RECHECK

## 2024-11-22 PROCEDURE — WMDI30

## 2024-11-26 ENCOUNTER — OFFICE VISIT (OUTPATIENT)
Dept: PHYSICAL THERAPY | Facility: MEDICAL CENTER | Age: 69
End: 2024-11-26
Payer: MEDICARE

## 2024-11-26 DIAGNOSIS — M76.62 ACHILLES TENDINITIS OF BOTH LOWER EXTREMITIES: Primary | ICD-10-CM

## 2024-11-26 DIAGNOSIS — M72.2 PLANTAR FASCIITIS, BILATERAL: ICD-10-CM

## 2024-11-26 DIAGNOSIS — M76.61 ACHILLES TENDINITIS OF BOTH LOWER EXTREMITIES: Primary | ICD-10-CM

## 2024-11-26 PROCEDURE — 97140 MANUAL THERAPY 1/> REGIONS: CPT

## 2024-11-26 PROCEDURE — 97110 THERAPEUTIC EXERCISES: CPT

## 2024-11-26 NOTE — PROGRESS NOTES
Daily Note     Today's date: 2024  Patient name: Burak Smith  : 1955  MRN: [id number]  Referring provider: Adrián Gilliam D*  Dx:   Encounter Diagnosis     ICD-10-CM    1. Achilles tendinitis of both lower extremities  M76.61     M76.62       2. Plantar fasciitis, bilateral  M72.2                      Subjective: Patient reports that he has some good days and some bad days.      Objective: See treatment diary below      Assessment: Continued with POC. Tolerated treatment well. Patient demonstrated fatigue post treatment, exhibited good technique with therapeutic exercises, and would benefit from continued PT      Plan: Continue per plan of care.      Precautions: venous insufficiency, tachycardia, orthostatic hypotension, b/l TKA    HEP: calf stretch, soleus stretch   Manuals             TCJ new KIRKPATRICK AP            STJ new KIRKPATRICK            Midfoot new KIRKPATRICK                         Neuro Re-Ed                                                                                           HEP review and pt education             Ther Ex             bike 5'            4 way CR x10            Calf stretch  3x30s            Soleus stretch 3x30s            LP             Self DF stretch 2' ea            Post tib CR 2x10                         Ther Activity                                       Gait Training                                       Modalities

## 2024-12-03 ENCOUNTER — TELEPHONE (OUTPATIENT)
Age: 69
End: 2024-12-03

## 2024-12-03 ENCOUNTER — OFFICE VISIT (OUTPATIENT)
Dept: PHYSICAL THERAPY | Facility: MEDICAL CENTER | Age: 69
End: 2024-12-03
Payer: MEDICARE

## 2024-12-03 DIAGNOSIS — M76.62 ACHILLES TENDINITIS OF BOTH LOWER EXTREMITIES: Primary | ICD-10-CM

## 2024-12-03 DIAGNOSIS — M76.61 ACHILLES TENDINITIS OF BOTH LOWER EXTREMITIES: Primary | ICD-10-CM

## 2024-12-03 DIAGNOSIS — M72.2 PLANTAR FASCIITIS, BILATERAL: ICD-10-CM

## 2024-12-03 PROCEDURE — 97140 MANUAL THERAPY 1/> REGIONS: CPT

## 2024-12-03 PROCEDURE — 97110 THERAPEUTIC EXERCISES: CPT

## 2024-12-03 NOTE — TELEPHONE ENCOUNTER
Spoke with pt & wife mague. Advised of diagnosis codes and being unable to change them. She is reporting that [facility] referred her to the [facility] who then denied assistance due to having a diagnosis of ETOH fatty liver.     advised to call [facility] with this information as they are still waiting on a determination from [facility]. Pt has follow up visits scheduled with Dr Schmidt on 2/26/24 and Dr Funk on 5/20/25

## 2024-12-03 NOTE — TELEPHONE ENCOUNTER
Pt called again and asked for the code for fatty liver and I gave her K70.0 according to the chart.

## 2024-12-03 NOTE — PROGRESS NOTES
Daily Note     Today's date: 12/3/2024  Patient name: Burak Smith  : 1955  MRN: [id number]  Referring provider: Adrián Gilliam D*  Dx:   Encounter Diagnosis     ICD-10-CM    1. Achilles tendinitis of both lower extremities  M76.61     M76.62       2. Plantar fasciitis, bilateral  M72.2                      Subjective: Patient reports that he has some good days and some bad days. He reports that he can not sit for a long time.       Objective: See treatment diary below      Assessment: Continued with POC. Tolerated treatment well. Patient demonstrated fatigue post treatment, exhibited good technique with therapeutic exercises, and would benefit from continued PT      Plan: Continue per plan of care.      Precautions: venous insufficiency, tachycardia, orthostatic hypotension, b/l TKA    HEP: calf stretch, soleus stretch   Manuals 12/3            TCJ mob JH AP            STJ mob JH            Midfoot new KIRKPATRICK                         Neuro Re-Ed                                                                                           HEP review and pt education             Ther Ex             bike 5'            4 way CR x10            Calf stretch  3x30s            Soleus stretch 3x30s            LP             Self DF stretch 2' ea            Post tib CR 2x10                         Ther Activity                                       Gait Training                                       Modalities

## 2024-12-03 NOTE — TELEPHONE ENCOUNTER
Patients wife, on communication consent, contacted office questioning dx codes used for medication Rezdiffra. Patient is attempting to go through a foundation that helps assist in cost.

## 2024-12-03 NOTE — TELEPHONE ENCOUNTER
Patients GI provider:  Dr. Funk    Number to return call: [phone]     Reason for call: Pt's wife Shea called in stating patient has not had a drink in over 6 months and the dx code that was given was for a alcoholic fatty liver patient. Patient's wife is asking if there can be another dx code given in order for the foundation to help pay for patient's medication. Please reach out to Shea, thank you.    Scheduled procedure/appointment date if applicable: Apt/procedure 2/26/25

## 2024-12-05 ENCOUNTER — OFFICE VISIT (OUTPATIENT)
Dept: PHYSICAL THERAPY | Facility: MEDICAL CENTER | Age: 69
End: 2024-12-05
Payer: MEDICARE

## 2024-12-05 DIAGNOSIS — M76.62 ACHILLES TENDINITIS OF BOTH LOWER EXTREMITIES: Primary | ICD-10-CM

## 2024-12-05 DIAGNOSIS — M76.61 ACHILLES TENDINITIS OF BOTH LOWER EXTREMITIES: Primary | ICD-10-CM

## 2024-12-05 DIAGNOSIS — M72.2 PLANTAR FASCIITIS, BILATERAL: ICD-10-CM

## 2024-12-05 PROCEDURE — 97140 MANUAL THERAPY 1/> REGIONS: CPT

## 2024-12-05 PROCEDURE — 97110 THERAPEUTIC EXERCISES: CPT

## 2024-12-05 NOTE — PROGRESS NOTES
Daily Note     Today's date: 2024  Patient name: Burak Smith  : 1955  MRN: [id number]  Referring provider: Adrián Gilliam D*  Dx:   Encounter Diagnosis     ICD-10-CM    1. Achilles tendinitis of both lower extremities  M76.61     M76.62       2. Plantar fasciitis, bilateral  M72.2                      Subjective: Patient reports that he was up and down the steps this morning so his foot is sensitive. He reports that he had a couple good days.       Objective: See treatment diary below      Assessment: Continued with POC. Tolerated treatment well. Patient demonstrated fatigue post treatment, exhibited good technique with therapeutic exercises, and would benefit from continued PT      Plan: Continue per plan of care.      Precautions: venous insufficiency, tachycardia, orthostatic hypotension, b/l TKA    HEP: calf stretch, soleus stretch   Manuals             TCJ mob JH AP            STJ mob JH            Midfoot mob JH                         Neuro Re-Ed                                                                                           HEP review and pt education             Ther Ex             bike 5'            4 way CR x10            Calf stretch  3x30s            Soleus stretch 3x30s            LP             Self DF stretch 2' ea            Post tib CR 2x10            LP 2x10 50# DL SS CR                         Ther Activity                                       Gait Training                                       Modalities

## 2024-12-06 NOTE — TELEPHONE ENCOUNTER
Pt's wife called for Marla Leiva. Please reach out to pt as she has some questions regarding pt's meds.

## 2024-12-06 NOTE — TELEPHONE ENCOUNTER
[facility] calling regarding copay for Rezdiffra.  Copay will be $992.00.  States they received new diagnosis of REEVES so patient will qualify for [facility] if patient agrees.      CB: [phone]

## 2024-12-06 NOTE — TELEPHONE ENCOUNTER
Spoke with pt's wife Shea, relayed information from message. She will call [facility] [employer] [facility] to pursue copay funding.

## 2024-12-07 DIAGNOSIS — F41.9 ANXIETY: ICD-10-CM

## 2024-12-09 ENCOUNTER — TRANSCRIBE ORDERS (OUTPATIENT)
Age: 69
End: 2024-12-09

## 2024-12-09 RX ORDER — BUSPIRONE HYDROCHLORIDE 5 MG/1
5 TABLET ORAL 2 TIMES DAILY
Qty: 180 TABLET | Refills: 1 | Status: SHIPPED | OUTPATIENT
Start: 2024-12-09

## 2024-12-09 NOTE — TELEPHONE ENCOUNTER
Spoke with pt's wife Shea. She states that [facility] need the diagnosis codes for REEVES to approve pt's prescription and copay funding.     Please fax diagnosis codes  REEVES (nonalcoholic steatohepatitis) [K75.81]; Advanced hepatic fibrosis [K74.02]     To [employer] [facility] [employer].

## 2024-12-10 ENCOUNTER — OFFICE VISIT (OUTPATIENT)
Dept: PHYSICAL THERAPY | Facility: MEDICAL CENTER | Age: 69
End: 2024-12-10
Payer: MEDICARE

## 2024-12-10 DIAGNOSIS — M72.2 PLANTAR FASCIITIS, BILATERAL: ICD-10-CM

## 2024-12-10 DIAGNOSIS — M76.61 ACHILLES TENDINITIS OF BOTH LOWER EXTREMITIES: Primary | ICD-10-CM

## 2024-12-10 DIAGNOSIS — M76.62 ACHILLES TENDINITIS OF BOTH LOWER EXTREMITIES: Primary | ICD-10-CM

## 2024-12-10 PROCEDURE — 97140 MANUAL THERAPY 1/> REGIONS: CPT

## 2024-12-10 NOTE — PROGRESS NOTES
Daily Note     Today's date: 12/10/2024  Patient name: Burak Smith  : 1955  MRN: [id number]  Referring provider: Adrián Gilliam D*  Dx:   Encounter Diagnosis     ICD-10-CM    1. Achilles tendinitis of both lower extremities  M76.61     M76.62       2. Plantar fasciitis, bilateral  M72.2                      Subjective: Patient reports that he feels better overall, but still has pain.       Objective: See treatment diary below      Assessment: Continued with POC. Tolerated treatment well. Patient demonstrated fatigue post treatment, exhibited good technique with therapeutic exercises, and would benefit from continued PT      Plan: Continue per plan of care.      Precautions: venous insufficiency, tachycardia, orthostatic hypotension, b/l TKA    HEP: calf stretch, soleus stretch   Manuals 12/10            TCJ mob JH AP            STJ mob JH            Midfoot mob JH                         Neuro Re-Ed                                                                                           HEP review and pt education             Ther Ex             bike 5'            4 way CR x10            Calf stretch  3x30s            Soleus stretch 3x30s            LP             Self DF stretch 2' ea            Post tib CR 2x10            LP 2x10 50# DL SS CR                         Ther Activity                                       Gait Training                                       Modalities

## 2024-12-11 DIAGNOSIS — R60.9 EDEMA, UNSPECIFIED TYPE: ICD-10-CM

## 2024-12-11 NOTE — TELEPHONE ENCOUNTER
Patient is aware of this information that we have the form and once it is signed we will fax it over.

## 2024-12-12 ENCOUNTER — TRANSCRIBE ORDERS (OUTPATIENT)
Age: 69
End: 2024-12-12

## 2024-12-12 ENCOUNTER — APPOINTMENT (OUTPATIENT)
Dept: PHYSICAL THERAPY | Facility: MEDICAL CENTER | Age: 69
End: 2024-12-12
Payer: MEDICARE

## 2024-12-12 RX ORDER — AMILORIDE HYDROCHLORIDE 5 MG/1
5 TABLET ORAL DAILY
Qty: 30 TABLET | Refills: 5 | Status: SHIPPED | OUTPATIENT
Start: 2024-12-12

## 2024-12-12 NOTE — TELEPHONE ENCOUNTER
Form has been faxed and we received confirmation.    Subjective     Moriah Lovett is a 88 y.o.. female.     Patient here today for follow up on hypertension. Patient was here on 9/20, atenolol increased from 25 to 50 mg daily at that time. Patient called the next day with b/p reading of 190s/90s; lisinopril HCTZ 20/12.5 mg 1 tab daily sent to Patient's pharmacy. Patient stating she stayed on the atenolol 50 mg daily until 9/26 when she went back down to 25 mg daily per her decision (not medically directed) because she was worried b/p would get to low, was having b/p's of 136/76. Patient stating she was on the lisinopril/hctz started from 9/21-9/27 but stopped because she was light headed and was concerned her b/p was getting too low, Patient did not check b/p at that time but stated her b/p was averaging 136/76. Patient stating she was only on lexapro for a couple of days but stopped due to GI issues. Patient stating she quit taking rosuvastatin due to GI issues. Patient currently not on any cholesterol lowering medication; states she used to be on livalo and would take that again.     2nd b/p reading in office today 140/70; Patient currently only taking atenolol 25 mg daily.      Hypertension      The following portions of the patient's history were reviewed and updated as appropriate: allergies, current medications, past family history, past medical history, past social history, past surgical history and problem list.    Past Medical History:   Diagnosis Date    Breast cyst     Hyperlipidemia     Hypertension        Past Surgical History:   Procedure Laterality Date    BREAST CYST EXCISION Bilateral     throughout her 40's bilateral benign    COLONOSCOPY      TONSILLECTOMY         Review of Systems   Constitutional: Negative.    Respiratory: Negative.     Cardiovascular: Negative.      Allergies   Allergen Reactions    Amoxicillin Unknown - Low Severity     Makes pt sick    Azo [Phenazopyridine]     Sulfa Antibiotics Rash       Objective     Vitals:    10/04/23 1254  "  BP: 154/82   Pulse: 67   Resp: 16   Temp: 98.2 °F (36.8 °C)   SpO2: 97%   Weight: 56.7 kg (125 lb)   Height: 152.4 cm (60\")     Body mass index is 24.41 kg/m².    Physical Exam  Vitals reviewed.   HENT:      Head: Normocephalic.   Eyes:      Pupils: Pupils are equal, round, and reactive to light.   Cardiovascular:      Rate and Rhythm: Normal rate and regular rhythm.      Pulses: Normal pulses.   Pulmonary:      Effort: Pulmonary effort is normal.      Breath sounds: Normal breath sounds.   Musculoskeletal:      Right lower leg: Edema (trace) present.      Left lower leg: Edema (trace) present.   Skin:     General: Skin is warm and dry.   Neurological:      Mental Status: She is alert and oriented to person, place, and time.   Psychiatric:         Speech: Speech normal.         Current Outpatient Medications:     aspirin 81 MG tablet, Take  by mouth., Disp: , Rfl:     atenolol (TENORMIN) 25 MG tablet, Take 2 tablets by mouth Daily., Disp: 180 tablet, Rfl: 0    Pitavastatin Calcium (Livalo) 4 MG tablet, Take 1 tablet by mouth Every Night., Disp: 90 tablet, Rfl: 1    No results found for this or any previous visit (from the past 2016 hour(s)).    Duplex Carotid Ultrasound CAR    Right internal carotid artery demonstrates less than but near   50% stenosis.    Left internal carotid artery demonstrates less than 50% stenosis.        Diagnoses and all orders for this visit:    1. Primary hypertension (Primary)    2. Mixed hyperlipidemia  -     Pitavastatin Calcium (Livalo) 4 MG tablet; Take 1 tablet by mouth Every Night.  Dispense: 90 tablet; Refill: 1        Patient Instructions   Hypertension: Patient would not agree on taking lisinopril/hctz today. Patient only agreeing to take atenolol 50 mg daily. Discussed adverse effects of Patient not getting b/p under control. Discussed s/s of low/high b/p, discussed need to be consistent with medications to see true results of changes, discussed b/p range desired " 110's-130/70-80's.     Hyperlipidemia: Patient not agreeing to staying on rosuvastatin. Patient agreeing to take livalo; sent script of livalo to Patient's pharmacy. Discussed adverse effects of Patient not keeping cholesterol levels in normal range.         Return for 1 week.

## 2024-12-18 ENCOUNTER — OFFICE VISIT (OUTPATIENT)
Dept: PHYSICAL THERAPY | Facility: MEDICAL CENTER | Age: 69
End: 2024-12-18
Payer: MEDICARE

## 2024-12-18 DIAGNOSIS — M72.2 PLANTAR FASCIITIS, BILATERAL: ICD-10-CM

## 2024-12-18 DIAGNOSIS — M76.62 ACHILLES TENDINITIS OF BOTH LOWER EXTREMITIES: Primary | ICD-10-CM

## 2024-12-18 DIAGNOSIS — M76.61 ACHILLES TENDINITIS OF BOTH LOWER EXTREMITIES: Primary | ICD-10-CM

## 2024-12-18 PROCEDURE — 97110 THERAPEUTIC EXERCISES: CPT

## 2024-12-18 PROCEDURE — 97140 MANUAL THERAPY 1/> REGIONS: CPT

## 2024-12-18 NOTE — PROGRESS NOTES
Daily Note     Today's date: 2024  Patient name: Burak Smith  : 1955  MRN: [id number]  Referring provider: Adrián Gilliam D*  Dx:   Encounter Diagnosis     ICD-10-CM    1. Achilles tendinitis of both lower extremities  M76.61     M76.62       2. Plantar fasciitis, bilateral  M72.2                      Subjective: Patient reports that he overdid it with the exercises and is sore. He reports that the pain goes on the top of the foot to between the toes.       Objective: See treatment diary below      Assessment: Continued with POC. Tolerated treatment well. Patient demonstrated fatigue post treatment, exhibited good technique with therapeutic exercises, and would benefit from continued PT      Plan: Continue per plan of care.      Precautions: venous insufficiency, tachycardia, orthostatic hypotension, b/l TKA    HEP: calf stretch, soleus stretch   Manuals             TCJ mob JH AP            STJ mob JH            Midfoot mob JH                         Neuro Re-Ed                                                                                           HEP review and pt education             Ther Ex             bike 5'            4 way CR x10            Calf stretch  3x30s            Soleus stretch 3x30s            LP             Self DF stretch 2' ea            Post tib CR 2x10            LP 2x10 50# DL SS CR                         Ther Activity                                       Gait Training                                       Modalities

## 2024-12-20 ENCOUNTER — OFFICE VISIT (OUTPATIENT)
Dept: PHYSICAL THERAPY | Facility: MEDICAL CENTER | Age: 69
End: 2024-12-20
Payer: MEDICARE

## 2024-12-20 DIAGNOSIS — M72.2 PLANTAR FASCIITIS, BILATERAL: ICD-10-CM

## 2024-12-20 DIAGNOSIS — M76.61 ACHILLES TENDINITIS OF BOTH LOWER EXTREMITIES: Primary | ICD-10-CM

## 2024-12-20 DIAGNOSIS — M76.62 ACHILLES TENDINITIS OF BOTH LOWER EXTREMITIES: Primary | ICD-10-CM

## 2024-12-20 PROCEDURE — 97140 MANUAL THERAPY 1/> REGIONS: CPT

## 2024-12-20 PROCEDURE — 97110 THERAPEUTIC EXERCISES: CPT

## 2024-12-20 NOTE — PROGRESS NOTES
Daily Note     Today's date: 2024  Patient name: Burak Smith  : 1955  MRN: [id number]  Referring provider: Adrián Gilliam D*  Dx:   Encounter Diagnosis     ICD-10-CM    1. Achilles tendinitis of both lower extremities  M76.61     M76.62       2. Plantar fasciitis, bilateral  M72.2                      Subjective: Patient reports that he didn't do the toe scrunches and he has no bottom of the foot pain.       Objective: See treatment diary below      Assessment: Continued with POC. Tolerated treatment well. Patient demonstrated fatigue post treatment, exhibited good technique with therapeutic exercises, and would benefit from continued PT      Plan: Continue per plan of care.      Precautions: venous insufficiency, tachycardia, orthostatic hypotension, b/l TKA    HEP: calf stretch, soleus stretch   Manuals             TCJ mob JH AP            STJ mob JH            Midfoot mob JH                         Neuro Re-Ed                                                                                           HEP review and pt education             Ther Ex             bike 5'            4 way CR x10            Calf stretch  3x30s            Soleus stretch 3x30s            LP             Self DF stretch 2' ea            Post tib CR 2x10            LP 2x10 50# DL SS CR                         Ther Activity                                       Gait Training                                       Modalities

## 2024-12-24 ENCOUNTER — OFFICE VISIT (OUTPATIENT)
Dept: PHYSICAL THERAPY | Facility: MEDICAL CENTER | Age: 69
End: 2024-12-24
Payer: MEDICARE

## 2024-12-24 DIAGNOSIS — M76.62 ACHILLES TENDINITIS OF BOTH LOWER EXTREMITIES: Primary | ICD-10-CM

## 2024-12-24 DIAGNOSIS — M76.61 ACHILLES TENDINITIS OF BOTH LOWER EXTREMITIES: Primary | ICD-10-CM

## 2024-12-24 DIAGNOSIS — M72.2 PLANTAR FASCIITIS, BILATERAL: ICD-10-CM

## 2024-12-24 PROCEDURE — 97110 THERAPEUTIC EXERCISES: CPT

## 2024-12-24 PROCEDURE — 97140 MANUAL THERAPY 1/> REGIONS: CPT

## 2024-12-24 NOTE — PROGRESS NOTES
Daily Note     Today's date: 2024  Patient name: Burak Smith  : 1955  MRN: [id number]  Referring provider: Adrián Gilliam D*  Dx:   Encounter Diagnosis     ICD-10-CM    1. Achilles tendinitis of both lower extremities  M76.61     M76.62       2. Plantar fasciitis, bilateral  M72.2                      Subjective: Patient reports that his toes are bothering him more than his heel.       Objective: See treatment diary below      Assessment: Continued with POC. Tolerated treatment well. Patient demonstrated fatigue post treatment, exhibited good technique with therapeutic exercises, and would benefit from continued PT      Plan: Continue per plan of care.      Precautions: venous insufficiency, tachycardia, orthostatic hypotension, b/l TKA    HEP: calf stretch, soleus stretch   Manuals             TCJ mob JH AP            STJ mob JH            Midfoot new JH                         Neuro Re-Ed                                                                                           HEP review and pt education             Ther Ex             bike 5'            4 way CR x10            Calf stretch  3x30s            Soleus stretch 3x30s            LP             Self DF stretch 2' ea            Post tib CR 2x10            LP 2x10 50# DL SS CR                         Ther Activity                                       Gait Training                                       Modalities

## 2024-12-31 ENCOUNTER — OFFICE VISIT (OUTPATIENT)
Dept: PHYSICAL THERAPY | Facility: MEDICAL CENTER | Age: 69
End: 2024-12-31
Payer: MEDICARE

## 2024-12-31 DIAGNOSIS — M76.62 ACHILLES TENDINITIS OF BOTH LOWER EXTREMITIES: Primary | ICD-10-CM

## 2024-12-31 DIAGNOSIS — M76.61 ACHILLES TENDINITIS OF BOTH LOWER EXTREMITIES: Primary | ICD-10-CM

## 2024-12-31 DIAGNOSIS — I10 ESSENTIAL HYPERTENSION: ICD-10-CM

## 2024-12-31 DIAGNOSIS — M72.2 PLANTAR FASCIITIS, BILATERAL: ICD-10-CM

## 2024-12-31 PROCEDURE — 97110 THERAPEUTIC EXERCISES: CPT

## 2024-12-31 PROCEDURE — 97140 MANUAL THERAPY 1/> REGIONS: CPT

## 2024-12-31 RX ORDER — LOSARTAN POTASSIUM 50 MG/1
50 TABLET ORAL DAILY
Qty: 90 TABLET | Refills: 1 | Status: SHIPPED | OUTPATIENT
Start: 2024-12-31

## 2024-12-31 NOTE — PROGRESS NOTES
Daily Note     Today's date: 2024  Patient name: Burak Smith  : 1955  MRN: [id number]  Referring provider: Adrián Gilliam D*  Dx:   Encounter Diagnosis     ICD-10-CM    1. Achilles tendinitis of both lower extremities  M76.61     M76.62       2. Plantar fasciitis, bilateral  M72.2                      Subjective: Patient reports that he feels better. He reports that he has some bad days but it is better than before.       Objective: See treatment diary below      Assessment: Pt presents with improved mobility in ankles. Tolerated treatment well. Patient demonstrated fatigue post treatment, exhibited good technique with therapeutic exercises, and would benefit from continued PT      Plan: Continue per plan of care.      Precautions: venous insufficiency, tachycardia, orthostatic hypotension, b/l TKA    HEP: calf stretch, soleus stretch   Manuals             TCJ mob JH AP            STJ mob JH            Midfoot mob JH                         Neuro Re-Ed                                                                                           HEP review and pt education             Ther Ex             bike 5'            4 way CR x10            Calf stretch  3x30s            Soleus stretch 3x30s            LP             Self DF stretch 2' ea            Post tib CR 2x10            LP 2x10 50# DL SS CR                         Ther Activity                                       Gait Training                                       Modalities

## 2025-01-02 ENCOUNTER — APPOINTMENT (OUTPATIENT)
Dept: PHYSICAL THERAPY | Facility: MEDICAL CENTER | Age: 70
End: 2025-01-02
Payer: COMMERCIAL

## 2025-01-07 ENCOUNTER — OFFICE VISIT (OUTPATIENT)
Dept: PHYSICAL THERAPY | Facility: MEDICAL CENTER | Age: 70
End: 2025-01-07
Payer: COMMERCIAL

## 2025-01-07 DIAGNOSIS — M76.62 ACHILLES TENDINITIS OF BOTH LOWER EXTREMITIES: Primary | ICD-10-CM

## 2025-01-07 DIAGNOSIS — M76.61 ACHILLES TENDINITIS OF BOTH LOWER EXTREMITIES: Primary | ICD-10-CM

## 2025-01-07 DIAGNOSIS — M72.2 PLANTAR FASCIITIS, BILATERAL: ICD-10-CM

## 2025-01-07 PROCEDURE — 97110 THERAPEUTIC EXERCISES: CPT

## 2025-01-07 PROCEDURE — 97140 MANUAL THERAPY 1/> REGIONS: CPT

## 2025-01-07 NOTE — PROGRESS NOTES
Daily Note     Today's date: 2025  Patient name: Burak Smith  : 1955  MRN: [id number]  Referring provider: Adrián Gilliam D*  Dx:   Encounter Diagnosis     ICD-10-CM    1. Achilles tendinitis of both lower extremities  M76.61     M76.62       2. Plantar fasciitis, bilateral  M72.2                      Subjective: Patient reports that he feels better and is moving better. He reports that he still has some pain around his toes.       Objective: See treatment diary below      Assessment: Pt presents with improved mobility in ankles. Tolerated treatment well. Patient demonstrated fatigue post treatment, exhibited good technique with therapeutic exercises, and would benefit from continued PT      Plan: Continue per plan of care.      Precautions: venous insufficiency, tachycardia, orthostatic hypotension, b/l TKA    HEP: calf stretch, soleus stretch   Manuals             TCJ mob JH AP            STJ mob JH            Midfoot mob JH                         Neuro Re-Ed                                                                                           HEP review and pt education             Ther Ex             bike 5'            4 way CR x10            Calf stretch  3x30s            Soleus stretch 3x30s            LP             Self DF stretch 2' ea            Post tib CR 2x10            LP 2x10 50# DL SS CR                         Ther Activity                                       Gait Training                                       Modalities

## 2025-01-08 ENCOUNTER — RA CDI HCC (OUTPATIENT)
Dept: OTHER | Facility: HOSPITAL | Age: 70
End: 2025-01-08

## 2025-01-09 ENCOUNTER — OFFICE VISIT (OUTPATIENT)
Dept: PHYSICAL THERAPY | Facility: MEDICAL CENTER | Age: 70
End: 2025-01-09
Payer: COMMERCIAL

## 2025-01-09 DIAGNOSIS — M76.61 ACHILLES TENDINITIS OF BOTH LOWER EXTREMITIES: Primary | ICD-10-CM

## 2025-01-09 DIAGNOSIS — M72.2 PLANTAR FASCIITIS, BILATERAL: ICD-10-CM

## 2025-01-09 DIAGNOSIS — M76.62 ACHILLES TENDINITIS OF BOTH LOWER EXTREMITIES: Primary | ICD-10-CM

## 2025-01-09 PROCEDURE — 97140 MANUAL THERAPY 1/> REGIONS: CPT

## 2025-01-09 PROCEDURE — 97110 THERAPEUTIC EXERCISES: CPT

## 2025-01-09 NOTE — PROGRESS NOTES
Re-Evaluation     Today's date: 2025  Patient name: Burak Smith  : 1955  MRN: [id number]  Referring provider: Adrián Gilliam D*  Dx:   Encounter Diagnosis     ICD-10-CM    1. Achilles tendinitis of both lower extremities  M76.61     M76.62       2. Plantar fasciitis, bilateral  M72.2           Start Time: 0900  Stop Time: 0940  Total time in clinic (min): 40 minutes    Subjective: Patient reports that he has been able to do more, but still has some soreness and pain with prolonged activity such as walking and standing.       Objective: See treatment diary below  Tenderness   Left Ankle/Foot   Tenderness in the lateral malleolus.     Right Ankle/Foot   Tenderness in the lateral malleolus and plantar fascia.     Active Range of Motion   Left Ankle/Foot   Dorsiflexion (ke): 5 degrees   Plantar flexion: 40 degrees   Inversion: 20 degrees   Eversion: 10 degrees     Right Ankle/Foot   Dorsiflexion (ke): 5 degrees   Plantar flexion: 40 degrees   Inversion: 20 degrees   Eversion: 10 degrees     Joint Play   Left Ankle/Foot  Hypomobile in the talocrural joint, subtalar joint and midfoot.     Right Ankle/Foot  Hypomobile in the talocrural joint, subtalar joint and midfoot.     Strength/Myotome Testing     Left Ankle/Foot   Dorsiflexion: 4  Plantar flexion: 4  Inversion: 4  Eversion: 4    Right Ankle/Foot   Dorsiflexion: 4  Plantar flexion: 4  Inversion: 4  Eversion: 4    Ambulation     Observational Gait   Decreased left stance time and right stance time.     Additional Observational Gait Details  Initial contact: foot flat b/l      Assessment: Burak Smith is a pleasant 68 y/o male who has been consistently attending skilled PT for b/l foot and achilles pain. Patient's ankle mobility and ROM has improved since beginning PT, however he continues to have strength deficit and hypomobility of ankle. This limits his ability to perform prolonged activities, stair negotiation, and ambulation. Pt would benefit from  continued skilled therapy to address above impairments and return to PLOF.        Plan: 1-2x/week for 6-8 weeks.      Precautions: venous insufficiency, tachycardia, orthostatic hypotension, b/l TKA    HEP: calf stretch, soleus stretch   Manuals 1/9            TCJ mob KAYA AP            STJ new KIRKPATRICK            Midfoot new KIRKPATRICK                         Neuro Re-Ed                                                                                           HEP review and pt education             Ther Ex             bike 5'            4 way CR x10            Calf stretch  3x30s            Soleus stretch 3x30s            LP             Self DF stretch 2' ea            Post tib CR 2x10            LP 2x10 50# DL SS CR                         Ther Activity                                       Gait Training                                       Modalities

## 2025-01-13 ENCOUNTER — OFFICE VISIT (OUTPATIENT)
Dept: PODIATRY | Facility: CLINIC | Age: 70
End: 2025-01-13
Payer: COMMERCIAL

## 2025-01-13 VITALS — WEIGHT: 259 LBS | BODY MASS INDEX: 40.65 KG/M2 | HEIGHT: 67 IN

## 2025-01-13 DIAGNOSIS — M76.61 ACHILLES TENDINITIS OF BOTH LOWER EXTREMITIES: ICD-10-CM

## 2025-01-13 DIAGNOSIS — M72.2 PLANTAR FASCIITIS, BILATERAL: Primary | ICD-10-CM

## 2025-01-13 DIAGNOSIS — M76.62 ACHILLES TENDINITIS OF BOTH LOWER EXTREMITIES: ICD-10-CM

## 2025-01-13 PROCEDURE — 99213 OFFICE O/P EST LOW 20 MIN: CPT | Performed by: PODIATRIST

## 2025-01-13 NOTE — PROGRESS NOTES
"Name: Burak Smith      : 1955      MRN: [id number]  Encounter Provider: Adrián Gilliam DPM  Encounter Date: 2025   Encounter department: [facility] [place]  :  Assessment & Plan  Plantar fasciitis, bilateral  Almost fully resolved       Achilles tendinitis of both lower extremities  resolved       He has some mild tenderness in the 2nd interspace, mild neuroma. If isn't severe, more achy. I imagine this will also continue to improve with PT, stretching and proper shoe gear,.     Reviewed multiple PT visits, his PT is also documenting good progress. FOTO score repeat is pending. Another month of PT would likely benefit    History of Present Illness   HPI  Burak Smith is a 69 y.o. male who presents for follow for heel and achilles pain. He has been stretching a lot and feeling much better. He has a little tenderness in the forefoot.       Review of Systems  As stated in HPI, otherwise normal    Medical History Reviewed by provider this encounter:  Tobacco  Allergies  Meds  Problems  Med Hx  Surg Hx  Fam Hx           Objective   Ht 5' 6.5\" (1.689 m)   Wt 117 kg (259 lb)   BMI 41.18 kg/m²      Physical Exam  Vitals reviewed.   Cardiovascular:      Pulses: Normal pulses.   Pulmonary:      Effort: No respiratory distress.   Musculoskeletal:         General: Tenderness (mild 2nd interspace tenderness) present.      Right foot: Decreased range of motion. Deformity present.      Left foot: Decreased range of motion. Deformity present.   Skin:     Capillary Refill: Capillary refill takes less than 2 seconds.      Findings: No bruising.   Neurological:      Mental Status: He is alert and oriented to person, place, and time.           "

## 2025-01-14 ENCOUNTER — OFFICE VISIT (OUTPATIENT)
Dept: PHYSICAL THERAPY | Facility: MEDICAL CENTER | Age: 70
End: 2025-01-14
Payer: COMMERCIAL

## 2025-01-14 DIAGNOSIS — M72.2 PLANTAR FASCIITIS, BILATERAL: ICD-10-CM

## 2025-01-14 DIAGNOSIS — M76.61 ACHILLES TENDINITIS OF BOTH LOWER EXTREMITIES: Primary | ICD-10-CM

## 2025-01-14 DIAGNOSIS — M76.62 ACHILLES TENDINITIS OF BOTH LOWER EXTREMITIES: Primary | ICD-10-CM

## 2025-01-14 PROCEDURE — 97110 THERAPEUTIC EXERCISES: CPT

## 2025-01-14 PROCEDURE — 97140 MANUAL THERAPY 1/> REGIONS: CPT

## 2025-01-14 NOTE — PROGRESS NOTES
Daily Note     Today's date: 2025  Patient name: Burak Smith  : 1955  MRN: [id number]  Referring provider: Adrián Gilliam D*  Dx:   Encounter Diagnosis     ICD-10-CM    1. Achilles tendinitis of both lower extremities  M76.61     M76.62       2. Plantar fasciitis, bilateral  M72.2                      Subjective: Patient reports that he was walking through the store yesterday and did not have much pain.       Objective: See treatment diary below      Assessment: Pt is progressing well with skilled PT. Tolerated treatment well. Patient demonstrated fatigue post treatment, exhibited good technique with therapeutic exercises, and would benefit from continued PT      Plan: Continue per plan of care.      Precautions: venous insufficiency, tachycardia, orthostatic hypotension, b/l TKA    HEP: calf stretch, soleus stretch   Manuals             TCJ mob JH AP            STJ mob JH            Midfoot new KIRKPATRICK                         Neuro Re-Ed                                                                                           HEP review and pt education             Ther Ex             bike 5'            4 way CR x10            Calf stretch  3x30s            Soleus stretch 3x30s            LP             Self DF stretch 2' ea            Post tib CR 2x10            LP 2x10 50# DL SS CR                         Ther Activity                                       Gait Training                                       Modalities

## 2025-01-15 ENCOUNTER — OFFICE VISIT (OUTPATIENT)
Age: 70
End: 2025-01-15
Payer: COMMERCIAL

## 2025-01-15 VITALS
DIASTOLIC BLOOD PRESSURE: 70 MMHG | WEIGHT: 265 LBS | SYSTOLIC BLOOD PRESSURE: 134 MMHG | HEART RATE: 90 BPM | TEMPERATURE: 98.2 F | RESPIRATION RATE: 16 BRPM | BODY MASS INDEX: 40.16 KG/M2 | HEIGHT: 68 IN | OXYGEN SATURATION: 98 %

## 2025-01-15 DIAGNOSIS — D69.6 THROMBOCYTOPENIA (HCC): ICD-10-CM

## 2025-01-15 DIAGNOSIS — I26.99 PULMONARY EMBOLISM WITHOUT ACUTE COR PULMONALE, UNSPECIFIED CHRONICITY, UNSPECIFIED PULMONARY EMBOLISM TYPE (HCC): Primary | ICD-10-CM

## 2025-01-15 DIAGNOSIS — E66.01 MORBID OBESITY (HCC): ICD-10-CM

## 2025-01-15 DIAGNOSIS — Z83.430 FAMILY HISTORY OF ELEVATED LIPOPROTEIN(A): ICD-10-CM

## 2025-01-15 DIAGNOSIS — Z12.5 PROSTATE CANCER SCREENING: ICD-10-CM

## 2025-01-15 PROBLEM — K70.9 ALCOHOLIC LIVER DISEASE (HCC): Status: ACTIVE | Noted: 2025-01-15

## 2025-01-15 PROCEDURE — G2211 COMPLEX E/M VISIT ADD ON: HCPCS | Performed by: FAMILY MEDICINE

## 2025-01-15 PROCEDURE — 99214 OFFICE O/P EST MOD 30 MIN: CPT | Performed by: FAMILY MEDICINE

## 2025-01-16 ENCOUNTER — APPOINTMENT (OUTPATIENT)
Dept: PHYSICAL THERAPY | Facility: MEDICAL CENTER | Age: 70
End: 2025-01-16
Payer: COMMERCIAL

## 2025-01-17 ENCOUNTER — TELEPHONE (OUTPATIENT)
Age: 70
End: 2025-01-17

## 2025-01-17 NOTE — TELEPHONE ENCOUNTER
Kortney, with [employer], reports PA for Rezdiffra is approved. However, co-pay is $1125.39. Please call [employer] to discuss further. [phone]

## 2025-01-17 NOTE — TELEPHONE ENCOUNTER
Patients GI provider:  Dr. uFnk    Number to return call: [phone]    Reason for call: Pt's wife called asking if the office has samples of Rezdiffra. Pt ran out of medication.     Scheduled procedure/appointment date if applicable: 5/20/2025

## 2025-01-17 NOTE — LETTER
[facility]  [address]  [place] [address]  [phone]  Dept: [phone]    2025     Patient: Burak Smith   YOB: 1955   Date of Visit:        [facility]  Date: 2025      RE:  Rizdiffra Coverage  Patient: Burak Reyes   : 1955  Policy Number: [id number]    To Whom It May Concern,    I am writing regarding the coverage of Rezdiffra for my patient, Burak Smith, who has been diagnosed with Non-Alcoholic Steatohepatis (REEVES) F2/F3.  Given the patient's disease stage and the unique clinical benefits of Rezdiffra, this medication is medically  necessary for preventing further liver damage and serious complications associated with advanced REEVES.    Medical Necessity and Rationale:    Rezdiffra is the first and only FDA-approved treatment specifically for REEVES patients with F2/F3 fibrosis, and it plays a critical role in reducing liver fibrosis and preventing progression to cirrhosis, liver failure, and hepatocellular carcinoma.    Patients at these stages of REEVES have a 10 to 17 times higher risk of liver-related mortality compared to the general population. Without proper treatment, this risk increases significantly, leading to severe outcomes such as the need for a liver transplant, liver cancer, and cardiovascular disease, all of which are not only life-threatening but also extremely costly for both the patient and the healthcare system.  For patients with F2/F3 REEVES, treatment with Rezdiffra is essential to manage disease progression and improve long-term survival rates.    Patient's Compliance and Health Behavior:    Burak Smith has been fully counseled on the importance of lifestyle modifications, including dietary changes, regular physical activity, and avoiding excessive alcohol consumption, all of which are key in managing REEVES.  Despite these efforts, the disease has progressed.     Conclusion:    Given the  patient's advanced stage of REEVES, the proven efficacy of Rezdiffra in slowing or halting disease progression, and the critical role it plays in preventing severe complications, I strongly urge the approval coverage for this medication.  Denying this treatment places our patient at a much higher risk of developing cirrhosis, liver failure, and other serious conditions that would require expensive, long-term interventions.    Please do not hesitate to contact me for any additional documentation or information that may support this.    Sincerely,

## 2025-01-17 NOTE — PROGRESS NOTES
Name: Burak Smith      : 1955      MRN: [id number]  Encounter Provider: Calvin Elizondo MD  Encounter Date: 1/15/2025   Encounter department: [facility]  :  Assessment & Plan  Pulmonary embolism without acute cor pulmonale, unspecified chronicity, unspecified pulmonary embolism type (HCC)  Stable, continue meds. Discussed supportive care and return parameters.   Orders:    TSH, 3rd generation with Free T4 reflex; Future    Lipid Panel with Direct LDL reflex; Future    PSA, Total Screen; Future    Thrombocytopenia (HCC)  Stable, continue meds. Discussed supportive care and return parameters.   Orders:    TSH, 3rd generation with Free T4 reflex; Future    Lipid Panel with Direct LDL reflex; Future    PSA, Total Screen; Future    Morbid obesity (HCC)    Discussed supportive care and return parameters.   Orders:    TSH, 3rd generation with Free T4 reflex; Future    Lipid Panel with Direct LDL reflex; Future    PSA, Total Screen; Future    Prostate cancer screening    Orders:    TSH, 3rd generation with Free T4 reflex; Future    Lipid Panel with Direct LDL reflex; Future    PSA, Total Screen; Future    Family history of elevated lipoprotein(a)    Orders:    TSH, 3rd generation with Free T4 reflex; Future           History of Present Illness     Patient is a 68 y/o male who presents for follow-up on h/o PE, morbid obesity, thrombocytopenia. Pt admits being stable on meds. And denies acute complaints no fevers chills nausea or vomiting.      Review of Systems   Constitutional: Negative.    HENT: Negative.     Eyes: Negative.    Respiratory: Negative.     Cardiovascular: Negative.    Gastrointestinal: Negative.    Endocrine: Negative.    Genitourinary: Negative.    Musculoskeletal: Negative.    Allergic/Immunologic: Negative.    Neurological: Negative.    Hematological: Negative.    Psychiatric/Behavioral: Negative.     All other systems reviewed and are negative.      Objective   BP  "134/70 (BP Location: Left arm, Patient Position: Sitting, Cuff Size: Large)   Pulse 90   Temp 98.2 °F (36.8 °C) (Temporal)   Resp 16   Ht 5' 8\" (1.727 m)   Wt 120 kg (265 lb)   SpO2 98%   BMI 40.29 kg/m²      Physical Exam  Vitals reviewed.   Constitutional:       General: He is not in acute distress.     Appearance: He is well-developed. He is not diaphoretic.   HENT:      Head: Normocephalic and atraumatic.      Right Ear: External ear normal.      Left Ear: External ear normal.      Nose: Nose normal.   Eyes:      General: No scleral icterus.        Right eye: No discharge.         Left eye: No discharge.      Conjunctiva/sclera: Conjunctivae normal.      Pupils: Pupils are equal, round, and reactive to light.   Neck:      Thyroid: No thyromegaly.      Trachea: No tracheal deviation.   Cardiovascular:      Rate and Rhythm: Normal rate and regular rhythm.      Heart sounds: Normal heart sounds. No murmur heard.     No friction rub.   Pulmonary:      Effort: Pulmonary effort is normal. No respiratory distress.      Breath sounds: Normal breath sounds. No stridor. No wheezing or rales.   Abdominal:      General: There is no distension.      Palpations: Abdomen is soft. There is no mass.      Tenderness: There is no abdominal tenderness. There is no guarding or rebound.   Musculoskeletal:         General: Normal range of motion.      Cervical back: Normal range of motion and neck supple.   Lymphadenopathy:      Cervical: No cervical adenopathy.   Skin:     General: Skin is warm.   Neurological:      Mental Status: He is alert and oriented to person, place, and time.      Cranial Nerves: No cranial nerve deficit.   Psychiatric:         Behavior: Behavior normal.         Thought Content: Thought content normal.         Judgment: Judgment normal.         "

## 2025-01-17 NOTE — ASSESSMENT & PLAN NOTE
Orders:    TSH, 3rd generation with Free T4 reflex; Future    Lipid Panel with Direct LDL reflex; Future    PSA, Total Screen; Future

## 2025-01-17 NOTE — ASSESSMENT & PLAN NOTE
Stable, continue meds. Discussed supportive care and return parameters.   Orders:    TSH, 3rd generation with Free T4 reflex; Future    Lipid Panel with Direct LDL reflex; Future    PSA, Total Screen; Future

## 2025-01-17 NOTE — TELEPHONE ENCOUNTER
Called [employer]   Agent had to abort the other prior auth for Rezdiffra in order to continue with a prior auth from the Dr office.    Case number [id number]  Fax number   8       ID, name and ref to Prior Auth      Insurance will not allow 90 day supply    PA for Rezdiffra   SUBMITTED to [employer]    via    [x]Phone call Case ID # [id number]    []PA sent as URGENT    All office notes, labs and other pertaining documents and studies sent. Clinical questions answered. Awaiting determination from insurance company.     Turnaround time for your insurance to make a decision on your Prior Authorization can take 7-21 business days.

## 2025-01-17 NOTE — TELEPHONE ENCOUNTER
VM left for Pt that a new Prior auth will be started for his medication. If he had any questions please give the office a call.

## 2025-01-20 ENCOUNTER — TELEPHONE (OUTPATIENT)
Age: 70
End: 2025-01-20

## 2025-01-20 NOTE — TELEPHONE ENCOUNTER
PA for rezdiffra APPROVED     Date(s) approved until 12/31/2025  Patient advised by          []MyChart Message  []Phone call   [x]LMOM  []L/M to call office as no active Communication consent on file  []Unable to leave detailed message as VM not approved on Communication consent       Pharmacy advised by    [x]Fax  []Phone call    Approval letter scanned into [employer] Yes

## 2025-01-25 DIAGNOSIS — F41.9 ANXIETY: ICD-10-CM

## 2025-01-25 RX ORDER — FOLIC ACID 1 MG/1
1000 TABLET ORAL DAILY
Qty: 90 TABLET | Refills: 0 | Status: SHIPPED | OUTPATIENT
Start: 2025-01-25

## 2025-02-10 NOTE — PROGRESS NOTES
Weight Management Medical Nutrition Assessment  Burak presented for a meal planning session. Presents with wife at today's visit. Today's weight is 270.9#. Burak expected a weight increase as he weighs himself daily at home. Patient is disappointed as he has made a variety of positive lifestyle changes. He is eating more protein, not skipping meals, and choosing low-fat foods. His wife does the cooking- reports she cooks with butter and is unable to recall other ingredients being used. Encouraged keeping a food log to determine if excess calories are being consumed. Has been more sedentary and walking less. Considering using weights as he wants to increase his activity. Encouraged patient to weight self once per week since scale will fluctuate and this can cause stress. Burak will keep a food log and bring to his f/u visit. Program information and pricing provided.    Patient seen by Medical Provider in past 6 months:  no  Requested to schedule appointment with Medical Provider: No      Anthropometric Measurements  Start Weight (#) & Date: 259.9# 11/22/2024  Current Weight (#): 270.9#  TBW % Change from start weight: -  Ideal Body Weight (#): 145#  Goal Weight (#): no specific goal weight  Highest: 290#  Lowest: 240#    Weight Loss History  Previous weight loss attempts: no previous attempts    Food and Nutrition Related History  Wake up: 6-6:30AM   Bed Time: 8-9AM   Uses CPAP at night.    Food Recall  Breakfast: 7AM 2 eggs + 1 slice Rye toast (cooks using Eva spray)   1 cup black coffee   Snack: 10AM 5.3oz Oik[employer] Greek yogurt OR protein shake (unable to recall name)  Lunch: 1PM salad ([employer] chicken, lettuce, cucumbers, green peppers, tomatoes, Kraft fat-free dressing)  Snack: 3PM 2-3 pretzels OR pistachios (unmeasured, grabs a handful)  Dinner: 5PM 6oz pork or chicken + 1 cup instant mashed potatoes or noodles + 1 cup vegetable (green beans, carrots, peas, broccoli, cauliflower)  Snack: skip    Beverages: 8oz black  coffee, 32oz water, will sip on Gatorade, no alcohol   Volume of beverage intake: 40-60oz    Weekends: Same  Cravings: chips, dip, sweets   Trouble area of day: not hungry throughout the day    Frequency of Eating out: once every 3 months  Food restrictions: allergy to citrus fruit  Cooking: wife  Food Shopping: self and wife    Physical Activity Intake  Activity: no formal exercise due to pain. Goes to physical therapy 2x per week (does exercises 2-3 times per day)   Frequency: PT 2x per week  Physical limitations/barriers to exercise: plantar fascitis, tendonitis     Estimated Needs   Energy  SECA: BMR: n/a      X 1.3 -1000 =  [place] [employer] Needs: BMR : 1954   1-2# loss weekly sedentary:  3242-8273            1-2# loss weekly lightly active: 9113-8888  Maintenance calories for sedentary activity level: 2345  Protein: 84-105g      (1.2-1.5g/kg IBW)  Fluid: 82oz     (35mL/kg IBW)    Nutrition Diagnosis  Yes;    Overweight/obesity  related to Food and nutrition related knowledge deficit as evidenced by  BMI more than normative standard for age and sex (obesity-grade III 40+)        Nutrition Intervention    Nutrition Prescription  Calories: 1700  Protein: 120g  Fluid: 82oz    Meal Plan (Karri/Pro/Carb)  Breakfast: 400-450/35  Snack: 200/10  Lunch: 400-450/35  Snack: 200/10  Dinner: 400-450/35  Snack:    Nutrition Education:    Calorie controlled menu  Lean protein food choices  Healthy snack options  Food journaling tips      Nutrition Counseling:  Strategies: meal planning, portion sizes, healthy snack choices, hydration, fiber intake, protein intake, exercise, food journal      Monitoring and Evaluation:  Evaluation criteria:  Energy Intake  Meet protein needs  Maintain adequate hydration  Monitor weekly weight  Meal planning/preparation  Food journal   Decreased portions at mealtimes and snacks  Physical activity     Barriers to learning:none  Readiness to change: Contemplation:  (Acknowledging that there  is a problem but not yet ready or sure of wanting to make a change)  Comprehension: good  Expected Compliance: fair            Clindamycin Counseling: I counseled the patient regarding use of clindamycin as an antibiotic for prophylactic and/or therapeutic purposes. Clindamycin is active against numerous classes of bacteria, including skin bacteria. Side effects may include nausea, diarrhea, gastrointestinal upset, rash, hives, yeast infections, and in rare cases, colitis.

## 2025-02-14 PROBLEM — Z12.5 PROSTATE CANCER SCREENING: Status: RESOLVED | Noted: 2023-06-02 | Resolved: 2025-02-14

## 2025-02-19 ENCOUNTER — CLINICAL SUPPORT (OUTPATIENT)
Dept: BARIATRICS | Facility: CLINIC | Age: 70
End: 2025-02-19
Payer: COMMERCIAL

## 2025-02-19 VITALS — HEIGHT: 67 IN | BODY MASS INDEX: 42.52 KG/M2 | WEIGHT: 270.9 LBS

## 2025-02-19 DIAGNOSIS — R63.5 ABNORMAL WEIGHT GAIN: Primary | ICD-10-CM

## 2025-02-19 PROCEDURE — RECHECK

## 2025-02-19 PROCEDURE — WMDI30

## 2025-02-22 DIAGNOSIS — I10 ESSENTIAL HYPERTENSION: ICD-10-CM

## 2025-02-22 RX ORDER — AMLODIPINE BESYLATE 5 MG/1
5 TABLET ORAL
Qty: 90 TABLET | Refills: 1 | Status: SHIPPED | OUTPATIENT
Start: 2025-02-22

## 2025-02-26 ENCOUNTER — OFFICE VISIT (OUTPATIENT)
Dept: GASTROENTEROLOGY | Facility: MEDICAL CENTER | Age: 70
End: 2025-02-26
Payer: COMMERCIAL

## 2025-02-26 VITALS
SYSTOLIC BLOOD PRESSURE: 157 MMHG | BODY MASS INDEX: 42.5 KG/M2 | WEIGHT: 270.8 LBS | OXYGEN SATURATION: 99 % | HEIGHT: 67 IN | TEMPERATURE: 98.1 F | DIASTOLIC BLOOD PRESSURE: 80 MMHG | HEART RATE: 93 BPM

## 2025-02-26 DIAGNOSIS — Z86.0100 HISTORY OF COLON POLYPS: Primary | ICD-10-CM

## 2025-02-26 DIAGNOSIS — K74.02 ADVANCED HEPATIC FIBROSIS: ICD-10-CM

## 2025-02-26 DIAGNOSIS — Z80.0 FAMILY HISTORY OF COLON CANCER: ICD-10-CM

## 2025-02-26 PROCEDURE — 99214 OFFICE O/P EST MOD 30 MIN: CPT | Performed by: STUDENT IN AN ORGANIZED HEALTH CARE EDUCATION/TRAINING PROGRAM

## 2025-03-03 NOTE — PROGRESS NOTES
Name: Burak Smith      : 1955      MRN: [id number]  Encounter Provider: Young Schmidt MD  Encounter Date: 2025   Encounter department: [facility] [place]  :  Assessment & Plan  History of colon polyps  Patient had multiple adenomatous polyps (including advanced adenomas) and a large SSA on 10/2024 colonoscopy.     - Repeat colonoscopy in 1 year       Family history of colon cancer  Both sister and father had colon cancer in their 50's.       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 He follows with Dr. Funk in hepatology and is currently on Rezdiffra. He has continued to gain weight, and given his thrombocytopenia, splenomegaly, and concern for rectal varices on colonoscopy, I wonder if he has progressed to cirrhosis.     He will follow up with Dr. Funk in 2025 - defer further management to the hepatology clinic.           History of Present Illness   Burak Smith is a 69 y.o. male w/ hx of metALD with advanced fibrosis on Rezdiffra, HTN, class III obesity, FELIBERTO, PE on Eliquis and GERD who presents for follow-up after colonoscopy 10/2024.    Colonoscopy 10/2024 showed 8 TA (including several large ones) and 1 large SSA as well as possible rectal varices. Patient is feeling well after the colonoscopy and had no bleeding complications.    He has a family history of colon cancer in both his father (passed away in his 50's) and sister (dx in her 50's).    HPI    Review of Systems A complete review of systems is negative other than that noted above in the HPI.      Current Outpatient Medications   Medication Sig Dispense Refill    AMILoride 5 mg tablet TAKE 1 TABLET(5 MG) BY MOUTH DAILY 30 tablet 5    amLODIPine (NORVASC) 5 mg tablet Take 1 tablet (5 mg total) by mouth daily at bedtime 90 tablet 1    busPIRone (BUSPAR) 5 mg tablet TAKE 1 TABLET BY MOUTH TWICE DAILY 180 tablet 1    Cholecalciferol (Vitamin D3) 50 MCG ( UT) capsule Take 2,000  "Units by mouth daily      Eliquis 5 MG TAKE 1 TABLET(5 MG) BY MOUTH TWICE DAILY 180 tablet 1    folic acid (FOLVITE) 1 mg tablet Take 1 tablet (1,000 mcg total) by mouth daily 90 tablet 0    loratadine (CLARITIN) 10 mg tablet Take 10 mg by mouth      losartan (COZAAR) 50 mg tablet TAKE 1 TABLET(50 MG) BY MOUTH DAILY 90 tablet 1    omeprazole (PriLOSEC) 40 MG capsule TAKE 1 CAPSULE(40 MG) BY MOUTH DAILY 90 capsule 1    resmetirom (Rezdiffra) Take 1 tablet (100 mg total) by mouth daily 90 tablet 3     No current facility-administered medications for this visit.     Objective   /80 (Patient Position: Sitting, Cuff Size: Large)   Pulse 93   Temp 98.1 °F (36.7 °C) (Tympanic)   Ht 5' 6.5\" (1.689 m)   Wt 123 kg (270 lb 12.8 oz)   SpO2 99%   BMI 43.05 kg/m²     Physical Exam   General: No acute distress  Abdomen: Soft, non-tender, non-distended, normoactive bowel  Neuro: Awake, alert, oriented x 3      Lab Results: I personally reviewed relevant lab results.       Results for orders placed during the hospital encounter of 10/10/24    Colonoscopy    Narrative  Table formatting from the original result was not included.  [facility]  [address]  [facility] [address]  [phone]      DATE OF SERVICE:  10/10/24    PHYSICIAN(S):  Attending:  Young Schmidt MD    Fellow:  No Staff Documented      INDICATION:  Positive colorectal cancer screening using Cologuard test      POST-OP DIAGNOSIS:  See the impression below.    HISTORY:  Prior colonoscopy: Less than 3 years ago. It is being repeated at an interval of less than 3 years because: Patient has a high risk for colon cancer    BOWEL PREPARATION:  Clenpiq      PREPROCEDURE:  Informed consent was obtained for the procedure, including sedation. Risks including but not limited to bleeding, infection, perforation, adverse drug reaction and aspiration were explained in detail. Also explained about less than 100% sensitivity with the " exam and other alternatives. The patient was placed in the left lateral decubitus position.    Procedure: Colonoscopy    DETAILS OF PROCEDURE:  Patient was taken to the procedure room where a time out was performed to confirm correct patient and correct procedure. The patient underwent monitored anesthesia care, which was administered by an anesthesia professional. The patient's blood pressure, ECG, ETCO2, heart rate, level of consciousness, oxygen and respirations were monitored throughout the procedure. A digital rectal exam was performed. The scope was introduced through the anus and advanced to the cecum. Retroflexion was performed in the rectum. The quality of bowel preparation was evaluated using the Madison Bowel Preparation Scale with scores of: right colon = 2, transverse colon = 2, left colon = 2. The total BBPS score was 6. Bowel prep was adequate. The patient experienced no blood loss. The procedure was not difficult. The patient tolerated the procedure well. There were no apparent adverse events.      ANESTHESIA INFORMATION:  ASA: III  Anesthesia Type: IV Sedation with Anesthesia    MEDICATIONS:  sodium chloride 0.9 % infusion 800 mL*  *From user-documented volume  (Totals for administrations occurring from 0810 to 0903 on 10/10/24)        FINDINGS:  Two sessile polyps measuring from 4 mm up to 8 mm in the cecum; performed cold snare with complete en bloc removal and retrieved specimen  One 13 mm sessile polyp in the ascending colon; performed cold snare with complete piecemeal removal and retrieved specimen  Four sessile polyps measuring from 3 mm up to 10 mm in the ascending colon; performed cold snare with complete en bloc removal and retrieved specimen  Two sessile polyps measuring from 8 mm up to 10 mm in the transverse colon; performed cold snare with complete en bloc removal and retrieved specimen  One 8 mm sessile and hyperplastic polyp in the rectum; performed cold snare with complete en bloc  removal and retrieved specimen  Multiple varices (no red jose miguel sign) in the rectum; no bleeding was identified. Possible rectal varices      EVENTS:  Procedure Events  Event Event Time  ENDO CECUM REACHED 10/10/2024  8:29 AM  ENDO SCOPE OUT TIME 10/10/2024  9:02 AM      SPECIMENS:  ID Type Source Tests Collected by Time Destination  1 : Cold Snare Cecal Polyps x 2 Tissue Large Intestine, Cecum TISSUE EXAM Young Schmidt MD 10/10/2024  8:31 AM  2 : Cold Snare Ascending Polyps x 5 Tissue Large Intestine, Right/Ascending Colon TISSUE EXAM Young Schmidt MD 10/10/2024  8:35 AM  3 : Cold Snare Transverse Polyps x 2 Tissue Large Intestine, Transverse Colon TISSUE EXAM Young Schmidt MD 10/10/2024  8:50 AM  4 : Cold Snare Rectal Polyp Tissue Rectum TISSUE EXAM Young Schmidt MD 10/10/2024  9:01 AM      EQUIPMENT:  Colonoscope -          Impression  10 polyps removed, several larger than 1 cm  Multiple varices in the rectum        RECOMMENDATION:  Repeat colonoscopy in 1 year, due: 10/10/2025  Personal history of colon polyps  Family history of colon cancer    Resume Eliquis in 2 days (10/12/24)            Young Schmidt MD

## 2025-03-03 NOTE — ASSESSMENT & PLAN NOTE
Patient had multiple adenomatous polyps (including advanced adenomas) and a large SSA on 10/2024 colonoscopy.     - Repeat colonoscopy in 1 year

## 2025-03-31 DIAGNOSIS — I10 ESSENTIAL HYPERTENSION: ICD-10-CM

## 2025-04-01 RX ORDER — LOSARTAN POTASSIUM 50 MG/1
50 TABLET ORAL DAILY
Qty: 90 TABLET | Refills: 1 | Status: SHIPPED | OUTPATIENT
Start: 2025-04-01

## 2025-04-14 NOTE — PROGRESS NOTES
Weight Management Medical Nutrition Assessment  Burak presented for a meal planning session. Presents with wife at today's visit. Today's weight is 268#. He lost 2.9# x 4 weeks. Burak is walking daily for 1.5 hours and doing much better with water intake. He starts his day with 20oz at breakfast and replaced night snack with 28oz water. Addressed his wife's questions related to portion sizes and lean proteins. She is concerned he is not eating enough with meals. RD encouraged snacks in between meals now that he is walking daily. Will f/u in 8 weeks.    Great work!    Patient seen by Medical Provider in past 6 months:  no  Requested to schedule appointment with Medical Provider: No      Anthropometric Measurements  Start Weight (#) & Date: 259.9# 11/22/2024  Current Weight (#): 268#  TBW % Change from start weight: -  Ideal Body Weight (#): 145#  Goal Weight (#): no specific goal weight  Highest: 290#  Lowest: 240#    Weight Loss History  Previous weight loss attempts: no previous attempts    Food and Nutrition Related History  Wake up: 6-6:30AM   Bed Time: 8-9AM   Uses CPAP at night.    Food Recall  Breakfast: 7AM 2 eggs + 1 slice Rye toast (cooks using Eva spray)   1 cup black coffee   Snack: 10AM Premier protein shake   Lunch: 1PM salad ([employer] chicken, lettuce, cucumbers, green peppers, tomatoes, Kraft fat-free dressing)  Snack: 3PM pistachios (unmeasured, grabs a handful)  Dinner: 5PM 3-6oz pork or chicken + 1/2 cup instant mashed potatoes or noodles + 1 cup vegetable (green beans, carrots, peas, broccoli, cauliflower)  Snack: skip (drinks 28oz water)    Beverages: 8oz black coffee, >100oz water, will sip on Gatorade, no alcohol   Volume of beverage intake: adequate     Weekends: Same  Cravings: chips, dip, sweets   Trouble area of day: not hungry throughout the day    Frequency of Eating out: once every 3 months  Food restrictions: allergy to citrus fruit  Cooking: wife  Food Shopping: self and wife    Physical  Activity Intake  Activity: walking 1.5 hours   Frequency: daily  Physical limitations/barriers to exercise: plantar fascitis, tendonitis     Estimated Needs   Energy  SECA: BMR: n/a      X 1.3 -1000 =  Chris Arrington Energy Needs: BMR : 1936   1-2# loss weekly sedentary:  6465-6215            1-2# loss weekly lightly active: 6023-5272  Maintenance calories for sedentary activity level: 2324  Protein: 84-105g      (1.2-1.5g/kg IBW)  Fluid Requirement Calculator   Total Fluid: 119   oz  (Huang-Segar Method)  Free Fluid: 95 oz (Huang-Segar Method - 20%)    Nutrition Diagnosis  Yes;    Overweight/obesity  related to Food and nutrition related knowledge deficit as evidenced by  BMI more than normative standard for age and sex (obesity-grade III 40+)        Nutrition Intervention    Nutrition Prescription  Calories: 1700  Protein: 120g  Fluid: 82oz    Meal Plan (Karri/Pro/Carb)  Breakfast: 400-450/35  Snack: 200/10  Lunch: 400-450/35  Snack: 200/10  Dinner: 400-450/35  Snack:    Nutrition Education:    Calorie controlled menu  Lean protein food choices  Healthy snack options  Food journaling tips      Nutrition Counseling:  Strategies: meal planning, portion sizes, healthy snack choices, hydration, fiber intake, protein intake, exercise, food journal      Monitoring and Evaluation:  Evaluation criteria:  Energy Intake  Meet protein needs  Maintain adequate hydration  Monitor weekly weight  Meal planning/preparation  Food journal   Decreased portions at mealtimes and snacks  Physical activity     Barriers to learning:none  Readiness to change: Action:  (Changing behavior)  Comprehension: good  Expected Compliance: fair

## 2025-04-23 ENCOUNTER — CLINICAL SUPPORT (OUTPATIENT)
Dept: BARIATRICS | Facility: CLINIC | Age: 70
End: 2025-04-23

## 2025-04-23 VITALS — WEIGHT: 268 LBS | BODY MASS INDEX: 42.06 KG/M2 | HEIGHT: 67 IN

## 2025-04-23 DIAGNOSIS — R63.5 ABNORMAL WEIGHT GAIN: Primary | ICD-10-CM

## 2025-04-23 PROCEDURE — RECHECK

## 2025-04-23 PROCEDURE — WMDI30

## 2025-04-28 DIAGNOSIS — F41.9 ANXIETY: ICD-10-CM

## 2025-04-29 DIAGNOSIS — F41.9 ANXIETY: ICD-10-CM

## 2025-04-29 RX ORDER — FOLIC ACID 1 MG/1
1000 TABLET ORAL DAILY
Qty: 90 TABLET | Refills: 1 | Status: SHIPPED | OUTPATIENT
Start: 2025-04-29

## 2025-04-30 RX ORDER — FOLIC ACID 1 MG/1
1000 TABLET ORAL DAILY
Qty: 90 TABLET | Refills: 0 | OUTPATIENT
Start: 2025-04-30

## 2025-05-16 ENCOUNTER — TELEPHONE (OUTPATIENT)
Age: 70
End: 2025-05-16

## 2025-05-16 NOTE — TELEPHONE ENCOUNTER
Patients GI provider:  Dr. Funk    Number to return call: [phone]    Reason for call: Pt accidentally cxl'ed his Hep appt on 05.20.25 and by the time he realized and called back, it was already booked to someone else and next available is not til December. Pt is taking rezdiffra and says he needs to be seen sooner. Cassie placed him on a WL and let him know I would reach out to the Dr's team to see what is advised

## 2025-05-25 DIAGNOSIS — I10 ESSENTIAL HYPERTENSION: ICD-10-CM

## 2025-05-25 DIAGNOSIS — R60.9 EDEMA, UNSPECIFIED TYPE: ICD-10-CM

## 2025-05-26 RX ORDER — AMLODIPINE BESYLATE 5 MG/1
5 TABLET ORAL
Qty: 90 TABLET | Refills: 1 | Status: SHIPPED | OUTPATIENT
Start: 2025-05-26

## 2025-05-26 RX ORDER — AMILORIDE HYDROCHLORIDE 5 MG/1
5 TABLET ORAL DAILY
Qty: 30 TABLET | Refills: 5 | Status: SHIPPED | OUTPATIENT
Start: 2025-05-26

## 2025-06-19 ENCOUNTER — TELEPHONE (OUTPATIENT)
Dept: BARIATRICS | Facility: CLINIC | Age: 70
End: 2025-06-19

## 2025-07-07 DIAGNOSIS — I26.99 PULMONARY EMBOLISM, OTHER, UNSPECIFIED CHRONICITY, UNSPECIFIED WHETHER ACUTE COR PULMONALE PRESENT (HCC): ICD-10-CM

## 2025-07-07 DIAGNOSIS — I10 ESSENTIAL HYPERTENSION: ICD-10-CM

## 2025-07-08 RX ORDER — APIXABAN 5 MG/1
5 TABLET, FILM COATED ORAL 2 TIMES DAILY
Qty: 180 TABLET | Refills: 1 | Status: SHIPPED | OUTPATIENT
Start: 2025-07-08

## 2025-07-08 RX ORDER — LOSARTAN POTASSIUM 50 MG/1
50 TABLET ORAL DAILY
Qty: 90 TABLET | Refills: 1 | Status: SHIPPED | OUTPATIENT
Start: 2025-07-08

## 2025-07-18 ENCOUNTER — OFFICE VISIT (OUTPATIENT)
Age: 70
End: 2025-07-18
Payer: COMMERCIAL

## 2025-07-18 VITALS
HEART RATE: 89 BPM | SYSTOLIC BLOOD PRESSURE: 135 MMHG | BODY MASS INDEX: 40.38 KG/M2 | TEMPERATURE: 98.3 F | HEIGHT: 68 IN | WEIGHT: 266.4 LBS | OXYGEN SATURATION: 99 % | DIASTOLIC BLOOD PRESSURE: 72 MMHG

## 2025-07-18 DIAGNOSIS — R60.9 EDEMA, UNSPECIFIED TYPE: ICD-10-CM

## 2025-07-18 DIAGNOSIS — H65.23 BILATERAL CHRONIC SEROUS OTITIS MEDIA: Primary | ICD-10-CM

## 2025-07-18 DIAGNOSIS — Z00.00 MEDICARE ANNUAL WELLNESS VISIT, SUBSEQUENT: ICD-10-CM

## 2025-07-18 DIAGNOSIS — I10 ESSENTIAL HYPERTENSION: ICD-10-CM

## 2025-07-18 PROCEDURE — G2211 COMPLEX E/M VISIT ADD ON: HCPCS | Performed by: FAMILY MEDICINE

## 2025-07-18 PROCEDURE — 99214 OFFICE O/P EST MOD 30 MIN: CPT | Performed by: FAMILY MEDICINE

## 2025-07-18 PROCEDURE — G0439 PPPS, SUBSEQ VISIT: HCPCS | Performed by: FAMILY MEDICINE

## 2025-07-18 RX ORDER — FLUTICASONE PROPIONATE 50 MCG
2 SPRAY, SUSPENSION (ML) NASAL DAILY
Qty: 15.8 ML | Refills: 1 | Status: SHIPPED | OUTPATIENT
Start: 2025-07-18 | End: 2025-07-18 | Stop reason: SDUPTHER

## 2025-07-18 RX ORDER — AMILORIDE HYDROCHLORIDE 5 MG/1
5 TABLET ORAL DAILY
Qty: 90 TABLET | Refills: 1 | Status: SHIPPED | OUTPATIENT
Start: 2025-07-18 | End: 2025-07-18 | Stop reason: SDUPTHER

## 2025-07-18 RX ORDER — FLUTICASONE PROPIONATE 50 MCG
2 SPRAY, SUSPENSION (ML) NASAL DAILY
Qty: 15.8 ML | Refills: 1 | Status: SHIPPED | OUTPATIENT
Start: 2025-07-18

## 2025-07-18 RX ORDER — AMILORIDE HYDROCHLORIDE 5 MG/1
5 TABLET ORAL DAILY
Qty: 90 TABLET | Refills: 1 | Status: SHIPPED | OUTPATIENT
Start: 2025-07-18

## 2025-07-18 NOTE — PROGRESS NOTES
Name: Burak Smith      : 1955      MRN: [id number]  Encounter Provider: Calvin Elizondo MD  Encounter Date: 2025   Encounter department: [facility]  :  Assessment & Plan  Edema, unspecified type  Stable, continue meds. Encourage LE compression Discussed supportive care and return parameters.   Orders:    AMILoride 5 mg tablet; Take 1 tablet (5 mg total) by mouth daily    Bilateral chronic serous otitis media  Add flonase, Discussed supportive care and return parameters.   Orders:    fluticasone (FLONASE) 50 mcg/act nasal spray; 2 sprays into each nostril daily    Medicare annual wellness visit, subsequent  Medicare Annual well visit. Discussed various safety and health maintenance issues including healthy diet like the Mediterranean diet, exercise, ample sleep, stress reduction, and healthy weight as tolerated. Discussed supportive care and return parameters.        Essential hypertension  Stable, continue meds. Discussed supportive care and return parameters.           Preventive health issues were discussed with patient, and age appropriate screening tests were ordered as noted in patient's After Visit Summary. Personalized health advice and appropriate referrals for health education or preventive services given if needed, as noted in patient's After Visit Summary.    History of Present Illness     Patient is a 69 y/o male who presents for follow-up on edema, HTN and serous TM effusion pt admits being otherwise stable on meds. And denies acute complaints no fevers chills nausea or vomiting.        Patient Care Team:  Calvin Elizondo MD as PCP - General (Family Medicine)  PRAFUL Sandhu as Nurse Practitioner (Gastroenterology)  Bertha Gaona PA-C (Gastroenterology)    Review of Systems   Constitutional: Negative.    HENT: Negative.     Eyes: Negative.    Respiratory: Negative.     Cardiovascular: Negative.    Gastrointestinal: Negative.     Endocrine: Negative.    Genitourinary: Negative.    Musculoskeletal: Negative.    Allergic/Immunologic: Negative.    Neurological: Negative.    Hematological: Negative.    Psychiatric/Behavioral: Negative.     All other systems reviewed and are negative.    Medical History Reviewed by provider this encounter:  Tobacco  Allergies  Meds  Problems  Med Hx  Surg Hx  Fam Hx       Annual Wellness Visit Questionnaire   Burak is here for his Subsequent Wellness visit. Last Medicare Wellness visit information reviewed, patient interviewed and updates made to the record today.      Health Risk Assessment:   Patient rates overall health as good. Patient feels that their physical health rating is same. Patient is satisfied with their life. Eyesight was rated as same. Hearing was rated as same. Patient feels that their emotional and mental health rating is same. Patients states they are never, rarely angry. Patient states they are sometimes unusually tired/fatigued. Pain experienced in the last 7 days has been none. Patient states that he has experienced no weight loss or gain in last 6 months.     Depression Screening:   PHQ-2 Score: 0      Fall Risk Screening:   In the past year, patient has experienced: no history of falling in past year      Home Safety:  Patient does not have trouble with stairs inside or outside of their home. Patient has working smoke alarms and has working carbon monoxide detector. Home safety hazards include: none.     Nutrition:   Current diet is Regular.     Medications:   Patient is not currently taking any over-the-counter supplements. Patient is able to manage medications.     Activities of Daily Living (ADLs)/Instrumental Activities of Daily Living (IADLs):   Walk and transfer into and out of bed and chair?: Yes  Dress and groom yourself?: Yes    Bathe or shower yourself?: Yes    Feed yourself? Yes  Do your laundry/housekeeping?: Yes  Manage your money, pay your bills and track your  expenses?: Yes  Make your own meals?: Yes    Do your own shopping?: Yes    Previous Hospitalizations:   Any hospitalizations or ED visits within the last 12 months?: No      Advance Care Planning:   Living will: No    Durable POA for healthcare: No    Advanced directive: No      Cognitive Screening:   Provider or family/friend/caregiver concerned regarding cognition?: No    Preventive Screenings      Cardiovascular Screening:    General: Screening Not Indicated and History Lipid Disorder      Diabetes Screening:     General: Screening Current      Colorectal Cancer Screening:     General: Screening Current      Prostate Cancer Screening:    General: Risks and Benefits Discussed    Due for: PSA      Osteoporosis Screening:    General: Screening Not Indicated      Abdominal Aortic Aneurysm (AAA) Screening:    Risk factors include: age between 65-74 yo and tobacco use        General: Screening Not Indicated      Lung Cancer Screening:     General: Screening Not Indicated      Hepatitis C Screening:    General: Screening Current    Screening, Brief Intervention, and Referral to Treatment (SBIRT)     Screening  Typical number of drinks in a day: 0  Typical number of drinks in a week: 0  Interpretation: Low risk drinking behavior.    AUDIT-C Screenin) How often did you have a drink containing alcohol in the past year? monthly or less  2) How many drinks did you have on a typical day when you were drinking in the past year? 0  3) How often did you have 6 or more drinks on one occasion in the past year? never    AUDIT-C Score: 1  Interpretation: Score 0-3 (male): Negative screen for alcohol misuse    Single Item Drug Screening:  How often have you used an illegal drug (including marijuana) or a prescription medication for non-medical reasons in the past year? never    Single Item Drug Screen Score: 0  Interpretation: Negative screen for possible drug use disorder    Social Drivers of Health     Food Insecurity: No Food  "Insecurity (7/18/2025)    Nursing - Inadequate Food Risk Classification     Worried About Running Out of Food in the Last Year: Never true     Ran Out of Food in the Last Year: Never true   Transportation Needs: No Transportation Needs (7/18/2025)    PRAPARE - Transportation     Lack of Transportation (Medical): No     Lack of Transportation (Non-Medical): No   Housing Stability: Low Risk  (7/18/2025)    Housing Stability Vital Sign     Unable to Pay for Housing in the Last Year: No     Number of Times Moved in the Last Year: 0     Homeless in the Last Year: No   Utilities: Not At Risk (7/18/2025)    [facility]     Threatened with loss of utilities: No     No results found.    Objective   /72   Pulse 89   Temp 98.3 °F (36.8 °C) (Temporal)   Ht 5' 8\" (1.727 m)   Wt 121 kg (266 lb 6.4 oz)   SpO2 99%   BMI 40.51 kg/m²     Physical Exam  Vitals reviewed.   Constitutional:       General: He is not in acute distress.     Appearance: He is well-developed. He is not diaphoretic.   HENT:      Head: Normocephalic and atraumatic.      Right Ear: External ear normal.      Left Ear: External ear normal.      Nose: Nose normal.     Eyes:      General: No scleral icterus.        Right eye: No discharge.         Left eye: No discharge.      Conjunctiva/sclera: Conjunctivae normal.      Pupils: Pupils are equal, round, and reactive to light.     Neck:      Thyroid: No thyromegaly.      Trachea: No tracheal deviation.     Cardiovascular:      Rate and Rhythm: Normal rate and regular rhythm.      Heart sounds: Normal heart sounds. No murmur heard.     No friction rub.   Pulmonary:      Effort: Pulmonary effort is normal. No respiratory distress.      Breath sounds: Normal breath sounds. No stridor. No wheezing or rales.   Abdominal:      General: There is no distension.      Palpations: Abdomen is soft. There is no mass.      Tenderness: There is no abdominal tenderness. There is no guarding or rebound. "     Musculoskeletal:         General: Normal range of motion.      Cervical back: Normal range of motion and neck supple.   Lymphadenopathy:      Cervical: No cervical adenopathy.     Skin:     General: Skin is warm.     Neurological:      Mental Status: He is alert and oriented to person, place, and time.      Cranial Nerves: No cranial nerve deficit.     Psychiatric:         Behavior: Behavior normal.         Thought Content: Thought content normal.         Judgment: Judgment normal.
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
## 2025-07-21 ENCOUNTER — OFFICE VISIT (OUTPATIENT)
Dept: GASTROENTEROLOGY | Facility: AMBULARY SURGERY CENTER | Age: 70
End: 2025-07-21
Payer: COMMERCIAL

## 2025-07-21 ENCOUNTER — TELEPHONE (OUTPATIENT)
Dept: GASTROENTEROLOGY | Facility: AMBULARY SURGERY CENTER | Age: 70
End: 2025-07-21

## 2025-07-21 VITALS
DIASTOLIC BLOOD PRESSURE: 80 MMHG | WEIGHT: 267.8 LBS | BODY MASS INDEX: 40.59 KG/M2 | HEART RATE: 95 BPM | SYSTOLIC BLOOD PRESSURE: 144 MMHG | HEIGHT: 68 IN | OXYGEN SATURATION: 96 %

## 2025-07-21 DIAGNOSIS — K76.0 METALD: Primary | ICD-10-CM

## 2025-07-21 DIAGNOSIS — F10.90 METALD: Primary | ICD-10-CM

## 2025-07-21 DIAGNOSIS — K74.02 ADVANCED HEPATIC FIBROSIS: ICD-10-CM

## 2025-07-21 DIAGNOSIS — G47.33 OSA ON CPAP: ICD-10-CM

## 2025-07-21 DIAGNOSIS — K75.81 METABOLIC DYSFUNCTION-ASSOCIATED STEATOHEPATITIS (MASH): ICD-10-CM

## 2025-07-21 DIAGNOSIS — R74.8 ELEVATED LIVER ENZYMES: ICD-10-CM

## 2025-07-21 PROCEDURE — G2211 COMPLEX E/M VISIT ADD ON: HCPCS | Performed by: FAMILY MEDICINE

## 2025-07-21 PROCEDURE — 99213 OFFICE O/P EST LOW 20 MIN: CPT | Performed by: FAMILY MEDICINE

## 2025-07-21 RX ORDER — TIRZEPATIDE 2.5 MG/.5ML
2.5 INJECTION, SOLUTION SUBCUTANEOUS WEEKLY
Qty: 2 ML | Refills: 0 | Status: SHIPPED | OUTPATIENT
Start: 2025-07-21 | End: 2025-08-12

## 2025-07-21 NOTE — TELEPHONE ENCOUNTER
Completed patient assistance form for Rezdiffra. Please, start PA. Thank you!   Repeat test 01/2019, normal. Continue to monitor, he had started some supplement

## 2025-07-21 NOTE — TELEPHONE ENCOUNTER
Please see other encounter back in Jan 2025    PA for rezdiffra APPROVED      Date(s) approved until 12/31/2025     Approval letter in media on 1/17/2025    Thank you

## 2025-07-21 NOTE — ASSESSMENT & PLAN NOTE
Will try for Zepbound given BMI 40.72 and dx of FELIBERTO on CPAP.   No personal hx of pancreatitis. No FH of thyroid cancer or MEN2.   Reviewed authorization process, administration instructions and potential medication side-effects.     Orders:  •  tirzepatide (Zepbound) 2.5 mg/0.5 mL auto-injector; Inject 0.5 mL (2.5 mg total) under the skin once a week for 4 doses    Follow-up in 6 months or sooner if necessary.

## 2025-07-21 NOTE — PROGRESS NOTES
Name: Burak Smith      : 1955      MRN: [id number]  Encounter Provider: Kayleigh Chacon PA-C  Encounter Date: 2025   Encounter department: [facility]  :  Assessment & Plan  MetALD  Metabolic dysfunction-associated steatohepatitis (MASH)  Advanced hepatic fibrosis  Elevated liver enzymes  Patient with a longstanding history of elevated serum transaminases found to have both hepatic steatosis and splenomegaly on imaging. Etiology was felt to be MetALD given both his metabolic risk factors and hx of heavy EtOH use. This was suported by an unremarkable secondary serologic workup - aside from a significantly elevated ferritin (>1000) for which subsequent HFE testing was negative/normal.      He ultimately underwent a liver biopsy  in 2022 which showed steatohepatitis with moderate centrilobular pericellular and periportal fibrosis. There was mild accumulation in the Kupffer cells and hepatocytes without extensive hepatocellular siderosis to suggest HH. He more recently had a US elastography (10/2024) with F3 fibrosis (IQR 24.4%).    He has been sober for nearly 1 year and was started on Rezdiffra in January. He has been tolerating the medication well without side effects. He has not had updated liver chemistries since starting Rezdiffra. He was doing well with weight loss but was seen by weight management and advised to increase his caloric intake which he attributes to his 15 lb weight gain. He has increased his efforts towards dietary/lifestyle modification and is also interested in GLP therapy. He carries a diagnosis of FELIBERTO and will try for an Rx for Zepbound (refer to A/P below).    Furthermore, he was seen by Dr. Schmidt in 2025 and it was mentioned that there is concern for underlying cirrhosis given thrombocytopenia, splenomegaly and rectal varices seen on colonoscopy.  Will TBW with attending to determine if patient should continue with  Rezdiffra given these concerns.    He is otherwise aware of the basic pathophysiology of fatty liver disease, potential to progress to cirrhosis if left untreated and recommendations for treatment including setting sustainable weight loss, optimization of his metabolic risk factors and sustained EtOH abstinence.  He is due for updated MELD labs now.  He will be due for an updated US elastography in January 2026 or approximately 1 year after starting Rezdiffra.    Orders:  •  tirzepatide (Zepbound) 2.5 mg/0.5 mL auto-injector; Inject 0.5 mL (2.5 mg total) under the skin once a week for 4 doses  •  CBC and differential; Future  •  Comprehensive metabolic panel; Future  •  Protime-INR; Future    BMI 40.0-44.9, adult (HCC)  EFLIBERTO on CPAP  Will try for Zepbound given BMI 40.72 and dx of FELIBERTO on CPAP.   No personal hx of pancreatitis. No FH of thyroid cancer or MEN2.   Reviewed authorization process, administration instructions and potential medication side-effects.     Orders:  •  tirzepatide (Zepbound) 2.5 mg/0.5 mL auto-injector; Inject 0.5 mL (2.5 mg total) under the skin once a week for 4 doses    Follow-up in 6 months or sooner if necessary.       History of Present Illness   HPI    Burak Smith is a 70 y.o. male who with PMH significant for obesity, HTN, FELIBERTO, PE on Eliquis and GERD who presents today for follow-up regarding fatty liver with advanced hepatic fibrosis on Rezdiffra.    Interval history  - Last seen 11/5/2024 by Dr. Funk.  - Started on Rezdiffra in early January. Denies side-effects.   - He has not had updated liver chemistries since starting Rezdiffra.  - He has gained 15 lbs since his last appointment.   - He has been sober for nearly 1 year.     Extended liver history  He has had longstanding elevations in serum transaminases and was noted to have hepatic steatosis on imaging with splenomegaly. Serologic work-up showed iron overload with ferritin >1000 but HFE testing was negative. Etiology was felt to  be Met-ALD but he did undergo therapeutic phlebotomy for unclear reasons with improvement of ferritin to below 100. He has not had a phlebotomy since August 2023.      He did undergo a liver biopsy in September 2022 which showed steatohepatitis with moderate centrilobular pericellular and periportal fibrosis. There was mild accumulation in the Kupffer cells and hepatocytes without extensive hepatocellular siderosis to suggest HH.      It was recommended he work on weight loss and avoid EtOH which he reports stopping 6 months ago. He had a recent US which showed moderate enlargement with diffuse steatosis and splenomegaly and US elastography did show LSM 10.1 kPA with IQR 24.4 suggestive of F3 disease.       History obtained from: patient    Review of Systems   All other systems reviewed and are negative.    Pertinent Medical History     Medical History Reviewed by provider this encounter:  Tobacco  Allergies  Meds  Problems  Med Hx  Surg Hx  Fam Hx     .  Past Medical History   Past Medical History[1]  Past Surgical History[1]  Family History[1]   reports that he has quit smoking. He has been exposed to tobacco smoke. He has never used smokeless tobacco. He reports that he does not currently use alcohol. He reports that he does not use drugs.  Current Outpatient Medications   Medication Instructions   • AMILoride 5 mg, Oral, Daily   • amLODIPine (NORVASC) 5 mg, Oral, Daily at bedtime   • busPIRone (BUSPAR) 5 mg, Oral, 2 times daily   • Eliquis 5 mg, Oral, 2 times daily   • fluticasone (FLONASE) 50 mcg/act nasal spray 2 sprays, Nasal, Daily   • folic acid (FOLVITE) 1,000 mcg, Oral, Daily   • loratadine (CLARITIN) 10 mg   • losartan (COZAAR) 50 mg, Oral, Daily   • omeprazole (PRILOSEC) 40 mg, Oral, Daily   • Rezdiffra 100 mg, Oral, Daily   • Vitamin D3 2,000 Units, Daily   • Zepbound 2.5 mg, Subcutaneous, Weekly   Allergies[1]   Medications Ordered Prior to Encounter[1]   Social History[1]     Objective   BP  "144/80 (BP Location: Left arm, Patient Position: Sitting, Cuff Size: Standard)   Pulse 95   Ht 5' 8\" (1.727 m)   Wt 121 kg (267 lb 12.8 oz)   SpO2 96%   BMI 40.72 kg/m²      Physical Exam  Vitals and nursing note reviewed.   Constitutional:       General: He is not in acute distress.     Appearance: Normal appearance. He is well-developed. He is not ill-appearing or toxic-appearing.   HENT:      Head: Normocephalic and atraumatic.     Eyes:      General: No scleral icterus.     Conjunctiva/sclera: Conjunctivae normal.     Pulmonary:      Effort: Pulmonary effort is normal. No respiratory distress.   Abdominal:      General: There is no distension.      Palpations: Abdomen is soft. There is no mass.      Tenderness: There is no abdominal tenderness.     Musculoskeletal:      Right lower leg: No edema.      Left lower leg: No edema.     Skin:     General: Skin is warm and dry.      Coloration: Skin is not jaundiced.      Findings: No bruising or rash.     Neurological:      Mental Status: He is alert and oriented to person, place, and time. Mental status is at baseline.     Psychiatric:         Mood and Affect: Mood normal.         Behavior: Behavior normal.                  [1]  Past Medical History:  Diagnosis Date   • Allergic    • Anxiety    • Arthritis    • Broken ribs    • Depression    • Elevated ferritin level    • GERD (gastroesophageal reflux disease)    • Hypertension    • Obesity    • Obstructive sleep apnea    [1]  Past Surgical History:  Procedure Laterality Date   • COLONOSCOPY     • IR BIOPSY LIVER RANDOM  09/22/2022   • REPLACEMENT TOTAL KNEE Bilateral    [1]  Family History  Problem Relation Name Age of Onset   • Colon cancer Father Raul galloway         GI related   [1]  Allergies  Allergen Reactions   • Ciprofloxacin Vomiting   • Flagyl [Metronidazole] Vomiting   • Other Other (See Comments)     Seasonal- Running nose   [1]  Current Outpatient Medications on File Prior to Visit   Medication Sig " Dispense Refill   • AMILoride 5 mg tablet Take 1 tablet (5 mg total) by mouth daily 90 tablet 1   • amLODIPine (NORVASC) 5 mg tablet TAKE 1 TABLET(5 MG) BY MOUTH DAILY AT BEDTIME 90 tablet 1   • busPIRone (BUSPAR) 5 mg tablet TAKE 1 TABLET BY MOUTH TWICE DAILY 180 tablet 1   • Cholecalciferol (Vitamin D3) 50 MCG (2000 UT) capsule Take 2,000 Units by mouth in the morning.     • Eliquis 5 MG TAKE 1 TABLET(5 MG) BY MOUTH TWICE DAILY 180 tablet 1   • fluticasone (FLONASE) 50 mcg/act nasal spray 2 sprays into each nostril daily 15.8 mL 1   • folic acid (FOLVITE) 1 mg tablet TAKE 1 TABLET BY MOUTH EVERY DAY 90 tablet 1   • loratadine (CLARITIN) 10 mg tablet Take 10 mg by mouth     • losartan (COZAAR) 50 mg tablet TAKE 1 TABLET(50 MG) BY MOUTH DAILY 90 tablet 1   • omeprazole (PriLOSEC) 40 MG capsule TAKE 1 CAPSULE(40 MG) BY MOUTH DAILY 90 capsule 1   • resmetirom (Rezdiffra) Take 1 tablet (100 mg total) by mouth daily 90 tablet 3     No current facility-administered medications on file prior to visit.   [1]  Social History  Tobacco Use   • Smoking status: Former     Passive exposure: Past   • Smokeless tobacco: Never   Vaping Use   • Vaping status: Never Used   Substance and Sexual Activity   • Alcohol use: Not Currently     Comment: occassionally   • Drug use: Never   • Sexual activity: Not Currently     Partners: Female     Birth control/protection: None

## 2025-07-22 ENCOUNTER — TELEPHONE (OUTPATIENT)
Dept: GASTROENTEROLOGY | Facility: AMBULARY SURGERY CENTER | Age: 70
End: 2025-07-22

## 2025-07-22 RX ORDER — TIRZEPATIDE 2.5 MG/.5ML
2.5 INJECTION, SOLUTION SUBCUTANEOUS WEEKLY
Refills: 0 | OUTPATIENT
Start: 2025-07-22 | End: 2025-08-13

## 2025-07-22 NOTE — TELEPHONE ENCOUNTER
LMOM advising pt of approval.     His wife gave me completed form at [facility] yesterday with Kayleigh which is why we started PA.

## 2025-07-22 NOTE — TELEPHONE ENCOUNTER
Forwarding to PA team for review.     Changes Requested     Name from pharmacy: ZEPBOUND 2.5 MG/0.5 ML PEN         Will file in chart as: Zepbound 2.5 MG/0.5ML auto-injector    Sig: Inject 0.5 mL (2.5 mg total) under the skin once a week for 4 doses    Disp: Not specified (Pharmacy requested: 2 each)    Refills: 0    Start: 7/21/2025    Class: Normal    Non-formulary For: Metabolic dysfunction-associated steatohepatitis (MASH); Advanced hepatic fibrosis; BMI 40.0-44.9, adult (HCC); FELIBERTO on CPAP    Last ordered: Yesterday (7/21/2025) by Kayleigh Chacon PA-C    Last refill: 7/21/2025    Rx #: [id number]    Pharmacy comment: Alternative Requested:NOT ON FORMULARY. PRIOR AUTH NEEDED.    Endocrinology:  Diabetes - GLP-1 Receptor Agonists [id number]/21/2025 03:42 PM   Protocol Details Valid encounter within last 6 months   Health [facility] Embedded [id number]/21/2025 03:42 PM   This is a duplicate request of medication ordered 2025-07-21. Check to see if the patient is requesting a refill from a new pharmacy.      This request has changes from the previous prescription.   To be filled at: [facility]/pharmacy #[id number] - [place], [place] - [address]

## 2025-07-23 NOTE — TELEPHONE ENCOUNTER
LMOM stated his medication was already approved, any pt assistance goes through his GI office, gave phone number.

## 2025-07-23 NOTE — TELEPHONE ENCOUNTER
Returned call to pt wife and advised assistance form was [facility] and sent to [place]

## 2025-07-23 NOTE — TELEPHONE ENCOUNTER
PA for Zepbound 2.5MG/0.5ML pen-injectors    SUBMITTED to [facility]    via    [x]CMM-KEY: [id number]    [x]PA sent as URGENT    All office notes, labs and other pertaining documents and studies sent. Clinical questions answered. Awaiting determination from insurance company.     Turnaround time for your insurance to make a decision on your Prior Authorization can take 7-21 business days.

## 2025-07-27 DIAGNOSIS — K74.02 ADVANCED HEPATIC FIBROSIS: ICD-10-CM

## 2025-07-27 DIAGNOSIS — K75.81 METABOLIC DYSFUNCTION-ASSOCIATED STEATOHEPATITIS (MASH): Primary | ICD-10-CM

## 2025-07-28 ENCOUNTER — TELEPHONE (OUTPATIENT)
Age: 70
End: 2025-07-28

## 2025-07-31 ENCOUNTER — APPOINTMENT (OUTPATIENT)
Age: 70
End: 2025-07-31
Payer: COMMERCIAL

## 2025-07-31 DIAGNOSIS — I26.99 PULMONARY EMBOLISM WITHOUT ACUTE COR PULMONALE, UNSPECIFIED CHRONICITY, UNSPECIFIED PULMONARY EMBOLISM TYPE (HCC): ICD-10-CM

## 2025-07-31 DIAGNOSIS — R74.8 ELEVATED LIVER ENZYMES: ICD-10-CM

## 2025-07-31 DIAGNOSIS — Z83.430 FAMILY HISTORY OF ELEVATED LIPOPROTEIN(A): ICD-10-CM

## 2025-07-31 DIAGNOSIS — K75.81 METABOLIC DYSFUNCTION-ASSOCIATED STEATOHEPATITIS (MASH): ICD-10-CM

## 2025-07-31 DIAGNOSIS — E66.01 MORBID OBESITY (HCC): ICD-10-CM

## 2025-07-31 DIAGNOSIS — D69.6 THROMBOCYTOPENIA (HCC): ICD-10-CM

## 2025-07-31 DIAGNOSIS — K74.02 ADVANCED HEPATIC FIBROSIS: ICD-10-CM

## 2025-07-31 DIAGNOSIS — K76.0 METALD: ICD-10-CM

## 2025-07-31 DIAGNOSIS — F10.90 METALD: ICD-10-CM

## 2025-07-31 DIAGNOSIS — Z12.5 PROSTATE CANCER SCREENING: ICD-10-CM

## 2025-07-31 LAB
ALBUMIN SERPL BCG-MCNC: 4.3 G/DL (ref 3.5–5)
ALP SERPL-CCNC: 82 U/L (ref 34–104)
ALT SERPL W P-5'-P-CCNC: 122 U/L (ref 7–52)
ANION GAP SERPL CALCULATED.3IONS-SCNC: 9 MMOL/L (ref 4–13)
AST SERPL W P-5'-P-CCNC: 143 U/L (ref 13–39)
BASOPHILS # BLD AUTO: 0.05 THOUSANDS/ÂΜL (ref 0–0.1)
BASOPHILS NFR BLD AUTO: 1 % (ref 0–1)
BILIRUB SERPL-MCNC: 0.82 MG/DL (ref 0.2–1)
BUN SERPL-MCNC: 16 MG/DL (ref 5–25)
CALCIUM SERPL-MCNC: 10.4 MG/DL (ref 8.4–10.2)
CHLORIDE SERPL-SCNC: 94 MMOL/L (ref 96–108)
CHOLEST SERPL-MCNC: 123 MG/DL (ref ?–200)
CO2 SERPL-SCNC: 28 MMOL/L (ref 21–32)
CREAT SERPL-MCNC: 0.69 MG/DL (ref 0.6–1.3)
EOSINOPHIL # BLD AUTO: 0.23 THOUSAND/ÂΜL (ref 0–0.61)
EOSINOPHIL NFR BLD AUTO: 4 % (ref 0–6)
ERYTHROCYTE [DISTWIDTH] IN BLOOD BY AUTOMATED COUNT: 13 % (ref 11.6–15.1)
GFR SERPL CREATININE-BSD FRML MDRD: 97 ML/MIN/1.73SQ M
GLUCOSE P FAST SERPL-MCNC: 100 MG/DL (ref 65–99)
HCT VFR BLD AUTO: 37.8 % (ref 36.5–49.3)
HDLC SERPL-MCNC: 72 MG/DL
HGB BLD-MCNC: 12.9 G/DL (ref 12–17)
IMM GRANULOCYTES # BLD AUTO: 0.02 THOUSAND/UL (ref 0–0.2)
IMM GRANULOCYTES NFR BLD AUTO: 0 % (ref 0–2)
INR PPP: 1.3 (ref 0.85–1.19)
LDLC SERPL CALC-MCNC: 43 MG/DL (ref 0–100)
LYMPHOCYTES # BLD AUTO: 1.39 THOUSANDS/ÂΜL (ref 0.6–4.47)
LYMPHOCYTES NFR BLD AUTO: 26 % (ref 14–44)
MCH RBC QN AUTO: 32.2 PG (ref 26.8–34.3)
MCHC RBC AUTO-ENTMCNC: 34.1 G/DL (ref 31.4–37.4)
MCV RBC AUTO: 94 FL (ref 82–98)
MONOCYTES # BLD AUTO: 0.71 THOUSAND/ÂΜL (ref 0.17–1.22)
MONOCYTES NFR BLD AUTO: 13 % (ref 4–12)
NEUTROPHILS # BLD AUTO: 2.9 THOUSANDS/ÂΜL (ref 1.85–7.62)
NEUTS SEG NFR BLD AUTO: 56 % (ref 43–75)
NRBC BLD AUTO-RTO: 0 /100 WBCS
PLATELET # BLD AUTO: 122 THOUSANDS/UL (ref 149–390)
PMV BLD AUTO: 10 FL (ref 8.9–12.7)
POTASSIUM SERPL-SCNC: 5 MMOL/L (ref 3.5–5.3)
PROT SERPL-MCNC: 7.9 G/DL (ref 6.4–8.4)
PROTHROMBIN TIME: 16.5 SECONDS (ref 12.3–15)
PSA SERPL-MCNC: 0.48 NG/ML (ref 0–4)
RBC # BLD AUTO: 4.01 MILLION/UL (ref 3.88–5.62)
SODIUM SERPL-SCNC: 131 MMOL/L (ref 135–147)
TRIGL SERPL-MCNC: 40 MG/DL (ref ?–150)
TSH SERPL DL<=0.05 MIU/L-ACNC: 3.45 UIU/ML (ref 0.45–4.5)
WBC # BLD AUTO: 5.3 THOUSAND/UL (ref 4.31–10.16)

## 2025-07-31 PROCEDURE — 84443 ASSAY THYROID STIM HORMONE: CPT

## 2025-07-31 PROCEDURE — 80061 LIPID PANEL: CPT

## 2025-07-31 PROCEDURE — 85025 COMPLETE CBC W/AUTO DIFF WBC: CPT

## 2025-07-31 PROCEDURE — 80053 COMPREHEN METABOLIC PANEL: CPT

## 2025-07-31 PROCEDURE — G0103 PSA SCREENING: HCPCS

## 2025-07-31 PROCEDURE — 85610 PROTHROMBIN TIME: CPT

## 2025-07-31 PROCEDURE — 36415 COLL VENOUS BLD VENIPUNCTURE: CPT

## 2025-08-01 ENCOUNTER — RESULTS FOLLOW-UP (OUTPATIENT)
Age: 70
End: 2025-08-01

## 2025-08-01 DIAGNOSIS — K74.02 ADVANCED HEPATIC FIBROSIS: ICD-10-CM

## 2025-08-01 DIAGNOSIS — E87.1 HYPONATREMIA: ICD-10-CM

## 2025-08-01 DIAGNOSIS — R74.8 ELEVATED LIVER ENZYMES: ICD-10-CM

## 2025-08-01 DIAGNOSIS — K75.81 METABOLIC DYSFUNCTION-ASSOCIATED STEATOHEPATITIS (MASH): Primary | ICD-10-CM

## 2025-08-07 ENCOUNTER — APPOINTMENT (OUTPATIENT)
Dept: LAB | Facility: CLINIC | Age: 70
End: 2025-08-07
Payer: COMMERCIAL

## 2025-08-16 ENCOUNTER — HOSPITAL ENCOUNTER (OUTPATIENT)
Dept: ULTRASOUND IMAGING | Facility: MEDICAL CENTER | Age: 70
Discharge: HOME | End: 2025-08-16
Attending: FAMILY MEDICINE
Payer: COMMERCIAL

## 2025-08-16 DIAGNOSIS — K74.02 ADVANCED HEPATIC FIBROSIS: ICD-10-CM

## 2025-08-16 DIAGNOSIS — K75.81 METABOLIC DYSFUNCTION-ASSOCIATED STEATOHEPATITIS (MASH): ICD-10-CM

## 2025-08-16 PROCEDURE — 76981 USE PARENCHYMA: CPT

## 2025-08-17 PROBLEM — Z00.00 MEDICARE ANNUAL WELLNESS VISIT, SUBSEQUENT: Status: RESOLVED | Noted: 2024-05-22 | Resolved: 2025-08-17
